# Patient Record
Sex: MALE | Race: WHITE | NOT HISPANIC OR LATINO | Employment: OTHER | URBAN - METROPOLITAN AREA
[De-identification: names, ages, dates, MRNs, and addresses within clinical notes are randomized per-mention and may not be internally consistent; named-entity substitution may affect disease eponyms.]

---

## 2017-01-16 DIAGNOSIS — R00.2 PALPITATIONS: Primary | ICD-10-CM

## 2017-01-18 RX ORDER — DILTIAZEM HYDROCHLORIDE 120 MG/1
120 CAPSULE, COATED, EXTENDED RELEASE ORAL DAILY
Qty: 90 CAPSULE | Refills: 0 | Status: SHIPPED | OUTPATIENT
Start: 2017-01-18 | End: 2017-02-24

## 2017-02-15 ENCOUNTER — DOCUMENTATION ONLY (OUTPATIENT)
Dept: LAB | Facility: CLINIC | Age: 77
End: 2017-02-15

## 2017-02-15 DIAGNOSIS — I10 HYPERTENSION GOAL BP (BLOOD PRESSURE) < 140/90: Primary | ICD-10-CM

## 2017-02-15 DIAGNOSIS — E78.5 HYPERLIPIDEMIA LDL GOAL <130: ICD-10-CM

## 2017-02-15 NOTE — PROGRESS NOTES
This patient has a lab only appointment on 2/20/2017 but does not have future orders. Please review, associate diagnosis and sign pending lab orders for the upcoming appointment.  He has an appointment with Dr. Doty on 2/24/2017.    Thank you,    [facility]

## 2017-02-20 DIAGNOSIS — E78.5 HYPERLIPIDEMIA LDL GOAL <130: ICD-10-CM

## 2017-02-20 DIAGNOSIS — I10 HYPERTENSION GOAL BP (BLOOD PRESSURE) < 140/90: ICD-10-CM

## 2017-02-20 LAB
ALBUMIN SERPL-MCNC: 3.7 G/DL (ref 3.4–5)
ALP SERPL-CCNC: 85 U/L (ref 40–150)
ALT SERPL W P-5'-P-CCNC: 32 U/L (ref 0–70)
ANION GAP SERPL CALCULATED.3IONS-SCNC: 8 MMOL/L (ref 3–14)
AST SERPL W P-5'-P-CCNC: 22 U/L (ref 0–45)
BILIRUB SERPL-MCNC: 0.6 MG/DL (ref 0.2–1.3)
BUN SERPL-MCNC: 10 MG/DL (ref 7–30)
CALCIUM SERPL-MCNC: 9.4 MG/DL (ref 8.5–10.1)
CHLORIDE SERPL-SCNC: 103 MMOL/L (ref 94–109)
CHOLEST SERPL-MCNC: 164 MG/DL
CO2 SERPL-SCNC: 27 MMOL/L (ref 20–32)
CREAT SERPL-MCNC: 0.73 MG/DL (ref 0.66–1.25)
ERYTHROCYTE [DISTWIDTH] IN BLOOD BY AUTOMATED COUNT: 14.3 % (ref 10–15)
GFR SERPL CREATININE-BSD FRML MDRD: NORMAL ML/MIN/1.7M2
GLUCOSE SERPL-MCNC: 98 MG/DL (ref 70–99)
HCT VFR BLD AUTO: 42.6 % (ref 40–53)
HDLC SERPL-MCNC: 66 MG/DL
HGB BLD-MCNC: 13.7 G/DL (ref 13.3–17.7)
LDLC SERPL CALC-MCNC: 82 MG/DL
MCH RBC QN AUTO: 30 PG (ref 26.5–33)
MCHC RBC AUTO-ENTMCNC: 32.2 G/DL (ref 31.5–36.5)
MCV RBC AUTO: 93 FL (ref 78–100)
NONHDLC SERPL-MCNC: 98 MG/DL
PLATELET # BLD AUTO: 193 10E9/L (ref 150–450)
POTASSIUM SERPL-SCNC: 3.9 MMOL/L (ref 3.4–5.3)
PROT SERPL-MCNC: 7.6 G/DL (ref 6.8–8.8)
RBC # BLD AUTO: 4.57 10E12/L (ref 4.4–5.9)
SODIUM SERPL-SCNC: 138 MMOL/L (ref 133–144)
TRIGL SERPL-MCNC: 81 MG/DL
WBC # BLD AUTO: 8.9 10E9/L (ref 4–11)

## 2017-02-20 PROCEDURE — 36415 COLL VENOUS BLD VENIPUNCTURE: CPT | Performed by: FAMILY MEDICINE

## 2017-02-20 PROCEDURE — 80053 COMPREHEN METABOLIC PANEL: CPT | Performed by: FAMILY MEDICINE

## 2017-02-20 PROCEDURE — 80061 LIPID PANEL: CPT | Performed by: FAMILY MEDICINE

## 2017-02-20 PROCEDURE — 85027 COMPLETE CBC AUTOMATED: CPT | Performed by: FAMILY MEDICINE

## 2017-02-24 ENCOUNTER — OFFICE VISIT (OUTPATIENT)
Dept: FAMILY MEDICINE | Facility: CLINIC | Age: 77
End: 2017-02-24
Payer: COMMERCIAL

## 2017-02-24 VITALS
SYSTOLIC BLOOD PRESSURE: 138 MMHG | HEIGHT: 69 IN | TEMPERATURE: 97.1 F | BODY MASS INDEX: 32.29 KG/M2 | HEART RATE: 74 BPM | OXYGEN SATURATION: 95 % | DIASTOLIC BLOOD PRESSURE: 67 MMHG | WEIGHT: 218 LBS

## 2017-02-24 DIAGNOSIS — R00.2 PALPITATIONS: ICD-10-CM

## 2017-02-24 DIAGNOSIS — E78.5 HYPERLIPIDEMIA LDL GOAL <130: Primary | ICD-10-CM

## 2017-02-24 DIAGNOSIS — J45.40 MODERATE PERSISTENT ASTHMA, UNCOMPLICATED: ICD-10-CM

## 2017-02-24 DIAGNOSIS — Z12.11 COLON CANCER SCREENING: ICD-10-CM

## 2017-02-24 PROCEDURE — 99214 OFFICE O/P EST MOD 30 MIN: CPT | Performed by: FAMILY MEDICINE

## 2017-02-24 RX ORDER — BUDESONIDE AND FORMOTEROL FUMARATE DIHYDRATE 160; 4.5 UG/1; UG/1
2 AEROSOL RESPIRATORY (INHALATION) 2 TIMES DAILY
Qty: 3 INHALER | Refills: 3 | Status: SHIPPED | OUTPATIENT
Start: 2017-02-24 | End: 2018-03-20

## 2017-02-24 RX ORDER — DILTIAZEM HYDROCHLORIDE 120 MG/1
120 CAPSULE, COATED, EXTENDED RELEASE ORAL DAILY
Qty: 90 CAPSULE | Refills: 3 | Status: SHIPPED | OUTPATIENT
Start: 2017-02-24 | End: 2018-03-20

## 2017-02-24 RX ORDER — ATORVASTATIN CALCIUM 20 MG/1
10 TABLET, FILM COATED ORAL DAILY
Qty: 45 TABLET | Refills: 3 | Status: SHIPPED | OUTPATIENT
Start: 2017-02-24 | End: 2018-03-07

## 2017-02-24 NOTE — PROGRESS NOTES
"SUBJECTIVE:  Guillermo Hogue, a 76 year old male scheduled an appointment to discuss the following issues:  Follow-up htn, high cholesterol and asthma.   Due for repeat colonoscopy in late spring.   Asthma stable. Weight loss - diet improving. Joined [employer] - silver sneakers.   Outside reading regularly. No chest pain. No abdominal pain. No nausea, vomiting or diarrhea or bloody stools. Occasionally gerd in past believed from sinuses -improving. HEPA filter.   Emotionally ok. No dating. Grandkid and great grandkids doing worse.    Past Medical History   Diagnosis Date     Allergic rhinitis age 22     Asthma 2009     Nasal polyposis 2009     Reactive airway disease 2009       Past Surgical History   Procedure Laterality Date     Colonoscopy       Tonsillectomy  age 22     Sinus surgery  2009     Colonoscopy with co2 insufflation N/A 5/14/2015     Procedure: COLONOSCOPY WITH CO2 INSUFFLATION;  Surgeon: Jose R Richmond MD;  Location:  OR       Family History   Problem Relation Age of Onset     Breast Cancer Mother      Arthritis Father      C.A.D. Father      CEREBROVASCULAR DISEASE Father      Respiratory Father      TB history     Rheumatoid Arthritis Father      CEREBROVASCULAR DISEASE Maternal Grandmother      Hypertension Maternal Grandmother      CANCER Maternal Grandfather      C.A.D. Brother      Prostate Cancer Brother      C.A.D. Brother        Social History   Substance Use Topics     Smoking status: Never Smoker     Smokeless tobacco: Never Used     Alcohol use No       ROS:  \  OBJECTIVE:  /67  Pulse 74  Temp 97.1  F (36.2  C) (Oral)  Ht 5' 9\" (1.753 m)  Wt 218 lb (98.9 kg)  SpO2 95%  BMI 32.19 kg/m2  EXAM:  GENERAL APPEARANCE: healthy, alert and no distress  NECK: no adenopathy, no asymmetry, masses, or scars and thyroid normal to palpation  RESP: lungs clear to auscultation - no rales, rhonchi or wheezes  CV: regular rates and rhythm, normal S1 S2, no S3 or S4 and no murmur, click or rub " -  ABDOMEN:  soft, nontender, no HSM or masses and bowel sounds normal  MS: extremities normal- no gross deformities noted, no evidence of inflammation in joints, FROM in all extremities.  PSYCH: mentation appears normal and affect normal/bright    ASSESSMENT / PLAN:  (E78.5) Hyperlipidemia LDL goal <130  (primary encounter diagnosis)  Comment: stable  Plan: atorvastatin (LIPITOR) 20 MG tablet        Continue diet/exercise. Chest pain or shortness of breath to er.     (R00.2) Palpitations  Comment: stable  Plan: diltiazem (CARTIA XT) 120 MG 24 hr capsule        To er if prolonged or LIGHTHEADED/ shortness of breath / chest pain. Reveiwed risks and side effects of medication      (J45.40) Moderate persistent asthma, uncomplicated  Comment: stable  Plan: budesonide-formoterol (SYMBICORT) 160-4.5         MCG/ACT Inhaler            (Z12.11) Colon cancer screening  Plan: GASTROENTEROLOGY ADULT REF PROCEDURE ONLY        Due in may - hopefully last needed with age.  Discussed psa - patient deferred.     Edi Doty

## 2017-02-24 NOTE — MR AVS SNAPSHOT
After Visit Summary   2/24/2017    Guillermo Hogue    MRN: [id number]           Patient Information     Date Of Birth          1940        Visit Information        Provider Department      2/24/2017 11:20 AM Edi Doty MD [facility]        Today's Diagnoses     Hyperlipidemia LDL goal <130    -  1    Palpitations        Moderate persistent asthma, uncomplicated        Colon cancer screening           Follow-ups after your visit        Additional Services     GASTROENTEROLOGY ADULT REF PROCEDURE ONLY       Last Lab Result: Creatinine       Date                     Value                 02/20/2017               0.73 mg/dL            10/13/2009               215              ----------  Body mass index is 32.19 kg/(m^2).     Needed:  No  Language:  English    Patient will be contacted to schedule procedure.     Please be aware that coverage of these services is subject to the terms and limitations of your health insurance plan.  Call member services at your health plan with any benefit or coverage questions.  Any procedures must be performed at a [place] facility OR coordinated by your clinic's referral office.    Please bring the following with you to your appointment:    (1) Any X-Rays, CTs or MRIs which have been performed.  Contact the facility where they were done to arrange for  prior to your scheduled appointment.    (2) List of current medications   (3) This referral request   (4) Any documents/labs given to you for this referral                  Your next 10 appointments already scheduled     Mar 20, 2017 10:00 AM CDT   Return Visit with Luis Cruz MD   [facility] ([facility] [place])    [address]  [place] [id number] [phone]              Who to contact     If you have questions or need follow up information about today's clinic visit or your schedule please contact [facility] directly at  "[phone].  Normal or non-critical lab and imaging results will be communicated to you by MyChart, letter or phone within 4 business days after the clinic has received the results. If you do not hear from us within 7 days, please contact the clinic through Sayduckhart or phone. If you have a critical or abnormal lab result, we will notify you by phone as soon as possible.  Submit refill requests through Phoodeez or call your pharmacy and they will forward the refill request to us. Please allow 3 business days for your refill to be completed.          Additional Information About Your Visit        SayduckharGraphite Software Information     Phoodeez lets you send messages to your doctor, view your test results, renew your prescriptions, schedule appointments and more. To sign up, go to [email or web address] . Click on \"Log in\" on the left side of the screen, which will take you to the Welcome page. Then click on \"Sign up Now\" on the right side of the page.     You will be asked to enter the access code listed below, as well as some personal information. Please follow the directions to create your username and password.     Your access code is: [email or web address]  Expires: 2017 11:58 AM     Your access code will  in 90 days. If you need help or a new code, please call your [place] clinic or [phone].        Care EveryWhere ID     This is your Care EveryWhere ID. This could be used by other organizations to access your [place] medical records  [id number]        Your Vitals Were     Pulse Temperature Height Pulse Oximetry BMI (Body Mass Index)       74 97.1  F (36.2  C) (Oral) 5' 9\" (1.753 m) 95% 32.19 kg/m2        Blood Pressure from Last 3 Encounters:   17 138/67   16 161/83   16 139/70    Weight from Last 3 Encounters:   17 218 lb (98.9 kg)   16 223 lb 3.2 oz (101.2 kg)   16 230 lb 3.2 oz (104.4 kg)              We Performed the Following     Asthma Action Plan (AAP)     GASTROENTEROLOGY " ADULT REF PROCEDURE ONLY          Today's Medication Changes          These changes are accurate as of: 2/24/17 11:58 AM.  If you have any questions, ask your nurse or doctor.               These medicines have changed or have updated prescriptions.        Dose/Directions    diltiazem 120 MG 24 hr capsule   Commonly known as:  CARTIA XT   This may have changed:  additional instructions   Used for:  Palpitations   Changed by:  Edi Doty MD        Dose:  120 mg   Take 1 capsule (120 mg) by mouth daily For blood pressure/lowers pulse. Pharmacy ok to hold prescription until due   Quantity:  90 capsule   Refills:  3            Where to get your medicines      These medications were sent to [facility]/pharmacy #[id number] - [place], [place] - [address],  [address]  [address], , [facility] [address]     Phone:  [phone]     atorvastatin 20 MG tablet    budesonide-formoterol 160-4.5 MCG/ACT Inhaler    diltiazem 120 MG 24 hr capsule                Primary Care Provider Office Phone # Fax #    Edi Doty [phone]       [facility] [address] [address] [facility] [address]        Thank you!     Thank you for choosing [facility]  for your care. Our goal is always to provide you with excellent care. Hearing back from our patients is one way we can continue to improve our services. Please take a few minutes to complete the written survey that you may receive in the mail after your visit with us. Thank you!             Your Updated Medication List - Protect others around you: Learn how to safely use, store and throw away your medicines at www.disposemymeds.org.          This list is accurate as of: 2/24/17 11:58 AM.  Always use your most recent med list.                   Brand Name Dispense Instructions for use    ARTHRITIS PAIN RELIEF PO      Take 1-2 tablets by mouth every 8 hours       atorvastatin 20 MG tablet    LIPITOR    45  tablet    Take 0.5 tablets (10 mg) by mouth daily For cholesterol Pharmacy ok to hold prescription until due       budesonide-formoterol 160-4.5 MCG/ACT Inhaler    SYMBICORT    3 Inhaler    Inhale 2 puffs into the lungs 2 times daily Pharmacy ok to hold prescription until due       COMPOUND - PHARMACY TO MIX COMPOUNDED MEDICATION    CMPD RX    2000 mL    20 mLs by Nasal Instillation route 2 times daily Itraconazole nasal solution 0.1mg/mL solution for nasal irrigation       diltiazem 120 MG 24 hr capsule    CARTIA XT    90 capsule    Take 1 capsule (120 mg) by mouth daily For blood pressure/lowers pulse. Pharmacy ok to hold prescription until due       diphenhydrAMINE-acetaminophen  MG tablet    TYLENOL PM     Take 1 tablet by mouth At Bedtime Once a week       IBUPROFEN PO      Take 200 mg by mouth Once a week alt with tylenol

## 2017-02-25 ASSESSMENT — ASTHMA QUESTIONNAIRES: ACT_TOTALSCORE: 25

## 2017-03-18 ENCOUNTER — TRANSFERRED RECORDS (OUTPATIENT)
Dept: HEALTH INFORMATION MANAGEMENT | Facility: CLINIC | Age: 77
End: 2017-03-18

## 2017-03-23 DIAGNOSIS — J32.4 CHRONIC PANSINUSITIS: ICD-10-CM

## 2017-03-23 NOTE — TELEPHONE ENCOUNTER
PT is requesting the following...    Gentamicin 0.016%/Dexameth 0.012% Irr  #2000ml  Last filled 11/23/16    Itraconazole 0.01% Nasal irrigation  #2000ml   Last filled 11/23/16      Thank you

## 2017-03-27 ENCOUNTER — TELEPHONE (OUTPATIENT)
Dept: FAMILY MEDICINE | Facility: CLINIC | Age: 77
End: 2017-03-27

## 2017-03-27 ENCOUNTER — TELEPHONE (OUTPATIENT)
Dept: OTOLARYNGOLOGY | Facility: CLINIC | Age: 77
End: 2017-03-27

## 2017-03-27 DIAGNOSIS — J31.0 CHRONIC RHINITIS: Primary | ICD-10-CM

## 2017-03-27 NOTE — TELEPHONE ENCOUNTER
Gokul the MA spoke with me about the lack of the second compounded medication.  I had only sent in Itraconazole, he also needs the Gent Dex.  I have just sent this in.

## 2017-03-27 NOTE — TELEPHONE ENCOUNTER
Pt has colonoscopy scheduled with Dr. Abisai Duarte at [facility] on 5-10-17.    Pt states he just picked up prep and it is different then what he has had in past.  He was given large galloon bottle and he would prefer to have two small bottles instead.  Advised that surgeons determine own prep's and if he wants something different the surgeon's nurse would need okay change with surgeon.  Message left at [facility] for nurse to contact pt about change.  Amanda Gold RN

## 2017-03-27 NOTE — TELEPHONE ENCOUNTER
This message was sent to the wrong team. It should be with Dr. Doty's team.    Regards,    Lata Bryan

## 2017-03-27 NOTE — TELEPHONE ENCOUNTER
Reason for call: Please call back regarding colonoscopy prep/Patient is questioning solution.    Phone Number Pt can be reached at: Home number on file [phone] (home)  Best Time: anytime  Can we leave a detailed message on this number? YES

## 2017-03-27 NOTE — TELEPHONE ENCOUNTER
Reason for Call:  Other prescription    Detailed comments: Per patient prior authorization is required for Gentamicin 0.016%/Dexameth 0.012% & Traconozole    Phone Number Patient can be reached at: Home number on file [phone] (home)    Best Time: anytime    Can we leave a detailed message on this number? YES    Call taken on 3/27/2017 at 10:44 AM by Astrid Ordonez

## 2017-03-27 NOTE — TELEPHONE ENCOUNTER
Daughter (memo) calling. There is come confusion about the compounding of this medication and with the pharmacy. Please call memo @  [phone].

## 2017-03-29 ENCOUNTER — TELEPHONE (OUTPATIENT)
Dept: FAMILY MEDICINE | Facility: CLINIC | Age: 77
End: 2017-03-29

## 2017-04-04 ENCOUNTER — OFFICE VISIT (OUTPATIENT)
Dept: OTOLARYNGOLOGY | Facility: CLINIC | Age: 77
End: 2017-04-04
Payer: COMMERCIAL

## 2017-04-04 VITALS — HEIGHT: 69 IN | BODY MASS INDEX: 31.84 KG/M2 | WEIGHT: 215 LBS | RESPIRATION RATE: 12 BRPM

## 2017-04-04 DIAGNOSIS — J32.4 CHRONIC PANSINUSITIS: Primary | ICD-10-CM

## 2017-04-04 DIAGNOSIS — J33.9 NASAL POLYPOSIS: ICD-10-CM

## 2017-04-04 DIAGNOSIS — J30.89 ALLERGIC RHINITIS DUE TO OTHER ALLERGIC TRIGGER, UNSPECIFIED RHINITIS SEASONALITY: ICD-10-CM

## 2017-04-04 PROCEDURE — 99214 OFFICE O/P EST MOD 30 MIN: CPT | Performed by: OTOLARYNGOLOGY

## 2017-04-04 NOTE — NURSING NOTE
"Chief Complaint   Patient presents with     RECHECK     sinus follow up       Initial Resp 12  Ht 1.753 m (5' 9\")  Wt 97.5 kg (215 lb)  BMI 31.75 kg/m2 Estimated body mass index is 31.75 kg/(m^2) as calculated from the following:    Height as of this encounter: 1.753 m (5' 9\").    Weight as of this encounter: 97.5 kg (215 lb).  Medication Reconciliation: complete     Gokul Thompson CMA      "

## 2017-04-04 NOTE — MR AVS SNAPSHOT
"              After Visit Summary   4/4/2017    Guillermo Hogue    MRN: [id number]           Patient Information     Date Of Birth          1940        Visit Information        Provider Department      4/4/2017 10:30 AM Alonso Valadez MD [facility]        Today's Diagnoses     Chronic pansinusitis    -  1    Allergic rhinitis due to other allergic trigger, unspecified rhinitis seasonality        Nasal polyposis           Follow-ups after your visit        Your next 10 appointments already scheduled     May 10, 2017   Procedure with Abisai Duarte,    [facility] (--)    [address] [facility] [address] [phone]              Who to contact     If you have questions or need follow up information about today's clinic visit or your schedule please contact [facility] directly at [phone].  Normal or non-critical lab and imaging results will be communicated to you by MyChart, letter or phone within 4 business days after the clinic has received the results. If you do not hear from us within 7 days, please contact the clinic through MyChart or phone. If you have a critical or abnormal lab result, we will notify you by phone as soon as possible.  Submit refill requests through DocuSpeak or call your pharmacy and they will forward the refill request to us. Please allow 3 business days for your refill to be completed.          Additional Information About Your Visit        MyChart Information     DocuSpeak lets you send messages to your doctor, view your test results, renew your prescriptions, schedule appointments and more. To sign up, go to [email or web address] . Click on \"Log in\" on the left side of the screen, which will take you to the Welcome page. Then click on \"Sign up Now\" on the right side of the page.     You will be asked to enter the access code listed below, as well as some personal information. Please follow the " "directions to create your username and password.     Your access code is: [email or web address]  Expires: 2017 12:58 PM     Your access code will  in 90 days. If you need help or a new code, please call your [place] clinic or [phone].        Care EveryWhere ID     This is your Care EveryWhere ID. This could be used by other organizations to access your [place] medical records  [id number]        Your Vitals Were     Respirations Height BMI (Body Mass Index)             12 1.753 m (5' 9\") 31.75 kg/m2          Blood Pressure from Last 3 Encounters:   17 138/67   16 161/83   16 139/70    Weight from Last 3 Encounters:   17 97.5 kg (215 lb)   17 98.9 kg (218 lb)   16 101.2 kg (223 lb 3.2 oz)              Today, you had the following     No orders found for display       Primary Care Provider Office Phone # Fax #    Edi Doty [phone]       [facility] [address] [facility] [address]        Thank you!     Thank you for choosing [facility]  for your care. Our goal is always to provide you with excellent care. Hearing back from our patients is one way we can continue to improve our services. Please take a few minutes to complete the written survey that you may receive in the mail after your visit with us. Thank you!             Your Updated Medication List - Protect others around you: Learn how to safely use, store and throw away your medicines at www.disposemymeds.org.          This list is accurate as of: 17 11:12 AM.  Always use your most recent med list.                   Brand Name Dispense Instructions for use    ARTHRITIS PAIN RELIEF PO      Take 1-2 tablets by mouth every 8 hours       atorvastatin 20 MG tablet    LIPITOR    45 tablet    Take 0.5 tablets (10 mg) by mouth daily For cholesterol Pharmacy ok to hold prescription until due       budesonide-formoterol 160-4.5 MCG/ACT Inhaler    SYMBICORT    " 3 Inhaler    Inhale 2 puffs into the lungs 2 times daily Pharmacy ok to hold prescription until due       * COMPOUND - PHARMACY TO MIX COMPOUNDED MEDICATION    CMPD RX    2000 mL    20 mLs by Nasal Instillation route 2 times daily Itraconazole nasal solution 0.1mg/mL solution for nasal irrigation       * COMPOUND - PHARMACY TO MIX COMPOUNDED MEDICATION    CMPD RX    2000 mL    20 mLs by Nasal Instillation route 2 times daily       diltiazem 120 MG 24 hr capsule    CARTIA XT    90 capsule    Take 1 capsule (120 mg) by mouth daily For blood pressure/lowers pulse. Pharmacy ok to hold prescription until due       diphenhydrAMINE-acetaminophen  MG tablet    TYLENOL PM     Take 1 tablet by mouth At Bedtime Once a week       IBUPROFEN PO      Take 200 mg by mouth Once a week alt with tylenol       * Notice:  This list has 2 medication(s) that are the same as other medications prescribed for you. Read the directions carefully, and ask your doctor or other care provider to review them with you.

## 2017-04-04 NOTE — PROGRESS NOTES
"History of Present Illness - Guillermo Hogue is a 76 year old male status post functional endoscopic sinus surgery on 2/12/2009.  At the visit on 11/11/2013 he told me that since the functional endoscopic sinus surgery his nose has been \"excellent.\"   However, we have continued to manage a chronic post nasal drainage.    To review, he was on BPM as an antihistamine, but because it was no longer available, I placed him on low dose atarax (hydroxyzine) to dry up his allergic rhinitis related post nasal drainage.  He has not had follow up since the last visit.  He told me that \"cornered the market on the BPM in [place]\" and he has just run out of it.  I tried him on atarax but he tells me that it is not helping nearly as much.  He is also taking the BID mucinex, but he thinks that is making only a minimal difference.  He had been taking sleeping pills to get him to sleep.    So with those failures of therapy, I tried a new approach, and used Gent Itraconazole Dex nasal irrigations with the thought that perhaps this was inflammatory post nasal drainage.  On 12/12/12, and the rinses worked dramatically well for him.  He did have complaints of sleep cycle issues, which I then addressed with decreasing the the Dex component.  But then at the follow up on 2/6/2013 there was a return of the drainage and congestion, so I resumed the full strength dex component for two months.  At the visit on 8/5/2014, I encouraged him to try and decrease the frequency of the medicated rinses, and continue saline irrigation.  And at the most recent visit on 10/5/2015, things were going great.        Past Medical History -   Patient Active Problem List   Diagnosis     Reactive airway disease     Nasal polyposis     AR (allergic rhinitis)     HYPERLIPIDEMIA LDL GOAL <130     Advanced directives, counseling/discussion     Moderate persistent asthma     BPH (benign prostatic hyperplasia)     Palpitations     Hypertension goal BP (blood " pressure) < 140/90     Fatty liver     Cataracts, bilateral       Current Medications -   Current Outpatient Prescriptions:      COMPOUND (CMPD RX) - PHARMACY TO MIX COMPOUNDED MEDICATION, 20 mLs by Nasal Instillation route 2 times daily, Disp: 2000 mL, Rfl: 06     COMPOUND (CMPD RX) - PHARMACY TO MIX COMPOUNDED MEDICATION, 20 mLs by Nasal Instillation route 2 times daily Itraconazole nasal solution 0.1mg/mL solution for nasal irrigation, Disp: 2000 mL, Rfl: 12     diphenhydrAMINE-acetaminophen (TYLENOL PM)  MG tablet, Take 1 tablet by mouth At Bedtime Once a week, Disp: , Rfl:      IBUPROFEN PO, Take 200 mg by mouth Once a week alt with tylenol, Disp: , Rfl:      diltiazem (CARTIA XT) 120 MG 24 hr capsule, Take 1 capsule (120 mg) by mouth daily For blood pressure/lowers pulse. Pharmacy ok to hold prescription until due, Disp: 90 capsule, Rfl: 3     atorvastatin (LIPITOR) 20 MG tablet, Take 0.5 tablets (10 mg) by mouth daily For cholesterol Pharmacy ok to hold prescription until due, Disp: 45 tablet, Rfl: 3     budesonide-formoterol (SYMBICORT) 160-4.5 MCG/ACT Inhaler, Inhale 2 puffs into the lungs 2 times daily Pharmacy ok to hold prescription until due, Disp: 3 Inhaler, Rfl: 3     Acetaminophen (ARTHRITIS PAIN RELIEF PO), Take 1-2 tablets by mouth every 8 hours, Disp: , Rfl:     Allergies -   Allergies   Allergen Reactions     Hytrin [Terazosin Hcl]      Leg swelling and vertigo     Simvastatin Other (See Comments)     Body aches     Cats      Codeine Nausea     Dogs      Dust Mites      [place] Trees      Seasonal Allergies        Social History -   Social History     Social History     Marital status:      Spouse name: Sanam -  2013     Number of children: 2     Years of education: 14     Occupational History     [employer] Management Retired          Social History Main Topics     Smoking status: Never Smoker     Smokeless tobacco: Never Used     Alcohol use No     Drug use: No     Sexual  "activity: No     Other Topics Concern     Parent/Sibling W/ Cabg, Mi Or Angioplasty Before 65f 55m? Yes     2 Brothers and father      Service No     Blood Transfusions No     Caffeine Concern No     Occupational Exposure Yes     he was in [employer]     Hobby Hazards No     Sleep Concern No     Stress Concern No     Weight Concern Yes     Special Diet No     Back Care No     Exercise No     Bike Helmet No     Seat Belt Yes     Self-Exams No     Social History Narrative       Family History -   Family History   Problem Relation Age of Onset     Breast Cancer Mother      Arthritis Father      C.A.D. Father      CEREBROVASCULAR DISEASE Father      Respiratory Father      TB history     Rheumatoid Arthritis Father      CEREBROVASCULAR DISEASE Maternal Grandmother      Hypertension Maternal Grandmother      CANCER Maternal Grandfather      C.A.D. Brother      Prostate Cancer Brother      C.A.D. Brother        Review of Systems - As per HPI and PMHx, otherwise 10+ system review of the head and neck, and general constitution is negative.    Physical Exam  Resp 12  Ht 1.753 m (5' 9\")    General - The patient is well nourished and well developed, and appears to have good nutritional status.  Alert and oriented to person and place, answers questions and cooperates with examination appropriately.   Head and Face - Normocephalic and atraumatic, with no gross asymmetry noted of the contour of the facial features.  The facial nerve is intact, with strong symmetric movements.  Voice and Breathing - The patient was breathing comfortably without the use of accessory muscles. There was no wheezing, stridor, or stertor.  The patients voice was clear and strong, and had appropriate pitch and quality.  Ears - The tympanic membranes are normal in appearance, bony landmarks are intact.  No retraction, perforation, or masses.  Normal mobility on valsalva maneuver, with no reports of dizziness on insuflation.  No fluid or purulence " was seen in the external canal or the middle ear. No evidence of infection of the middle ear or external canal, cerumen was normal in appearance.  Eyes - Extraocular movements intact, and the pupils were reactive to light.  Sclera were not icteric or injected, conjunctiva were pink and moist.  Mouth - Examination of the oral cavity showed pink, healthy oral mucosa. No lesions or ulcerations noted.  The tongue was mobile and midline, and the dentition were in good condition.    Throat - The walls of the oropharynx were smooth, pink, moist, symmetric, and had no lesions or ulcerations.  The tonsillar pillars and soft palate were symmetric.  The uvula was midline on elevation.    Neck - Normal midline excursion of the laryngotracheal complex during swallowing.  Full range of motion on passive movement.  Palpation of the occipital, submental, submandibular, internal jugular chain, and supraclavicular nodes did not demonstrate any abnormal lymph nodes or masses.  The carotid pulse was palpable bilaterally.  Palpation of the thyroid was soft and smooth, with no nodules or goiter appreciated.  The trachea was mobile and midline.  Nose - External contour is symmetric, no gross deflection or scars.  Nasal mucosa is pink and moist with no abnormal mucus.  The septum was midline and non-obstructive, turbinates of normal size and position.  No polyps, masses, or purulence noted on examination.      A/P - Guillermo Hogue is a 76 year old male  (J32.4) Chronic pansinusitis  (primary encounter diagnosis)  (J30.89) Allergic rhinitis due to other allergic trigger, unspecified rhinitis seasonality  (J33.9) Nasal polyposis    The Gent Dex, and Itraconazole are still helping him dramatically.  He mentioned that he did have a minor delay in getting the last mix, and it did somewhat worsen his arthritis.    I have renewed his prescriptions, and he should continue to have yearly follow up with me.

## 2017-04-13 DIAGNOSIS — R00.2 PALPITATIONS: ICD-10-CM

## 2017-04-13 RX ORDER — DILTIAZEM HYDROCHLORIDE 120 MG/1
CAPSULE, EXTENDED RELEASE ORAL
Qty: 90 CAPSULE | Refills: 0 | OUTPATIENT
Start: 2017-04-13

## 2017-05-10 ENCOUNTER — SURGERY (OUTPATIENT)
Age: 77
End: 2017-05-10

## 2017-05-10 ENCOUNTER — HOSPITAL ENCOUNTER (OUTPATIENT)
Facility: AMBULATORY SURGERY CENTER | Age: 77
Discharge: HOME | End: 2017-05-10
Attending: SURGERY | Admitting: SURGERY
Payer: COMMERCIAL

## 2017-05-10 VITALS
TEMPERATURE: 98.6 F | RESPIRATION RATE: 16 BRPM | OXYGEN SATURATION: 95 % | DIASTOLIC BLOOD PRESSURE: 69 MMHG | SYSTOLIC BLOOD PRESSURE: 126 MMHG

## 2017-05-10 PROCEDURE — 45380 COLONOSCOPY AND BIOPSY: CPT | Mod: PT

## 2017-05-10 PROCEDURE — 45380 COLONOSCOPY AND BIOPSY: CPT | Mod: PT | Performed by: SURGERY

## 2017-05-10 PROCEDURE — G8907 PT DOC NO EVENTS ON DISCHARG: HCPCS

## 2017-05-10 PROCEDURE — G8918 PT W/O PREOP ORDER IV AB PRO: HCPCS

## 2017-05-10 PROCEDURE — 88305 TISSUE EXAM BY PATHOLOGIST: CPT | Mod: 59 | Performed by: PATHOLOGY

## 2017-05-10 RX ORDER — ONDANSETRON 2 MG/ML
4 INJECTION INTRAMUSCULAR; INTRAVENOUS
Status: DISCONTINUED | OUTPATIENT
Start: 2017-05-10 | End: 2017-05-11 | Stop reason: HOSPADM

## 2017-05-10 RX ORDER — LIDOCAINE 40 MG/G
CREAM TOPICAL
Status: DISCONTINUED | OUTPATIENT
Start: 2017-05-10 | End: 2017-05-11 | Stop reason: HOSPADM

## 2017-05-10 RX ORDER — FENTANYL CITRATE 50 UG/ML
INJECTION, SOLUTION INTRAMUSCULAR; INTRAVENOUS PRN
Status: DISCONTINUED | OUTPATIENT
Start: 2017-05-10 | End: 2017-05-10 | Stop reason: HOSPADM

## 2017-05-10 RX ADMIN — FENTANYL CITRATE 100 MCG: 50 INJECTION, SOLUTION INTRAMUSCULAR; INTRAVENOUS at 10:45

## 2017-05-10 RX ADMIN — FENTANYL CITRATE 50 MCG: 50 INJECTION, SOLUTION INTRAMUSCULAR; INTRAVENOUS at 10:52

## 2017-05-10 RX ADMIN — FENTANYL CITRATE 50 MCG: 50 INJECTION, SOLUTION INTRAMUSCULAR; INTRAVENOUS at 11:10

## 2017-05-15 LAB — COPATH REPORT: NORMAL

## 2017-06-05 LAB — COLONOSCOPY: NORMAL

## 2018-02-06 ENCOUNTER — TRANSFERRED RECORDS (OUTPATIENT)
Dept: HEALTH INFORMATION MANAGEMENT | Facility: CLINIC | Age: 78
End: 2018-02-06

## 2018-03-07 DIAGNOSIS — E78.5 HYPERLIPIDEMIA LDL GOAL <130: ICD-10-CM

## 2018-03-07 RX ORDER — ATORVASTATIN CALCIUM 20 MG/1
TABLET, FILM COATED ORAL
Qty: 15 TABLET | Refills: 0 | Status: SHIPPED | OUTPATIENT
Start: 2018-03-07 | End: 2018-03-20

## 2018-03-07 NOTE — TELEPHONE ENCOUNTER
30-day supply only as patient is overdue for appointment     TC, patient due for:  OV with PCP for asthma, med refills, fasting lipids,     Health Maintenance Due   Topic Date Due     ASTHMA CONTROL TEST Q6 MOS  08/24/2017     ASTHMA ACTION PLAN Q1 YR  02/24/2018     FALL RISK ASSESSMENT  02/24/2018     Yeimi Bob RN

## 2018-03-07 NOTE — LETTER
March 7, 2018    Guillermo Hogue  [address]  [address]  [place] [address]    Dear Guillermo,       We recently received a refill request for atorvastatin (LIPITOR) 20 MG tablet.  We have refilled this for a one time 30 day supply only because you are due for a:    Cholesterol and asthma office visit and fasting lab appointment      Please schedule this lab appointment 4-5 days prior to the office visit.     Please call at your earliest convenience so that there will not be a delay with your future refills.          Thank you,   Your [facility] Team/  [phone]

## 2018-03-10 ENCOUNTER — TELEPHONE (OUTPATIENT)
Dept: FAMILY MEDICINE | Facility: CLINIC | Age: 78
End: 2018-03-10

## 2018-03-10 DIAGNOSIS — I10 HYPERTENSION GOAL BP (BLOOD PRESSURE) < 140/90: Primary | ICD-10-CM

## 2018-03-10 DIAGNOSIS — E78.5 HYPERLIPIDEMIA LDL GOAL <130: ICD-10-CM

## 2018-03-10 DIAGNOSIS — R00.2 PALPITATIONS: ICD-10-CM

## 2018-03-10 NOTE — TELEPHONE ENCOUNTER
Patient has a physical on 3/20 with Dr. Doty. He is scheduled for labs on 3/16 but does not have any standing orders. Please put orders in as needed for the previsit labs. He will be fasting.    Amanda MEJIA  Central Scheduler

## 2018-03-16 DIAGNOSIS — R00.2 PALPITATIONS: ICD-10-CM

## 2018-03-16 DIAGNOSIS — I10 HYPERTENSION GOAL BP (BLOOD PRESSURE) < 140/90: ICD-10-CM

## 2018-03-16 DIAGNOSIS — E78.5 HYPERLIPIDEMIA LDL GOAL <130: ICD-10-CM

## 2018-03-16 LAB
ALBUMIN SERPL-MCNC: 3.8 G/DL (ref 3.4–5)
ALP SERPL-CCNC: 82 U/L (ref 40–150)
ALT SERPL W P-5'-P-CCNC: 26 U/L (ref 0–70)
ANION GAP SERPL CALCULATED.3IONS-SCNC: 7 MMOL/L (ref 3–14)
AST SERPL W P-5'-P-CCNC: 20 U/L (ref 0–45)
BILIRUB SERPL-MCNC: 0.7 MG/DL (ref 0.2–1.3)
BUN SERPL-MCNC: 13 MG/DL (ref 7–30)
CALCIUM SERPL-MCNC: 9.3 MG/DL (ref 8.5–10.1)
CHLORIDE SERPL-SCNC: 103 MMOL/L (ref 94–109)
CHOLEST SERPL-MCNC: 153 MG/DL
CO2 SERPL-SCNC: 28 MMOL/L (ref 20–32)
CREAT SERPL-MCNC: 0.83 MG/DL (ref 0.66–1.25)
ERYTHROCYTE [DISTWIDTH] IN BLOOD BY AUTOMATED COUNT: 14.2 % (ref 10–15)
GFR SERPL CREATININE-BSD FRML MDRD: 90 ML/MIN/1.7M2
GLUCOSE SERPL-MCNC: 100 MG/DL (ref 70–99)
HCT VFR BLD AUTO: 44.1 % (ref 40–53)
HDLC SERPL-MCNC: 70 MG/DL
HGB BLD-MCNC: 14.3 G/DL (ref 13.3–17.7)
LDLC SERPL CALC-MCNC: 67 MG/DL
MCH RBC QN AUTO: 29.9 PG (ref 26.5–33)
MCHC RBC AUTO-ENTMCNC: 32.4 G/DL (ref 31.5–36.5)
MCV RBC AUTO: 92 FL (ref 78–100)
NONHDLC SERPL-MCNC: 83 MG/DL
PLATELET # BLD AUTO: 180 10E9/L (ref 150–450)
POTASSIUM SERPL-SCNC: 4 MMOL/L (ref 3.4–5.3)
PROT SERPL-MCNC: 7.9 G/DL (ref 6.8–8.8)
RBC # BLD AUTO: 4.79 10E12/L (ref 4.4–5.9)
SODIUM SERPL-SCNC: 138 MMOL/L (ref 133–144)
TRIGL SERPL-MCNC: 81 MG/DL
WBC # BLD AUTO: 8.6 10E9/L (ref 4–11)

## 2018-03-16 PROCEDURE — 80053 COMPREHEN METABOLIC PANEL: CPT | Performed by: FAMILY MEDICINE

## 2018-03-16 PROCEDURE — 85027 COMPLETE CBC AUTOMATED: CPT | Performed by: FAMILY MEDICINE

## 2018-03-16 PROCEDURE — 36415 COLL VENOUS BLD VENIPUNCTURE: CPT | Performed by: FAMILY MEDICINE

## 2018-03-16 PROCEDURE — 80061 LIPID PANEL: CPT | Performed by: FAMILY MEDICINE

## 2018-03-20 ENCOUNTER — OFFICE VISIT (OUTPATIENT)
Dept: FAMILY MEDICINE | Facility: CLINIC | Age: 78
End: 2018-03-20
Payer: COMMERCIAL

## 2018-03-20 VITALS
HEART RATE: 71 BPM | RESPIRATION RATE: 20 BRPM | TEMPERATURE: 97 F | BODY MASS INDEX: 32.14 KG/M2 | HEIGHT: 69 IN | SYSTOLIC BLOOD PRESSURE: 139 MMHG | DIASTOLIC BLOOD PRESSURE: 73 MMHG | WEIGHT: 217 LBS | OXYGEN SATURATION: 95 %

## 2018-03-20 DIAGNOSIS — R00.2 PALPITATIONS: ICD-10-CM

## 2018-03-20 DIAGNOSIS — I10 HYPERTENSION GOAL BP (BLOOD PRESSURE) < 140/90: Primary | ICD-10-CM

## 2018-03-20 DIAGNOSIS — J45.40 MODERATE PERSISTENT ASTHMA, UNCOMPLICATED: ICD-10-CM

## 2018-03-20 DIAGNOSIS — E78.5 HYPERLIPIDEMIA LDL GOAL <130: ICD-10-CM

## 2018-03-20 PROCEDURE — 99214 OFFICE O/P EST MOD 30 MIN: CPT | Performed by: FAMILY MEDICINE

## 2018-03-20 RX ORDER — DILTIAZEM HYDROCHLORIDE 120 MG/1
120 CAPSULE, COATED, EXTENDED RELEASE ORAL DAILY
Qty: 90 CAPSULE | Refills: 3 | Status: SHIPPED | OUTPATIENT
Start: 2018-03-20 | End: 2023-01-23

## 2018-03-20 RX ORDER — BUDESONIDE AND FORMOTEROL FUMARATE DIHYDRATE 160; 4.5 UG/1; UG/1
2 AEROSOL RESPIRATORY (INHALATION) 2 TIMES DAILY
Qty: 3 INHALER | Refills: 3 | Status: SHIPPED | OUTPATIENT
Start: 2018-03-20 | End: 2018-06-27

## 2018-03-20 RX ORDER — ATORVASTATIN CALCIUM 20 MG/1
TABLET, FILM COATED ORAL
Qty: 45 TABLET | Refills: 1 | Status: SHIPPED | OUTPATIENT
Start: 2018-03-20 | End: 2018-09-30

## 2018-03-20 ASSESSMENT — PAIN SCALES - GENERAL: PAINLEVEL: MILD PAIN (2)

## 2018-03-20 NOTE — MR AVS SNAPSHOT
After Visit Summary   3/20/2018    Guillermo Hogue    MRN: [id number]           Patient Information     Date Of Birth          1940        Visit Information        Provider Department      3/20/2018 10:10 AM Edi Doty MD [facility]        Today's Diagnoses     Hypertension goal BP (blood pressure) < 140/90    -  1    Hyperlipidemia LDL goal <130        Palpitations        Moderate persistent asthma, uncomplicated           Follow-ups after your visit        Additional Services     CARDIOLOGY EVAL ADULT REFERRAL       Preferred location:  OTHER PROVIDERS:[email or web address] [phone] Palpitations follow-up.      Please be aware that coverage of these services is subject to the terms and limitations of your health insurance plan.  Call member services at your health plan with any benefit or coverage questions.      Please bring the following to your appointment:  Any x-rays, CTs or MRIs which have been performed. Contact the facility where they were done to arrange for  prior to your scheduled appointment.    List of current medications  This referral request   Any documents/labs given to you for this referral                  Who to contact     If you have questions or need follow up information about today's clinic visit or your schedule please contact [facility] directly at [phone].  Normal or non-critical lab and imaging results will be communicated to you by MyChart, letter or phone within 4 business days after the clinic has received the results. If you do not hear from us within 7 days, please contact the clinic through MyChart or phone. If you have a critical or abnormal lab result, we will notify you by phone as soon as possible.  Submit refill requests through [employer] or call your pharmacy and they will forward the refill request to us. Please allow 3 business days for your refill to be completed.          Additional  "Information About Your Visit        Care EveryWhere ID     This is your Care EveryWhere ID. This could be used by other organizations to access your [facility] medical records  [id number]        Your Vitals Were     Pulse Temperature Respirations Height Pulse Oximetry BMI (Body Mass Index)    71 97  F (36.1  C) (Oral) 20 5' 9\" (1.753 m) 95% 32.05 kg/m2       Blood Pressure from Last 3 Encounters:   03/20/18 139/73   05/10/17 126/69   02/24/17 138/67    Weight from Last 3 Encounters:   03/20/18 217 lb (98.4 kg)   04/04/17 215 lb (97.5 kg)   02/24/17 218 lb (98.9 kg)              We Performed the Following     CARDIOLOGY EVAL ADULT REFERRAL          Today's Medication Changes          These changes are accurate as of 3/20/18 10:35 AM.  If you have any questions, ask your nurse or doctor.               These medicines have changed or have updated prescriptions.        Dose/Directions    atorvastatin 20 MG tablet   Commonly known as:  LIPITOR   This may have changed:  See the new instructions.   Used for:  Hyperlipidemia LDL goal <130   Changed by:  Edi Doty MD        TAKE ONE HALF TABLET BY MOUTH DAILY FOR CHOLESTEROL.   Quantity:  45 tablet   Refills:  1            Where to get your medicines      These medications were sent to [facility]/pharmacy #[id number] - [place], [place] - Count includes the [facility],  AT [address], [facility] [address]     Phone:  [phone]     atorvastatin 20 MG tablet    budesonide-formoterol 160-4.5 MCG/ACT Inhaler    diltiazem 120 MG 24 hr capsule                Primary Care Provider Office Phone # Fax #    Edi Doty [phone] [address] [address] [facility] [address]        Equal Access to Services     VIRI AMANDA AH: Amadou mendes Sovish, wakarenda luqethel, qaybta kaalmada sun, samia chen. So [facility] [phone].    ATENCIÓN: Si habla español, tiene a murcia disposición servicios " vanda de asistencia lingüística. Fabrice shankar [phone].    We comply with applicable federal civil rights laws and [place] laws. We do not discriminate on the basis of race, color, national origin, age, disability, sex, sexual orientation, or gender identity.            Thank you!     Thank you for choosing [facility]  for your care. Our goal is always to provide you with excellent care. Hearing back from our patients is one way we can continue to improve our services. Please take a few minutes to complete the written survey that you may receive in the mail after your visit with us. Thank you!             Your Updated Medication List - Protect others around you: Learn how to safely use, store and throw away your medicines at www.disposemymeds.org.          This list is accurate as of 3/20/18 10:35 AM.  Always use your most recent med list.                   Brand Name Dispense Instructions for use Diagnosis    ARTHRITIS PAIN RELIEF PO      Take 1-2 tablets by mouth every 8 hours        atorvastatin 20 MG tablet    LIPITOR    45 tablet    TAKE ONE HALF TABLET BY MOUTH DAILY FOR CHOLESTEROL.    Hyperlipidemia LDL goal <130       budesonide-formoterol 160-4.5 MCG/ACT Inhaler    SYMBICORT    3 Inhaler    Inhale 2 puffs into the lungs 2 times daily Pharmacy ok to hold prescription until due    Moderate persistent asthma, uncomplicated       * COMPOUNDED NON-CONTROLLED SUBSTANCE - PHARMACY TO MIX COMPOUNDED MEDICATION    CMPD RX    2000 mL    20 mLs by Nasal Instillation route 2 times daily Itraconazole nasal solution 0.1mg/mL solution for nasal irrigation    Chronic pansinusitis       * COMPOUNDED NON-CONTROLLED SUBSTANCE - PHARMACY TO MIX COMPOUNDED MEDICATION    CMPD RX    2000 mL    20 mLs by Nasal Instillation route 2 times daily    Chronic rhinitis       diltiazem 120 MG 24 hr capsule    CARTIA XT    90 capsule    Take 1 capsule (120 mg) by mouth daily For blood pressure/lowers pulse. Pharmacy ok  to hold prescription until due    Palpitations       diphenhydrAMINE-acetaminophen  MG tablet    TYLENOL PM     Take 1 tablet by mouth At Bedtime Once a week        IBUPROFEN PO      Take 200 mg by mouth Once a week alt with tylenol        * Notice:  This list has 2 medication(s) that are the same as other medications prescribed for you. Read the directions carefully, and ask your doctor or other care provider to review them with you.

## 2018-03-20 NOTE — PROGRESS NOTES
"SUBJECTIVE:  Guillermo Hogue, a 77 year old male scheduled an appointment to discuss the following issues:     Hypertension goal BP (blood pressure) < 140/90  Moderate persistent asthma without complication  Hyperlipidemia LDL goal <130  Follow-up htn, high cholesterol and asthma.  Breathing worse in spring/winter. No winter vacation.   No chest pain. Walking doing ok. No nausea, vomiting or diarrhea. No black or blood stools. No urine changes or hematuria.   Seen cardiology 2 years - wanted to see again.   Medical, social, surgical, and family histories reviewed.    ROS:  All other ROS negative    OBJECTIVE:  /73  Pulse 71  Temp 97  F (36.1  C) (Oral)  Resp 20  Ht 5' 9\" (1.753 m)  Wt 217 lb (98.4 kg)  SpO2 95%  BMI 32.05 kg/m2  EXAM:  GENERAL APPEARANCE: healthy, alert and no distress  EYES: EOMI,  PERRL  HENT: ear canals and TM's normal and nose and mouth without ulcers or lesions  NECK: no adenopathy, no asymmetry, masses, or scars and thyroid normal to palpation  RESP: lungs clear to auscultation - no rales, rhonchi or wheezes  CV: regular rates and rhythm, normal S1 S2, no S3 or S4 and no murmur, click or rub -  ABDOMEN:  soft, nontender, no HSM or masses and bowel sounds normal  MS: extremities normal- no gross deformities noted, no evidence of inflammation in joints, FROM in all extremities.  NEURO: Normal strength and tone, sensory exam grossly normal, mentation intact and speech normal  PSYCH: mentation appears normal and affect normal/bright    ASSESSMENT / PLAN:  (I10) Hypertension goal BP (blood pressure) < 140/90  (primary encounter diagnosis)  Comment: stable  Plan: self-monitor and exercise. Return to clinic if worse/limit sodium    (E78.5) Hyperlipidemia LDL goal <130  Comment: excellent  Plan: atorvastatin (LIPITOR) 20 MG tablet        Continue exercise    (R00.2) Palpitations  Comment: intermittent  Plan: diltiazem (CARTIA XT) 120 MG 24 hr capsule,         CARDIOLOGY EVAL ADULT " REFERRAL        Patient would like to go back to cardiology [facility]. Prolonged or chest pain/ LIGHTHEADED to er.     (J45.40) Moderate persistent asthma, uncomplicated  Comment: stable  Plan: budesonide-formoterol (SYMBICORT) 160-4.5         MCG/ACT Inhaler        Back to specialist if worse. Acutely worsening shortness of breath to er.     Edi Doty md

## 2018-03-20 NOTE — NURSING NOTE
"Chief Complaint   Patient presents with     Hypertension     Lipids       Initial /73  Pulse 71  Temp 97  F (36.1  C) (Oral)  Resp 20  Ht 5' 9\" (1.753 m)  Wt 217 lb (98.4 kg)  SpO2 95%  BMI 32.05 kg/m2 Estimated body mass index is 32.05 kg/(m^2) as calculated from the following:    Height as of this encounter: 5' 9\" (1.753 m).    Weight as of this encounter: 217 lb (98.4 kg).    Amanda Mead, CMA    "

## 2018-05-15 ENCOUNTER — TRANSFERRED RECORDS (OUTPATIENT)
Dept: HEALTH INFORMATION MANAGEMENT | Facility: CLINIC | Age: 78
End: 2018-05-15

## 2018-06-09 DIAGNOSIS — J45.40 MODERATE PERSISTENT ASTHMA, UNCOMPLICATED: ICD-10-CM

## 2018-06-11 RX ORDER — BUDESONIDE AND FORMOTEROL FUMARATE DIHYDRATE 160; 4.5 UG/1; UG/1
AEROSOL RESPIRATORY (INHALATION)
Qty: 30.6 INHALER | Refills: 1 | Status: SHIPPED | OUTPATIENT
Start: 2018-06-11 | End: 2019-01-07

## 2018-06-11 NOTE — TELEPHONE ENCOUNTER
symbicort refill request  Dr. Edi Doty had filled on 3/20/18  #3 with 3 refills.  Resent.  Holli John RN

## 2018-06-12 ENCOUNTER — TRANSFERRED RECORDS (OUTPATIENT)
Dept: HEALTH INFORMATION MANAGEMENT | Facility: CLINIC | Age: 78
End: 2018-06-12

## 2018-06-19 ENCOUNTER — THERAPY VISIT (OUTPATIENT)
Dept: PHYSICAL THERAPY | Facility: CLINIC | Age: 78
End: 2018-06-19
Payer: COMMERCIAL

## 2018-06-19 DIAGNOSIS — R29.818 NEUROLOGICAL SIGNS: ICD-10-CM

## 2018-06-19 DIAGNOSIS — H81.12 BENIGN PAROXYSMAL POSITIONAL VERTIGO, LEFT: ICD-10-CM

## 2018-06-19 PROCEDURE — G8981 BODY POS CURRENT STATUS: HCPCS | Mod: GP | Performed by: PHYSICAL THERAPIST

## 2018-06-19 PROCEDURE — 95992 CANALITH REPOSITIONING PROC: CPT | Mod: GP | Performed by: PHYSICAL THERAPIST

## 2018-06-19 PROCEDURE — G8982 BODY POS GOAL STATUS: HCPCS | Mod: GP | Performed by: PHYSICAL THERAPIST

## 2018-06-19 PROCEDURE — 97161 PT EVAL LOW COMPLEX 20 MIN: CPT | Mod: GP | Performed by: PHYSICAL THERAPIST

## 2018-06-19 NOTE — MR AVS SNAPSHOT
"              After Visit Summary   6/19/2018    Guillermo Hogue    MRN: [id number]           Patient Information     Date Of Birth          1940        Visit Information        Provider Department      6/19/2018 9:20 AM Chinedu Muro PT [facility] [place] [facility]        Today's Diagnoses     Benign paroxysmal positional vertigo, left        Neurological signs           Follow-ups after your visit        Your next 10 appointments already scheduled     Jun 26, 2018  1:50 PM CDT   [facility] with Chinedu Muro PT   [facility] [place] [facility] ([facility] [place])    [address]  [address]  [place] [address] [phone]              Who to contact     If you have questions or need follow up information about today's clinic visit or your schedule please contact [facility] [place] directly at [phone].  Normal or non-critical lab and imaging results will be communicated to you by [employer], letter or phone within 4 business days after the clinic has received the results. If you do not hear from us within 7 days, please contact the clinic through [employer] or phone. If you have a critical or abnormal lab result, we will notify you by phone as soon as possible.  Submit refill requests through Ex[employer] or call your pharmacy and they will forward the refill request to us. Please allow 3 business days for your refill to be completed.          Additional Information About Your Visit        MyChart Information     Ex[employer]aster lets you send messages to your doctor, view your test results, renew your prescriptions, schedule appointments and more. To sign up, go to [email or web address] . Click on \"Log in\" on the left side of the screen, which will take you to the Welcome page. Then click on \"Sign up Now\" on the right side of the page.     You will be asked to enter the access code listed below, as well as some personal information. Please follow " the directions to create your username and password.     Your access code is: [email or web address]  Expires: 2018 12:58 PM     Your access code will  in 90 days. If you need help or a new code, please call your [place] clinic or [phone].        Care EveryWhere ID     This is your Care EveryWhere ID. This could be used by other organizations to access your [place] medical records  [id number]         Blood Pressure from Last 3 Encounters:   18 139/73   05/10/17 126/69   17 138/67    Weight from Last 3 Encounters:   18 98.4 kg (217 lb)   17 97.5 kg (215 lb)   17 98.9 kg (218 lb)              We Performed the Following     CANALITH REPOSITIONING, PER DAY     HC PT [place], LOW COMPLEXITY        Primary Care Provider Office Phone # Fax #    Edi Glenn Doty [phone] [address] [facility] [address]        Equal Access to Services     [facility]: Hadii aad ku hadasho Soomaali, waaxda luqadaha, qaybta kaalmada adeegyada, waxay idiin hayfrankien benigno reza . So [facility] [phone].    ATENCIÓN: Si habla español, tiene a murcia disposición servicios gratuitos de asistencia lingüística. Llame al [phone].    We comply with applicable federal civil rights laws and [place] laws. We do not discriminate on the basis of race, color, national origin, age, disability, sex, sexual orientation, or gender identity.            Thank you!     Thank you for choosing [facility]  for your care. Our goal is always to provide you with excellent care. Hearing back from our patients is one way we can continue to improve our services. Please take a few minutes to complete the written survey that you may receive in the mail after your visit with us. Thank you!             Your Updated Medication List - Protect others around you: Learn how to safely use, store and throw away your medicines at www.disposemymeds.org.          This list is  accurate as of 6/19/18 12:58 PM.  Always use your most recent med list.                   Brand Name Dispense Instructions for use Diagnosis    ARTHRITIS PAIN RELIEF PO      Take 1-2 tablets by mouth every 8 hours        atorvastatin 20 MG tablet    LIPITOR    45 tablet    TAKE ONE HALF TABLET BY MOUTH DAILY FOR CHOLESTEROL.    Hyperlipidemia LDL goal <130       * budesonide-formoterol 160-4.5 MCG/ACT Inhaler    SYMBICORT    3 Inhaler    Inhale 2 puffs into the lungs 2 times daily Pharmacy ok to hold prescription until due    Moderate persistent asthma, uncomplicated       * SYMBICORT 160-4.5 MCG/ACT Inhaler   Generic drug:  budesonide-formoterol     30.6 Inhaler    INHALE 2 PUFFS INTO THE LUNGS 2 TIMES DAILY PHARMACY OK TO HOLD PRESCRIPTION UNTIL DUE    Moderate persistent asthma, uncomplicated       * COMPOUNDED NON-CONTROLLED SUBSTANCE - PHARMACY TO MIX COMPOUNDED MEDICATION    CMPD RX    2000 mL    20 mLs by Nasal Instillation route 2 times daily Itraconazole nasal solution 0.1mg/mL solution for nasal irrigation    Chronic pansinusitis       * COMPOUNDED NON-CONTROLLED SUBSTANCE - PHARMACY TO MIX COMPOUNDED MEDICATION    CMPD RX    2000 mL    20 mLs by Nasal Instillation route 2 times daily    Chronic rhinitis       diltiazem 120 MG 24 hr capsule    CARTIA XT    90 capsule    Take 1 capsule (120 mg) by mouth daily For blood pressure/lowers pulse. Pharmacy ok to hold prescription until due    Palpitations       diphenhydrAMINE-acetaminophen  MG tablet    TYLENOL PM     Take 1 tablet by mouth At Bedtime Once a week        IBUPROFEN PO      Take 200 mg by mouth Once a week alt with tylenol        * Notice:  This list has 4 medication(s) that are the same as other medications prescribed for you. Read the directions carefully, and ask your doctor or other care provider to review them with you.

## 2018-06-19 NOTE — PROGRESS NOTES
[facility] Initial Evaluation  Subjective:  HPI                    Objective:  System    Physical Exam    General     ROS  S:  Guillermo is a 78 year old patient complaining of vertigo.  Denies HAs, tinnitus, changes in vision or nausea  Onset of symptoms: Started around 6/1/18 for unknown reasons.  No recent head trauma, ear or sinus infections, change in meds      Is this a recurrent problem: Yes, was seen by me in 2012 for the same issue  Progression since onset: mild improvement  Frequency of episodes/time of day: worse in the AM  Duration of episodes: less than a minute  Exacerbating factors: rolling to the L  Relieving factors: rolling to the R  Previous treatments:  PT- very helpful  Special tests/diagnostics performed: no new testing  Significant medical hx: asthma, overweight  Meds: none reported on HH but later states he takes meds for asthma  Occupation: retired   Work requirements: N/A  General health reported by patient: fair  Red Flags: dizziness      O:  Cervical ROM screen: major loss of cervical extension, other motions are functional  Oculomotor/gaze stability screen: Smooth pursuits, saccades and gaze stab WNL  Vertebral artery test: negative  Hallpike-[place] maneuver:  R: negative  L: negative  Horizontal canal test:  R: negative  L: positive  Balance testing:  Not tested      Assessment/Plan:    Patient is a 78 year old male with dizziness complaints.    Patient has the following significant findings with corresponding treatment plan.                Diagnosis 1:  BPPV  Dizziness - CRMs and home program    Therapy Evaluation Codes:   1) History comprised of:   Personal factors that impact the plan of care:      Age, Overall behavior pattern and Past/current experiences.    Comorbidity factors that impact the plan of care are:      Dizziness.     Medications impacting care: None.  2) Examination of Body Systems comprised of:   Body structures and functions that impact the plan of  care:      vestibular.   Activity limitations that impact the plan of care are:      rolling in bed.  3) Clinical presentation characteristics are:   Stable/Uncomplicated.  4) Decision-Making    Low complexity using standardized patient assessment instrument and/or measureable assessment of functional outcome.  Cumulative Therapy Evaluation is: Low complexity.    Previous and current functional limitations:  (See Goal Flow Sheet for this information)    Short term and Long term goals: (See Goal Flow Sheet for this information)     Communication ability:  Patient appears to be able to clearly communicate and understand verbal and written communication and follow directions correctly.  Treatment Explanation - The following has been discussed with the patient:   RX ordered/plan of care  Anticipated outcomes  Possible risks and side effects  This patient would benefit from PT intervention to resume normal activities.   Rehab potential is good.    Frequency:  1 X week, once daily  Duration:  for 3 weeks  Discharge Plan:  Achieve all LTG.  Independent in home treatment program.  Reach maximal therapeutic benefit.    Please refer to the daily flowsheet for treatment today, total treatment time and time spent performing 1:1 timed codes.

## 2018-06-26 ENCOUNTER — THERAPY VISIT (OUTPATIENT)
Dept: PHYSICAL THERAPY | Facility: CLINIC | Age: 78
End: 2018-06-26
Payer: COMMERCIAL

## 2018-06-26 DIAGNOSIS — R29.818 NEUROLOGICAL SIGNS: ICD-10-CM

## 2018-06-26 DIAGNOSIS — H81.12 BENIGN PAROXYSMAL POSITIONAL VERTIGO, LEFT: ICD-10-CM

## 2018-06-26 PROCEDURE — G8983 BODY POS D/C STATUS: HCPCS | Mod: GP | Performed by: PHYSICAL THERAPIST

## 2018-06-26 PROCEDURE — 97530 THERAPEUTIC ACTIVITIES: CPT | Mod: GP | Performed by: PHYSICAL THERAPIST

## 2018-06-26 PROCEDURE — 95992 CANALITH REPOSITIONING PROC: CPT | Mod: GP | Performed by: PHYSICAL THERAPIST

## 2018-06-26 PROCEDURE — G8982 BODY POS GOAL STATUS: HCPCS | Mod: GP | Performed by: PHYSICAL THERAPIST

## 2018-06-26 NOTE — MR AVS SNAPSHOT
"              After Visit Summary   2018    Guillermo Hogue    MRN: [id number]           Patient Information     Date Of Birth          1940        Visit Information        Provider Department      2018 1:50 PM Chinedu Muro PT [facility] [place]        Today's Diagnoses     Benign paroxysmal positional vertigo, left        Neurological signs           Follow-ups after your visit        Who to contact     If you have questions or need follow up information about today's clinic visit or your schedule please contact [facility] [place] directly at [phone].  Normal or non-critical lab and imaging results will be communicated to you by [employer], letter or phone within 4 business days after the clinic has received the results. If you do not hear from us within 7 days, please contact the clinic through [employer] or phone. If you have a critical or abnormal lab result, we will notify you by phone as soon as possible.  Submit refill requests through [employer] or call your pharmacy and they will forward the refill request to us. Please allow 3 business days for your refill to be completed.          Additional Information About Your Visit        MyChart Information     [employer] lets you send messages to your doctor, view your test results, renew your prescriptions, schedule appointments and more. To sign up, go to [email or web address] . Click on \"Log in\" on the left side of the screen, which will take you to the Welcome page. Then click on \"Sign up Now\" on the right side of the page.     You will be asked to enter the access code listed below, as well as some personal information. Please follow the directions to create your username and password.     Your access code is: [email or web address]  Expires: 2018 12:58 PM     Your access code will  in 90 days. If you need help or a new code, please call your [place] clinic or [phone].        Care " EveryWhere ID     This is your Care EveryWhere ID. This could be used by other organizations to access your [place] medical records  [id number]         Blood Pressure from Last 3 Encounters:   03/20/18 139/73   05/10/17 126/69   02/24/17 138/67    Weight from Last 3 Encounters:   03/20/18 98.4 kg (217 lb)   04/04/17 97.5 kg (215 lb)   02/24/17 98.9 kg (218 lb)              We Performed the Following     CANALITH REPOSITIONING, PER DAY     THERAPEUTIC ACTIVITIES        Primary Care Provider Office Phone # Fax #    Edi Glenn Doty [phone] [address] [facility] [address]        Equal Access to Services     VIRI AMANDA : Hadii aad ku hadasho Sovish, waaxda luqadaha, qaybta kaalmada adeegyada, samia chen. So [facility] [phone].    ATENCIÓN: Si habla español, tiene a murcia disposición servicios gratuitos de asistencia lingüística. [facility] [phone].    We comply with applicable federal civil rights laws and [place] laws. We do not discriminate on the basis of race, color, national origin, age, disability, sex, sexual orientation, or gender identity.            Thank you!     Thank you for choosing [facility]   for your care. Our goal is always to provide you with excellent care. Hearing back from our patients is one way we can continue to improve our services. Please take a few minutes to complete the written survey that you may receive in the mail after your visit with us. Thank you!             Your Updated Medication List - Protect others around you: Learn how to safely use, store and throw away your medicines at www.disposemymeds.org.          This list is accurate as of 6/26/18  9:16 PM.  Always use your most recent med list.                   Brand Name Dispense Instructions for use Diagnosis    ARTHRITIS PAIN RELIEF PO      Take 1-2 tablets by mouth every 8 hours        atorvastatin 20 MG tablet    LIPITOR    45  tablet    TAKE ONE HALF TABLET BY MOUTH DAILY FOR CHOLESTEROL.    Hyperlipidemia LDL goal <130       * budesonide-formoterol 160-4.5 MCG/ACT Inhaler    SYMBICORT    3 Inhaler    Inhale 2 puffs into the lungs 2 times daily Pharmacy ok to hold prescription until due    Moderate persistent asthma, uncomplicated       * SYMBICORT 160-4.5 MCG/ACT Inhaler   Generic drug:  budesonide-formoterol     30.6 Inhaler    INHALE 2 PUFFS INTO THE LUNGS 2 TIMES DAILY PHARMACY OK TO HOLD PRESCRIPTION UNTIL DUE    Moderate persistent asthma, uncomplicated       * COMPOUNDED NON-CONTROLLED SUBSTANCE - PHARMACY TO MIX COMPOUNDED MEDICATION    CMPD RX    2000 mL    20 mLs by Nasal Instillation route 2 times daily Itraconazole nasal solution 0.1mg/mL solution for nasal irrigation    Chronic pansinusitis       * COMPOUNDED NON-CONTROLLED SUBSTANCE - PHARMACY TO MIX COMPOUNDED MEDICATION    CMPD RX    2000 mL    20 mLs by Nasal Instillation route 2 times daily    Chronic rhinitis       diltiazem 120 MG 24 hr capsule    CARTIA XT    90 capsule    Take 1 capsule (120 mg) by mouth daily For blood pressure/lowers pulse. Pharmacy ok to hold prescription until due    Palpitations       diphenhydrAMINE-acetaminophen  MG tablet    TYLENOL PM     Take 1 tablet by mouth At Bedtime Once a week        IBUPROFEN PO      Take 200 mg by mouth Once a week alt with tylenol        * Notice:  This list has 4 medication(s) that are the same as other medications prescribed for you. Read the directions carefully, and ask your doctor or other care provider to review them with you.

## 2018-06-27 ENCOUNTER — OFFICE VISIT (OUTPATIENT)
Dept: FAMILY MEDICINE | Facility: CLINIC | Age: 78
End: 2018-06-27
Payer: COMMERCIAL

## 2018-06-27 VITALS
SYSTOLIC BLOOD PRESSURE: 132 MMHG | TEMPERATURE: 97.3 F | BODY MASS INDEX: 32.05 KG/M2 | DIASTOLIC BLOOD PRESSURE: 65 MMHG | HEART RATE: 72 BPM | WEIGHT: 217 LBS | RESPIRATION RATE: 18 BRPM | OXYGEN SATURATION: 97 %

## 2018-06-27 DIAGNOSIS — J01.91 ACUTE RECURRENT SINUSITIS, UNSPECIFIED LOCATION: ICD-10-CM

## 2018-06-27 DIAGNOSIS — R42 VERTIGO: Primary | ICD-10-CM

## 2018-06-27 PROCEDURE — 99213 OFFICE O/P EST LOW 20 MIN: CPT | Performed by: FAMILY MEDICINE

## 2018-06-27 RX ORDER — PREDNISONE 10 MG/1
TABLET ORAL
Qty: 15 TABLET | Refills: 0 | Status: SHIPPED | OUTPATIENT
Start: 2018-06-27 | End: 2019-01-03

## 2018-06-27 RX ORDER — CEFUROXIME AXETIL 500 MG/1
500 TABLET ORAL 2 TIMES DAILY
Qty: 20 TABLET | Refills: 0 | Status: SHIPPED | OUTPATIENT
Start: 2018-06-27 | End: 2019-01-03

## 2018-06-27 ASSESSMENT — PAIN SCALES - GENERAL: PAINLEVEL: NO PAIN (0)

## 2018-06-27 NOTE — MR AVS SNAPSHOT
"              After Visit Summary   2018    Guillermo Hogue    MRN: [id number]           Patient Information     Date Of Birth          1940        Visit Information        Provider Department      2018 10:00 AM Anthony Patton MD [facility]        Today's Diagnoses     Acute recurrent sinusitis, unspecified location    -  1    Benign paroxysmal positional vertigo of left ear          Care Instructions      Take prescribed medication as directed.    See ENT Doctor if not improving.          Follow-ups after your visit        Who to contact     If you have questions or need follow up information about today's clinic visit or your schedule please contact [facility] directly at [phone].  Normal or non-critical lab and imaging results will be communicated to you by MyChart, letter or phone within 4 business days after the clinic has received the results. If you do not hear from us within 7 days, please contact the clinic through MyChart or phone. If you have a critical or abnormal lab result, we will notify you by phone as soon as possible.  Submit refill requests through [employer] or call your pharmacy and they will forward the refill request to us. Please allow 3 business days for your refill to be completed.          Additional Information About Your Visit        MyChart Information     [employer] lets you send messages to your doctor, view your test results, renew your prescriptions, schedule appointments and more. To sign up, go to [email or web address] . Click on \"Log in\" on the left side of the screen, which will take you to the Welcome page. Then click on \"Sign up Now\" on the right side of the page.     You will be asked to enter the access code listed below, as well as some personal information. Please follow the directions to create your username and password.     Your access code is: [email or web address]  Expires: 2018 12:58 PM     Your access code will  in 90 " days. If you need help or a new code, please call your [place] clinic or [phone].        Care EveryWhere ID     This is your Care EveryWhere ID. This could be used by other organizations to access your [place] medical records  [id number]        Your Vitals Were     Pulse Temperature Respirations Pulse Oximetry BMI (Body Mass Index)       72 97.3  F (36.3  C) (Oral) 18 97% 32.05 kg/m2        Blood Pressure from Last 3 Encounters:   06/27/18 132/65   03/20/18 139/73   05/10/17 126/69    Weight from Last 3 Encounters:   06/27/18 217 lb (98.4 kg)   03/20/18 217 lb (98.4 kg)   04/04/17 215 lb (97.5 kg)              Today, you had the following     No orders found for display         Today's Medication Changes          These changes are accurate as of 6/27/18 10:33 AM.  If you have any questions, ask your nurse or doctor.               Start taking these medicines.        Dose/Directions    cefuroxime 500 MG tablet   Commonly known as:  CEFTIN   Used for:  Acute recurrent sinusitis, unspecified location   Started by:  Anthony Patton MD        Dose:  500 mg   Take 1 tablet (500 mg) by mouth 2 times daily   Quantity:  20 tablet   Refills:  0       predniSONE 10 MG tablet   Commonly known as:  DELTASONE   Used for:  Acute recurrent sinusitis, unspecified location   Started by:  Anthony Patton MD        2 daily for 5 days then 1 daily for 5 days.   Quantity:  15 tablet   Refills:  0            Where to get your medicines      These medications were sent to [facility]/pharmacy #[id number] - [address] [address]     Phone:  [phone]     cefuroxime 500 MG tablet         Call your pharmacy to confirm that your medication is ready for pickup. It may take up to 24 hours for them to receive the prescription. If the prescription is not ready within 3 business days, please contact your clinic or your provider.     We will let  you know when these medications are ready. If you don't hear back within 3 business days, please contact us.     predniSONE 10 MG tablet                Primary Care Provider Office Phone # Fax #    Edi Doty [phone] [address] [facility] [address]        Equal Access to Services     REJI AMANDA : Hadii aad ku hadasho Soomaali, waaxda luqadaha, qaybta kaalmada adeegyada, waxay idiin hayaan adesindy stovallrubinaangus chen. So [facility] [phone].    ATENCIÓN: Si habla español, tiene a murcia disposición servicios gratuitos de asistencia lingüística. [facility] [phone].    We comply with applicable federal civil rights laws and [place] laws. We do not discriminate on the basis of race, color, national origin, age, disability, sex, sexual orientation, or gender identity.            Thank you!     Thank you for choosing [facility]  for your care. Our goal is always to provide you with excellent care. Hearing back from our patients is one way we can continue to improve our services. Please take a few minutes to complete the written survey that you may receive in the mail after your visit with us. Thank you!             Your Updated Medication List - Protect others around you: Learn how to safely use, store and throw away your medicines at www.disposemymeds.org.          This list is accurate as of 6/27/18 10:33 AM.  Always use your most recent med list.                   Brand Name Dispense Instructions for use Diagnosis    ARTHRITIS PAIN RELIEF PO      Take 1-2 tablets by mouth every 8 hours        atorvastatin 20 MG tablet    LIPITOR    45 tablet    TAKE ONE HALF TABLET BY MOUTH DAILY FOR CHOLESTEROL.    Hyperlipidemia LDL goal <130       cefuroxime 500 MG tablet    CEFTIN    20 tablet    Take 1 tablet (500 mg) by mouth 2 times daily    Acute recurrent sinusitis, unspecified location       COMPOUNDED NON-CONTROLLED SUBSTANCE - PHARMACY TO MIX COMPOUNDED MEDICATION    CMPD  RX    2000 mL    20 mLs by Nasal Instillation route 2 times daily Itraconazole nasal solution 0.1mg/mL solution for nasal irrigation    Chronic pansinusitis       diltiazem 120 MG 24 hr capsule    CARTIA XT    90 capsule    Take 1 capsule (120 mg) by mouth daily For blood pressure/lowers pulse. Pharmacy ok to hold prescription until due    Palpitations       diphenhydrAMINE-acetaminophen  MG tablet    TYLENOL PM     Take 1 tablet by mouth At Bedtime Once a week        IBUPROFEN PO      Take 200 mg by mouth Once a week alt with tylenol        predniSONE 10 MG tablet    DELTASONE    15 tablet    2 daily for 5 days then 1 daily for 5 days.    Acute recurrent sinusitis, unspecified location       SYMBICORT 160-4.5 MCG/ACT Inhaler   Generic drug:  budesonide-formoterol     30.6 Inhaler    INHALE 2 PUFFS INTO THE LUNGS 2 TIMES DAILY PHARMACY OK TO HOLD PRESCRIPTION UNTIL DUE    Moderate persistent asthma, uncomplicated

## 2018-06-27 NOTE — PROGRESS NOTES
Subjective:  HPI                    Objective:  System    Physical Exam    General     ROS    Assessment/Plan:    DISCHARGE REPORT    Progress reporting period is from 6/19/18 to 6/26/18.       SUBJECTIVE  Subjective: No changes to report- still feels dizzy when he lays on L side and when he first stands up in the morning.  States it is not debilitating, he just knows what to do to manage it    Current Pain level: 0/10.     Initial Pain level: 0/10.   Changes in function:  None  Adverse reaction to treatment or activity: None    OBJECTIVE  Objective: Same testing as last week: negative Kg-Hallpike to R and L, negative R log roll test and L positive for c/o dizziness but no nystagmus     ASSESSMENT/PLAN  Updated problem list and treatment plan: Diagnosis 1:  BPPV    STG/LTGs have been met or progress has been made towards goals:  None  Assessment of Progress: The patient's condition is unchanged.  Self Management Plans:  Patient has been instructed in a home treatment program.  Guillermo continues to require the following intervention to meet STG and LTG's:  PT intervention is no longer required to meet STG/LTG.    Recommendations:  It is still not clear if patient has a true case of BPPV.  Did not respond to first round of CRMs.  Patient felt he was able to self manage and was not interested in returning to PT.  I recommended that if he got any worse he should contact PT and we could refer him to [facility]    Please refer to the daily flowsheet for treatment today, total treatment time and time spent performing 1:1 timed codes.

## 2018-06-27 NOTE — PROGRESS NOTES
CHIEF COMPLAINT    Vertigo      HISTORY    Patient c/o vertigo when he awakens in AM and turns to L side. He saw therapist he knew from before who tried maneuvers w/o benefit. Recommended further evaluation.    He had similar symptoms back in 2012.  Neurology consult was obtained.  He did therapy with maneuvers.  MRI of brain unremarkable for serious pathology at that time.    He does have a history of allergies and sinus problems.  He has had previous sinus surgery.  He is wondering if maybe sinus problems are resuming and could be contributing.    Patient Active Problem List   Diagnosis     Reactive airway disease     Nasal polyposis     AR (allergic rhinitis)     HYPERLIPIDEMIA LDL GOAL <130     Advanced directives, counseling/discussion     Moderate persistent asthma     BPH (benign prostatic hyperplasia)     Palpitations     Hypertension goal BP (blood pressure) < 140/90     Fatty liver     Cataracts, bilateral     Moderate persistent asthma without complication     Moderate persistent asthma, uncomplicated     Current Outpatient Prescriptions   Medication Sig Dispense Refill     Acetaminophen (ARTHRITIS PAIN RELIEF PO) Take 1-2 tablets by mouth every 8 hours       atorvastatin (LIPITOR) 20 MG tablet TAKE ONE HALF TABLET BY MOUTH DAILY FOR CHOLESTEROL. 45 tablet 1     cefuroxime (CEFTIN) 500 MG tablet Take 1 tablet (500 mg) by mouth 2 times daily 20 tablet 0     COMPOUND (CMPD RX) - PHARMACY TO MIX COMPOUNDED MEDICATION 20 mLs by Nasal Instillation route 2 times daily Itraconazole nasal solution 0.1mg/mL solution for nasal irrigation 2000 mL 12     diltiazem (CARTIA XT) 120 MG 24 hr capsule Take 1 capsule (120 mg) by mouth daily For blood pressure/lowers pulse. Pharmacy ok to hold prescription until due 90 capsule 3     diphenhydrAMINE-acetaminophen (TYLENOL PM)  MG tablet Take 1 tablet by mouth At Bedtime Once a week       IBUPROFEN PO Take 200 mg by mouth Once a week alt with tylenol       predniSONE  (DELTASONE) 10 MG tablet 2 daily for 5 days then 1 daily for 5 days. 15 tablet 0     SYMBICORT 160-4.5 MCG/ACT Inhaler INHALE 2 PUFFS INTO THE LUNGS 2 TIMES DAILY PHARMACY OK TO HOLD PRESCRIPTION UNTIL DUE 30.6 Inhaler 1     [DISCONTINUED] budesonide-formoterol (SYMBICORT) 160-4.5 MCG/ACT Inhaler Inhale 2 puffs into the lungs 2 times daily Pharmacy ok to hold prescription until due 3 Inhaler 3       REVIEW OF SYSTEMS    No fever.  No headache.  No visual or speech problems.  No numbness or weakness.  No chest pain or S OB.      Past Medical History:   Diagnosis Date     Allergic rhinitis age 22     Asthma 2009     Nasal polyposis 2009     Reactive airway disease 2009       EXAM  /65  Pulse 72  Temp 97.3  F (36.3  C) (Oral)  Resp 18  Wt 217 lb (98.4 kg)  SpO2 97%  BMI 32.05 kg/m2    PE RRL.  EOMs are intact.  No nystagmus is seen.  Face is symmetrical and speech clear.  Tympanic membranes and ear canals appear normal.  Neck is without adenopathy or mass.  Motor and gait are normal.      (R42) Vertigo  (primary encounter diagnosis)  Comment:   This sounds like positional vertigo but his symptoms are very episodic.  There is concerned that some of this may be related to a sinus congestion so this will be treated first.  He will consider ENT follow-up if his symptoms do not improve.  One could also consider neurologic consultation.  He is advised to have follow-up if symptoms persist.  Plan:     (J01.91) Acute recurrent sinusitis, unspecified location  Comment:   Plan: cefuroxime (CEFTIN) 500 MG tablet, predniSONE         (DELTASONE) 10 MG tablet

## 2018-06-27 NOTE — NURSING NOTE
"Chief Complaint   Patient presents with     Dizziness     light headedness x 3 weeks        Initial /65  Pulse 72  Temp 97.3  F (36.3  C) (Oral)  Resp 18  Wt 217 lb (98.4 kg)  SpO2 97%  BMI 32.05 kg/m2 Estimated body mass index is 32.05 kg/(m^2) as calculated from the following:    Height as of 3/20/18: 5' 9\" (1.753 m).    Weight as of this encounter: 217 lb (98.4 kg).  Medication Reconciliation: complete  Madison Nuñez M.A.    "

## 2018-07-10 ENCOUNTER — OFFICE VISIT (OUTPATIENT)
Dept: OTOLARYNGOLOGY | Facility: CLINIC | Age: 78
End: 2018-07-10
Payer: COMMERCIAL

## 2018-07-10 VITALS
HEART RATE: 74 BPM | BODY MASS INDEX: 31.84 KG/M2 | DIASTOLIC BLOOD PRESSURE: 62 MMHG | RESPIRATION RATE: 12 BRPM | WEIGHT: 215 LBS | SYSTOLIC BLOOD PRESSURE: 144 MMHG | OXYGEN SATURATION: 96 % | HEIGHT: 69 IN

## 2018-07-10 DIAGNOSIS — J32.4 CHRONIC PANSINUSITIS: ICD-10-CM

## 2018-07-10 DIAGNOSIS — H81.10 BENIGN PAROXYSMAL POSITIONAL VERTIGO, UNSPECIFIED LATERALITY: Primary | ICD-10-CM

## 2018-07-10 PROCEDURE — 99214 OFFICE O/P EST MOD 30 MIN: CPT | Performed by: OTOLARYNGOLOGY

## 2018-07-10 NOTE — PATIENT INSTRUCTIONS
Scheduling Information  To schedule your CT/MRI scan, please contact [facility] at [phone] OR [facility] at [phone]    To schedule your Surgery, please contact our Specialty Schedulers at [phone]      ENT Clinic Locations Clinic Hours Telephone Number     [facility]  [address] [place] [address]   Monday:           1:00pm -- 5:00pm    Friday:              8:00am - 12:00pm   To schedule/reschedule an appointment with   Dr. Valadez,   please contact our   Specialty Scheduling Department at:     [phone]       [facility]  [address] [place] [address] Tuesday:          8:00am -- 2:00pm         Urgent Care Locations Clinic Hours Telephone Numbers     [facility]  [address] [place] [address]     Monday-Friday:     11:00am - 9:00pm    Saturday-Sunday:  9:00am - 5:00pm   [phone]     [facility]  [address] [place] [address]     Monday-Friday:      5:00pm - 9:00pm     Saturday-Sunday:  9:00am - 5:00pm   [phone]

## 2018-07-10 NOTE — PROGRESS NOTES
"History of Present Illness - Guillermo Hogue is a 78 year old male status post functional endoscopic sinus surgery on 2/12/2009.  At the visit on 11/11/2013 he told me that since the functional endoscopic sinus surgery his nose has been \"excellent.\"   However, we have continued to manage a chronic post nasal drainage.    To review, he was on BPM as an antihistamine, but because it was no longer available, I placed him on low dose atarax (hydroxyzine) to dry up his allergic rhinitis related post nasal drainage.  He has not had follow up since the last visit.  He told me that \"cornered the market on the BPM in [place]\" and he has just run out of it.  I tried him on atarax but he tells me that it is not helping nearly as much.  He is also taking the BID mucinex, but he thinks that is making only a minimal difference.  He had been taking sleeping pills to get him to sleep.    So with those failures of therapy, I tried a new approach, and used Gent Itraconazole Dex nasal irrigations with the thought that perhaps this was inflammatory post nasal drainage.  On 12/12/12, and the rinses worked dramatically well for him.  He did have complaints of sleep cycle issues, which I then addressed with decreasing the the Dex component.  But then at the follow up on 2/6/2013 there was a return of the drainage and congestion, so I resumed the full strength dex component for two months.  At the visit on 8/5/2014, I encouraged him to try and decrease the frequency of the medicated rinses, and continue saline irrigation.  And at the most recent visit on 10/5/2015, things were going great, and I have not seen him since 4/4/2017.    He tells me that he is here for his annual visit, but also he has a new issue.  He tells me taht intermittently over the years he has had issues with occasional dizziness, and PT for benign paroxysmal positional vertigo has always helped in the past.  But this time it has not seemed help.    Past Medical " History -   Patient Active Problem List   Diagnosis     Reactive airway disease     Nasal polyposis     AR (allergic rhinitis)     HYPERLIPIDEMIA LDL GOAL <130     Advanced directives, counseling/discussion     Moderate persistent asthma     BPH (benign prostatic hyperplasia)     Palpitations     Hypertension goal BP (blood pressure) < 140/90     Fatty liver     Cataracts, bilateral     Moderate persistent asthma without complication     Moderate persistent asthma, uncomplicated       Current Medications -   Current Outpatient Prescriptions:      Acetaminophen (ARTHRITIS PAIN RELIEF PO), Take 1-2 tablets by mouth every 8 hours, Disp: , Rfl:      atorvastatin (LIPITOR) 20 MG tablet, TAKE ONE HALF TABLET BY MOUTH DAILY FOR CHOLESTEROL., Disp: 45 tablet, Rfl: 1     cefuroxime (CEFTIN) 500 MG tablet, Take 1 tablet (500 mg) by mouth 2 times daily, Disp: 20 tablet, Rfl: 0     COMPOUND (CMPD RX) - PHARMACY TO MIX COMPOUNDED MEDICATION, 20 mLs by Nasal Instillation route 2 times daily Itraconazole nasal solution 0.1mg/mL solution for nasal irrigation, Disp: 2000 mL, Rfl: 12     diltiazem (CARTIA XT) 120 MG 24 hr capsule, Take 1 capsule (120 mg) by mouth daily For blood pressure/lowers pulse. Pharmacy ok to hold prescription until due, Disp: 90 capsule, Rfl: 3     diphenhydrAMINE-acetaminophen (TYLENOL PM)  MG tablet, Take 1 tablet by mouth At Bedtime Once a week, Disp: , Rfl:      IBUPROFEN PO, Take 200 mg by mouth Once a week alt with tylenol, Disp: , Rfl:      predniSONE (DELTASONE) 10 MG tablet, 2 daily for 5 days then 1 daily for 5 days., Disp: 15 tablet, Rfl: 0     SYMBICORT 160-4.5 MCG/ACT Inhaler, INHALE 2 PUFFS INTO THE LUNGS 2 TIMES DAILY PHARMACY OK TO HOLD PRESCRIPTION UNTIL DUE, Disp: 30.6 Inhaler, Rfl: 1    Allergies -   Allergies   Allergen Reactions     Hytrin [Terazosin Hcl]      Leg swelling and vertigo     Simvastatin Other (See Comments)     Body aches     Cats      Codeine Nausea     Dogs      Dust  "Mites      [place]      Seasonal Allergies        Social History -   Social History     Social History     Marital status:      Spouse name: Sanam palafox      Number of children: 2     Years of education: 14     Occupational History     Middle Management Retired          Social History Main Topics     Smoking status: Never Smoker     Smokeless tobacco: Never Used     Alcohol use No     Drug use: No     Sexual activity: No     Other Topics Concern     Parent/Sibling W/ Cabg, Mi Or Angioplasty Before 65f 55m? Yes     2 Brothers and father      Service No     Blood Transfusions No     Caffeine Concern No     Occupational Exposure Yes     he was in [employer]     Hobby Hazards No     Sleep Concern No     Stress Concern No     Weight Concern Yes     Special Diet No     Back Care No     Exercise No     Bike Helmet No     Seat Belt Yes     Self-Exams No     Social History Narrative       Family History -   Family History   Problem Relation Age of Onset     Breast Cancer Mother      Arthritis Father      C.A.D. Father      Cerebrovascular Disease Father      Respiratory Father      TB history     Rheumatoid Arthritis Father      Cerebrovascular Disease Maternal Grandmother      Hypertension Maternal Grandmother      Cancer Maternal Grandfather      C.A.D. Brother      Prostate Cancer Brother      C.A.D. Brother        Review of Systems - As per HPI and PMHx, otherwise 10+ system review of the head and neck, and general constitution is negative.    Physical Exam  /62  Pulse 74  Resp 12  Ht 1.753 m (5' 9\")  Wt 97.5 kg (215 lb)  SpO2 96%  BMI 31.75 kg/m2    General - The patient is well nourished and well developed, and appears to have good nutritional status.  Alert and oriented to person and place, answers questions and cooperates with examination appropriately.   Head and Face - Normocephalic and atraumatic, with no gross asymmetry noted of the contour of the facial features.  The " facial nerve is intact, with strong symmetric movements.  Voice and Breathing - The patient was breathing comfortably without the use of accessory muscles. There was no wheezing, stridor, or stertor.  The patients voice was clear and strong, and had appropriate pitch and quality.  Ears - The tympanic membranes are normal in appearance, bony landmarks are intact.  No retraction, perforation, or masses.  No fluid or purulence was seen in the external canal or the middle ear. No evidence of infection of the middle ear or external canal, cerumen was normal in appearance.  Eyes - Extraocular movements intact, and the pupils were reactive to light.  Sclera were not icteric or injected, conjunctiva were pink and moist.  Mouth - Examination of the oral cavity showed pink, healthy oral mucosa. No lesions or ulcerations noted.  The tongue was mobile and midline, and the dentition were in good condition.    Throat - The walls of the oropharynx were smooth, pink, moist, symmetric, and had no lesions or ulcerations.  The tonsillar pillars and soft palate were symmetric.  The uvula was midline on elevation.    Neck - Normal midline excursion of the laryngotracheal complex during swallowing.  Full range of motion on passive movement.  Palpation of the occipital, submental, submandibular, internal jugular chain, and supraclavicular nodes did not demonstrate any abnormal lymph nodes or masses.  The carotid pulse was palpable bilaterally.  Palpation of the thyroid was soft and smooth, with no nodules or goiter appreciated.  The trachea was mobile and midline.  Nose - External contour is symmetric, no gross deflection or scars.  Nasal mucosa is pink and moist with no abnormal mucus.  The septum was midline and non-obstructive, turbinates of normal size and position.  No polyps, masses, or purulence noted on examination.      A/P - Guillermo Hogue is a 78 year old male  (H81.10) Benign paroxysmal positional vertigo, unspecified  laterality  (primary encounter diagnosis)  Based on the trigger of his momentary spinning vertigo changing from unidirectional, to bidrectional rolling in bed, my suspicion is that this is not posterior canal benign paroxysmal positional vertigo, but is actually horizontal canal benign paroxysmal positional vertigo.  I will refer to [facility] for more advanced [place] maneuvers.    (J32.4) Chronic pansinusitis  The two irrigations, itraconazole and separate Gent/Dex continue to maintian perfect control of his nasal and sinus health.  Please continue, and consider decreasing from daily use to QOD use.

## 2018-07-10 NOTE — MR AVS SNAPSHOT
After Visit Summary   7/10/2018    Guillermo Hogue    MRN: [id number]           Patient Information     Date Of Birth          1940        Visit Information        Provider Department      7/10/2018 4:45 PM Alonso Valadez MD [facility]        Today's Diagnoses     Benign paroxysmal positional vertigo, unspecified laterality    -  1    Chronic pansinusitis          Care Instructions    Scheduling Information  To schedule your CT/MRI scan, please contact [facility] at [phone] OR [facility] at [phone]    To schedule your Surgery, please contact our Specialty Schedulers at [phone]      ENT Clinic Locations Clinic Hours Telephone Number     [facility]  [address]  [place] [address]   Monday:           1:00pm -- 5:00pm    Friday:              8:00am - 12:00pm   To schedule/reschedule an appointment with   Dr. Valadez,   please contact our   Specialty Scheduling Department at:     [phone]       [facility]  [address] [place] [address] Tuesday:          8:00am -- 2:00pm         Urgent Care Locations Clinic Hours Telephone Numbers     [facility]  [address] [place] [address]     Monday-Friday:     11:00am - 9:00pm    Saturday-Sunday:  9:00am - 5:00pm   [phone]     [facility]  [address] [place] [address]     Monday-Friday:      5:00pm - 9:00pm     Saturday-Sunday:  9:00am - 5:00pm   [phone]                 Follow-ups after your visit        Additional Services     AUDIOLOGY BALANCE REFERRAL       Your provider has referred you to: [facility] [phone] [email or web address]    Audiology Balance Referral (Comprehensive) includes Videonystagmography (VNG), Rotational Chair testing, and Computerized Dynamic Posturography.  You may also select individual tests from the list  "below.    Procedure(s): Requesting canalith repositioning maneuvers for likely horizontal canal benign paroxysmal positional vertigo.                  Who to contact     If you have questions or need follow up information about today's clinic visit or your schedule please contact [facility] directly at [phone].  Normal or non-critical lab and imaging results will be communicated to you by MyChart, letter or phone within 4 business days after the clinic has received the results. If you do not hear from us within 7 days, please contact the clinic through MyChart or phone. If you have a critical or abnormal lab result, we will notify you by phone as soon as possible.  Submit refill requests through [employer] or call your pharmacy and they will forward the refill request to us. Please allow 3 business days for your refill to be completed.          Additional Information About Your Visit        MyChart Information     [employer] lets you send messages to your doctor, view your test results, renew your prescriptions, schedule appointments and more. To sign up, go to [email or web address] . Click on \"Log in\" on the left side of the screen, which will take you to the Welcome page. Then click on \"Sign up Now\" on the right side of the page.     You will be asked to enter the access code listed below, as well as some personal information. Please follow the directions to create your username and password.     Your access code is: [email or web address]  Expires: 2018 12:58 PM     Your access code will  in 90 days. If you need help or a new code, please call your [facility] clinic or [phone].        Care EveryWhere ID     This is your Care EveryWhere ID. This could be used by other organizations to access your [facility] medical records  [id number]        Your Vitals Were     Pulse Respirations Height Pulse Oximetry BMI (Body Mass Index)       74 12 1.753 m (5' 9\") 96% 31.75 kg/m2        Blood Pressure " from Last 3 Encounters:   07/10/18 144/62   06/27/18 132/65   03/20/18 139/73    Weight from Last 3 Encounters:   07/10/18 97.5 kg (215 lb)   06/27/18 98.4 kg (217 lb)   03/20/18 98.4 kg (217 lb)              We Performed the Following     AUDIOLOGY BALANCE REFERRAL          Today's Medication Changes          These changes are accurate as of 7/10/18  5:12 PM.  If you have any questions, ask your nurse or doctor.               Start taking these medicines.        Dose/Directions    COMPOUNDED NON-CONTROLLED SUBSTANCE - PHARMACY TO MIX COMPOUNDED MEDICATION   Commonly known as:  CMPD RX   Used for:  Chronic pansinusitis, Benign paroxysmal positional vertigo, unspecified laterality   Started by:  Alonso Valadez MD        Gentamicin/Dexamethasone 160/120mg/Liter saline   Quantity:  2000 mL   Refills:  11            Where to get your medicines      These [facility] [place] [address] [facility]  [address] [facility] [address]     Phone:  [phone]     COMPOUNDED NON-CONTROLLED SUBSTANCE - PHARMACY TO MIX COMPOUNDED MEDICATION    COMPOUNDED NON-CONTROLLED SUBSTANCE - PHARMACY TO MIX COMPOUNDED MEDICATION                Primary Care Provider Office Phone # Fax #    Edi Glenn Doty [phone] [address] [address] [facility] [address]        Equal Access to Services     VIRI AMANDA AH: Hadii aad ku hadasho Soomaali, waaxda luqadaha, qaybta kaalmada adeegyada, waxay idiin hayaan benigno chen. So [facility] [phone].    ATENCIÓN: Si habla español, tiene a murcia disposición servicios gratuitos de asistencia lingüística. Llame al [phone].    We comply with applicable federal civil rights laws and [place] laws. We do not discriminate on the basis of race, color, national origin, age, disability, sex, sexual orientation, or gender identity.            Thank you!     Thank you for choosing [facility]  for  your care. Our goal is always to provide you with excellent care. Hearing back from our patients is one way we can continue to improve our services. Please take a few minutes to complete the written survey that you may receive in the mail after your visit with us. Thank you!             Your Updated Medication List - Protect others around you: Learn how to safely use, store and throw away your medicines at www.disposemymeds.org.          This list is accurate as of 7/10/18  5:12 PM.  Always use your most recent med list.                   Brand Name Dispense Instructions for use Diagnosis    ARTHRITIS PAIN RELIEF PO      Take 1-2 tablets by mouth every 8 hours        atorvastatin 20 MG tablet    LIPITOR    45 tablet    TAKE ONE HALF TABLET BY MOUTH DAILY FOR CHOLESTEROL.    Hyperlipidemia LDL goal <130       cefuroxime 500 MG tablet    CEFTIN    20 tablet    Take 1 tablet (500 mg) by mouth 2 times daily    Acute recurrent sinusitis, unspecified location       COMPOUNDED NON-CONTROLLED SUBSTANCE - PHARMACY TO MIX COMPOUNDED MEDICATION    CMPD RX    2000 mL    20 mLs by Nasal Instillation route 2 times daily Itraconazole nasal solution 0.1mg/mL solution for nasal irrigation    Chronic pansinusitis       COMPOUNDED NON-CONTROLLED SUBSTANCE - PHARMACY TO MIX COMPOUNDED MEDICATION    CMPD RX    2000 mL    Gentamicin/Dexamethasone 160/120mg/Liter saline    Chronic pansinusitis, Benign paroxysmal positional vertigo, unspecified laterality       diltiazem 120 MG 24 hr capsule    CARTIA XT    90 capsule    Take 1 capsule (120 mg) by mouth daily For blood pressure/lowers pulse. Pharmacy ok to hold prescription until due    Palpitations       diphenhydrAMINE-acetaminophen  MG tablet    TYLENOL PM     Take 1 tablet by mouth At Bedtime Once a week        IBUPROFEN PO      Take 200 mg by mouth Once a week alt with tylenol        predniSONE 10 MG tablet    DELTASONE    15 tablet    2 daily for 5 days then 1 daily for 5 days.     Acute recurrent sinusitis, unspecified location       SYMBICORT 160-4.5 MCG/ACT Inhaler   Generic drug:  budesonide-formoterol     30.6 Inhaler    INHALE 2 PUFFS INTO THE LUNGS 2 TIMES DAILY PHARMACY OK TO HOLD PRESCRIPTION UNTIL DUE    Moderate persistent asthma, uncomplicated

## 2018-07-11 ENCOUNTER — TELEPHONE (OUTPATIENT)
Dept: OTOLARYNGOLOGY | Facility: CLINIC | Age: 78
End: 2018-07-11

## 2018-07-11 DIAGNOSIS — J32.4 CHRONIC PANSINUSITIS: ICD-10-CM

## 2018-07-11 DIAGNOSIS — H81.10 BENIGN PAROXYSMAL POSITIONAL VERTIGO, UNSPECIFIED LATERALITY: Primary | ICD-10-CM

## 2018-07-11 DIAGNOSIS — H81.10 BENIGN PAROXYSMAL POSITIONAL VERTIGO, UNSPECIFIED LATERALITY: ICD-10-CM

## 2018-07-11 NOTE — TELEPHONE ENCOUNTER
Reason for Call:  Medication or medication refill:    Do you use a [facility]? yes  Name of the pharmacy and phone number for the current request:   Compounding pharmacy    Name of the medication requested: Nasal irrigation gentamicin dexamethazone directions are needed.    Other request: na    Can we leave a detailed message on this number? [phone]    Phone number patient can be reached at: Other phone number:  na     Best Time: any    Call taken on 7/11/2018 at 11:20 AM by Rosie Jenkins

## 2018-07-20 ENCOUNTER — HOSPITAL ENCOUNTER (OUTPATIENT)
Dept: PHYSICAL THERAPY | Facility: CLINIC | Age: 78
Setting detail: THERAPIES SERIES
End: 2018-07-20
Attending: OTOLARYNGOLOGY
Payer: COMMERCIAL

## 2018-07-20 PROCEDURE — G8978 MOBILITY CURRENT STATUS: HCPCS | Mod: GP,CJ | Performed by: PHYSICAL THERAPIST

## 2018-07-20 PROCEDURE — 97161 PT EVAL LOW COMPLEX 20 MIN: CPT | Mod: GP | Performed by: PHYSICAL THERAPIST

## 2018-07-20 PROCEDURE — G8979 MOBILITY GOAL STATUS: HCPCS | Mod: GP,CI | Performed by: PHYSICAL THERAPIST

## 2018-07-20 PROCEDURE — 40000840 ZZHC STATISTIC PT VESTIBULAR VISIT: Performed by: PHYSICAL THERAPIST

## 2018-07-20 PROCEDURE — 97112 NEUROMUSCULAR REEDUCATION: CPT | Mod: GP,59 | Performed by: PHYSICAL THERAPIST

## 2018-07-20 PROCEDURE — 95992 CANALITH REPOSITIONING PROC: CPT | Mod: GP | Performed by: PHYSICAL THERAPIST

## 2018-07-20 NOTE — PROGRESS NOTES
" 07/20/18 0700   Quick Adds   Quick Adds Vestibular Eval   Type of Visit Initial OP PT Evaluation   General Information   Start of Care Date 07/20/18   Referring Physician Dr. Alonso Valadez    Orders Evaluate and Treat as Indicated   Order Date 07/11/18   Medical Diagnosis BPPV, unspecified laterality    Onset of illness/injury or Date of Surgery 06/08/18   Surgical/Medical history reviewed Yes   Pertinent history of current problem (include personal factors and/or comorbidities that impact the POC) Guillermo reports that he usually sleeps on his R side but when turns to L, reports that the room does not spin but feels \"motion\". Has been going on for 6 weeks-- has been treated for BPPV a few years ago. [facility]-- does have hx of sinus and allergies. When he first stands up, he does feel a bit unsteady. He saw PT at [facility] for this issue a few weeks ago and it did not clear up. He reports that PT has helped in the past with a similar issue so wanting further testing. Hx of hypertension, hyperlipidemia and reactive airway diease and asthma.    Pertinent Visual History  no changes in vision    Prior level of function comment reports moving around independently- lives alone so does house work and errands    Patient role/Employment history Retired   Living environment Apartment/condo   Home/Community Accessibility Comments no stairs- lives alone    Assistive Devices Comments does not use    Patient/Family Goals Statement improve his dizziness    General Information Comments arrives today with daughter    Fall Risk Screen   Fall screen completed by PT   Have you fallen 2 or more times in the past year? No   Have you fallen and had an injury in the past year? No   Timed Up and Go score (seconds) did not assess today    Is patient a fall risk? No   System Outcome Measures   Outcome Measures BPPV   Dizziness Handicap Inventory (score out of 100) A decrease in score by 17.18 or greater " indicates a clinically significant change in symptoms. 12   Pain   Pain comments no pain    Vitals Signs   Heart Rate 87   SpO2 96   Blood Pressure 138/71   Cognitive Status Examination   Orientation orientation to person, place and time   Level of Consciousness alert   Follows Commands and Answers Questions 100% of the time   Personal Safety and Judgment intact   Memory intact   Posture   Posture Comments foward shouler posture and slight foward head    Strength   Strength Comments WFLs overall as observed through functional mobility    Bed Mobility   Bed Mobility Comments independent- does move slowly d/t possible dizziness occuring    Transfer Skills   Transfer Comments sit<>stand with UEs on chair for support    Gait   Gait Comments ambulates with decreased step length and arm swing; overall is steady    Gait Special Tests   Gait Special Tests 25 FOOT TIMED WALK   Gait Special Tests 25 Foot Timed Walk   Seconds 8.00    Steps 14 Steps   Balance   Balance Comments overall steady balance with functional mobility- deferred balance tests to next session if indicated    Sensory Examination   Sensory Perception Comments denies issues    Cervicogenic Screen   Neck ROM decreased neck ROM- 50% extension, with rotation and SB moves through at least 75% of ROM    Vertebral Artery Test Normal   Oculomotor Exam   Smooth Pursuit Normal   Saccades Normal   Infrared Goggle Exam or Frenzel Lense Exam   Vestibular Suppressant in Last 24 Hours? No   Exam completed with Infrared Goggles   Spontaneous Nystagmus Negative   Gaze Evoked Nystagmus Horizontal R;Horizontal L   Gaze Evoked Nystagmus comments with end gaze x 5 beats toward the direction he was looking    Head Shake Horizontal Nystagmus Negative   Positional Testing comments sensitive to position changes in general    Kg-Hallpike (right) Negative   [place]-Hallpike (Left) Downbeating L torsional   Kg-Hallpike (left) comments a few noted with delayed onset but then resolved;  reports dizziness/imbalance after 10 secs that last for 30 secs    [facility] Supine Roll Test (Right) Negative   [facility] Supine Roll Test (Left) Negative   [facility] Supine Roll Test (Left) Comments  no nystagmus but reports imbalance/dizziness   BPPV Canal(s) L Anterior   BPPV Type Canalithasis   Planned Therapy Interventions   Planned Therapy Interventions neuromuscular re-education  (canalith repositioning manuever )   Clinical Impression   Criteria for Skilled Therapeutic Interventions Met yes, treatment indicated   PT Diagnosis BPPV, dizziness    Influenced by the following impairments nystagmus with positional testing, dizziness symptoms with testing, sensitivity to movement in and out of bed    Functional limitations due to impairments at increased fall risk, decreased ease of functional mobility    Clinical Presentation Stable/Uncomplicated   Clinical Presentation Rationale stable medcially, clinical presentation and judgement, impairments, co morbidities    Clinical Decision Making (Complexity) Low complexity   Therapy Frequency (3-4 more visits until problem resolves )   Predicted Duration of Therapy Intervention (days/wks) up to 90 days   Risk & Benefits of therapy have been explained Yes   Patient, Family & other staff in agreement with plan of care Yes   Clinical Impression Comments Patient presents with dizziness with positional changes-- had a few torsional down beats in L marino hallpike position indicating AC canalthiasis, however, no the typical presentation of sytmpom description. Did well with epley maneuver and reported decrease in symptoms on second repetition and improved ease of mobility upon leaving clinic. Will benefit from further PT to address these issues and look into possible other vestibular issues with more testing as indicated.    GOALS   PT Eval Goals 1;2   Goal 1   Goal Identifier rolling in bed   Goal Description Guillermo will report a 90% improvement in dizziness symptoms with rolling over in bed  in order to improve his safety with getting up to use restroom at night.    Target Date 10/17/18   Goal 2   Goal Identifier DHI    Goal Description Guillermo will report a 4 or less on DHI in order to show improvement in dizziness sypmtoms to decrease risk of falls and improve ease of mobility.    Target Date 10/17/18   Total Evaluation Time   Total Evaluation Time (Minutes) 30

## 2018-08-07 NOTE — PROGRESS NOTES
Outpatient Physical Therapy Progress Note     Patient: Guillermo Hogue  : 1940    Beginning/End Dates of Reporting Period:  18 to 2018    Referring Provider: Dr. Alonso Valadez    Therapy Diagnosis: BBPV, dizziness     Client Self Report:  Please see evaluation    Outcome Measures (most recent score):  Dizziness Handicap Inventory (score out of 100) A decrease in score by 17.18 or greater indicates a clinically significant change in symptoms.: 12         Goals:  Goal Identifier rolling in bed   Goal Description Guillermo will report a 90% improvement in dizziness symptoms with rolling over in bed in order to improve his safety with getting up to use restroom at night.    Target Date 10/17/18   Date Met   18   Progress: MET     Goal Identifier DHI    Goal Description Guillermo will report a 4 or less on DHI in order to show improvement in dizziness sypmtoms to decrease risk of falls and improve ease of mobility.    Target Date 10/17/18   Date Met      Progress: Was not able to re-test as patient did not return for second appt.     Progress Toward Goals:   Patient called therapist a few days later after evaluation and reported no dizziness symptoms- BPPV was cleared with 1 treatment session on day of evaluation and met his position changing goals.    Plan:  Discharge from therapy.    Discharge:    Reason for Discharge: Patient has met all goals.    Equipment Issued: none    Discharge Plan: Patient to continue home program.

## 2018-08-07 NOTE — ADDENDUM NOTE
Encounter addended by: Mary Vann PT on: 8/7/2018 11:56 AM<BR>     Actions taken: Sign clinical note, Episode resolved

## 2018-09-30 DIAGNOSIS — E78.5 HYPERLIPIDEMIA LDL GOAL <130: ICD-10-CM

## 2018-10-01 RX ORDER — ATORVASTATIN CALCIUM 20 MG/1
TABLET, FILM COATED ORAL
Qty: 45 TABLET | Refills: 1 | Status: SHIPPED | OUTPATIENT
Start: 2018-10-01 | End: 2019-01-07

## 2019-01-02 ENCOUNTER — DOCUMENTATION ONLY (OUTPATIENT)
Dept: LAB | Facility: CLINIC | Age: 79
End: 2019-01-02

## 2019-01-02 DIAGNOSIS — I10 HYPERTENSION GOAL BP (BLOOD PRESSURE) < 140/90: ICD-10-CM

## 2019-01-02 DIAGNOSIS — E78.5 HYPERLIPIDEMIA LDL GOAL <130: Primary | ICD-10-CM

## 2019-01-02 NOTE — PROGRESS NOTES
.Per lab scrubbing protocol patientt is not due for any lab test at this time. Please review chart, if nothing is needed please have someone on your team contact the patient and cancel the appointment.  Thank you,  Valerie

## 2019-01-03 DIAGNOSIS — E78.5 HYPERLIPIDEMIA LDL GOAL <130: ICD-10-CM

## 2019-01-03 DIAGNOSIS — I10 HYPERTENSION GOAL BP (BLOOD PRESSURE) < 140/90: ICD-10-CM

## 2019-01-03 LAB
ALBUMIN SERPL-MCNC: 3.6 G/DL (ref 3.4–5)
ALP SERPL-CCNC: 69 U/L (ref 40–150)
ALT SERPL W P-5'-P-CCNC: 29 U/L (ref 0–70)
ANION GAP SERPL CALCULATED.3IONS-SCNC: 6 MMOL/L (ref 3–14)
AST SERPL W P-5'-P-CCNC: 18 U/L (ref 0–45)
BILIRUB SERPL-MCNC: 0.6 MG/DL (ref 0.2–1.3)
BUN SERPL-MCNC: 13 MG/DL (ref 7–30)
CALCIUM SERPL-MCNC: 9 MG/DL (ref 8.5–10.1)
CHLORIDE SERPL-SCNC: 105 MMOL/L (ref 94–109)
CHOLEST SERPL-MCNC: 161 MG/DL
CO2 SERPL-SCNC: 27 MMOL/L (ref 20–32)
CREAT SERPL-MCNC: 0.85 MG/DL (ref 0.66–1.25)
GFR SERPL CREATININE-BSD FRML MDRD: 84 ML/MIN/{1.73_M2}
GLUCOSE SERPL-MCNC: 103 MG/DL (ref 70–99)
HDLC SERPL-MCNC: 63 MG/DL
LDLC SERPL CALC-MCNC: 83 MG/DL
NONHDLC SERPL-MCNC: 98 MG/DL
POTASSIUM SERPL-SCNC: 4.2 MMOL/L (ref 3.4–5.3)
PROT SERPL-MCNC: 7.4 G/DL (ref 6.8–8.8)
SODIUM SERPL-SCNC: 138 MMOL/L (ref 133–144)
TRIGL SERPL-MCNC: 75 MG/DL

## 2019-01-03 PROCEDURE — 80053 COMPREHEN METABOLIC PANEL: CPT | Performed by: FAMILY MEDICINE

## 2019-01-03 PROCEDURE — 80061 LIPID PANEL: CPT | Performed by: FAMILY MEDICINE

## 2019-01-03 PROCEDURE — 36415 COLL VENOUS BLD VENIPUNCTURE: CPT | Performed by: FAMILY MEDICINE

## 2019-01-07 ENCOUNTER — OFFICE VISIT (OUTPATIENT)
Dept: FAMILY MEDICINE | Facility: CLINIC | Age: 79
End: 2019-01-07
Payer: COMMERCIAL

## 2019-01-07 ENCOUNTER — ANCILLARY PROCEDURE (OUTPATIENT)
Dept: GENERAL RADIOLOGY | Facility: CLINIC | Age: 79
End: 2019-01-07
Payer: COMMERCIAL

## 2019-01-07 VITALS
RESPIRATION RATE: 20 BRPM | HEART RATE: 67 BPM | BODY MASS INDEX: 32.44 KG/M2 | DIASTOLIC BLOOD PRESSURE: 67 MMHG | OXYGEN SATURATION: 95 % | HEIGHT: 69 IN | SYSTOLIC BLOOD PRESSURE: 132 MMHG | WEIGHT: 219 LBS | TEMPERATURE: 97.1 F

## 2019-01-07 DIAGNOSIS — M25.552 HIP PAIN, LEFT: ICD-10-CM

## 2019-01-07 DIAGNOSIS — J45.40 MODERATE PERSISTENT ASTHMA, UNCOMPLICATED: ICD-10-CM

## 2019-01-07 DIAGNOSIS — Z00.00 MEDICARE ANNUAL WELLNESS VISIT, SUBSEQUENT: Primary | ICD-10-CM

## 2019-01-07 DIAGNOSIS — E78.5 HYPERLIPIDEMIA LDL GOAL <130: ICD-10-CM

## 2019-01-07 DIAGNOSIS — R00.2 PALPITATIONS: ICD-10-CM

## 2019-01-07 PROCEDURE — 73502 X-RAY EXAM HIP UNI 2-3 VIEWS: CPT

## 2019-01-07 PROCEDURE — 99213 OFFICE O/P EST LOW 20 MIN: CPT | Mod: 25 | Performed by: FAMILY MEDICINE

## 2019-01-07 PROCEDURE — 99397 PER PM REEVAL EST PAT 65+ YR: CPT | Performed by: FAMILY MEDICINE

## 2019-01-07 RX ORDER — BUDESONIDE AND FORMOTEROL FUMARATE DIHYDRATE 160; 4.5 UG/1; UG/1
AEROSOL RESPIRATORY (INHALATION)
Qty: 30.6 INHALER | Refills: 1 | Status: SHIPPED | OUTPATIENT
Start: 2019-01-07 | End: 2019-12-05

## 2019-01-07 RX ORDER — ATORVASTATIN CALCIUM 20 MG/1
TABLET, FILM COATED ORAL
Qty: 45 TABLET | Refills: 3 | Status: SHIPPED | OUTPATIENT
Start: 2019-01-07 | End: 2020-03-02

## 2019-01-07 ASSESSMENT — ENCOUNTER SYMPTOMS
FREQUENCY: 1
SORE THROAT: 0
CHILLS: 0
ABDOMINAL PAIN: 0
HEMATURIA: 0
MYALGIAS: 1
EYE PAIN: 0
FEVER: 0
WEAKNESS: 0
DYSURIA: 0
PALPITATIONS: 0
JOINT SWELLING: 0
NAUSEA: 0
COUGH: 0
HEADACHES: 0
DIZZINESS: 0
CONSTIPATION: 0
HEARTBURN: 0
DIARRHEA: 0
SHORTNESS OF BREATH: 1
ARTHRALGIAS: 1
NERVOUS/ANXIOUS: 0
PARESTHESIAS: 0
HEMATOCHEZIA: 0

## 2019-01-07 ASSESSMENT — MIFFLIN-ST. JEOR: SCORE: 1708.76

## 2019-01-07 ASSESSMENT — ACTIVITIES OF DAILY LIVING (ADL): CURRENT_FUNCTION: NO ASSISTANCE NEEDED

## 2019-01-07 NOTE — PROGRESS NOTES
"SUBJECTIVE:   Guillermo Hogue is a 78 year old male who presents for Preventive Visit.    Follow-up htn, high cholesterol and asthma.  Breathing worse in spring/winter.  Breathing stable. Limited exercise. Walking a fair amount. No chest pain. Urine issues - mild nocturia.   No nausea, vomiting or diarrhea or bloody stool/black stools. No hematuria.   Emotionally stressful with friend lost. No SUICIAL IDEATION OR HOMOCIDAL IDEATION OR CEASAR.  Not interested in therapist/medicdations. Tylenol pm helpful.   Pain in left thigh x2 months. No injury. No history herniated disc. Worse with standing up hit/miss. No weakness. No similar issues but history cortisone shot right knee/shoulder in past. Seen p.t. Helpful for knee.   No rashes noted. Tylenol arthritis ok.     Are you in the first 12 months of your Medicare coverage?  No    Annual Wellness Visit     In general, how would you rate your overall health?  Good    Frequency of exercise:  None    Do you usually eat at least 4 servings of fruit and vegetables a day, include whole grains    & fiber and avoid regularly eating high fat or \"junk\" foods?  Yes    Taking medications regularly:  Yes    Medication side effects:  None, No muscle aches and No significant flushing    Ability to successfully perform activities of daily living:  No assistance needed    Home Safety:  No safety concerns identified    Hearing Impairment:  No hearing concerns    In the past 6 months, have you been bothered by leaking of urine?  No    In general, how would you rate your overall mental or emotional health?  Good    PHQ-2 Total Score: 1    Additional concerns today:  No    Do you feel safe in your environment? Yes    Do you have a Health Care Directive? Yes: Patient states has Advance Directive and will bring in a copy to clinic.      Fall risk       Cognitive Screening   1) Repeat 3 items (Leader, Season, Table)    2) Clock draw: NORMAL  3) 3 item recall: Recalls 2 objects   Results: NORMAL " clock, 1-2 items recalled: COGNITIVE IMPAIRMENT LESS LIKELY    Mini-CogTM Copyright ARPIT Balbuena. Licensed by the author for use in [facility]; reprinted with permission (mecca@[facility]). All rights reserved.      Do you have sleep apnea, excessive snoring or daytime drowsiness?: no    Reviewed and updated as needed this visit by clinical staff  Tobacco  Allergies  Meds         Reviewed and updated as needed this visit by Provider        Social History     Tobacco Use     Smoking status: Never Smoker     Smokeless tobacco: Never Used   Substance Use Topics     Alcohol use: No       Alcohol Use 1/7/2019   If you drink alcohol do you typically have greater than 3 drinks per day OR greater than 7 drinks per week? Not Applicable           PROBLEMS TO ADD ON...    Current providers sharing in care for this patient include:   Patient Care Team:  Edi Doty MD as PCP - General  Edi Doty MD as PCP - Assigned PCP    The following health maintenance items are reviewed in Epic and correct as of today:  Health Maintenance   Topic Date Due     ASTHMA CONTROL TEST Q6 MOS  08/24/2017     ASTHMA ACTION PLAN Q1 YR  02/24/2018     FALL RISK ASSESSMENT  03/20/2019     PHQ-2 Q1 YR  06/27/2019     ADVANCE DIRECTIVE PLANNING Q5 YRS  02/24/2022     DTAP/TDAP/TD IMMUNIZATION (2 - Td) 08/14/2022     LIPID SCREEN Q5 YR MALE (SYSTEM ASSIGNED)  01/03/2024     INFLUENZA VACCINE  Completed     ZOSTER IMMUNIZATION  Completed     IPV IMMUNIZATION  Aged Out     MENINGITIS IMMUNIZATION  Aged Out     Labs reviewed in EPIC      Review of Systems   Constitutional: Negative for chills and fever.   HENT: Negative for congestion, ear pain and sore throat.    Eyes: Negative for pain and visual disturbance.   Respiratory: Positive for shortness of breath. Negative for cough.    Cardiovascular: Negative for chest pain, palpitations and peripheral edema.   Gastrointestinal: Negative for abdominal pain, constipation, diarrhea,  "heartburn, hematochezia and nausea.   Genitourinary: Positive for frequency and urgency. Negative for discharge, dysuria, genital sores, hematuria and impotence.   Musculoskeletal: Positive for arthralgias and myalgias. Negative for joint swelling.   Skin: Negative for rash.   Neurological: Negative for dizziness, weakness, headaches and paresthesias.   Psychiatric/Behavioral: Negative for mood changes. The patient is not nervous/anxious.          OBJECTIVE:   /67   Pulse 67   Temp 97.1  F (36.2  C) (Oral)   Resp 20   Ht 1.753 m (5' 9\")   Wt 99.3 kg (219 lb)   SpO2 95%   BMI 32.34 kg/m   Estimated body mass index is 32.34 kg/m  as calculated from the following:    Height as of this encounter: 1.753 m (5' 9\").    Weight as of this encounter: 99.3 kg (219 lb).  Physical Exam  GENERAL: healthy, alert and no distress  EYES: Eyes grossly normal to inspection, PERRL and conjunctivae and sclerae normal  HENT: ear canals and TM's normal, nose and mouth without ulcers or lesions  NECK: no adenopathy, no asymmetry, masses, or scars and thyroid normal to palpation  RESP: lungs clear to auscultation - no rales, rhonchi or wheezes  BREAST: normal without masses, tenderness or nipple discharge and no palpable axillary masses or adenopathy  CV: regular rate and rhythm, normal S1 S2, no S3 or S4, no murmur, click or rub, no peripheral edema and peripheral pulses strong  ABDOMEN: soft, nontender, no hepatosplenomegaly, no masses and bowel sounds normal   (male): patient deferred /rectal exams. No concerns.   MS: no gross musculoskeletal defects noted, no edema  MS: some pain with rotation left hip  SKIN: no suspicious lesions or rashes  NEURO: Normal strength and tone, mentation intact and speech normal  BACK: no CVA tenderness, no paralumbar tenderness  PSYCH: mentation appears normal, affect normal/bright      ASSESSMENT / PLAN:     ASSESSMENT / PLAN:  (Z00.00) Medicare annual wellness visit, subsequent  (primary " "encounter diagnosis)  Comment: generallly healthy and normal exam  Plan: Reviewed self mole/testicle check handout.  Call/email with questions/concerns.     (X91.870) Hip pain, left  Comment: strain vs buritis vs disc  Plan: XR Hip Left 2-3 Views        Follow-up p.t. If xray ok. Await rad report. Vs ortho if worse. Expected course and warning signs reviewed. tylenol    (E78.5) Hyperlipidemia LDL goal <130  Comment: stable  Plan: atorvastatin (LIPITOR) 20 MG tablet        Reveiwed risks and side effects of medication      (J45.40) Moderate persistent asthma, uncomplicated  Comment: stable  Plan: budesonide-formoterol (SYMBICORT) 160-4.5         MCG/ACT Inhaler        Reveiwed risks and side effects of medication      (R00.2) Palpitations  Comment: stable  Plan: continue med.       End of Life Planning:  Patient currently has an advanced directive: Yes.  Practitioner is supportive of decision.    COUNSELING:  Reviewed preventive health counseling, as reflected in patient instructions       Regular exercise       Healthy diet/nutrition       Vision screening       Hearing screening       Dental care    BP Readings from Last 1 Encounters:   01/07/19 132/67     Estimated body mass index is 32.34 kg/m  as calculated from the following:    Height as of this encounter: 1.753 m (5' 9\").    Weight as of this encounter: 99.3 kg (219 lb).           reports that  has never smoked. he has never used smokeless tobacco.      Appropriate preventive services were discussed with this patient, including applicable screening as appropriate for cardiovascular disease, diabetes, osteopenia/osteoporosis, and glaucoma.  As appropriate for age/gender, discussed screening for colorectal cancer, prostate cancer, breast cancer, and cervical cancer. Checklist reviewing preventive services available has been given to the patient.    Reviewed patients plan of care and provided an AVS. The Intermediate Care Plan ( asthma action plan, low back pain " action plan, and migraine action plan) for Guillermo meets the Care Plan requirement. This Care Plan has been established and reviewed with the Patient.    Counseling Resources:  ATP IV Guidelines  Pooled Cohorts Equation Calculator  Breast Cancer Risk Calculator  FRAX Risk Assessment  ICSI Preventive Guidelines  Dietary Guidelines for Americans, 2010  USDA's MyPlate  ASA Prophylaxis  Lung CA Screening    Edi Doty MD  [facility]

## 2019-01-07 NOTE — NURSING NOTE
"Chief Complaint   Patient presents with     Physical       Initial /67   Pulse 67   Temp 97.1  F (36.2  C) (Oral)   Resp 20   Ht 1.753 m (5' 9\")   Wt 99.3 kg (219 lb)   SpO2 95%   BMI 32.34 kg/m   Estimated body mass index is 32.34 kg/m  as calculated from the following:    Height as of this encounter: 1.753 m (5' 9\").    Weight as of this encounter: 99.3 kg (219 lb).    Amanda Mead CMA    "

## 2019-01-09 ENCOUNTER — THERAPY VISIT (OUTPATIENT)
Dept: PHYSICAL THERAPY | Facility: CLINIC | Age: 79
End: 2019-01-09
Payer: COMMERCIAL

## 2019-01-09 DIAGNOSIS — M25.552 HIP PAIN, LEFT: ICD-10-CM

## 2019-01-09 PROCEDURE — 97161 PT EVAL LOW COMPLEX 20 MIN: CPT | Mod: GP | Performed by: PHYSICAL THERAPIST

## 2019-01-09 PROCEDURE — G8981 BODY POS CURRENT STATUS: HCPCS | Mod: GP | Performed by: PHYSICAL THERAPIST

## 2019-01-09 PROCEDURE — 97110 THERAPEUTIC EXERCISES: CPT | Mod: GP | Performed by: PHYSICAL THERAPIST

## 2019-01-09 PROCEDURE — G8982 BODY POS GOAL STATUS: HCPCS | Mod: GP | Performed by: PHYSICAL THERAPIST

## 2019-01-09 ASSESSMENT — ACTIVITIES OF DAILY LIVING (ADL)
LIGHT_TO_MODERATE_WORK: SLIGHT DIFFICULTY
WALKING_DOWN_STEEP_HILLS: MODERATE DIFFICULTY
PUTTING_ON_SOCKS_AND_SHOES: EXTREME DIFFICULTY
GETTING_INTO_AND_OUT_OF_A_BATHTUB: SLIGHT DIFFICULTY
STEPPING_UP_AND_DOWN_CURBS: NO DIFFICULTY AT ALL
HOS_ADL_COUNT: 17
WALKING_UP_STEEP_HILLS: MODERATE DIFFICULTY
HOW_WOULD_YOU_RATE_YOUR_CURRENT_LEVEL_OF_FUNCTION_DURING_YOUR_USUAL_ACTIVITIES_OF_DAILY_LIVING_FROM_0_TO_100_WITH_100_BEING_YOUR_LEVEL_OF_FUNCTION_PRIOR_TO_YOUR_HIP_PROBLEM_AND_0_BEING_THE_INABILITY_TO_PERFORM_ANY_OF_YOUR_USUAL_DAILY_ACTIVITIES?: 70
HOS_ADL_SCORE(%): 69.12
HOS_ADL_HIGHEST_POTENTIAL_SCORE: 68
WALKING_APPROXIMATELY_10_MINUTES: SLIGHT DIFFICULTY
GOING_DOWN_1_FLIGHT_OF_STAIRS: SLIGHT DIFFICULTY
HEAVY_WORK: MODERATE DIFFICULTY
RECREATIONAL_ACTIVITIES: SLIGHT DIFFICULTY
ROLLING_OVER_IN_BED: SLIGHT DIFFICULTY
STANDING_FOR_15_MINUTES: SLIGHT DIFFICULTY
WALKING_15_MINUTES_OR_GREATER: MODERATE DIFFICULTY
GOING_UP_1_FLIGHT_OF_STAIRS: SLIGHT DIFFICULTY
SITTING_FOR_15_MINUTES: SLIGHT DIFFICULTY
GETTING_INTO_AND_OUT_OF_AN_AVERAGE_CAR: SLIGHT DIFFICULTY
WALKING_INITIALLY: NO DIFFICULTY AT ALL
DEEP_SQUATTING: MODERATE DIFFICULTY
HOS_ADL_ITEM_SCORE_TOTAL: 47
TWISTING/PIVOTING_ON_INVOLVED_LEG: MODERATE DIFFICULTY

## 2019-01-09 ASSESSMENT — ASTHMA QUESTIONNAIRES: ACT_TOTALSCORE: 23

## 2019-01-09 NOTE — PROGRESS NOTES
"[facility] Initial Evaluation  Subjective:  The history is provided by the patient. No  was used.   Guillermo Hogue is a 78 year old male with a left hip condition.  Condition occurred with:  Insidious onset.  Condition occurred: for unknown reasons.  This is a new condition  Pt states pain came on about three months ago without specific incident.  Feels like isn't as bad as it used to be and has been better since taking wallet out of back pocket L.  Referred to PT 01/07/2019.    Patient reports pain:  Anterior and groin.    Pain is described as sharp and is intermittent and reported as 3/10.   Worse during: no particular pattern re: time of day.  Exacerbated by: sit to stand, any sideways mov't, getting in / out of car. Relieved by: Tylenol arthritis.  Since onset symptoms are gradually improving.  Special testing: xray as in chart 01/07/2019.  Previous treatment: N/A.    General health as reported by patient is excellent.  Pertinent medical history includes:  Asthma.  Medical allergies: yes (see chart and includes latex).  Other surgeries include:  Other (sinus).  Current medications:  Cardiac medication.  Current occupation is retired.        Barriers include:  None as reported by the patient.    Red flags:  None as reported by the patient.    Pt states his goal is to \"get rid of it.\"                    Objective:  System                                           Hip Evaluation  Hip PROM:    Flexion: Left: 90 deg positive   Right: > 90 deg negative  Extension: Left: 20 deg negative   Right: 30 deg negative  Abduction: Left: 30 deg negative    Right: 40 deg negative    Internal Rotation: Left: 0 deg negative    Right: 20 deg  External Rotation: Left: 15 deg    Right: 30 deg              Hip Strength:    Flexion:   Left: 4/5   -  Pain:  Right: 5-/5   -  Pain:                    Extension:  Left: 4/5  -  Pain:Right: 4+/5    -  Pain:    Abduction:  Left: 4/5    -   Pain:Right: " 4+/5   -   Pain:    Internal Rotation:  Left: 4/5   -   Pain:Right: 4/5   -  Pain:  External Rotation:  Left: 4/5   -  Pain:  Right: 4/5   -  Pain:  Knee Flexion:  Left: 4+/5   -  Pain:Right: 5-/5   -  Pain:  Knee Extension:  Left: 4/5   +  Pain:Right: 4+/5    -  Pain:          Hip Palpation:  Normal                      [place] Lumbar Evaluation    Posture:  Sitting: fair  Standing: fair  Lordosis: Reduced  Lateral Shift: no  Correction of Posture: no effect    Movement Loss:  Flexion (Flex): mod  Extension (EXT): mod  Side Glide R (SG R): min  Side Glide L (SG L): min  Test Movements:  FIS: During: no effect  After: no effect  Pretest Movements: L anterior hip and groin  Repeat FIS: During: no effect  After: no effect    EIS: During: no effect  After: no effect    Repeat EIS: During: no effect  After: no effect        SGIS R: During: no effect  After: no effect      SGIS L: During: no effect  After: no effect        Conclusion: other                                     No change in symptoms after sets of repeated sitting hip flexion and standing hip extension.    ROS    Assessment/Plan:    Patient is a 78 year old male with left side hip complaints.    Patient has the following significant findings with corresponding treatment plan.                Diagnosis 1:  L hip pain with underlying degeneration  Pain -  hot/cold therapy  Decreased ROM/flexibility - manual therapy and therapeutic exercise  Decreased strength - therapeutic exercise and therapeutic activities  Decreased function - therapeutic activities    Therapy Evaluation Codes:   1) History comprised of:   Personal factors that impact the plan of care:      Age and Time since onset of symptoms.    Comorbidity factors that impact the plan of care are:      None.     Medications impacting care: None.  2) Examination of Body Systems comprised of:   Body structures and functions that impact the plan of care:      Hip.   Activity limitations that impact the  plan of care are:      Driving, Sitting and Standing.  3) Clinical presentation characteristics are:   Stable/Uncomplicated.  4) Decision-Making    Moderate complexity using standardized patient assessment instrument and/or measureable assessment of functional outcome.  Cumulative Therapy Evaluation is: Low complexity.    Previous and current functional limitations:  (See Goal Flow Sheet for this information)    Short term and Long term goals: (See Goal Flow Sheet for this information)     Communication ability:  Patient appears to be able to clearly communicate and understand verbal and written communication and follow directions correctly.  Treatment Explanation - The following has been discussed with the patient:   RX ordered/plan of care  Anticipated outcomes  Possible risks and side effects  This patient would benefit from PT intervention to resume normal activities.   Rehab potential is good.    Frequency:  1 X week, once daily  Duration:  for 4 weeks  Discharge Plan:  Achieve all LTG.  Independent in home treatment program.  Reach maximal therapeutic benefit.    Please refer to the daily flowsheet for treatment today, total treatment time and time spent performing 1:1 timed codes.

## 2019-01-16 ENCOUNTER — THERAPY VISIT (OUTPATIENT)
Dept: PHYSICAL THERAPY | Facility: CLINIC | Age: 79
End: 2019-01-16
Payer: COMMERCIAL

## 2019-01-16 DIAGNOSIS — M25.552 HIP PAIN, LEFT: ICD-10-CM

## 2019-01-16 PROCEDURE — 97110 THERAPEUTIC EXERCISES: CPT | Mod: GP | Performed by: PHYSICAL THERAPIST

## 2019-01-16 PROCEDURE — 97530 THERAPEUTIC ACTIVITIES: CPT | Mod: GP | Performed by: PHYSICAL THERAPIST

## 2019-01-16 NOTE — PROGRESS NOTES
"Subjective:  HPI                    Objective:  System    Physical Exam    General     ROS    Assessment/Plan:    SUBJECTIVE  Subjective: Pt reports he is \"comfortable\" with HEP but not necessarily feeling great symptomatic change.  Seems to do well with anything \"forward\" but is challenging for anything out to the side.   Current Pain level: No change   Changes in function:  Yes (See Goal flowsheet attached for changes in current functional level)     Adverse reaction to treatment or activity:  None    OBJECTIVE  Objective: Asymmetric WB on sit to stand greater to R.  L hip PROM and MMT as noted 01/09.     ASSESSMENT  Guillermo continues to require intervention to meet STG and LTG's: PT  Patient is progressing as expected.  Response to therapy has shown an improvement in function with conscious attention to technique sit to stand  Progress made towards STG/LTG?  Yes (See Goal flowsheet attached for updates on achievement of STG and LTG)    PLAN  Current treatment program is being advanced to more complex exercises.    PTA/ATC plan:  N/A    Please refer to the daily flowsheet for treatment today, total treatment time and time spent performing 1:1 timed codes.          "

## 2019-01-31 ENCOUNTER — OFFICE VISIT (OUTPATIENT)
Dept: FAMILY MEDICINE | Facility: CLINIC | Age: 79
End: 2019-01-31
Payer: COMMERCIAL

## 2019-01-31 VITALS
WEIGHT: 219 LBS | TEMPERATURE: 97.5 F | DIASTOLIC BLOOD PRESSURE: 69 MMHG | SYSTOLIC BLOOD PRESSURE: 127 MMHG | BODY MASS INDEX: 32.34 KG/M2 | OXYGEN SATURATION: 95 % | HEART RATE: 90 BPM

## 2019-01-31 DIAGNOSIS — J45.41 MODERATE PERSISTENT ASTHMA WITH ACUTE EXACERBATION: Primary | ICD-10-CM

## 2019-01-31 DIAGNOSIS — J01.90 ACUTE SINUSITIS, RECURRENCE NOT SPECIFIED, UNSPECIFIED LOCATION: ICD-10-CM

## 2019-01-31 PROCEDURE — 99214 OFFICE O/P EST MOD 30 MIN: CPT | Performed by: FAMILY MEDICINE

## 2019-01-31 RX ORDER — AMOXICILLIN 875 MG
875 TABLET ORAL 2 TIMES DAILY
Qty: 20 TABLET | Refills: 0 | Status: SHIPPED | OUTPATIENT
Start: 2019-01-31 | End: 2019-02-28

## 2019-01-31 RX ORDER — ALBUTEROL SULFATE 90 UG/1
2 AEROSOL, METERED RESPIRATORY (INHALATION) EVERY 4 HOURS PRN
Qty: 1 INHALER | Refills: 0 | Status: SHIPPED | OUTPATIENT
Start: 2019-01-31 | End: 2019-06-23

## 2019-01-31 RX ORDER — PREDNISONE 20 MG/1
TABLET ORAL
Qty: 20 TABLET | Refills: 0 | Status: SHIPPED | OUTPATIENT
Start: 2019-01-31 | End: 2019-02-28

## 2019-01-31 NOTE — PROGRESS NOTES
SUBJECTIVE:   Guillermo Hogue is a 78 year old male who presents to clinic today for the following health issues:    Accompanied by daughter    RESPIRATORY SYMPTOMS      Duration: 5 days    Description  rhinorrhea, cough, shortness of breath and headache    Severity: moderate    Accompanying signs and symptoms: None    History (predisposing factors):  Asthma- diagnosed 10 years ago or so    Precipitating or alleviating factors: None    Therapies tried and outcome:  rest and fluids    5 days ago had a runny nose went on for 2 days  But then 3 days ago the coughing started  Yesterday started having more productive cough  Not sure if wheezing - he has no albuterol   SMOKER: No  Fever No  Sinus congestion/sinus pain just some congestion  Chest pain or exertional shortness of breath: NO  Exposure to pertussis or pertussis like symptoms: NO  Orthopnea, worsening edema, pnd: NO  Rash: NO  Tried OTC medications without relief  No hemoptysis   No calf pain or tenderness. No signs or symptoms of DVT.   Worsening symptoms hence patient came in to be seen     Problem list and histories reviewed & adjusted, as indicated.  Additional history: as documented    Problem list, Medication list, Allergies, and Medical/Social/Surgical histories reviewed in EPIC and updated as appropriate.    ROS:  Constitutional, HEENT, cardiovascular, pulmonary, gi and gu systems are negative, except as otherwise noted.    OBJECTIVE:                                                    /69   Pulse 90   Temp 97.5  F (36.4  C)   Wt 99.3 kg (219 lb)   SpO2 95%   BMI 32.34 kg/m    Body mass index is 32.34 kg/m .  GENERAL: healthy, alert and no distress  EYES: Pink palpebral conjunctiva, anicteric sclera  ENT: midline nasal septum normal ear exam. Congested turbinates  NECK: no adenopathy, no asymmetry, masses, or scars and thyroid normal to palpation  RESP: symmetrical chest expansion, no retractions, increased expiratory phase wheezes heard at  bilateral lungs   CV: regular rate and rhythm, normal S1 S2, no S3 or S4, no murmur, click or rub, no peripheral edema and peripheral pulses strong  SKIN: no readily visible rashes noted  MS: no gross musculoskeletal defects noted    Diagnostic Test Results:  none      ASSESSMENT/PLAN:                                                        ICD-10-CM    1. Moderate persistent asthma with acute exacerbation J45.41 predniSONE (DELTASONE) 20 MG tablet     albuterol (PROAIR HFA/PROVENTIL HFA/VENTOLIN HFA) 108 (90 Base) MCG/ACT inhaler     Spacer/Aero-Holding Chambers (SPACER/AERO-HOLD CHAMBER MASK) RAMAN   2. Acute sinusitis, recurrence not specified, unspecified location J01.90 amoxicillin (AMOXIL) 875 MG tablet     Prescribed with above  Tolerated these meds before   Follow up with primary care provider recommended in 3 days  Advised that prolonged cough > 3 weeks recommend chest xray, offered today, declined.   Adverse reactions of medications discussed.  Over the counter medications discussed.   Aware to come back in if with worsening symptoms or if no relief despite treatment plan  Patient voiced understanding and had no further questions.   Alarm signs or symptoms discussed, if present recommend go to ER       MD Nicole Porras MD  [facility]

## 2019-02-28 ENCOUNTER — OFFICE VISIT (OUTPATIENT)
Dept: FAMILY MEDICINE | Facility: CLINIC | Age: 79
End: 2019-02-28
Payer: COMMERCIAL

## 2019-02-28 ENCOUNTER — TELEPHONE (OUTPATIENT)
Dept: FAMILY MEDICINE | Facility: CLINIC | Age: 79
End: 2019-02-28

## 2019-02-28 ENCOUNTER — ANCILLARY PROCEDURE (OUTPATIENT)
Dept: GENERAL RADIOLOGY | Facility: CLINIC | Age: 79
End: 2019-02-28
Attending: FAMILY MEDICINE
Payer: COMMERCIAL

## 2019-02-28 VITALS
BODY MASS INDEX: 32.88 KG/M2 | HEART RATE: 73 BPM | DIASTOLIC BLOOD PRESSURE: 76 MMHG | TEMPERATURE: 97.8 F | WEIGHT: 222 LBS | SYSTOLIC BLOOD PRESSURE: 124 MMHG | HEIGHT: 69 IN | OXYGEN SATURATION: 94 %

## 2019-02-28 DIAGNOSIS — R05.9 COUGH: ICD-10-CM

## 2019-02-28 DIAGNOSIS — J45.41 MODERATE PERSISTENT ASTHMA WITH ACUTE EXACERBATION: Primary | ICD-10-CM

## 2019-02-28 DIAGNOSIS — M16.12 PRIMARY OSTEOARTHRITIS OF LEFT HIP: ICD-10-CM

## 2019-02-28 PROCEDURE — 71046 X-RAY EXAM CHEST 2 VIEWS: CPT

## 2019-02-28 PROCEDURE — 99214 OFFICE O/P EST MOD 30 MIN: CPT | Performed by: FAMILY MEDICINE

## 2019-02-28 RX ORDER — PREDNISONE 20 MG/1
TABLET ORAL
Qty: 20 TABLET | Refills: 0 | Status: SHIPPED | OUTPATIENT
Start: 2019-02-28 | End: 2019-05-17

## 2019-02-28 RX ORDER — AMOXICILLIN 875 MG
875 TABLET ORAL 2 TIMES DAILY
Qty: 20 TABLET | Refills: 0 | Status: SHIPPED | OUTPATIENT
Start: 2019-02-28 | End: 2019-05-17

## 2019-02-28 ASSESSMENT — MIFFLIN-ST. JEOR: SCORE: 1722.37

## 2019-02-28 NOTE — TELEPHONE ENCOUNTER
Patient was expecting scripts for Amoxicillin and Prednisone. There have been no scripts written in Epic. Patient is currently waiting in pharmacy.      Katlyn Villarreal    Pharmacy Technician  [facility]

## 2019-02-28 NOTE — TELEPHONE ENCOUNTER
RN contacted Dr. Stevenson by phone requesting orders for pt as below.  Per Dr. Stevenson, if orders were not sent to pharmacy, then they are pending in the office visit  and can be signed.  RN located the pending orders within pt's office visit entry, and verified the orders pended by provider, and signed orders per VO per Dr. Stevenson.    Attempted to reach pt. There was no answer. No message left.  RN contacted pharmacy staff, Tricia. Tricia states pt is still waiting at pharmacy, and confirmed receipt of orders.    Michelle Hernandez RN

## 2019-02-28 NOTE — PROGRESS NOTES
"  SUBJECTIVE:   Guillermo Hogue is a 78 year old male who presents to clinic today for the following health issues:        ENT Symptoms             Symptoms: cc Present Absent Comment   Fever/Chills   x    Fatigue  x     Muscle Aches   x    Eye Irritation   x    Sneezing  x     Nasal Noe/Drg  x     Sinus Pressure/Pain   x    Loss of smell   x    Dental pain   x    Sore Throat   x    Swollen Glands   x    Ear Pain/Fullness   x    Cough  x     Wheeze  x     Chest Pain  x     Shortness of breath  x     Rash   x    Other   x      Symptom duration:  2 days    Symptom severity:     Treatments tried:  inhalers    Contacts:  no       Had some URI symptoms  He was started on amoxicillin and prednisone  Was on it about 3 weeks ago.   Was fine until the last 2 days when above symptoms   Now noticing intermittent sharp pain in right side of chest.   Is using his inhaler  Chest pain only occurs at night   Sob is the same as always    Pt would like his left hip xray reviewed. Having a lot of left groin pain  Xray reveals severe arthritis of hip. Would recommend he follow-up with ortho.  Pt is agreeable        Problem list and histories reviewed & adjusted, as indicated.  Additional history: as documented    Labs reviewed in EPIC    Reviewed and updated as needed this visit by clinical staff  Tobacco  Allergies  Meds  Med Hx  Surg Hx  Fam Hx  Soc Hx      Reviewed and updated as needed this visit by Provider         ROS:  Constitutional, HEENT, cardiovascular, pulmonary, gi and gu systems are negative, except as otherwise noted.    OBJECTIVE:     /76   Pulse 73   Temp 97.8  F (36.6  C) (Oral)   Ht 1.753 m (5' 9\")   Wt 100.7 kg (222 lb)   SpO2 94%   BMI 32.78 kg/m    Body mass index is 32.78 kg/m .  GENERAL: healthy, alert and no distress  EYES: Eyes grossly normal to inspection, PERRL and conjunctivae and sclerae normal  HENT: ear canals and TM's normal, nose and mouth without ulcers or lesions  NECK: no " adenopathy, no asymmetry, masses, or scars and thyroid normal to palpation  RESP: rhonchi bibasilar and expiratory wheezes throughout  CV: regular rate and rhythm, normal S1 S2, no S3 or S4, no murmur, click or rub, no peripheral edema and peripheral pulses strong  MS: no gross musculoskeletal defects noted, no edema    Diagnostic Test Results:  CXR - negative    ASSESSMENT/PLAN:     1. Moderate persistent asthma with acute exacerbation  Asthma exacerbation again. Pt to do prednisone taper and amoxicillin. As weaning down prednisone should increase symbicort to bid.  Follow-up if not completely better  - XR Chest 2 Views; Future  - amoxicillin (AMOXIL) 875 MG tablet; Take 1 tablet (875 mg) by mouth 2 times daily  Dispense: 20 tablet; Refill: 0  - predniSONE (DELTASONE) 20 MG tablet; Take 60 mg by mouth daily for 3 days, THEN 40 mg daily for 3 days, THEN 20 mg daily for 3 days, THEN 10 mg daily for 3 days.  Dispense: 20 tablet; Refill: 0    2. Primary osteoarthritis of left hip  Refer to ortho  - ORTHOPEDICS ADULT REFERRAL        Suyapa Solis MD  [facility]

## 2019-03-07 ENCOUNTER — OFFICE VISIT (OUTPATIENT)
Dept: ORTHOPEDICS | Facility: CLINIC | Age: 79
End: 2019-03-07
Payer: COMMERCIAL

## 2019-03-07 ENCOUNTER — TELEPHONE (OUTPATIENT)
Dept: ORTHOPEDICS | Facility: CLINIC | Age: 79
End: 2019-03-07

## 2019-03-07 ENCOUNTER — ANCILLARY PROCEDURE (OUTPATIENT)
Dept: GENERAL RADIOLOGY | Facility: CLINIC | Age: 79
End: 2019-03-07
Attending: ORTHOPAEDIC SURGERY
Payer: COMMERCIAL

## 2019-03-07 VITALS
OXYGEN SATURATION: 93 % | HEART RATE: 72 BPM | SYSTOLIC BLOOD PRESSURE: 144 MMHG | DIASTOLIC BLOOD PRESSURE: 66 MMHG | RESPIRATION RATE: 12 BRPM | TEMPERATURE: 99 F

## 2019-03-07 DIAGNOSIS — M16.12 PRIMARY OSTEOARTHRITIS OF LEFT HIP: ICD-10-CM

## 2019-03-07 DIAGNOSIS — M16.12 PRIMARY OSTEOARTHRITIS OF LEFT HIP: Primary | ICD-10-CM

## 2019-03-07 PROCEDURE — 72170 X-RAY EXAM OF PELVIS: CPT

## 2019-03-07 PROCEDURE — 99203 OFFICE O/P NEW LOW 30 MIN: CPT | Performed by: ORTHOPAEDIC SURGERY

## 2019-03-07 ASSESSMENT — PAIN SCALES - GENERAL: PAINLEVEL: MODERATE PAIN (4)

## 2019-03-07 NOTE — LETTER
3/7/2019         RE: Guillermo Hogue  [address]  [place] [address]        Dear Colleague,    Thank you for referring your patient, Guillermo Hogue, to the [facility]. Please see a copy of my visit note below.    HISTORY OF PRESENT ILLNESS    Guillermo Hogue is a 78 year old male who is seen in consultation at the request of Dr. Doty for evaluation of left hip pain that has been present for approximately 3-4 months. He has had ongoing thigh and groin pain. He has been taking prednisone for respiratory congestion. This has been beneficial regarding his left hip pain. Today he has thigh and groin pain. Pain with getting in and out of a car. No pain at night. Pain with prolonged sitting and first getting up. He makes an effort to walk around [employer], but has groin pain with prolonged walking. Has low back pain.     No history of DVT.     Pain occurrence: imtermittent  Night pain: yes  Walking ability: circumference of [employer]  Pain location: groin,   Pain radiates to: NA    Lower extremity sensory symptoms: none  Lower extremity motor symptoms: none   Previous treatment: prednisone    Orthopaedics history: History of bilateral knee pains in 2015.      PMH: See patient history on [facility].    This document serves as a record of the services and decisions personally performed and made by AMARA Parisi MD. It was created on his behalf by Yaima Newton, a trained medical scribe. The creation of this document is based the provider's statements to the medical scribe.    Vera Newton 11:21 AM 3/7/2019      REVIEW OF SYSTEMS:  CONSTITUTIONAL:NEGATIVE for fever, chills, change in weight  INTEGUMENTARY/SKIN: NEGATIVE for worrisome rashes, moles or lesions  MUSCULOSKELETAL:See HPI above  NEURO: NEGATIVE for weakness, dizziness or paresthesias     PHYSICAL EXAM:    GENERAL APPEARANCE: elderly, alert and no distress   SKIN: no suspicious lesions or rashes  NEURO: Normal strength and  tone, sensory exam grossly normal, mentation intact, speech normal and oriented times   PSYCH:  mentation appears normal and affect normal/bright  VASCULAR: normal pulses, and cap refill in lower extremity's.    Gait: normal  Palpation: Tender:  Left greater trochanter and inguinal area.   Strength:  Mild hip flexor weakness, mild abductor weakness*  Hip range of motion: Internal rotation: 10*, External rotation: 15*, Abduction 25*, Flexion 105*, no flexion contracture  Leg lengths: equal at medial malleolus     Lumbar range of motion: flexion to touch mid shin, extension limitations with pain in back, side bend: limited with lateral tilt to right reproduces left groin pain  Trendelenburg's sign: negative   Toe stand: Able  Heel stand: Able  Sensation:normal  Motor: all normal  Pathologic reflexes: None    X-Rays: of the left hip from 1/7/2019  IMPRESSION: Severe degenerative changes in the left hip including near  complete loss of the joint space, marginal osteophytes, and  subchondral sclerosis and cystic change. No evidence of fracture. Flattening of the superior femoral head and osteophytes of the femoral head.                                       X-rays of the left femur from 5/11/2015 show moderate narrowing of the hip joint space. There has been progression in the last 4 years.     Assessment/Plan:    ICD-10-CM    1. Primary osteoarthritis of left hip M16.12 XR Pelvis 1/2 Views     [facility]-Operative Worksheet         Total Hip Arthroplasty:   We talked about the patient's condition and diagnosis. Because of severe arthritis, and failure of conservative measures, I have suggested left hip total hip arthroplasty.  I've talked in depth about the procedure and the risks, which include, but are not limited to blood loss requiring transfusion, infection, neurovascular injury, DVT, PE, pain, (both perioperative and persistent post-recovery pain), dislocation, leg length discrepancy, intra-operative fracture, potential  anesthetic and perioperative medical complications, and the possibility of needing additional procedures. We also talked about recovery time, which differs from patient to patient.  We've encouraged attendance at the arthroplasty class at the hospital.  Medical clearance will need to be obtained.    Corticosteroid injection was discussed as an option as well, but is not likely to give long term relief.  The overall risk for thromboembolic events in this patient is small due to no history of dvt/pe, and 325 mg aspirin will be used for postoperative prophylaxis.    Other special considerations - patient will go to rehab center post discharge       Return to clinic as needed     The information in this document, created by a scribe for me, accurately reflects the services I personally performed and the decisions made by me. I have reviewed and approved this document for accuracy.        AMARA Parisi MD  Dept. Orthopedic Surgery  [facility]        Again, thank you for allowing me to participate in the care of your patient.        Sincerely,        Jeffrey Parisi MD

## 2019-03-07 NOTE — PROGRESS NOTES
HISTORY OF PRESENT ILLNESS    Guillermo Hogue is a 78 year old male who is seen in consultation at the request of Dr. Doty for evaluation of left hip pain that has been present for approximately 3-4 months. He has had ongoing thigh and groin pain. He has been taking prednisone for respiratory congestion. This has been beneficial regarding his left hip pain. Today he has thigh and groin pain. Pain with getting in and out of a car. No pain at night. Pain with prolonged sitting and first getting up. He makes an effort to walk around [employer], but has groin pain with prolonged walking. Has low back pain.     No history of DVT.     Pain occurrence: imtermittent  Night pain: yes  Walking ability: circumference of [employer]  Pain location: groin,   Pain radiates to: NA    Lower extremity sensory symptoms: none  Lower extremity motor symptoms: none   Previous treatment: prednisone    Orthopaedics history: History of bilateral knee pains in 2015.      PMH: See patient history on [facility].    This document serves as a record of the services and decisions personally performed and made by AMARA Parisi MD. It was created on his behalf by Yaima Newton, a trained medical scribe. The creation of this document is based the provider's statements to the medical scribe.    Scribe Yaima Newton 11:21 AM 3/7/2019      REVIEW OF SYSTEMS:  CONSTITUTIONAL:NEGATIVE for fever, chills, change in weight  INTEGUMENTARY/SKIN: NEGATIVE for worrisome rashes, moles or lesions  MUSCULOSKELETAL:See HPI above  NEURO: NEGATIVE for weakness, dizziness or paresthesias     PHYSICAL EXAM:    GENERAL APPEARANCE: elderly, alert and no distress   SKIN: no suspicious lesions or rashes  NEURO: Normal strength and tone, sensory exam grossly normal, mentation intact, speech normal and oriented times   PSYCH:  mentation appears normal and affect normal/bright  VASCULAR: normal pulses, and cap refill in lower extremity's.    Gait: normal  Palpation: Tender:   Left greater trochanter and inguinal area.   Strength:  Mild hip flexor weakness, mild abductor weakness*  Hip range of motion: Internal rotation: 10*, External rotation: 15*, Abduction 25*, Flexion 105*, no flexion contracture  Leg lengths: equal at medial malleolus     Lumbar range of motion: flexion to touch mid shin, extension limitations with pain in back, side bend: limited with lateral tilt to right reproduces left groin pain  Trendelenburg's sign: negative   Toe stand: Able  Heel stand: Able  Sensation:normal  Motor: all normal  Pathologic reflexes: None    X-Rays: of the left hip from 1/7/2019  IMPRESSION: Severe degenerative changes in the left hip including near  complete loss of the joint space, marginal osteophytes, and  subchondral sclerosis and cystic change. No evidence of fracture. Flattening of the superior femoral head and osteophytes of the femoral head.                                       X-rays of the left femur from 5/11/2015 show moderate narrowing of the hip joint space. There has been progression in the last 4 years.     Assessment/Plan:    ICD-10-CM    1. Primary osteoarthritis of left hip M16.12 XR Pelvis 1/2 Views     [facility]-Operative Worksheet         Total Hip Arthroplasty:   We talked about the patient's condition and diagnosis. Because of severe arthritis, and failure of conservative measures, I have suggested left hip total hip arthroplasty.  I've talked in depth about the procedure and the risks, which include, but are not limited to blood loss requiring transfusion, infection, neurovascular injury, DVT, PE, pain, (both perioperative and persistent post-recovery pain), dislocation, leg length discrepancy, intra-operative fracture, potential anesthetic and perioperative medical complications, and the possibility of needing additional procedures. We also talked about recovery time, which differs from patient to patient.  We've encouraged attendance at the arthroplasty class at the  hospital.  Medical clearance will need to be obtained.    Corticosteroid injection was discussed as an option as well, but is not likely to give long term relief.  The overall risk for thromboembolic events in this patient is small due to no history of dvt/pe, and 325 mg aspirin will be used for postoperative prophylaxis.    Other special considerations - patient will go to rehab center post discharge       Return to clinic as needed     The information in this document, created by a scribe for me, accurately reflects the services I personally performed and the decisions made by me. I have reviewed and approved this document for accuracy.        AMARA Parisi MD  Dept. Orthopedic Surgery  [facility]

## 2019-03-07 NOTE — PATIENT INSTRUCTIONS
Non-surgical treatment for osteoarthritis includes:    * rest.  For acute flares. (Periodic).    * Activity modification - avoid impact activities or activities that aggravate symptoms.   Keeping joints moving may be the most important aspect of joint health.    * Tylenol as needed for pain, consider Tylenol arthritis or similar.  (no more than 3000mg of acetaminophen in a 24 hour period)    * NSAIDS (non-steroidal anti-inflammatory medications; e.g. Aleve, advil, motrin, ibuprofen, etc) - regular use for inflammation ( twice daily or three times daily), with food, as long as there are no contra-indications to using these medications. (if you have stomach ulcers, reflux, or kidney dysfunction, you should talk to your primary care provider to discuss whether these medications are right for your).  Please discuss other concerns with your primary care doctor if needed.    *Glucosamine-Chondroitin (1500 mg per day. Available over the counter)  has not been proven to help arthritis, yet some patients claim that it does help them with their arthritis pain.    * ice, 15-20 minutes at a time several times a day or as needed if there is swelling, or after an acute injury.  * Strengthening of muscles  * Physical Therapy for strengthening, stretching and range of motion exercises of legs    * Weight loss: Body mass index is 35.49 kg/(m^2). A healthy BMI is below 25.  Weight loss benefits, not only the current pain symptoms, but also overall health. Recommend a good diet plan that works for the patient, with the assistance of a dietician or primary care doctor as needed. Also, a good, low-impact exercise program for at least 20 minutes per day, 3 times per week, such as exercise bike, elliptical , or pool.    *DIET:  Although there is no set diet that will eliminate/cure arthritis, there are some foods that may help inflammation, which is the cause of the pain in arthritis, such as concentrated cherry juice, Tumeric, and  Fish Oil.    Check out the Arthritis Foundation website for further diet suggestions to potentially reduce inflammation and pain:    http://www.arthritis.org/living-with-arthritis/arthritis-diet/best-foods-for-arthritis/    * Exercise: low impact such as stationary bike, elliptical, pool.    * Injections: cortisone   * over the counter supplements such as glucosamine-chondroitin sulfate may help with joint pain.  * topical ointments may help as well with pain    *Accupuncture may be helpful to control the pain.    *Surgical options should be discussed with Dr. Parisi.  - We discussed to wait 3 months before surgery at this time.   - Consider taking hip replacement course for more information before potential surgery  - Will likely go to rehab center following surgery.  - Feel free to come back to clinic if you have any more questions.

## 2019-03-12 ENCOUNTER — OFFICE VISIT (OUTPATIENT)
Dept: OTOLARYNGOLOGY | Facility: CLINIC | Age: 79
End: 2019-03-12
Payer: COMMERCIAL

## 2019-03-12 VITALS
BODY MASS INDEX: 32.58 KG/M2 | HEIGHT: 69 IN | RESPIRATION RATE: 16 BRPM | SYSTOLIC BLOOD PRESSURE: 139 MMHG | OXYGEN SATURATION: 97 % | DIASTOLIC BLOOD PRESSURE: 73 MMHG | WEIGHT: 220 LBS | HEART RATE: 72 BPM

## 2019-03-12 DIAGNOSIS — R07.89 CHEST WALL PAIN: ICD-10-CM

## 2019-03-12 DIAGNOSIS — J30.89 ALLERGIC RHINITIS DUE TO OTHER ALLERGIC TRIGGER, UNSPECIFIED SEASONALITY: ICD-10-CM

## 2019-03-12 DIAGNOSIS — J33.9 NASAL POLYPOSIS: Primary | ICD-10-CM

## 2019-03-12 PROCEDURE — 31231 NASAL ENDOSCOPY DX: CPT | Performed by: OTOLARYNGOLOGY

## 2019-03-12 PROCEDURE — 99214 OFFICE O/P EST MOD 30 MIN: CPT | Mod: 25 | Performed by: OTOLARYNGOLOGY

## 2019-03-12 ASSESSMENT — MIFFLIN-ST. JEOR: SCORE: 1713.29

## 2019-03-12 NOTE — PATIENT INSTRUCTIONS
Scheduling Information  To schedule your CT/MRI scan, please contact [facility] at [phone] OR [facility] at [phone]    To schedule your Surgery, please contact our Specialty Schedulers at [phone]      ENT Clinic Locations Clinic Hours Telephone Number     Daniel Medina  [address] [place] [address]   Monday:           1:00pm -- 5:00pm    Friday:              8:00am - 12:00pm   To schedule/reschedule an appointment with   Dr. Valadez,   please contact our   Specialty Scheduling Department at:     [phone]       Daniel Bowels  [address] [place] [address] Tuesday:          8:00am -- 2:00pm         Urgent Care Locations Clinic Hours Telephone Numbers     Daniel Bowles  [address] [place] [address]     Monday-Friday:     11:00am - 9:00pm    Saturday-Sunday:  9:00am - 5:00pm   [phone]     [facility]  [address] [place] [address]     Monday-Friday:      5:00pm - 9:00pm     Saturday-Sunday:  9:00am - 5:00pm   [phone]

## 2019-03-12 NOTE — LETTER
"    3/12/2019         RE: Guillermo Hogue  [id number] [facility] [address]  [place] [address]        Dear Colleague,    Thank you for referring your patient, Guillermo Hogue, to the [facility]. Please see a copy of my visit note below.    History of Present Illness - Guillermo Hogue is a 78 year old male status post functional endoscopic sinus surgery on 2/12/2009, last seen on 7/10/2018.  At the visit on 11/11/2013 he told me that since the functional endoscopic sinus surgery his nose has been \"excellent.\"   However, we have continued to manage a chronic post nasal drainage.    To review, he was on BPM as an antihistamine, but because it was no longer available, I placed him on low dose atarax (hydroxyzine) to dry up his allergic rhinitis related post nasal drainage.  He has not had follow up since the last visit.  He told me that \"cornered the market on the BPM in [place]\" and he has just run out of it.  I tried him on atarax but he tells me that it is not helping nearly as much.  He is also taking the BID mucinex, but he thinks that is making only a minimal difference.  He had been taking sleeping pills to get him to sleep.    So with those failures of therapy, I tried a new approach, and used Gent Itraconazole Dex nasal irrigations with the thought that perhaps this was inflammatory post nasal drainage.  On 12/12/12, and the rinses worked dramatically well for him.  He did have complaints of sleep cycle issues, which I then addressed with decreasing the the Dex component.  But then at the follow up on 2/6/2013 there was a return of the drainage and congestion, so I resumed the full strength dex component for two months.  At the visit on 8/5/2014, I encouraged him to try and decrease the frequency of the medicated rinses, and continue saline irrigation.  And at the most recent visit on 10/5/2015, things were going great, and I have not seen him since 4/4/2017.    He tells me that he is here for " his annual visit, but at the 2018 visit he also had a new issue.  He tells me taht intermittently over the years he has had issues with occasional dizziness, and PT for benign paroxysmal positional vertigo has always helped in the past.  But this time it has not seemed help.  I felt that this bidirectional rotationally triggered momentary vertigo was posterior canal benign paroxysmal positional vertigo, and referred him for Semont maneuvers.  That problem has improved.    The main reason he has come to me earlier than usual is that lately he has been having some pain in the RIGHT upper chest when he lays down.  He was seen by his PCP Dr. Doty, and there was some noise in the chest, and was treated with Amoxicillin and predniosne, and a second round.  He wanted to rule out sinus post nasal drainage as the cause of this pain in the chest. There has been no productive cough or dyspnea otherwise.      Past Medical History -   Patient Active Problem List   Diagnosis     Reactive airway disease     Nasal polyposis     AR (allergic rhinitis)     HYPERLIPIDEMIA LDL GOAL <130     Advanced directives, counseling/discussion     Moderate persistent asthma     BPH (benign prostatic hyperplasia)     Palpitations     Hypertension goal BP (blood pressure) < 140/90     Fatty liver     Cataracts, bilateral     Moderate persistent asthma without complication     Moderate persistent asthma, uncomplicated     Hip pain, left       Current Medications -   Current Outpatient Medications:      Acetaminophen (ARTHRITIS PAIN RELIEF PO), Take 1-2 tablets by mouth every 8 hours, Disp: , Rfl:      albuterol (PROAIR HFA/PROVENTIL HFA/VENTOLIN HFA) 108 (90 Base) MCG/ACT inhaler, Inhale 2 puffs into the lungs every 4 hours as needed for shortness of breath / dyspnea or wheezing Use with spacer, Disp: 1 Inhaler, Rfl: 0     amoxicillin (AMOXIL) 875 MG tablet, Take 1 tablet (875 mg) by mouth 2 times daily, Disp: 20 tablet, Rfl: 0     atorvastatin  (LIPITOR) 20 MG tablet, TAKE 1/2 TABLET BY MOUTH DAILY FOR CHOLESTEROL., Disp: 45 tablet, Rfl: 3     budesonide-formoterol (SYMBICORT) 160-4.5 MCG/ACT Inhaler, INHALE 2 PUFFS INTO THE LUNGS 2 TIMES DAILY PHARMACY OK TO HOLD PRESCRIPTION UNTIL DUE, Disp: 30.6 Inhaler, Rfl: 1     COMPOUNDED NON-CONTROLLED SUBSTANCE (CMPD RX) - PHARMACY TO MIX COMPOUNDED MEDICATION, 20 mLs by Nasal Instillation route 2 times daily Itraconazole nasal solution 0.1mg/mL solution for nasal irrigation, Disp: 2000 mL, Rfl: 12     COMPOUNDED NON-CONTROLLED SUBSTANCE (CMPD RX) - PHARMACY TO MIX COMPOUNDED MEDICATION, Gentamicin/Dexamethasone 160/120mg/Liter saline, Disp: 2000 mL, Rfl: 11     diltiazem (CARTIA XT) 120 MG 24 hr capsule, Take 1 capsule (120 mg) by mouth daily For blood pressure/lowers pulse. Pharmacy ok to hold prescription until due, Disp: 90 capsule, Rfl: 3     diphenhydrAMINE-acetaminophen (TYLENOL PM)  MG tablet, Take 1 tablet by mouth At Bedtime Once a week, Disp: , Rfl:      IBUPROFEN PO, Take 200 mg by mouth Once a week alt with tylenol, Disp: , Rfl:      predniSONE (DELTASONE) 20 MG tablet, Take 60 mg by mouth daily for 3 days, THEN 40 mg daily for 3 days, THEN 20 mg daily for 3 days, THEN 10 mg daily for 3 days., Disp: 20 tablet, Rfl: 0     Spacer/Aero-Holding Chambers (SPACER/AERO-HOLD CHAMBER MASK) RAMAN, 1 Adult size spacer to use with MDI inhalers., Disp: 1 each, Rfl: 0    Allergies -   Allergies   Allergen Reactions     Hytrin [Terazosin Hcl]      Leg swelling and vertigo     Simvastatin Other (See Comments)     Body aches     Cats      Codeine Nausea     Dogs      Dust Mites      [place] Trees      Seasonal Allergies        Social History -   Social History     Social History     Marital status:      Spouse name: Sanam -       Number of children: 2     Years of education: 14     Occupational History     [employer] Management Retired          Social History Main Topics     Smoking status: Never  "Smoker     Smokeless tobacco: Never Used     Alcohol use No     Drug use: No     Sexual activity: No     Other Topics Concern     Parent/Sibling W/ Cabg, Mi Or Angioplasty Before 65f 55m? Yes     2 Brothers and father      Service No     Blood Transfusions No     Caffeine Concern No     Occupational Exposure Yes     he was in [employer]     Hobby Hazards No     Sleep Concern No     Stress Concern No     Weight Concern Yes     Special Diet No     Back Care No     Exercise No     Bike Helmet No     Seat Belt Yes     Self-Exams No     Social History Narrative       Family History -   Family History   Problem Relation Age of Onset     Breast Cancer Mother      Arthritis Father      C.A.D. Father      Cerebrovascular Disease Father      Respiratory Father         TB history     Rheumatoid Arthritis Father      Cerebrovascular Disease Maternal Grandmother      Hypertension Maternal Grandmother      Cancer Maternal Grandfather      C.A.D. Brother      Prostate Cancer Brother      C.A.D. Brother        Review of Systems - As per HPI and PMHx, otherwise 10+ system review of the head and neck, and general constitution is negative.    Physical Exam  /73   Pulse 72   Resp 16   Ht 1.753 m (5' 9\")   Wt 99.8 kg (220 lb)   SpO2 97%   BMI 32.49 kg/m       General - The patient is well nourished and well developed, and appears to have good nutritional status.  Alert and oriented to person and place, answers questions and cooperates with examination appropriately.   Head and Face - Normocephalic and atraumatic, with no gross asymmetry noted of the contour of the facial features.  The facial nerve is intact, with strong symmetric movements.  Voice and Breathing - The patient was breathing comfortably without the use of accessory muscles. There was no wheezing, stridor, or stertor.  The patients voice was clear and strong, and had appropriate pitch and quality.  Ears - The tympanic membranes are normal in appearance, " bony landmarks are intact.  No retraction, perforation, or masses.  No fluid or purulence was seen in the external canal or the middle ear. No evidence of infection of the middle ear or external canal, cerumen was normal in appearance.  Eyes - Extraocular movements intact, and the pupils were reactive to light.  Sclera were not icteric or injected, conjunctiva were pink and moist.  Mouth - Examination of the oral cavity showed pink, healthy oral mucosa. No lesions or ulcerations noted.  The tongue was mobile and midline, and the dentition were in good condition.    Throat - The walls of the oropharynx were smooth, pink, moist, symmetric, and had no lesions or ulcerations.  The tonsillar pillars and soft palate were symmetric.  The uvula was midline on elevation.    Neck - Normal midline excursion of the laryngotracheal complex during swallowing.  Full range of motion on passive movement.  Palpation of the occipital, submental, submandibular, internal jugular chain, and supraclavicular nodes did not demonstrate any abnormal lymph nodes or masses.  The carotid pulse was palpable bilaterally.  Palpation of the thyroid was soft and smooth, with no nodules or goiter appreciated.  The trachea was mobile and midline.    To evaluate the nose in the postoperative state I performed rigid nasal endoscopy.  I first sprayed with lidocaine and neosynephrine.  I began with the LEFT side using a 2.7mm 30 degree rigid nasal endoscope, and color photographs were taken for the medical record.    The middle meatus was open, and I was able to pass the scope through.  The LEFT maxillary antrostomy is open, and looking down and into the LEFT maxillary sinus, the mucosa looks healthy, no abnormal secretions.  Going further back, the ethmoid roof is nicely re-mucosalized, and there is no abnormal secretions or polypoid degeneration.    The right side was then examined.  The middle meatus was open and I visualized the RIGHT antrostomy was  open.  Looking down into the RIGHT maxillary sinus, the mucosa was flat and healthy in appearance.  Going further back the ethmoid system looks good.  The mucosa is healthy, and there were polyps or polypoid degenerations.          A/P - Guillermo Hogue is a 78 year old male  (H81.10) Benign paroxysmal positional vertigo, unspecified laterality  (primary encounter diagnosis)  Based on the trigger of his momentary spinning vertigo changing from unidirectional, to bidrectional rolling in bed, my suspicion is that this is not posterior canal benign paroxysmal positional vertigo, but is actually horizontal canal benign paroxysmal positional vertigo.  I will refer to [facility] for more advanced Semont maneuvers.    (J32.4) Chronic pansinusitis  The two irrigations, itraconazole and separate Gent/Dex continue to maintian perfect control of his nasal and sinus health, and based on today's direct endoscopic exam, things look perfect.  Please continue, and consider decreasing from daily use to QOD use.     I don't think the sinuses are related to this chest pain.  But given the persistence, and his overall health, I would favor more advanced imaging.  I have ordered a chest CT with contrast and will call him with results.      Again, thank you for allowing me to participate in the care of your patient.        Sincerely,        Alonso Valadez MD

## 2019-03-12 NOTE — PROGRESS NOTES
"History of Present Illness - Guillermo Hogue is a 78 year old male status post functional endoscopic sinus surgery on 2/12/2009, last seen on 7/10/2018.  At the visit on 11/11/2013 he told me that since the functional endoscopic sinus surgery his nose has been \"excellent.\"   However, we have continued to manage a chronic post nasal drainage.    To review, he was on BPM as an antihistamine, but because it was no longer available, I placed him on low dose atarax (hydroxyzine) to dry up his allergic rhinitis related post nasal drainage.  He has not had follow up since the last visit.  He told me that \"cornered the market on the BPM in [place]\" and he has just run out of it.  I tried him on atarax but he tells me that it is not helping nearly as much.  He is also taking the BID mucinex, but he thinks that is making only a minimal difference.  He had been taking sleeping pills to get him to sleep.    So with those failures of therapy, I tried a new approach, and used Gent Itraconazole Dex nasal irrigations with the thought that perhaps this was inflammatory post nasal drainage.  On 12/12/12, and the rinses worked dramatically well for him.  He did have complaints of sleep cycle issues, which I then addressed with decreasing the the Dex component.  But then at the follow up on 2/6/2013 there was a return of the drainage and congestion, so I resumed the full strength dex component for two months.  At the visit on 8/5/2014, I encouraged him to try and decrease the frequency of the medicated rinses, and continue saline irrigation.  And at the most recent visit on 10/5/2015, things were going great, and I have not seen him since 4/4/2017.    He tells me that he is here for his annual visit, but at the 2018 visit he also had a new issue.  He tells me taht intermittently over the years he has had issues with occasional dizziness, and PT for benign paroxysmal positional vertigo has always helped in the past.  But this time it " has not seemed help.  I felt that this bidirectional rotationally triggered momentary vertigo was posterior canal benign paroxysmal positional vertigo, and referred him for Semont maneuvers.  That problem has improved.    The main reason he has come to me earlier than usual is that lately he has been having some pain in the RIGHT upper chest when he lays down.  He was seen by his PCP Dr. Doty, and there was some noise in the chest, and was treated with Amoxicillin and predniosne, and a second round.  He wanted to rule out sinus post nasal drainage as the cause of this pain in the chest. There has been no productive cough or dyspnea otherwise.      Past Medical History -   Patient Active Problem List   Diagnosis     Reactive airway disease     Nasal polyposis     AR (allergic rhinitis)     HYPERLIPIDEMIA LDL GOAL <130     Advanced directives, counseling/discussion     Moderate persistent asthma     BPH (benign prostatic hyperplasia)     Palpitations     Hypertension goal BP (blood pressure) < 140/90     Fatty liver     Cataracts, bilateral     Moderate persistent asthma without complication     Moderate persistent asthma, uncomplicated     Hip pain, left       Current Medications -   Current Outpatient Medications:      Acetaminophen (ARTHRITIS PAIN RELIEF PO), Take 1-2 tablets by mouth every 8 hours, Disp: , Rfl:      albuterol (PROAIR HFA/PROVENTIL HFA/VENTOLIN HFA) 108 (90 Base) MCG/ACT inhaler, Inhale 2 puffs into the lungs every 4 hours as needed for shortness of breath / dyspnea or wheezing Use with spacer, Disp: 1 Inhaler, Rfl: 0     amoxicillin (AMOXIL) 875 MG tablet, Take 1 tablet (875 mg) by mouth 2 times daily, Disp: 20 tablet, Rfl: 0     atorvastatin (LIPITOR) 20 MG tablet, TAKE 1/2 TABLET BY MOUTH DAILY FOR CHOLESTEROL., Disp: 45 tablet, Rfl: 3     budesonide-formoterol (SYMBICORT) 160-4.5 MCG/ACT Inhaler, INHALE 2 PUFFS INTO THE LUNGS 2 TIMES DAILY PHARMACY OK TO HOLD PRESCRIPTION UNTIL DUE, Disp: 30.6  Inhaler, Rfl: 1     COMPOUNDED NON-CONTROLLED SUBSTANCE (CMPD RX) - PHARMACY TO MIX COMPOUNDED MEDICATION, 20 mLs by Nasal Instillation route 2 times daily Itraconazole nasal solution 0.1mg/mL solution for nasal irrigation, Disp: 2000 mL, Rfl: 12     COMPOUNDED NON-CONTROLLED SUBSTANCE (CMPD RX) - PHARMACY TO MIX COMPOUNDED MEDICATION, Gentamicin/Dexamethasone 160/120mg/Liter saline, Disp: 2000 mL, Rfl: 11     diltiazem (CARTIA XT) 120 MG 24 hr capsule, Take 1 capsule (120 mg) by mouth daily For blood pressure/lowers pulse. Pharmacy ok to hold prescription until due, Disp: 90 capsule, Rfl: 3     diphenhydrAMINE-acetaminophen (TYLENOL PM)  MG tablet, Take 1 tablet by mouth At Bedtime Once a week, Disp: , Rfl:      IBUPROFEN PO, Take 200 mg by mouth Once a week alt with tylenol, Disp: , Rfl:      predniSONE (DELTASONE) 20 MG tablet, Take 60 mg by mouth daily for 3 days, THEN 40 mg daily for 3 days, THEN 20 mg daily for 3 days, THEN 10 mg daily for 3 days., Disp: 20 tablet, Rfl: 0     Spacer/Aero-Holding Chambers (SPACER/AERO-HOLD CHAMBER MASK) RAMAN, 1 Adult size spacer to use with MDI inhalers., Disp: 1 each, Rfl: 0    Allergies -   Allergies   Allergen Reactions     Hytrin [Terazosin Hcl]      Leg swelling and vertigo     Simvastatin Other (See Comments)     Body aches     Cats      Codeine Nausea     Dogs      Dust Mites      [place] Trees      Seasonal Allergies        Social History -   Social History     Social History     Marital status:      Spouse name: Sanam -       Number of children: 2     Years of education: 14     Occupational History     [employer] Management Retired          Social History Main Topics     Smoking status: Never Smoker     Smokeless tobacco: Never Used     Alcohol use No     Drug use: No     Sexual activity: No     Other Topics Concern     Parent/Sibling W/ Cabg, Mi Or Angioplasty Before 65f 55m? Yes     2 Brothers and father      Service No     Blood  "Transfusions No     Caffeine Concern No     Occupational Exposure Yes     he was in [employer]     Hobby Hazards No     Sleep Concern No     Stress Concern No     Weight Concern Yes     Special Diet No     Back Care No     Exercise No     Bike Helmet No     Seat Belt Yes     Self-Exams No     Social History Narrative       Family History -   Family History   Problem Relation Age of Onset     Breast Cancer Mother      Arthritis Father      C.A.D. Father      Cerebrovascular Disease Father      Respiratory Father         TB history     Rheumatoid Arthritis Father      Cerebrovascular Disease Maternal Grandmother      Hypertension Maternal Grandmother      Cancer Maternal Grandfather      C.A.D. Brother      Prostate Cancer Brother      C.A.D. Brother        Review of Systems - As per HPI and PMHx, otherwise 10+ system review of the head and neck, and general constitution is negative.    Physical Exam  /73   Pulse 72   Resp 16   Ht 1.753 m (5' 9\")   Wt 99.8 kg (220 lb)   SpO2 97%   BMI 32.49 kg/m      General - The patient is well nourished and well developed, and appears to have good nutritional status.  Alert and oriented to person and place, answers questions and cooperates with examination appropriately.   Head and Face - Normocephalic and atraumatic, with no gross asymmetry noted of the contour of the facial features.  The facial nerve is intact, with strong symmetric movements.  Voice and Breathing - The patient was breathing comfortably without the use of accessory muscles. There was no wheezing, stridor, or stertor.  The patients voice was clear and strong, and had appropriate pitch and quality.  Ears - The tympanic membranes are normal in appearance, bony landmarks are intact.  No retraction, perforation, or masses.  No fluid or purulence was seen in the external canal or the middle ear. No evidence of infection of the middle ear or external canal, cerumen was normal in appearance.  Eyes - " Extraocular movements intact, and the pupils were reactive to light.  Sclera were not icteric or injected, conjunctiva were pink and moist.  Mouth - Examination of the oral cavity showed pink, healthy oral mucosa. No lesions or ulcerations noted.  The tongue was mobile and midline, and the dentition were in good condition.    Throat - The walls of the oropharynx were smooth, pink, moist, symmetric, and had no lesions or ulcerations.  The tonsillar pillars and soft palate were symmetric.  The uvula was midline on elevation.    Neck - Normal midline excursion of the laryngotracheal complex during swallowing.  Full range of motion on passive movement.  Palpation of the occipital, submental, submandibular, internal jugular chain, and supraclavicular nodes did not demonstrate any abnormal lymph nodes or masses.  The carotid pulse was palpable bilaterally.  Palpation of the thyroid was soft and smooth, with no nodules or goiter appreciated.  The trachea was mobile and midline.    To evaluate the nose in the postoperative state I performed rigid nasal endoscopy.  I first sprayed with lidocaine and neosynephrine.  I began with the LEFT side using a 2.7mm 30 degree rigid nasal endoscope, and color photographs were taken for the medical record.    The middle meatus was open, and I was able to pass the scope through.  The LEFT maxillary antrostomy is open, and looking down and into the LEFT maxillary sinus, the mucosa looks healthy, no abnormal secretions.  Going further back, the ethmoid roof is nicely re-mucosalized, and there is no abnormal secretions or polypoid degeneration.    The right side was then examined.  The middle meatus was open and I visualized the RIGHT antrostomy was open.  Looking down into the RIGHT maxillary sinus, the mucosa was flat and healthy in appearance.  Going further back the ethmoid system looks good.  The mucosa is healthy, and there were polyps or polypoid degenerations.          JEET/GRACY - Guillermo TANNER  Mykel is a 78 year old male  (H81.10) Benign paroxysmal positional vertigo, unspecified laterality  (primary encounter diagnosis)  Based on the trigger of his momentary spinning vertigo changing from unidirectional, to bidrectional rolling in bed, my suspicion is that this is not posterior canal benign paroxysmal positional vertigo, but is actually horizontal canal benign paroxysmal positional vertigo.  I will refer to [facility] for more advanced Semont maneuvers.    (J32.4) Chronic pansinusitis  The two irrigations, itraconazole and separate Gent/Dex continue to maintian perfect control of his nasal and sinus health, and based on today's direct endoscopic exam, things look perfect.  Please continue, and consider decreasing from daily use to QOD use.     I don't think the sinuses are related to this chest pain.  But given the persistence, and his overall health, I would favor more advanced imaging.  I have ordered a chest CT with contrast and will call him with results.

## 2019-03-13 ENCOUNTER — ANCILLARY PROCEDURE (OUTPATIENT)
Dept: CT IMAGING | Facility: CLINIC | Age: 79
End: 2019-03-13
Attending: OTOLARYNGOLOGY
Payer: COMMERCIAL

## 2019-03-13 DIAGNOSIS — R07.89 CHEST WALL PAIN: ICD-10-CM

## 2019-03-13 PROCEDURE — 71260 CT THORAX DX C+: CPT | Mod: TC

## 2019-03-13 RX ORDER — IOPAMIDOL 755 MG/ML
80 INJECTION, SOLUTION INTRAVASCULAR ONCE
Status: COMPLETED | OUTPATIENT
Start: 2019-03-13 | End: 2019-03-13

## 2019-03-13 RX ADMIN — IOPAMIDOL 80 ML: 755 INJECTION, SOLUTION INTRAVASCULAR at 13:25

## 2019-03-14 LAB
CREAT BLD-MCNC: 0.8 MG/DL (ref 0.5–1.2)
GFR SERPL CREATININE-BSD FRML MDRD: 86 ML/MIN/{1.73_M2}
GFRB: 94

## 2019-03-18 PROBLEM — M25.552 HIP PAIN, LEFT: Status: RESOLVED | Noted: 2019-01-09 | Resolved: 2019-03-18

## 2019-03-18 NOTE — PROGRESS NOTES
"Subjective:  HPI                    Objective:  System    Physical Exam    General     ROS    Assessment/Plan:    DISCHARGE REPORT    Progress reporting period is from 01/09/2019 to 03/18/2019.   Patient has not returned to therapy so current subjective and objective findings are unknown.  The subjective and objective information are from the last visit (01/16/2019) with this patient.    SUBJECTIVE  Subjective: Pt reports he is \"comfortable\" with HEP but not necessarily feeling great symptomatic change.  Seems to do well with anything \"forward\" but is challenging for anything out to the side.   Current Pain level: No change   Initial Pain level: 3/10        ;   ,       OBJECTIVE  Objective: Asymmetric WB on sit to stand greater to R.  L hip PROM and MMT as noted 01/09.      ASSESSMENT/PLAN  Updated problem list and treatment plan: Diagnosis 1:  Hip pain -- see plan below  STG/LTGs have been met or progress has been made towards goals:  N/A  Assessment of Progress: Patient has not returned to therapy.  Current status is unknown and discharge G code cannot be reported.  Self Management Plans:  Patient has been instructed in a home treatment program.  Guillermo continues to require the following intervention to meet STG and LTG's: PT intervention is no longer required to meet STG/LTG.    Recommendations:  Pt last seen in PT 01/16/2019.  He has since cancelled without rescheduling and seen Dr Parisi, targeting [facility].  Discharge to HEP.  "

## 2019-05-06 DIAGNOSIS — I10 HYPERTENSION GOAL BP (BLOOD PRESSURE) < 140/90: Primary | ICD-10-CM

## 2019-05-07 RX ORDER — DILTIAZEM HYDROCHLORIDE 120 MG/1
CAPSULE, EXTENDED RELEASE ORAL
Qty: 90 CAPSULE | Refills: 0 | Status: SHIPPED | OUTPATIENT
Start: 2019-05-07 | End: 2019-08-12

## 2019-05-13 ENCOUNTER — TELEPHONE (OUTPATIENT)
Dept: FAMILY MEDICINE | Facility: CLINIC | Age: 79
End: 2019-05-13

## 2019-05-13 ENCOUNTER — TELEPHONE (OUTPATIENT)
Dept: OTOLARYNGOLOGY | Facility: CLINIC | Age: 79
End: 2019-05-13

## 2019-05-13 NOTE — TELEPHONE ENCOUNTER
A prior authorization is needed for the following medication prescribed.  Please complete a prior authorization with the information included below.    Medication:Gent 160mg/Dex 120mg/L Irr    Ingredients                                                                   NDCs                             Gentamicin sul 40mg/ml                                                   [address]-30      Main Active Ingredient  Dexamethasone sod Phos 120 soln                              [address]-64  Sodium chloride 0.9% Soln                                              [address]-61        RX #:[id number]   Reason for Rejection: Product not on Formulary    Pharmacy Insurance plan: [employer]  BIN #:[id number]  ID #:[id number]  PCN #:[id number]  Phone #[phone]    Please backdate for 05/12/2019      Pharmacy NPI:[id number]      Please advise the pharmacy when the prior authorization is approved or denied.     Thank you for your time.    Tresa Callaway    Compounding Pharmacy Technician  [facility]   [address] [place] [address]   Phone: [phone]  Fax: [phone]

## 2019-05-13 NOTE — TELEPHONE ENCOUNTER
Per patient, he is supposed to have a hip replacement with Dr. Parisi in June at [place]. No one has called to help him schedule this surgery and he has questions regarding his surgery. Please call to discuss.

## 2019-05-14 ENCOUNTER — TELEPHONE (OUTPATIENT)
Dept: ORTHOPEDICS | Facility: CLINIC | Age: 79
End: 2019-05-14

## 2019-05-14 DIAGNOSIS — M16.12 PRIMARY OSTEOARTHRITIS OF LEFT HIP: Primary | ICD-10-CM

## 2019-05-14 NOTE — TELEPHONE ENCOUNTER
N/a X 3. P: We note Surgery did leave message with patient on : Conversation: Schedule Surgery   (Newest Message First)     Mary Jo Chaidez           3/7/19 2:34 PM   Note      Patient wants June.          I left a VM instructing patient to call [phone] to set up this procedure at his convenience.. Additionally, a new surgery order was placed.Advised to call if any questions or assistance needed.   Parminder Lopez OPA

## 2019-05-15 NOTE — TELEPHONE ENCOUNTER
Type of surgery: LEFT TOTAL HIP ARTHROPLASTY  CPT 40837  Primary osteoarthritis of left hip [M16.12]  - Primary    Location of surgery: [facility]  Date and time of surgery: 06/17/2019 / 7:30 AM   Surgeon: AMARA LONGO   Pre-Op Appt Date: 06/03/2019  Post-Op Appt Date: 07/03/2019  Packet sent out: Yes  Pre-cert/Authorization completed:  No pre cert needed, per are online list  Date: 03/15/2019    Thank you,   Izabela Prather   [facility] Department  [phone]

## 2019-05-16 NOTE — TELEPHONE ENCOUNTER
PA Initiation    Medication: Gent 160mg/Dex 120mg/L Irr  Insurance Company: [facility] - Phone [phone] Fax [phone]  Pharmacy Filling the Rx: [facility] - [place], [place] - [address]  Filling Pharmacy Phone: [phone]  Filling Pharmacy Fax:    Start Date: 5/16/2019    Central Prior Authorization Team   Phone: [phone]      Manually faxed prior authorization form to [facility] at fax# [phone]

## 2019-05-17 ENCOUNTER — OFFICE VISIT (OUTPATIENT)
Dept: FAMILY MEDICINE | Facility: CLINIC | Age: 79
End: 2019-05-17
Payer: COMMERCIAL

## 2019-05-17 VITALS
DIASTOLIC BLOOD PRESSURE: 70 MMHG | RESPIRATION RATE: 20 BRPM | OXYGEN SATURATION: 94 % | HEIGHT: 69 IN | WEIGHT: 224 LBS | BODY MASS INDEX: 33.18 KG/M2 | SYSTOLIC BLOOD PRESSURE: 138 MMHG | TEMPERATURE: 97.8 F | HEART RATE: 85 BPM

## 2019-05-17 DIAGNOSIS — J45.40 MODERATE PERSISTENT ASTHMA, UNCOMPLICATED: ICD-10-CM

## 2019-05-17 DIAGNOSIS — J30.89 ALLERGIC RHINITIS DUE TO OTHER ALLERGIC TRIGGER, UNSPECIFIED SEASONALITY: Primary | ICD-10-CM

## 2019-05-17 PROCEDURE — 99214 OFFICE O/P EST MOD 30 MIN: CPT | Performed by: FAMILY MEDICINE

## 2019-05-17 PROCEDURE — 99207 C PAF COMPLETED  NO CHARGE: CPT | Performed by: FAMILY MEDICINE

## 2019-05-17 RX ORDER — PREDNISONE 10 MG/1
TABLET ORAL
Qty: 30 TABLET | Refills: 1 | Status: SHIPPED | OUTPATIENT
Start: 2019-05-17 | End: 2019-06-03

## 2019-05-17 RX ORDER — MONTELUKAST SODIUM 10 MG/1
10 TABLET ORAL AT BEDTIME
Qty: 30 TABLET | Refills: 5 | Status: SHIPPED | OUTPATIENT
Start: 2019-05-17 | End: 2019-08-01

## 2019-05-17 ASSESSMENT — MIFFLIN-ST. JEOR: SCORE: 1726.44

## 2019-05-17 NOTE — NURSING NOTE
"Chief Complaint   Patient presents with     Allergies       Initial /70   Pulse 85   Temp 97.8  F (36.6  C) (Oral)   Resp 20   Ht 1.753 m (5' 9\")   Wt 101.6 kg (224 lb)   SpO2 94%   BMI 33.08 kg/m   Estimated body mass index is 33.08 kg/m  as calculated from the following:    Height as of this encounter: 1.753 m (5' 9\").    Weight as of this encounter: 101.6 kg (224 lb).    Amanda Mead CMA    "

## 2019-05-17 NOTE — TELEPHONE ENCOUNTER
Prior Authorization Approval    Authorization Effective Date:  4/16/2019  Authorization Expiration Date:  5/16/2020  Medication: Gent 160mg/Dex 120mg/L Irr  Approved Dose/Quantity: 1000ml  Reference #: [id number]   Insurance Company: [employer] - Phone [phone] Fax [phone]  Which Pharmacy is filling the prescription (Not needed for infusion/clinic administered): [facility] - [place] [address]    Received an approval letter: Routing to pharmacy to have them reprocess.

## 2019-05-17 NOTE — PROGRESS NOTES
"SUBJECTIVE:  Guillermo Hogue, a 79 year old male scheduled an appointment to discuss the following issues:  Allergies. History asthma.   Had prednisone 3 months.   Will be getting THR surgery June 17th.   Taking symbicort. Albuterol more recently past week.   Allergies worse with pollens. Sinus congestion/sneezing. No fevers or chills. Itching/red eyes. No over the counter allergies. Taking nasal compound from ENT - steroid/gentamicin.   No pets/no water damage to house.   Medical, social, surgical, and family histories reviewed.    ROS:  All other ROS negative    OBJECTIVE:  /70   Pulse 85   Temp 97.8  F (36.6  C) (Oral)   Resp 20   Ht 1.753 m (5' 9\")   Wt 101.6 kg (224 lb)   SpO2 94%   BMI 33.08 kg/m    EXAM:  GENERAL APPEARANCE: healthy, alert and no distress  EYES: EOMI,  PERRL  HENT: ear canals and TM's normal and nose clear discharge and mouth without ulcers or lesions  NECK: no adenopathy, no asymmetry, masses, or scars and thyroid normal to palpation  RESP: diffuse exp wheezing. Able to speak in complete sentences.  CV: regular rates and rhythm, normal S1 S2, no S3 or S4 and no murmur, click or rub -  ABDOMEN:  soft, nontender, no HSM or masses and bowel sounds normal  MS: extremities normal- no gross deformities noted, no evidence of inflammation in joints, FROM in all extremities.  PSYCH: mentation appears normal and affect normal/bright    ASSESSMENT / PLAN:  (J30.89) Allergic rhinitis due to other allergic trigger, unspecified seasonality  (primary encounter diagnosis)  Comment: needs help  Plan: montelukast (SINGULAIR) 10 MG tablet        Reveiwed risks and side effects of medication  Add singulair. Over the counter allegra. Follow-up allergist if not improving/worse    (J45.40) Moderate persistent asthma, uncomplicated  Comment: needs help  Plan: montelukast (SINGULAIR) 10 MG tablet,         predniSONE (DELTASONE) 10 MG tablet        Continue MDI's. Prn prednisone - can hold for now. " Reveiwed risks and side effects of medication. Worsening shortness of breath to er. Allergist if needed. Call/email with questions/concerns    Edi Doty MD    .

## 2019-05-21 NOTE — TELEPHONE ENCOUNTER
I have faxed paperwork to [facility].     Thank you,   Izabela Prather   Referral Department  [phone]

## 2019-06-03 ENCOUNTER — OFFICE VISIT (OUTPATIENT)
Dept: FAMILY MEDICINE | Facility: CLINIC | Age: 79
End: 2019-06-03
Payer: COMMERCIAL

## 2019-06-03 VITALS
OXYGEN SATURATION: 96 % | WEIGHT: 220 LBS | RESPIRATION RATE: 20 BRPM | HEIGHT: 69 IN | TEMPERATURE: 97.8 F | SYSTOLIC BLOOD PRESSURE: 131 MMHG | BODY MASS INDEX: 32.58 KG/M2 | HEART RATE: 64 BPM | DIASTOLIC BLOOD PRESSURE: 65 MMHG

## 2019-06-03 DIAGNOSIS — J45.40 MODERATE PERSISTENT ASTHMA WITHOUT COMPLICATION: ICD-10-CM

## 2019-06-03 DIAGNOSIS — Z01.818 PREOP GENERAL PHYSICAL EXAM: Primary | ICD-10-CM

## 2019-06-03 DIAGNOSIS — M25.552 HIP PAIN, LEFT: ICD-10-CM

## 2019-06-03 DIAGNOSIS — I10 HYPERTENSION GOAL BP (BLOOD PRESSURE) < 140/90: ICD-10-CM

## 2019-06-03 LAB
ALBUMIN UR-MCNC: NEGATIVE MG/DL
APPEARANCE UR: CLEAR
BILIRUB UR QL STRIP: NEGATIVE
COLOR UR AUTO: YELLOW
ERYTHROCYTE [DISTWIDTH] IN BLOOD BY AUTOMATED COUNT: 13.7 % (ref 10–15)
GLUCOSE UR STRIP-MCNC: NEGATIVE MG/DL
HCT VFR BLD AUTO: 42.6 % (ref 40–53)
HGB BLD-MCNC: 13.8 G/DL (ref 13.3–17.7)
HGB UR QL STRIP: NEGATIVE
KETONES UR STRIP-MCNC: NEGATIVE MG/DL
LEUKOCYTE ESTERASE UR QL STRIP: NEGATIVE
MCH RBC QN AUTO: 29.7 PG (ref 26.5–33)
MCHC RBC AUTO-ENTMCNC: 32.4 G/DL (ref 31.5–36.5)
MCV RBC AUTO: 92 FL (ref 78–100)
NITRATE UR QL: NEGATIVE
PH UR STRIP: 5.5 PH (ref 5–7)
PLATELET # BLD AUTO: 184 10E9/L (ref 150–450)
RBC # BLD AUTO: 4.64 10E12/L (ref 4.4–5.9)
SOURCE: NORMAL
SP GR UR STRIP: 1.02 (ref 1–1.03)
UROBILINOGEN UR STRIP-ACNC: 0.2 EU/DL (ref 0.2–1)
WBC # BLD AUTO: 9.3 10E9/L (ref 4–11)

## 2019-06-03 PROCEDURE — 36415 COLL VENOUS BLD VENIPUNCTURE: CPT | Performed by: FAMILY MEDICINE

## 2019-06-03 PROCEDURE — 93000 ELECTROCARDIOGRAM COMPLETE: CPT | Performed by: FAMILY MEDICINE

## 2019-06-03 PROCEDURE — 99214 OFFICE O/P EST MOD 30 MIN: CPT | Performed by: FAMILY MEDICINE

## 2019-06-03 PROCEDURE — 85027 COMPLETE CBC AUTOMATED: CPT | Performed by: FAMILY MEDICINE

## 2019-06-03 PROCEDURE — 81003 URINALYSIS AUTO W/O SCOPE: CPT | Performed by: FAMILY MEDICINE

## 2019-06-03 PROCEDURE — 99207 C PAF COMPLETED  NO CHARGE: CPT | Performed by: FAMILY MEDICINE

## 2019-06-03 ASSESSMENT — MIFFLIN-ST. JEOR: SCORE: 1708.29

## 2019-06-03 NOTE — NURSING NOTE
"Chief Complaint   Patient presents with     Pre-Op Exam     act       Initial /65   Pulse 64   Temp 97.8  F (36.6  C) (Oral)   Resp 20   Ht 1.753 m (5' 9\")   Wt 99.8 kg (220 lb)   SpO2 96%   BMI 32.49 kg/m   Estimated body mass index is 32.49 kg/m  as calculated from the following:    Height as of this encounter: 1.753 m (5' 9\").    Weight as of this encounter: 99.8 kg (220 lb).    Amanda Mead, CMA    "

## 2019-06-03 NOTE — LETTER
June 4, 2019    Guillermo Hogue  [address]  [place] [address]        Dear Guillermo,    The results of your recent tests were normal.  Below is a copy of the results.  It was a pleasure to see you at your last appointment.    If you have any questions or concerns, please call myself or my nurse at [phone].    Sincerely,    .Edi Doty MD/tegan      Results for orders placed or performed in visit on 06/03/19   CBC with platelets   Result Value Ref Range    WBC 9.3 4.0 - 11.0 10e9/L    RBC Count 4.64 4.4 - 5.9 10e12/L    Hemoglobin 13.8 13.3 - 17.7 g/dL    Hematocrit 42.6 40.0 - 53.0 %    MCV 92 78 - 100 fl    MCH 29.7 26.5 - 33.0 pg    MCHC 32.4 31.5 - 36.5 g/dL    RDW 13.7 10.0 - 15.0 %    Platelet Count 184 150 - 450 10e9/L   UA reflex to Microscopic and Culture   Result Value Ref Range    Color Urine Yellow     Appearance Urine Clear     Glucose Urine Negative NEG^Negative mg/dL    Bilirubin Urine Negative NEG^Negative    Ketones Urine Negative NEG^Negative mg/dL    Specific Gravity Urine 1.020 1.003 - 1.035    Blood Urine Negative NEG^Negative    pH Urine 5.5 5.0 - 7.0 pH    Protein Albumin Urine Negative NEG^Negative mg/dL    Urobilinogen Urine 0.2 0.2 - 1.0 EU/dL    Nitrite Urine Negative NEG^Negative    Leukocyte Esterase Urine Negative NEG^Negative    Source Midstream Urine

## 2019-06-17 ENCOUNTER — TELEPHONE (OUTPATIENT)
Dept: FAMILY MEDICINE | Facility: CLINIC | Age: 79
End: 2019-06-17

## 2019-06-17 NOTE — TELEPHONE ENCOUNTER
Patient's surgery was cancelled today due to atrial fib. Per anesthesiologist, he needs to see Dr Doty in the next couple of days. Appointment made with Dr Doty for tomorrow at 10:00 AM. Left message for patient to call back.Anny Gaytan MA/TING

## 2019-06-17 NOTE — PROGRESS NOTES
[facility]  [address] [address] [phone]  Dept: [phone]    PRE-OP EVALUATION:  Today's date: 2019    Guillermo Hogue (: 1940) presents for pre-operative evaluation assessment as requested by  ***.  He requires evaluation and anesthesia risk assessment prior to undergoing surgery/procedure for treatment of *** .    {PREOP QUESTIONNAIRE OPTIONS (by MA):123579}    HPI:     HPI related to upcoming procedure: ***      {Ch. Problems:253607}    MEDICAL HISTORY:     Patient Active Problem List    Diagnosis Date Noted     Moderate persistent asthma without complication 2018     Priority: Medium     Moderate persistent asthma, uncomplicated 2018     Priority: Medium     Cataracts, bilateral 2016     Priority: Medium     Fatty liver 10/05/2015     Priority: Medium     Hypertension goal BP (blood pressure) < 140/90 2015     Priority: Medium     Palpitations 2013     Priority: Medium     BPH (benign prostatic hyperplasia) 2011     Priority: Medium     Advanced directives, counseling/discussion 2011     Priority: Medium     Discussed Advance Directive planning with patient; information given to patient to review.           Moderate persistent asthma 2011     Priority: Medium     HYPERLIPIDEMIA LDL GOAL <130 10/31/2010     Priority: Medium     AR (allergic rhinitis) 10/19/2010     Priority: Medium     Reactive airway disease      Priority: Medium     Nasal polyposis      Priority: Medium      Past Medical History:   Diagnosis Date     Allergic rhinitis age 22     Asthma 2009     Chronic osteoarthritis      Nasal polyposis      Osteoarthritis of left hip      Reactive airway disease      Past Surgical History:   Procedure Laterality Date     COLONOSCOPY       COLONOSCOPY N/A 5/10/2017    Procedure: COMBINED COLONOSCOPY, SINGLE OR MULTIPLE BIOPSY/POLYPECTOMY BY BIOPSY;;  Surgeon: Abisai Duarte DO;  Location:  [facility] OR     COLONOSCOPY WITH CO2 INSUFFLATION N/A 5/14/2015    Procedure: COLONOSCOPY WITH CO2 INSUFFLATION;  Surgeon: Jose R Richmond MD;  Location: [facility] OR     COLONOSCOPY WITH CO2 INSUFFLATION N/A 5/10/2017    Procedure: COLONOSCOPY WITH CO2 INSUFFLATION;  COLON SCREEN/ UNDERWOOD;  Surgeon: Abisai Duarte DO;  Location: [facility] OR     SINUS SURGERY  2009     TONSILLECTOMY  age 22     Current Outpatient Medications   Medication Sig Dispense Refill     Acetaminophen (ARTHRITIS PAIN RELIEF PO) Take 1-2 tablets by mouth every 8 hours       albuterol (PROAIR HFA/PROVENTIL HFA/VENTOLIN HFA) 108 (90 Base) MCG/ACT inhaler Inhale 2 puffs into the lungs every 4 hours as needed for shortness of breath / dyspnea or wheezing Use with spacer 1 Inhaler 0     atorvastatin (LIPITOR) 20 MG tablet TAKE 1/2 TABLET BY MOUTH DAILY FOR CHOLESTEROL. 45 tablet 3     budesonide-formoterol (SYMBICORT) 160-4.5 MCG/ACT Inhaler INHALE 2 PUFFS INTO THE LUNGS 2 TIMES DAILY PHARMACY OK TO HOLD PRESCRIPTION UNTIL DUE 30.6 Inhaler 1     COMPOUNDED NON-CONTROLLED SUBSTANCE (CMPD RX) - PHARMACY TO MIX COMPOUNDED MEDICATION 20 mLs by Nasal Instillation route 2 times daily Itraconazole nasal solution 0.1mg/mL solution for nasal irrigation 2000 mL 12     COMPOUNDED NON-CONTROLLED SUBSTANCE (CMPD RX) - PHARMACY TO MIX COMPOUNDED MEDICATION Gentamicin/Dexamethasone 160/120mg/Liter saline 2000 mL 11     DILT- MG 24 hr capsule TAKE 1 CAPSULE BY MOUTH DAILY FOR BLOOD PRESSURE/LOWERS PULSE 90 capsule 0     diltiazem (CARTIA XT) 120 MG 24 hr capsule Take 1 capsule (120 mg) by mouth daily For blood pressure/lowers pulse. Pharmacy ok to hold prescription until due 90 capsule 3     IBUPROFEN PO Take 200 mg by mouth Once a week alt with tylenol       montelukast (SINGULAIR) 10 MG tablet Take 1 tablet (10 mg) by mouth At Bedtime For allergies/asthma 30 tablet 5     Spacer/Aero-Holding Chambers (SPACER/AERO-HOLD CHAMBER MASK) RAMAN 1 Adult size spacer to use with  "MDI inhalers. 1 each 0     OTC products: {OTC ANALGESICS:461432}    Allergies   Allergen Reactions     Hytrin [Terazosin Hcl]      Leg swelling and vertigo     Simvastatin Other (See Comments)     Body aches     Cats      Codeine Nausea     Dogs      Dust Mites      [place] Trees      Seasonal Allergies       Latex Allergy: {YES/NO WITH DEFAULT:721693::\"NO\"}    Social History     Tobacco Use     Smoking status: Never Smoker     Smokeless tobacco: Never Used   Substance Use Topics     Alcohol use: No     History   Drug Use No       REVIEW OF SYSTEMS:   {ROS Preop Choices:366373}    EXAM:   There were no vitals taken for this visit.  {EXAM Preop Choices:042169}    DIAGNOSTICS:   {DIAGNOSTIC FOR PREOP:629176}    Recent Labs   Lab Test 06/03/19  1505 01/03/19  0934 03/16/18  1002   HGB 13.8  --  14.3     --  180   NA  --  138 138   POTASSIUM  --  4.2 4.0   CR  --  0.85 0.83        IMPRESSION:   {PREOP REASONS:540648::\"Reason for surgery/procedure: ***\",\"Diagnosis/reason for consult: ***\"}    The proposed surgical procedure is considered {HIGH=major cardiovascular or procedures requiring prolonged anesthesia >4 hours or large fluid shifts;    INTERMEDIATE=abdominal, most orthopedic and intrathoracic surgery; LOW= endoscopy, cataract and breast surgery:314271} risk.    REVISED CARDIAC RISK INDEX  The patient has the following serious cardiovascular risks for perioperative complications such as (MI, PE, VFib and 3  AV Block):  {PREOP REVISED CARDIAC INDEX (RCI):766930:p:\"No serious cardiac risks\"}  INTERPRETATION: {REVISED CARDIAC RISK INTERPRETATION:629571}    The patient has the following additional risks for perioperative complications:  {Additional perioperative risks:851309:p:\"No identified additional risks\"}      ICD-10-CM    1. Preop general physical exam Z01.818        RECOMMENDATIONS:     {IMPORTANT - Conditions - complete carefully!!:997268}    {IMPORTANT - Medications:654260::\"--Patient is to take all " "scheduled medications on the day of surgery EXCEPT for modifications listed below.\"}    {IMPORTANT - Approval:938470:p:\"APPROVAL GIVEN to proceed with proposed procedure, without further diagnostic evaluation\"}       Signed Electronically by: Edi Doty MD    Copy of this evaluation report is provided to requesting physician.    [facility] Preop Guidelines    Revised Cardiac Risk Index  "

## 2019-06-17 NOTE — TELEPHONE ENCOUNTER
Called and spoke to patient, as there was not a release to speak with the daughter. Patient will be in at 10:00 tomorrow morning to see Dr Doty.Anny Gaytan MA/TING

## 2019-06-18 ENCOUNTER — OFFICE VISIT (OUTPATIENT)
Dept: FAMILY MEDICINE | Facility: CLINIC | Age: 79
End: 2019-06-18
Payer: COMMERCIAL

## 2019-06-18 VITALS
SYSTOLIC BLOOD PRESSURE: 127 MMHG | TEMPERATURE: 98 F | RESPIRATION RATE: 16 BRPM | HEART RATE: 72 BPM | DIASTOLIC BLOOD PRESSURE: 71 MMHG | OXYGEN SATURATION: 91 % | WEIGHT: 222 LBS | BODY MASS INDEX: 32.88 KG/M2 | HEIGHT: 69 IN

## 2019-06-18 DIAGNOSIS — I48.0 PAF (PAROXYSMAL ATRIAL FIBRILLATION) (H): Primary | ICD-10-CM

## 2019-06-18 PROCEDURE — 99214 OFFICE O/P EST MOD 30 MIN: CPT | Performed by: FAMILY MEDICINE

## 2019-06-18 ASSESSMENT — MIFFLIN-ST. JEOR: SCORE: 1717.37

## 2019-06-18 NOTE — PROGRESS NOTES
"SUBJECTIVE:  Guillermo Hogue, a 79 year old male scheduled an appointment to discuss the following issues:  Follow-up a.fib. Was supposed to have hip replacement and cancelled by anesthesiology because a.fib noted.  Patient thinks had a bout of self-limited a.fib 2 years ago.   Patient unaware of symptoms. No chest pain or palpitations or LIGHTHEADED.   Patient was on off cardiazem day of surgery.   No bleeding issues. No black/bloody stools. No abdominal pain. No focal weakness/tingling. No blurry vision. No headache.   Medical, social, surgical, and family histories reviewed.    ROS:  All other ROS.     OBJECTIVE:  /71   Pulse 72   Temp 98  F (36.7  C) (Oral)   Resp 16   Ht 1.753 m (5' 9\")   Wt 100.7 kg (222 lb)   SpO2 91%   BMI 32.78 kg/m    EXAM:  GENERAL APPEARANCE: healthy, alert and no distress  EYES: EOMI,  PERRL  HENT: ear canals and TM's normal and nose and mouth without ulcers or lesions  NECK: no adenopathy, no asymmetry, masses, or scars and thyroid normal to palpation  RESP: lungs clear to auscultation - no rales, rhonchi or wheezes  CV: irregularly irregular rhythm  ABDOMEN:  soft, nontender, no HSM or masses and bowel sounds normal  MS: extremities normal- no gross deformities noted, no evidence of inflammation in joints, FROM in all extremities.  PSYCH: mentation appears normal and affect normal/bright    ASSESSMENT / PLAN:  (I48.0) PAF (paroxysmal atrial fibrillation) (H)  (primary encounter diagnosis)  Comment: likely had episode self-limited 2 years ago. Normal stress test in past and rate ok today but on diltiazem.   Plan: rivaroxaban ANTICOAGULANT (XARELTO) 20 MG TABS         tablet, CARDIOLOGY EVAL ADULT REFERRAL        Needs blood thinner. Reveiwed risks and side effects of medication  Consider generic equilavent. Expected course and warning signs reviewed. Call/email with questions/concerns. To ER if chest pain/ shortness of breath/ LIGHTHEADED.   Will clear for surgery if ok " per cardiology in future.   Edi Doty MD

## 2019-06-18 NOTE — NURSING NOTE
"Chief Complaint   Patient presents with     Atrial Fib     act       Initial /71   Pulse 72   Temp 98  F (36.7  C) (Oral)   Resp 16   Ht 1.753 m (5' 9\")   Wt 100.7 kg (222 lb)   SpO2 91%   BMI 32.78 kg/m   Estimated body mass index is 32.78 kg/m  as calculated from the following:    Height as of this encounter: 1.753 m (5' 9\").    Weight as of this encounter: 100.7 kg (222 lb).    Amanda Mead, CMA    "

## 2019-06-19 ENCOUNTER — ANCILLARY PROCEDURE (OUTPATIENT)
Dept: CARDIOLOGY | Facility: CLINIC | Age: 79
End: 2019-06-19
Attending: INTERNAL MEDICINE
Payer: COMMERCIAL

## 2019-06-19 ENCOUNTER — OFFICE VISIT (OUTPATIENT)
Dept: CARDIOLOGY | Facility: CLINIC | Age: 79
End: 2019-06-19
Payer: COMMERCIAL

## 2019-06-19 VITALS
DIASTOLIC BLOOD PRESSURE: 69 MMHG | HEART RATE: 77 BPM | OXYGEN SATURATION: 93 % | BODY MASS INDEX: 32.93 KG/M2 | SYSTOLIC BLOOD PRESSURE: 126 MMHG | WEIGHT: 223 LBS

## 2019-06-19 DIAGNOSIS — Z01.810 PRE-OPERATIVE CARDIOVASCULAR EXAMINATION: Primary | ICD-10-CM

## 2019-06-19 DIAGNOSIS — Z87.09 HISTORY OF ASTHMA: ICD-10-CM

## 2019-06-19 DIAGNOSIS — I48.0 PAF (PAROXYSMAL ATRIAL FIBRILLATION) (H): ICD-10-CM

## 2019-06-19 PROCEDURE — 99204 OFFICE O/P NEW MOD 45 MIN: CPT | Performed by: INTERNAL MEDICINE

## 2019-06-19 PROCEDURE — 93000 ELECTROCARDIOGRAM COMPLETE: CPT | Performed by: INTERNAL MEDICINE

## 2019-06-19 PROCEDURE — 93306 TTE W/DOPPLER COMPLETE: CPT | Performed by: INTERNAL MEDICINE

## 2019-06-19 ASSESSMENT — ASTHMA QUESTIONNAIRES: ACT_TOTALSCORE: 22

## 2019-06-19 NOTE — LETTER
6/19/2019      RE: Guillermo Hogue  [id number] [address]  [place] [id number]       Dear Colleague,    Thank you for the opportunity to participate in the care of your patient, Guillermo Hogue, at the [facility] AT [facility] at [facility]. Please see a copy of my visit note below.    Referring provider: Edi Doty MD    Chief complaint: pre-operative evaluation    HPI: Mr. Guillermo Hogue is a 79 year old  male with PMH significant for asthma and hyperlipidemia.  The patient was seen by Dr. Doty on 6/3/2019 for preoperative evaluation prior to undergoing left hip replacement and he was cleared for surgery.  The patient's procedure on 6/17  was canceled by anesthesiology due to possible atrial fibrillation noted on the monitor when he was being administered premedication drugs and about to receive spinal block in preparation for surgery.  He was taken to a monitoring room and stayed there for ~1 hour and his rhythm was noted to be normal (patient information).  No EKG documentation is available.  The patient denies feeling palpitations on that day.  He was also noted to have normal vitals during checking in for surgery.  The patient reports chronic dyspnea on exertion due to asthma which is not new to him.  Denies chest pain, palpitations, dizziness, syncope.  He reports lower extremity edema which was exacerbated by his recent prednisone use for wheezing and asthma exacerbation.  The patient has a history of paroxysmal atrial fibrillation in the setting of pericarditis in 2011.  He underwent CT angiogram which showed mild coronary artery disease.  No recurrence of atrial fibrillation since then.  The patient was seen by Dr. Doty after the cancellation of surgery and recommended to start rivaroxaban for anticoagulation however the patient has not started the medication yet.  He denies using aspirin.  He is currently on inhalers,  atorvastatin 20 mg and diltiazem 120 mg.  No history of smoking.  He drinks alcohol very rarely.  No history of hypertension, diabetes or stroke.      Prior cardiac tests include a nuclear stress test in 2014 from [facility] which showed normal LV function with no evidence of ischemia.     I have reviewed his ECG in clinic today which shows sinus rhythm with bigeminal PACs.  Normal MD/QRS/QTc intervals.  No ST-T wave changes.  Normal axis.      Medications, personal, family, and social history reviewed with patient and revised.    PAST MEDICAL HISTORY:  Past Medical History:   Diagnosis Date     Allergic rhinitis age 22     Asthma 2009     Chronic osteoarthritis      Nasal polyposis 2009     Osteoarthritis of left hip      Reactive airway disease 2009       CURRENT MEDICATIONS:  Current Outpatient Medications   Medication Sig Dispense Refill     Acetaminophen (ARTHRITIS PAIN RELIEF PO) Take 1-2 tablets by mouth every 8 hours       albuterol (PROAIR HFA/PROVENTIL HFA/VENTOLIN HFA) 108 (90 Base) MCG/ACT inhaler Inhale 2 puffs into the lungs every 4 hours as needed for shortness of breath / dyspnea or wheezing Use with spacer 1 Inhaler 0     atorvastatin (LIPITOR) 20 MG tablet TAKE 1/2 TABLET BY MOUTH DAILY FOR CHOLESTEROL. 45 tablet 3     budesonide-formoterol (SYMBICORT) 160-4.5 MCG/ACT Inhaler INHALE 2 PUFFS INTO THE LUNGS 2 TIMES DAILY PHARMACY OK TO HOLD PRESCRIPTION UNTIL DUE 30.6 Inhaler 1     COMPOUNDED NON-CONTROLLED SUBSTANCE (CMPD RX) - PHARMACY TO MIX COMPOUNDED MEDICATION 20 mLs by Nasal Instillation route 2 times daily Itraconazole nasal solution 0.1mg/mL solution for nasal irrigation 2000 mL 12     COMPOUNDED NON-CONTROLLED SUBSTANCE (CMPD RX) - PHARMACY TO MIX COMPOUNDED MEDICATION Gentamicin/Dexamethasone 160/120mg/Liter saline 2000 mL 11     DILT- MG 24 hr capsule TAKE 1 CAPSULE BY MOUTH DAILY FOR BLOOD PRESSURE/LOWERS PULSE 90 capsule 0     diltiazem (CARTIA XT) 120 MG 24 hr capsule Take 1  capsule (120 mg) by mouth daily For blood pressure/lowers pulse. Pharmacy ok to hold prescription until due 90 capsule 3     IBUPROFEN PO Take 200 mg by mouth Once a week alt with tylenol       montelukast (SINGULAIR) 10 MG tablet Take 1 tablet (10 mg) by mouth At Bedtime For allergies/asthma 30 tablet 5     rivaroxaban ANTICOAGULANT (XARELTO) 20 MG TABS tablet Take 1 tablet (20 mg) by mouth daily (with dinner) Blood thinner for atrial fib 15 tablet 1     Spacer/Aero-Holding Chambers (SPACER/AERO-HOLD CHAMBER MASK) RAMAN 1 Adult size spacer to use with MDI inhalers. 1 each 0       PAST SURGICAL HISTORY:  Past Surgical History:   Procedure Laterality Date     COLONOSCOPY       COLONOSCOPY N/A 5/10/2017    Procedure: COMBINED COLONOSCOPY, SINGLE OR MULTIPLE BIOPSY/POLYPECTOMY BY BIOPSY;;  Surgeon: Abisai Duarte DO;  Location: [facility] OR     COLONOSCOPY WITH CO2 INSUFFLATION N/A 5/14/2015    Procedure: COLONOSCOPY WITH CO2 INSUFFLATION;  Surgeon: Jose R Richmond MD;  Location: [facility] OR     COLONOSCOPY WITH CO2 INSUFFLATION N/A 5/10/2017    Procedure: COLONOSCOPY WITH CO2 INSUFFLATION;  COLON SCREEN/ UNDERWOOD;  Surgeon: Abisai Duarte DO;  Location: [facility] OR     SINUS SURGERY  2009     TONSILLECTOMY  age 22       ALLERGIES:     Allergies   Allergen Reactions     Hytrin [Terazosin Hcl]      Leg swelling and vertigo     Simvastatin Other (See Comments)     Body aches     Cats      Codeine Nausea     Dogs      Dust Mites      [place] Trees      Seasonal Allergies        FAMILY HISTORY:  Family History   Problem Relation Age of Onset     Breast Cancer Mother      Arthritis Father      C.A.D. Father      Cerebrovascular Disease Father      Respiratory Father         TB history     Rheumatoid Arthritis Father      Cerebrovascular Disease Maternal Grandmother      Hypertension Maternal Grandmother      Cancer Maternal Grandfather      C.A.D. Brother      Prostate Cancer Brother      C.A.D. Brother          SOCIAL HISTORY:  Social  History     Tobacco Use     Smoking status: Never Smoker     Smokeless tobacco: Never Used   Substance Use Topics     Alcohol use: No     Drug use: No       ROS:   Constitutional: No fever, chills, or sweats. Weight stable.   ENT: No visual disturbance, ear ache, epistaxis, sore throat.   Cardiovascular: As per HPI.   Respiratory: Cough +, No hemoptysis.    GI: No nausea, vomiting, hematemesis, melena, or hematochezia.   : No hematuria.   Integument: Negative.   Psychiatric: Negative.   Hematologic:  No easy bruising, no easy bleeding.  Neuro: Negative.   Endocrinology: No significant heat or cold intolerance   Musculoskeletal: No myalgia.    Exam:  /69 (BP Location: Left arm, Patient Position: Chair, Cuff Size: Adult Large)   Pulse 77   Wt 101.2 kg (223 lb)   SpO2 93%   BMI 32.93 kg/m     GENERAL APPEARANCE: alert and no distress  HEENT: no icterus, no central cyanosis  LYMPH/NECK: no adenopathy, no asymmetry, JVP not elevated, no carotid bruits.  RESPIRATORY:Rhonchi over both lungs, no use of accessory muscles, no retractions, respirations are unlabored, normal respiratory rate  CARDIOVASCULAR: regular rhythm, normal S1, S2, no S3 or S4 and no murmur, click or rub, precordium quiet with normal PMI.  GI: soft, non tender  EXTREMITIES: peripheral pulses normal, 1+ bilateral pretibial edema  NEURO: alert, normal speech,and affect  VASC: Radial, dorsalis pedis and posterior tibialis pulses are normal in volumes and symmetric bilaterally.   SKIN: no ecchymoses, no rashes     I have reviewed the labs, outside hospital medical records and personally reviewed the imaging below (transthoracic echocardiogram from today and EKG in clinic) and made my comment in the assessment and plan.    Labs:  CBC RESULTS:   Lab Results   Component Value Date    WBC 9.3 06/03/2019    RBC 4.64 06/03/2019    HGB 13.8 06/03/2019    HCT 42.6 06/03/2019    MCV 92 06/03/2019    MCH 29.7 06/03/2019    MCHC 32.4 06/03/2019    RDW 13.7  06/03/2019     06/03/2019       BMP RESULTS:  Lab Results   Component Value Date     01/03/2019    POTASSIUM 4.2 01/03/2019    CHLORIDE 105 01/03/2019    CO2 27 01/03/2019    ANIONGAP 6 01/03/2019     (H) 01/03/2019    BUN 13 01/03/2019    CR 0.85 01/03/2019    GFRESTIMATED 84 01/03/2019    GFRESTBLACK >90 01/03/2019    BENJA 9.0 01/03/2019        INR RESULTS:  Lab Results   Component Value Date    INR 0.94 12/01/2009       NORMAL pharmacologic stress perfusion imaging study [facility] 3/20/2014   No significant ischemia was suggested by this study.   There was no ECG evidence diagnostic for of ischemia.   LV systolic function was normal without regional wall motion abnormalities.   Compared to prior study of 8/12, there is no significant change in major findings.    CT angiogram 8/1/2011 [facility]  1)  Minor scattered calcific coronary plaque.   2)  No severe obstructive lesions noted.   3)  Normal left ventricular function.   4)  Pericardial effusion noted.    5)  Study reveals presence of calcified nonobstructive plaque.    EKG 5/15/2018: Sinus rhythm with first-degree AV block [facility]    EKG 6/19/2019 personally reviewed in clinic shows sinus rhythm with bigeminal PACs.    Assessment and Plan:   Mr. Guillermo Hogue is a 79 year old  male with PMH significant for asthma and hyperlipidemia.    1.  Paroxysmal atrial fibrillation?:  The patient had a previous episode of atrial fibrillation in the setting of pericarditis in 2011 without any recurrence since then.  Few days ago the patient underwent preparation for left hip replacement and patient's procedure was canceled due to atrial fibrillation ? detected on monitor by anesthesiology.  However there is no documentation on EKG and the patient was told to have normal rhythm when he was brought to monitoring unit.  So far there is no convincing evidence of afib that requires long-term oral anticoagulation initiation (prior episode in  the setting of inflammation due to pericarditis).  Therefore I recommended long-term EKG monitoring for 1 month. He will hold starting anticoagulation in the meantime.  I discussed extensively with the patient and the daughter that since we do not have clear evidence of atrial fibrillation documentation the patient is at risk of bleeding with oral anticoagulation if the rhythm detected on the monitor was not atrial fibrillation (the patient has frequent PACs which can sometimes cause irregular rhythm and be mistaken for atrial fibrillation). Today's EKG shows sinus rhythm with frequent PACs.    2.  Preoperative cardiovascular evaluation: The patient has dyspnea on exertion however he has asthma and his symptoms are not new.  CT angiogram in 2011 did not show obstructive CAD.  2014 nuclear stress test also showed normal myocardial perfusion.  The patient can proceed to planned hip surgery with low risk from cardiac standpoint.    3.  Moderate persistent asthma: On inhalers and sometimes prednisone.    No medication changes today. I will review the event monitor and if there is any evidence of atrial fibrillation we will start oral anticoagulation at that time.    Plan summary:  Continue current medications.  1 month event EKG monitor to detect silent atrial fibrillation (the patient will start wearing monitor after hip surgery.  If the surgery is delayed more than 1 month patient's daughter will call our clinic and EKG monitor will be placed on the patient).  Anticoagulation is held for now until there is clear evidence of atrial ablation on the event monitor.  The patient is cleared to undergo hip surgery from cardiac standpoint.  TTE ordered.    A total of 40 minutes spent face-toface with greater than 50% of the time spent in counseling and coordinating cares of the issues above.     Please donot hesitate to contact me if you have any questions or concerns. Again, thank you for allowing me to participate in the  care of your patient.    Dea PERAZA MD  [facility] Division of Cardiology  Pager [phone]    Progress note 6/19/2019:  I personally reviewed transthoracic echocardiogram obtained today which showed normal LV size and function.  No valve disease noted.   DR.CAN

## 2019-06-19 NOTE — PROGRESS NOTES
Referring provider: Edi Doty MD    Chief complaint: pre-operative evaluation    HPI: Mr. Guillermo Hogue is a 79 year old  male with PMH significant for asthma and hyperlipidemia.  The patient was seen by Dr. Doty on 6/3/2019 for preoperative evaluation prior to undergoing left hip replacement and he was cleared for surgery.  The patient's procedure on 6/17  was canceled by anesthesiology due to possible atrial fibrillation noted on the monitor when he was being administered premedication drugs and about to receive spinal block in preparation for surgery.  He was taken to a monitoring room and stayed there for ~1 hour and his rhythm was noted to be normal (patient information).  No EKG documentation is available.  The patient denies feeling palpitations on that day.  He was also noted to have normal vitals during checking in for surgery.  The patient reports chronic dyspnea on exertion due to asthma which is not new to him.  Denies chest pain, palpitations, dizziness, syncope.  He reports lower extremity edema which was exacerbated by his recent prednisone use for wheezing and asthma exacerbation.  The patient has a history of paroxysmal atrial fibrillation in the setting of pericarditis in 2011.  He underwent CT angiogram which showed mild coronary artery disease.  No recurrence of atrial fibrillation since then.  The patient was seen by Dr. Doty after the cancellation of surgery and recommended to start rivaroxaban for anticoagulation however the patient has not started the medication yet.  He denies using aspirin.  He is currently on inhalers, atorvastatin 20 mg and diltiazem 120 mg.  No history of smoking.  He drinks alcohol very rarely.  No history of hypertension, diabetes or stroke.      Prior cardiac tests include a nuclear stress test in 2014 from [facility] which showed normal LV function with no evidence of ischemia.     I have reviewed his ECG in clinic today which shows sinus rhythm with  bigeminal PACs.  Normal CT/QRS/QTc intervals.  No ST-T wave changes.  Normal axis.      Medications, personal, family, and social history reviewed with patient and revised.    PAST MEDICAL HISTORY:  Past Medical History:   Diagnosis Date     Allergic rhinitis age 22     Asthma 2009     Chronic osteoarthritis      Nasal polyposis 2009     Osteoarthritis of left hip      Reactive airway disease 2009       CURRENT MEDICATIONS:  Current Outpatient Medications   Medication Sig Dispense Refill     Acetaminophen (ARTHRITIS PAIN RELIEF PO) Take 1-2 tablets by mouth every 8 hours       albuterol (PROAIR HFA/PROVENTIL HFA/VENTOLIN HFA) 108 (90 Base) MCG/ACT inhaler Inhale 2 puffs into the lungs every 4 hours as needed for shortness of breath / dyspnea or wheezing Use with spacer 1 Inhaler 0     atorvastatin (LIPITOR) 20 MG tablet TAKE 1/2 TABLET BY MOUTH DAILY FOR CHOLESTEROL. 45 tablet 3     budesonide-formoterol (SYMBICORT) 160-4.5 MCG/ACT Inhaler INHALE 2 PUFFS INTO THE LUNGS 2 TIMES DAILY PHARMACY OK TO HOLD PRESCRIPTION UNTIL DUE 30.6 Inhaler 1     COMPOUNDED NON-CONTROLLED SUBSTANCE (CMPD RX) - PHARMACY TO MIX COMPOUNDED MEDICATION 20 mLs by Nasal Instillation route 2 times daily Itraconazole nasal solution 0.1mg/mL solution for nasal irrigation 2000 mL 12     COMPOUNDED NON-CONTROLLED SUBSTANCE (CMPD RX) - PHARMACY TO MIX COMPOUNDED MEDICATION Gentamicin/Dexamethasone 160/120mg/Liter saline 2000 mL 11     DILT- MG 24 hr capsule TAKE 1 CAPSULE BY MOUTH DAILY FOR BLOOD PRESSURE/LOWERS PULSE 90 capsule 0     diltiazem (CARTIA XT) 120 MG 24 hr capsule Take 1 capsule (120 mg) by mouth daily For blood pressure/lowers pulse. Pharmacy ok to hold prescription until due 90 capsule 3     IBUPROFEN PO Take 200 mg by mouth Once a week alt with tylenol       montelukast (SINGULAIR) 10 MG tablet Take 1 tablet (10 mg) by mouth At Bedtime For allergies/asthma 30 tablet 5     rivaroxaban ANTICOAGULANT (XARELTO) 20 MG TABS tablet  Take 1 tablet (20 mg) by mouth daily (with dinner) Blood thinner for atrial fib 15 tablet 1     Spacer/Aero-Holding Chambers (SPACER/AERO-HOLD CHAMBER MASK) RAMAN 1 Adult size spacer to use with MDI inhalers. 1 each 0       PAST SURGICAL HISTORY:  Past Surgical History:   Procedure Laterality Date     COLONOSCOPY       COLONOSCOPY N/A 5/10/2017    Procedure: COMBINED COLONOSCOPY, SINGLE OR MULTIPLE BIOPSY/POLYPECTOMY BY BIOPSY;;  Surgeon: Abisai Duarte DO;  Location: [facility] OR     COLONOSCOPY WITH CO2 INSUFFLATION N/A 5/14/2015    Procedure: COLONOSCOPY WITH CO2 INSUFFLATION;  Surgeon: Jose R Richmond MD;  Location: [facility] OR     COLONOSCOPY WITH CO2 INSUFFLATION N/A 5/10/2017    Procedure: COLONOSCOPY WITH CO2 INSUFFLATION;  COLON SCREEN/ UNDERWOOD;  Surgeon: Abisai Duarte DO;  Location: [facility] OR     SINUS SURGERY  2009     TONSILLECTOMY  age 22       ALLERGIES:     Allergies   Allergen Reactions     Hytrin [Terazosin Hcl]      Leg swelling and vertigo     Simvastatin Other (See Comments)     Body aches     Cats      Codeine Nausea     Dogs      Dust Mites      [place] Trees      Seasonal Allergies        FAMILY HISTORY:  Family History   Problem Relation Age of Onset     Breast Cancer Mother      Arthritis Father      C.A.D. Father      Cerebrovascular Disease Father      Respiratory Father         TB history     Rheumatoid Arthritis Father      Cerebrovascular Disease Maternal Grandmother      Hypertension Maternal Grandmother      Cancer Maternal Grandfather      C.A.D. Brother      Prostate Cancer Brother      C.A.D. Brother          SOCIAL HISTORY:  Social History     Tobacco Use     Smoking status: Never Smoker     Smokeless tobacco: Never Used   Substance Use Topics     Alcohol use: No     Drug use: No       ROS:   Constitutional: No fever, chills, or sweats. Weight stable.   ENT: No visual disturbance, ear ache, epistaxis, sore throat.   Cardiovascular: As per HPI.   Respiratory: Cough +, No hemoptysis.    GI:  No nausea, vomiting, hematemesis, melena, or hematochezia.   : No hematuria.   Integument: Negative.   Psychiatric: Negative.   Hematologic:  No easy bruising, no easy bleeding.  Neuro: Negative.   Endocrinology: No significant heat or cold intolerance   Musculoskeletal: No myalgia.    Exam:  /69 (BP Location: Left arm, Patient Position: Chair, Cuff Size: Adult Large)   Pulse 77   Wt 101.2 kg (223 lb)   SpO2 93%   BMI 32.93 kg/m    GENERAL APPEARANCE: alert and no distress  HEENT: no icterus, no central cyanosis  LYMPH/NECK: no adenopathy, no asymmetry, JVP not elevated, no carotid bruits.  RESPIRATORY:Rhonchi over both lungs, no use of accessory muscles, no retractions, respirations are unlabored, normal respiratory rate  CARDIOVASCULAR: regular rhythm, normal S1, S2, no S3 or S4 and no murmur, click or rub, precordium quiet with normal PMI.  GI: soft, non tender  EXTREMITIES: peripheral pulses normal, 1+ bilateral pretibial edema  NEURO: alert, normal speech,and affect  VASC: Radial, dorsalis pedis and posterior tibialis pulses are normal in volumes and symmetric bilaterally.   SKIN: no ecchymoses, no rashes     I have reviewed the labs, outside hospital medical records and personally reviewed the imaging below (transthoracic echocardiogram from today and EKG in clinic) and made my comment in the assessment and plan.    Labs:  CBC RESULTS:   Lab Results   Component Value Date    WBC 9.3 06/03/2019    RBC 4.64 06/03/2019    HGB 13.8 06/03/2019    HCT 42.6 06/03/2019    MCV 92 06/03/2019    MCH 29.7 06/03/2019    MCHC 32.4 06/03/2019    RDW 13.7 06/03/2019     06/03/2019       BMP RESULTS:  Lab Results   Component Value Date     01/03/2019    POTASSIUM 4.2 01/03/2019    CHLORIDE 105 01/03/2019    CO2 27 01/03/2019    ANIONGAP 6 01/03/2019     (H) 01/03/2019    BUN 13 01/03/2019    CR 0.85 01/03/2019    GFRESTIMATED 84 01/03/2019    GFRESTBLACK >90 01/03/2019    BENJA 9.0 01/03/2019         INR RESULTS:  Lab Results   Component Value Date    INR 0.94 12/01/2009       NORMAL pharmacologic stress perfusion imaging study [facility] 3/20/2014   No significant ischemia was suggested by this study.   There was no ECG evidence diagnostic for of ischemia.   LV systolic function was normal without regional wall motion abnormalities.   Compared to prior study of 8/12, there is no significant change in major findings.    CT angiogram 8/1/2011 [facility]  1)  Minor scattered calcific coronary plaque.   2)  No severe obstructive lesions noted.   3)  Normal left ventricular function.   4)  Pericardial effusion noted.    5)  Study reveals presence of calcified nonobstructive plaque.    EKG 5/15/2018: [facility]us [facility]    EKG 6/19/2019 personally reviewed in clinic shows sinus rhythm with bigeminal PACs.    Assessment and Plan:   Mr. Guillermo Hogue is a 79 year old  male with PMH significant for asthma and hyperlipidemia.    1.  Paroxysmal atrial fibrillation?:  The patient had a previous episode of atrial fibrillation in the setting of pericarditis in 2011 without any recurrence since then.  Few days ago the patient underwent preparation for left hip replacement and patient's procedure was canceled due to atrial fibrillation ? detected on monitor by anesthesiology.  However there is no documentation on EKG and the patient was told to have normal rhythm when he was brought to monitoring unit.  So far there is no convincing evidence of afib that requires long-term oral anticoagulation initiation (prior episode in the setting of inflammation due to pericarditis).  Therefore I recommended long-term EKG monitoring for 1 month. He will hold starting anticoagulation in the meantime.  I discussed extensively with the patient and the daughter that since we do not have clear evidence of atrial fibrillation documentation the patient is at risk of bleeding with oral anticoagulation  if the rhythm detected on the monitor was not atrial fibrillation (the patient has frequent PACs which can sometimes cause irregular rhythm and be mistaken for atrial fibrillation). Today's EKG shows sinus rhythm with frequent PACs.    2.  Preoperative cardiovascular evaluation: The patient has dyspnea on exertion however he has asthma and his symptoms are not new.  CT angiogram in 2011 did not show obstructive CAD.  2014 nuclear stress test also showed normal myocardial perfusion.  The patient can proceed to planned hip surgery with low risk from cardiac standpoint.    3.  Moderate persistent asthma: On inhalers and sometimes prednisone.    No medication changes today. I will review the event monitor and if there is any evidence of atrial fibrillation we will start oral anticoagulation at that time.    Plan summary:  Continue current medications.  1 month event EKG monitor to detect silent atrial fibrillation (the patient will start wearing monitor after hip surgery.  If the surgery is delayed more than 1 month patient's daughter will call our clinic and EKG monitor will be placed on the patient).  Anticoagulation is held for now until there is clear evidence of atrial ablation on the event monitor.  The patient is cleared to undergo hip surgery from cardiac standpoint.  TTE ordered.    A total of 40 minutes spent face-toface with greater than 50% of the time spent in counseling and coordinating cares of the issues above.     Please donot hesitate to contact me if you have any questions or concerns. Again, thank you for allowing me to participate in the care of your patient.    Dea PERAZA MD  [facility] Division of Cardiology  Pager [phone]    Progress note 6/19/2019:  I personally reviewed transthoracic echocardiogram obtained today which showed normal LV size and function.  No valve disease noted.   DR.CAN

## 2019-06-19 NOTE — NURSING NOTE
"Chief Complaint   Patient presents with     Atrial Fib     NEW Dr. Todd paroxsymal afib. Knee surgery canceled due to afib found. No EKG found. Also contacted [facility] medical records and confirmed that they can not locate the EKG also Hx HTN, HL . Pt reports occasional chest pains at night, SOB happening more often, daily, fatique with exertion.       Initial /69 (BP Location: Left arm, Patient Position: Chair, Cuff Size: Adult Large)   Pulse 77   Wt 101.2 kg (223 lb)   SpO2 93%   BMI 32.93 kg/m   Estimated body mass index is 32.93 kg/m  as calculated from the following:    Height as of 6/18/19: 1.753 m (5' 9\").    Weight as of this encounter: 101.2 kg (223 lb)..  BP completed using cuff size: large    EKG was done.  Heydi Gomez MA    "

## 2019-06-20 NOTE — TELEPHONE ENCOUNTER
No prior auth required per [facility] list.  See below on telephone message.      Sent to  and was received. 06/21/2019

## 2019-06-20 NOTE — TELEPHONE ENCOUNTER
6-17-19 surgery was cancelled due to heart murmur. Patient left  1:45pm stating he has been cleared for surgery. LVM for patient to return call to schedule

## 2019-06-20 NOTE — TELEPHONE ENCOUNTER
Surgery has been rescheduled / cardiac clearance is done    Surgery date 07/22/2019 [facility] [place]  preop is 07/16/2019  Postop 08/08/2019

## 2019-06-21 DIAGNOSIS — J45.41 MODERATE PERSISTENT ASTHMA WITH ACUTE EXACERBATION: ICD-10-CM

## 2019-06-23 RX ORDER — ALBUTEROL SULFATE 90 UG/1
2 AEROSOL, METERED RESPIRATORY (INHALATION) EVERY 4 HOURS PRN
Qty: 1 INHALER | Refills: 1 | Status: SHIPPED | OUTPATIENT
Start: 2019-06-23 | End: 2021-11-30

## 2019-07-10 ENCOUNTER — OFFICE VISIT (OUTPATIENT)
Dept: FAMILY MEDICINE | Facility: CLINIC | Age: 79
End: 2019-07-10
Payer: COMMERCIAL

## 2019-07-10 ENCOUNTER — ANCILLARY PROCEDURE (OUTPATIENT)
Dept: GENERAL RADIOLOGY | Facility: CLINIC | Age: 79
End: 2019-07-10
Attending: FAMILY MEDICINE
Payer: COMMERCIAL

## 2019-07-10 VITALS
OXYGEN SATURATION: 94 % | BODY MASS INDEX: 32.34 KG/M2 | DIASTOLIC BLOOD PRESSURE: 62 MMHG | TEMPERATURE: 98.3 F | WEIGHT: 219 LBS | SYSTOLIC BLOOD PRESSURE: 110 MMHG | HEART RATE: 89 BPM

## 2019-07-10 DIAGNOSIS — J45.41 MODERATE PERSISTENT ASTHMA WITH ACUTE EXACERBATION: Primary | ICD-10-CM

## 2019-07-10 PROCEDURE — 94640 AIRWAY INHALATION TREATMENT: CPT | Performed by: FAMILY MEDICINE

## 2019-07-10 PROCEDURE — 99214 OFFICE O/P EST MOD 30 MIN: CPT | Mod: 25 | Performed by: FAMILY MEDICINE

## 2019-07-10 PROCEDURE — 71046 X-RAY EXAM CHEST 2 VIEWS: CPT

## 2019-07-10 RX ORDER — ALBUTEROL SULFATE 0.83 MG/ML
2.5 SOLUTION RESPIRATORY (INHALATION) ONCE
Status: COMPLETED | OUTPATIENT
Start: 2019-07-10 | End: 2019-07-10

## 2019-07-10 RX ORDER — PREDNISONE 20 MG/1
TABLET ORAL
Qty: 18 TABLET | Refills: 0 | Status: SHIPPED | OUTPATIENT
Start: 2019-07-10 | End: 2019-07-16

## 2019-07-10 RX ADMIN — ALBUTEROL SULFATE 2.5 MG: 0.83 SOLUTION RESPIRATORY (INHALATION) at 10:51

## 2019-07-10 ASSESSMENT — PAIN SCALES - GENERAL: PAINLEVEL: NO PAIN (0)

## 2019-07-10 NOTE — Clinical Note
Gonzalez Tucker and lee Guidry patient was seen for asthma exacerbation and is on prednisone taper. With recent prednisone use not sure he will be medically optimized in time for planned hip replacement procedure. He has an appointment with Dr. Doty for preop that he plans to keep this appointment to follow up with Dr. Davion Love

## 2019-07-10 NOTE — NURSING NOTE
The following nebulizer treatment was given:     MEDICATION: Albuterol Sulfate 2.5 mg  : Minervax  LOT #: [id number]  EXPIRATION DATE:  11/2020  NDC # 27938-325-07     Nebulizer Start Time:  10:59am  Nebulizer Stop Time:  11:11am  See Vital Signs Flowsheet    Given by Abilio SARGENT MA

## 2019-07-10 NOTE — NURSING NOTE
"Chief Complaint   Patient presents with     Cough     cough for 4-5 days       Initial /62   Pulse 89   Temp 98.3  F (36.8  C) (Tympanic)   Wt 99.3 kg (219 lb)   SpO2 94%   BMI 32.34 kg/m   Estimated body mass index is 32.34 kg/m  as calculated from the following:    Height as of 6/18/19: 1.753 m (5' 9\").    Weight as of this encounter: 99.3 kg (219 lb).  Medication Reconciliation: complete    KENNY Oakes MA    "

## 2019-07-10 NOTE — PROGRESS NOTES
Chief complaint: cough and wheezing     This is a recurring situation     Patient would have a cough and worse at night  The past few days has been worse   Last episode was 6 months ago   Known Asthma: Yes  Known COPD: No  SMOKER: No  Fever No  Sinus congestion/sinus pain  has been ahving some postnasal drainage - this is chronic  Chest pain or exertional shortness of breath: NO  Exposure to pertussis or pertussis like symptoms: NO  Orthopnea, worsening edema, pnd: NO  Rash: NO  Tried OTC medications without relief  No hemoptysis   No calf pain or tenderness. No signs or symptoms of DVT.   Worsening symptoms hence patient came in to be seen      Problem list and histories reviewed & adjusted, as indicated.  Additional history: as documented     Problem list, Medication list, Allergies, and Medical/Social/Surgical histories reviewed in EPIC and updated as appropriate.     ROS:  Constitutional, HEENT, cardiovascular, pulmonary, gi and gu systems are negative, except as otherwise noted.     OBJECTIVE:                                                    /62   Pulse 89   Temp 98.3  F (36.8  C) (Tympanic)   Wt 99.3 kg (219 lb)   SpO2 94%   BMI 32.34 kg/m    Body mass index is 32.34 kg/m .  GENERAL: healthy, alert and no distress  EYES: Pink palpebral conjunctiva, anicteric sclera  ENT: midline nasal septum normal ear exam. Normal sinuses  NECK: no adenopathy, no asymmetry, masses, or scars and thyroid normal to palpation  RESP: symmetrical chest expansion, no retractions, increased expiratory phase wheezes heard at bilateral lungs all lung fields   CV: regular rate and rhythm, normal S1 S2, no S3 or S4, no murmur, click or rub, no peripheral edema and peripheral pulses strong  SKIN: no readily visible rashes noted  MS: no gross musculoskeletal defects noted     Diagnostic Test Results:  Results for orders placed or performed in visit on 07/10/19 (from the past 24 hour(s))   XR Chest 2 Views    Narrative    CHEST  TWO VIEWS July 10, 2019 10:59 AM     HISTORY: Moderate persistent asthma with acute exacerbation.    COMPARISON: Chest x-ray 2/28/2019.      Impression    IMPRESSION: Two views of the chest are performed. Lungs are clear.  Mild elevation left hemidiaphragm is stable. The thorax or pleural  effusions. Heart is normal in size. Calcified plaque is noted in the  aortic arch. Degenerative thoracic spine changes are present.    EVELIN DOMINGUEZ MD         ASSESSMENT/PLAN:                                                         ICD-10-CM    1. Moderate persistent asthma with acute exacerbation J45.41 predniSONE (DELTASONE) 20 MG tablet     albuterol (PROVENTIL) neb solution 2.5 mg     XR Chest 2 Views     INHALATION/NEBULIZER TREATMENT, INITIAL     Prescribed with prednisone taper  Initially very tight airways improved with albuterol neb  Patient has hip replacement scheduled 12 days from now - with this recent exacerbation - advised patient to notify his surgeon as he would be on prednisone taper.  He may not be medically optimized in time 12 days from now.  Copied primary care provider and Dr. Parisi with my note.   Patient voiced understanding   Follow up with primary care provider recommended in 3 days  Adverse reactions of medications discussed.  Over the counter medications discussed.   Aware to come back in if with worsening symptoms or if no relief despite treatment plan  Patient voiced understanding and had no further questions.      MD Nicole Porras MD  [facility]

## 2019-07-12 NOTE — PROGRESS NOTES
[facility]  [address] [id number] [phone]  Dept: [phone]    PRE-OP EVALUATION:  Today's date: 2019    Guillermo Hogue (: 1940) presents for pre-operative evaluation assessment as requested by Dr. Parisi.  He requires evaluation and anesthesia risk assessment prior to undergoing surgery/procedure for treatment of Hip replacement  .  See pre-op from 6/3/19. Surgery delayed for bout of a.fib -resolved and seen cardiology and normal echo and recently for prednisone for asthma flare seen in urgent care and on 20mg daily until tomorrow. Breathing overall improving. No fevers. No antibiotic given.  Surgery supposed to happen next Monday.   Breathing worse in hot/humid weather. Taking symbicort.     Fax number for surgical facility: [facility]  Primary Physician: Edi Doty  Type of Anesthesia Anticipated: to be determined    Patient has a Health Care Directive or Living Will:  YES     Preop Questions 2019   Who is doing your surgery? dr pearce   What are you having done? hip replacement   Date of Surgery/Procedure: 2019   Facility or Hospital where procedure/surgery will be performed: [facility]   1.  Do you have a history of Heart attack, stroke, stent, coronary bypass surgery, or other heart surgery? No   2.  Do you ever have any pain or discomfort in your chest? YES - cough recently   3.  Do you have a history of  Heart Failure? No   4.   Are you troubled by shortness of breath when:  walking on a level surface, or up a slight hill, or at night? YES     5.  Do you currently have a cold, bronchitis or other respiratory infection? No   6.  Do you have a cough, shortness of breath, or wheezing? YES    7.  Do you sometimes get pains in the calves of your legs when you walk? No   8. Do you or anyone in your family have previous history of blood clots? No   9.  Do you or does anyone in your family have a serious bleeding problem such as prolonged  bleeding following surgeries or cuts? No   10. Have you ever had problems with anemia or been told to take iron pills? No   11. Have you had any abnormal blood loss such as black, tarry or bloody stools? No   12. Have you ever had a blood transfusion? UNKNOWN    13. Have you or any of your relatives ever had problems with anesthesia? No   14. Do you have sleep apnea, excessive snoring or daytime drowsiness? No   15. Do you have any prosthetic heart valves? No   16. Do you have prosthetic joints? No         HPI:     HPI related to upcoming procedure: hip replacement.      ASTHMA - Patient has a longstanding history of moderate-severe Asthma     HYPERTENSION - Patient has longstanding history of HTN , currently denies any symptoms referable to elevated blood pressure. Specifically denies chest pain, palpitations, dyspnea, orthopnea, PND or peripheral edema. Blood pressure readings have been in normal range. Current medication regimen is as listed below. Patient denies any side effects of medication.       MEDICAL HISTORY:     Patient Active Problem List    Diagnosis Date Noted     Moderate persistent asthma without complication 03/20/2018     Priority: Medium     Moderate persistent asthma, uncomplicated 03/20/2018     Priority: Medium     Cataracts, bilateral 02/01/2016     Priority: Medium     Fatty liver 10/05/2015     Priority: Medium     Hypertension goal BP (blood pressure) < 140/90 09/08/2015     Priority: Medium     Palpitations 03/18/2013     Priority: Medium     BPH (benign prostatic hyperplasia) 08/03/2011     Priority: Medium     Advanced directives, counseling/discussion 04/14/2011     Priority: Medium     Discussed Advance Directive planning with patient; information given to patient to review.           Moderate persistent asthma 04/14/2011     Priority: Medium     HYPERLIPIDEMIA LDL GOAL <130 10/31/2010     Priority: Medium     AR (allergic rhinitis) 10/19/2010     Priority: Medium     Reactive airway  disease      Priority: Medium     Nasal polyposis      Priority: Medium      Past Medical History:   Diagnosis Date     Allergic rhinitis age 22     Asthma 2009     Chronic osteoarthritis      Nasal polyposis 2009     Osteoarthritis of left hip      Reactive airway disease 2009     Past Surgical History:   Procedure Laterality Date     COLONOSCOPY       COLONOSCOPY N/A 5/10/2017    Procedure: COMBINED COLONOSCOPY, SINGLE OR MULTIPLE BIOPSY/POLYPECTOMY BY BIOPSY;;  Surgeon: Abisai Duarte DO;  Location: [facility] OR     COLONOSCOPY WITH CO2 INSUFFLATION N/A 5/14/2015    Procedure: COLONOSCOPY WITH CO2 INSUFFLATION;  Surgeon: Jose R Richmond MD;  Location: [facility] OR     COLONOSCOPY WITH CO2 INSUFFLATION N/A 5/10/2017    Procedure: COLONOSCOPY WITH CO2 INSUFFLATION;  COLON SCREEN/ UNDERWOOD;  Surgeon: Abisai Duarte DO;  Location: [facility] OR     SINUS SURGERY  2009     TONSILLECTOMY  age 22     Current Outpatient Medications   Medication Sig Dispense Refill     Acetaminophen (ARTHRITIS PAIN RELIEF PO) Take 1-2 tablets by mouth every 8 hours       albuterol (PROAIR HFA/PROVENTIL HFA/VENTOLIN HFA) 108 (90 Base) MCG/ACT inhaler Inhale 2 puffs into the lungs every 4 hours as needed for shortness of breath / dyspnea or wheezing Use with spacer 1 Inhaler 1     atorvastatin (LIPITOR) 20 MG tablet TAKE 1/2 TABLET BY MOUTH DAILY FOR CHOLESTEROL. 45 tablet 3     budesonide-formoterol (SYMBICORT) 160-4.5 MCG/ACT Inhaler INHALE 2 PUFFS INTO THE LUNGS 2 TIMES DAILY PHARMACY OK TO HOLD PRESCRIPTION UNTIL DUE 30.6 Inhaler 1     COMPOUNDED NON-CONTROLLED SUBSTANCE (CMPD RX) - PHARMACY TO MIX COMPOUNDED MEDICATION 20 mLs by Nasal Instillation route 2 times daily Itraconazole nasal solution 0.1mg/mL solution for nasal irrigation (Patient not taking: Reported on 7/10/2019) 2000 mL 12     COMPOUNDED NON-CONTROLLED SUBSTANCE (CMPD RX) - PHARMACY TO MIX COMPOUNDED MEDICATION Gentamicin/Dexamethasone 160/120mg/Liter saline (Patient not taking:  Reported on 7/10/2019) 2000 mL 11     DILT- MG 24 hr capsule TAKE 1 CAPSULE BY MOUTH DAILY FOR BLOOD PRESSURE/LOWERS PULSE 90 capsule 0     diltiazem (CARTIA XT) 120 MG 24 hr capsule Take 1 capsule (120 mg) by mouth daily For blood pressure/lowers pulse. Pharmacy ok to hold prescription until due 90 capsule 3     IBUPROFEN PO Take 200 mg by mouth Once a week alt with tylenol       montelukast (SINGULAIR) 10 MG tablet Take 1 tablet (10 mg) by mouth At Bedtime For allergies/asthma 30 tablet 5     predniSONE (DELTASONE) 20 MG tablet 20mg/daily: 3 tablets the first 3 days, then 2 tabs the next 3 days, then 1 tab daily for the last 3 days 18 tablet 0     Spacer/Aero-Holding Chambers (SPACER/AERO-HOLD CHAMBER MASK) RAMAN 1 Adult size spacer to use with MDI inhalers. 1 each 0     OTC products: None, except as noted above    Allergies   Allergen Reactions     Hytrin [Terazosin Hcl]      Leg swelling and vertigo     Simvastatin Other (See Comments)     Body aches     Cats      Codeine Nausea     Dogs      Dust Mites      [place]      Seasonal Allergies       Latex Allergy: NO    Social History     Tobacco Use     Smoking status: Never Smoker     Smokeless tobacco: Never Used   Substance Use Topics     Alcohol use: No     History   Drug Use No       REVIEW OF SYSTEMS:   CONSTITUTIONAL: NEGATIVE for fever, chills, change in weight  INTEGUMENTARY/SKIN: NEGATIVE for worrisome rashes  ENT/MOUTH: NEGATIVE for ear, mouth and throat problems  ENT/MOUTH: post-nasal drip - taking some sudafed intermittent.  RESP:cough/wheezing  CV: NEGATIVE for chest pain, palpitations or peripheral edema  GI: NEGATIVE for nausea, abdominal pain, heartburn, or change in bowel habits, no black or bloody stools  : negative for dysuria, hematuria, decreased urinary stream, erectile dysfunction  MUSCULOSKELETAL: NEGATIVE for significant arthralgias or myalgia  NEURO: NEGATIVE for weakness, dizziness or paresthesias  ENDOCRINE: NEGATIVE for  "temperature intolerance, skin/hair changes  HEME/ALLERGY/IMMUNE: NEGATIVE for bleeding problems  PSYCHIATRIC: NEGATIVE for changes in mood or affect    EXAM:   There were no vitals taken for this visit.   /62   Pulse 69   Temp 97.5  F (36.4  C) (Tympanic)   Ht 1.753 m (5' 9\")   Wt 101.6 kg (224 lb)   SpO2 94%   BMI 33.08 kg/m      GENERAL APPEARANCE: healthy, alert and no distress  EYES: Eyes grossly normal to inspection, PERRL and conjunctivae and sclerae normal  HENT: ear canals and TM's normal and nose clear discharge  and mouth without ulcers or lesions  NECK: no adenopathy, no asymmetry, masses, or scars and thyroid normal to palpation  RESP: lungs clear to auscultation - no rales, rhonchi but mild diffuse wheezes  CV: regular rate and rhythm, normal S1 S2, no S3 or S4 and no murmur, click or rub   ABDOMEN: soft, nontender, no HSM or masses and bowel sounds normal  MS: extremities normal- no gross deformities noted  SKIN: no suspicious lesions or rashes  NEURO: Normal strength and tone, sensory exam grossly normal, mentation intact and speech normal  PSYCH: mentation appears normal and affect normal/bright    DIAGNOSTICS:       Recent Labs   Lab Test 06/03/19  1505 01/03/19  0934 03/16/18  1002   HGB 13.8  --  14.3     --  180   NA  --  138 138   POTASSIUM  --  4.2 4.0   CR  --  0.85 0.83     IMPRESSION:   Reason for surgery/procedure: chronic hip pain// THR  Diagnosis/reason for consult: asthma/htn/palpitations// fitness for surgery  Lungs currently clear and cardiac exam/echo ok.   Patient would like to delay surgery until early fall with heat/humidity. I will clear again but would need repeat cbc/urine.   The proposed surgical procedure is considered INTERMEDIATE risk.    REVISED CARDIAC RISK INDEX  The patient has the following serious cardiovascular risks for perioperative complications such as (MI, PE, VFib and 3  AV Block):  No serious cardiac risks  INTERPRETATION: 1 risks: Class II " (low risk - 0.9% complication rate)    The patient has the following additional risks for perioperative complications:  No identified additional risks      ICD-10-CM    1. Preop general physical exam Z01.818        RECOMMENDATIONS:     --Consult hospital rounder / IM to assist post-op medical management  --patient should be  wear compression hose after surgery    --Patient is to take all scheduled medications on the day of surgery EXCEPT for modifications listed below.    SURGERY on HOLD until early fall with history of asthma worse with heat/humidity.        Signed Electronically by: Edi Doty MD    Copy of this evaluation report is provided to requesting physician.    [facility] Preop Guidelines    Revised Cardiac Risk Index

## 2019-07-16 ENCOUNTER — OFFICE VISIT (OUTPATIENT)
Dept: FAMILY MEDICINE | Facility: CLINIC | Age: 79
End: 2019-07-16
Payer: COMMERCIAL

## 2019-07-16 VITALS
BODY MASS INDEX: 33.18 KG/M2 | SYSTOLIC BLOOD PRESSURE: 130 MMHG | HEART RATE: 69 BPM | WEIGHT: 224 LBS | DIASTOLIC BLOOD PRESSURE: 62 MMHG | HEIGHT: 69 IN | OXYGEN SATURATION: 94 % | TEMPERATURE: 97.5 F

## 2019-07-16 DIAGNOSIS — Z01.818 PREOP GENERAL PHYSICAL EXAM: Primary | ICD-10-CM

## 2019-07-16 DIAGNOSIS — M25.552 HIP PAIN, LEFT: ICD-10-CM

## 2019-07-16 DIAGNOSIS — J45.40 MODERATE PERSISTENT ASTHMA, UNCOMPLICATED: ICD-10-CM

## 2019-07-16 DIAGNOSIS — J45.41 MODERATE PERSISTENT ASTHMA WITH ACUTE EXACERBATION: ICD-10-CM

## 2019-07-16 PROCEDURE — 99214 OFFICE O/P EST MOD 30 MIN: CPT | Performed by: FAMILY MEDICINE

## 2019-07-16 RX ORDER — PSEUDOEPHEDRINE HCL 30 MG
30 TABLET ORAL EVERY 4 HOURS PRN
COMMUNITY
End: 2019-12-17

## 2019-07-16 RX ORDER — PREDNISONE 20 MG/1
TABLET ORAL
Qty: 14 TABLET | Refills: 1 | Status: SHIPPED | OUTPATIENT
Start: 2019-07-16 | End: 2019-09-26

## 2019-07-16 ASSESSMENT — MIFFLIN-ST. JEOR: SCORE: 1726.44

## 2019-07-16 NOTE — Clinical Note
Patient cleared for surgery but patient currently on prednisone and asthma worse with heat and humidity. Patient would like to cancel surgery and reschedule for early fall.

## 2019-07-16 NOTE — NURSING NOTE
"Chief Complaint   Patient presents with     Pre-Op Exam       Initial /62   Pulse 69   Temp 97.5  F (36.4  C) (Tympanic)   Ht 1.753 m (5' 9\")   Wt 101.6 kg (224 lb)   SpO2 94%   BMI 33.08 kg/m   Estimated body mass index is 33.08 kg/m  as calculated from the following:    Height as of this encounter: 1.753 m (5' 9\").    Weight as of this encounter: 101.6 kg (224 lb).    Amanda Mead, ENDY      "

## 2019-07-19 NOTE — TELEPHONE ENCOUNTER
7-22-19 surgery cancelled, patient did not pass pre-op due to asthma.  Surgery rescheduled to 9-23-19 @ [facility] [place]

## 2019-08-01 ENCOUNTER — OFFICE VISIT (OUTPATIENT)
Dept: ALLERGY | Facility: CLINIC | Age: 79
End: 2019-08-01
Attending: FAMILY MEDICINE
Payer: COMMERCIAL

## 2019-08-01 ENCOUNTER — TELEPHONE (OUTPATIENT)
Dept: ALLERGY | Facility: CLINIC | Age: 79
End: 2019-08-01

## 2019-08-01 VITALS
OXYGEN SATURATION: 94 % | WEIGHT: 224 LBS | BODY MASS INDEX: 33.18 KG/M2 | SYSTOLIC BLOOD PRESSURE: 116 MMHG | TEMPERATURE: 97.5 F | DIASTOLIC BLOOD PRESSURE: 62 MMHG | HEIGHT: 69 IN | HEART RATE: 69 BPM

## 2019-08-01 DIAGNOSIS — J30.89 ALLERGIC RHINITIS DUE TO DUST MITE: ICD-10-CM

## 2019-08-01 DIAGNOSIS — J30.81 ALLERGIC RHINITIS DUE TO ANIMAL DANDER: ICD-10-CM

## 2019-08-01 DIAGNOSIS — J30.1 SEASONAL ALLERGIC RHINITIS DUE TO POLLEN: ICD-10-CM

## 2019-08-01 DIAGNOSIS — J32.8 OTHER CHRONIC SINUSITIS: ICD-10-CM

## 2019-08-01 DIAGNOSIS — J45.41 MODERATE PERSISTENT ASTHMA WITH ACUTE EXACERBATION: Primary | ICD-10-CM

## 2019-08-01 PROBLEM — H10.9 RHINOCONJUNCTIVITIS: Status: ACTIVE | Noted: 2019-08-01

## 2019-08-01 PROBLEM — J31.0 RHINOCONJUNCTIVITIS: Status: ACTIVE | Noted: 2019-08-01

## 2019-08-01 LAB
BASOPHILS # BLD AUTO: 0 10E9/L (ref 0–0.2)
BASOPHILS NFR BLD AUTO: 0.2 %
DIFFERENTIAL METHOD BLD: NORMAL
EOSINOPHIL # BLD AUTO: 0.7 10E9/L (ref 0–0.7)
EOSINOPHIL NFR BLD AUTO: 6.6 %
ERYTHROCYTE [DISTWIDTH] IN BLOOD BY AUTOMATED COUNT: 13.8 % (ref 10–15)
FEF 25/75: NORMAL
FEV-1: NORMAL
FEV1/FVC: NORMAL
FVC: NORMAL
HCT VFR BLD AUTO: 41.8 % (ref 40–53)
HGB BLD-MCNC: 13.4 G/DL (ref 13.3–17.7)
LYMPHOCYTES # BLD AUTO: 3.4 10E9/L (ref 0.8–5.3)
LYMPHOCYTES NFR BLD AUTO: 34.6 %
MCH RBC QN AUTO: 29.6 PG (ref 26.5–33)
MCHC RBC AUTO-ENTMCNC: 32.1 G/DL (ref 31.5–36.5)
MCV RBC AUTO: 92 FL (ref 78–100)
MONOCYTES # BLD AUTO: 1.1 10E9/L (ref 0–1.3)
MONOCYTES NFR BLD AUTO: 10.8 %
NEUTROPHILS # BLD AUTO: 4.7 10E9/L (ref 1.6–8.3)
NEUTROPHILS NFR BLD AUTO: 47.8 %
PLATELET # BLD AUTO: 198 10E9/L (ref 150–450)
RBC # BLD AUTO: 4.53 10E12/L (ref 4.4–5.9)
WBC # BLD AUTO: 9.8 10E9/L (ref 4–11)

## 2019-08-01 PROCEDURE — 99204 OFFICE O/P NEW MOD 45 MIN: CPT | Mod: 25 | Performed by: ALLERGY & IMMUNOLOGY

## 2019-08-01 PROCEDURE — 94060 EVALUATION OF WHEEZING: CPT | Performed by: ALLERGY & IMMUNOLOGY

## 2019-08-01 PROCEDURE — 82785 ASSAY OF IGE: CPT | Performed by: ALLERGY & IMMUNOLOGY

## 2019-08-01 PROCEDURE — 85025 COMPLETE CBC W/AUTO DIFF WBC: CPT | Performed by: ALLERGY & IMMUNOLOGY

## 2019-08-01 PROCEDURE — 95004 PERQ TESTS W/ALRGNC XTRCS: CPT | Performed by: ALLERGY & IMMUNOLOGY

## 2019-08-01 PROCEDURE — 36415 COLL VENOUS BLD VENIPUNCTURE: CPT | Performed by: ALLERGY & IMMUNOLOGY

## 2019-08-01 RX ORDER — TRIAMCINOLONE ACETONIDE 55 UG/1
2 SPRAY, METERED NASAL DAILY
Qty: 1 BOTTLE | Refills: 3 | Status: SHIPPED | OUTPATIENT
Start: 2019-08-01 | End: 2019-09-26

## 2019-08-01 RX ORDER — ALBUTEROL SULFATE 0.83 MG/ML
2.5 SOLUTION RESPIRATORY (INHALATION) ONCE
Status: COMPLETED | OUTPATIENT
Start: 2019-08-01 | End: 2019-08-01

## 2019-08-01 RX ADMIN — ALBUTEROL SULFATE 2.5 MG: 0.83 SOLUTION RESPIRATORY (INHALATION) at 11:19

## 2019-08-01 ASSESSMENT — MIFFLIN-ST. JEOR: SCORE: 1726.44

## 2019-08-01 NOTE — LETTER
8/1/2019         RE: Guillermo Hogue  [address]  [address]  [place] [address]        Dear Colleague,    Thank you for referring your patient, Guillermo Hogue, to the [facility]. Please see a copy of my visit note below.    Guillermo Hogue is a 79 year old White male with previous medical history significant for asthma, chronic sinusitis, allergic rhinitis. Guillermo Hogue is being seen today for evaluation of asthma and seasonal allergies. The patient is being seen in consultation at the request of Dr. Soren MD.     Patient has a long-standing history of chest symptoms.  Chest symptoms include shortness of breath, wheezing.  He reports that he was diagnosed with asthma in 2010 at [facility].  In fact I found records from 2010 which document moderate persistent asthma.  He apparently had an elevated fractional excretion of nitric oxide.  Had been on Symbicort.  Had been on chronic prednisone use.  Had been on Singulair.  Previously his main complaint was chronic cough.  Denies chest tightness.  Now with exercise he reports shortness of breath 2-3 times per day.  Symptoms worsen with hot/humid air.  Increased chest symptoms in the spring and summer.  Increased chest symptoms if exposed to cats, dogs, dust and smoke.  Albuterol is used twice daily.  He thinks it is helpful.  He is off Singulair.  He did not find this to be helpful.  He is on Symbicort 160/4.5 mcg 2 puff inhaled twice daily.  He is put on steroids approximately twice per year for chest symptoms.  He thinks oral steroids are beneficial.    Patient has perennial with spring and summer nasal and ocular symptoms.  Increased symptoms around cats and dogs.  Symptoms include rhinorrhea, sneezing, congestion, ocular itching, ocular watering.  History of chronic sinusitis with nasal polyposis.  He denies decreased sense of smell, decreased taste.  He denies sinus pressure.  Mucus from his nose is clear.  Follows with ENT.  He had been  on medicated sinus rinses.  He is no longer using.  He is not using any nasal corticosteroid.  I reviewed prior allergy notes, ENT notes and pulmonary notes.  He is a lifelong non-smoker.  At most recent ENT appointment no polyps were noted.    ENVIRONMENTAL HISTORY: The family [place] in a [facility] [place]. The home is heated with a forced air. They do have central air conditioning. The patient's bedroom is furnished with carpeting in bedroom.  Pets inside the house include 0 animals. There is no history of cockroach or mice infestation. There is/are 0 smokers in the house.  The house does not have a damp basement.     ACT Total Scores 8/1/2019   ACT TOTAL SCORE -   ASTHMA ER VISITS -   ASTHMA HOSPITALIZATIONS -   ACT TOTAL SCORE (Goal Greater than or Equal to 20) 15   In the past 12 months, how many times did you visit the emergency room for your asthma without being admitted to the hospital? 0   In the past 12 months, how many times were you hospitalized overnight because of your asthma? 0     Past Medical History:   Diagnosis Date     Allergic rhinitis age 22     Asthma 2009     Chronic osteoarthritis      Nasal polyposis 2009     Osteoarthritis of left hip      Reactive airway disease 2009     Family History   Problem Relation Age of Onset     Breast Cancer Mother      Arthritis Father      C.A.D. Father      Cerebrovascular Disease Father      Respiratory Father         TB history     Rheumatoid Arthritis Father      Cerebrovascular Disease Maternal Grandmother      Hypertension Maternal Grandmother      Cancer Maternal Grandfather      C.A.D. Brother      Prostate Cancer Brother      C.A.D. Brother      Past Surgical History:   Procedure Laterality Date     COLONOSCOPY       COLONOSCOPY N/A 5/10/2017    Procedure: COMBINED COLONOSCOPY, SINGLE OR MULTIPLE BIOPSY/POLYPECTOMY BY BIOPSY;;  Surgeon: Abisai Duarte DO;  Location: [facility] OR     COLONOSCOPY WITH CO2 INSUFFLATION N/A 5/14/2015     Procedure: COLONOSCOPY WITH CO2 INSUFFLATION;  Surgeon: Jose R Richmond MD;  Location: [facility] OR     COLONOSCOPY WITH CO2 INSUFFLATION N/A 5/10/2017    Procedure: COLONOSCOPY WITH CO2 INSUFFLATION;  COLON SCREEN/ UNDERWOOD;  Surgeon: Abisai Duarte DO;  Location: [facility] OR     SINUS SURGERY  2009     TONSILLECTOMY  age 22       REVIEW OF SYSTEMS:  General: negative for weight gain. negative for weight loss. negative for changes in sleep.   Ears: negative for fullness. negative for hearing loss. negative for dizziness.   Nose: negative for snoring.negative for changes in smell. negative for drainage.   Eyes: positive  for eye watering. positive  for eye itching. negative for vision changes. negative for eye redness.  Throat: negative for hoarseness. negative for sore throat. negative for trouble swallowing.   Lungs: positive  for shortness of breath.positive  for wheezing. negative for sputum production.   Cardiovascular: positive  for chest pain. negative for swelling of ankles. negative for fast or irregular heartbeat.   Gastrointestinal: negative for nausea. positive  for heartburn. negative for acid reflux.   Musculoskeletal: positive  for joint pain. positive  for joint stiffness. negative for joint swelling.   Neurologic: negative for seizures. negative for fainting. negative for weakness.   Psychiatric: negative for changes in mood. negative for anxiety.   Endocrine: negative for cold intolerance. negative for heat intolerance. negative for tremors.   Lymphatic: negative for lower extremity swelling. negative for lymph node swelling.   Hematologic: negative for easy bruising. negative for easy bleeding.  Integumentary: negative for rash. negative for scaling. negative for nail changes.       Current Outpatient Medications:      Acetaminophen (ARTHRITIS PAIN RELIEF PO), Take 1-2 tablets by mouth every 8 hours, Disp: , Rfl:      albuterol (PROAIR HFA/PROVENTIL HFA/VENTOLIN HFA) 108 (90 Base) MCG/ACT inhaler,  Inhale 2 puffs into the lungs every 4 hours as needed for shortness of breath / dyspnea or wheezing Use with spacer, Disp: 1 Inhaler, Rfl: 1     atorvastatin (LIPITOR) 20 MG tablet, TAKE 1/2 TABLET BY MOUTH DAILY FOR CHOLESTEROL., Disp: 45 tablet, Rfl: 3     budesonide-formoterol (SYMBICORT) 160-4.5 MCG/ACT Inhaler, INHALE 2 PUFFS INTO THE LUNGS 2 TIMES DAILY PHARMACY OK TO HOLD PRESCRIPTION UNTIL DUE, Disp: 30.6 Inhaler, Rfl: 1     DILT- MG 24 hr capsule, TAKE 1 CAPSULE BY MOUTH DAILY FOR BLOOD PRESSURE/LOWERS PULSE, Disp: 90 capsule, Rfl: 0     diltiazem (CARTIA XT) 120 MG 24 hr capsule, Take 1 capsule (120 mg) by mouth daily For blood pressure/lowers pulse. Pharmacy ok to hold prescription until due, Disp: 90 capsule, Rfl: 3     IBUPROFEN PO, Take 200 mg by mouth Once a week alt with tylenol, Disp: , Rfl:      Spacer/Aero-Holding Chambers (SPACER/AERO-HOLD CHAMBER MASK) RAMAN, 1 Adult size spacer to use with MDI inhalers., Disp: 1 each, Rfl: 0     tiotropium (SPIRIVA RESPIMAT) 2.5 MCG/ACT inhaler, Inhale 2 puffs into the lungs daily, Disp: 1 Inhaler, Rfl: 3     triamcinolone (NASACORT) 55 MCG/ACT nasal aerosol, Spray 2 sprays into both nostrils daily, Disp: 1 Bottle, Rfl: 3     COMPOUNDED NON-CONTROLLED SUBSTANCE (CMPD RX) - PHARMACY TO MIX COMPOUNDED MEDICATION, 20 mLs by Nasal Instillation route 2 times daily Itraconazole nasal solution 0.1mg/mL solution for nasal irrigation (Patient not taking: Reported on 8/1/2019), Disp: 2000 mL, Rfl: 12     COMPOUNDED NON-CONTROLLED SUBSTANCE (CMPD RX) - PHARMACY TO MIX COMPOUNDED MEDICATION, Gentamicin/Dexamethasone 160/120mg/Liter saline (Patient not taking: Reported on 8/1/2019), Disp: 2000 mL, Rfl: 11     predniSONE (DELTASONE) 20 MG tablet, 2 tablets the first 2 days, then 1 tabs the next 3 days. Take with food in AM for asthma flares (Patient not taking: Reported on 8/1/2019), Disp: 14 tablet, Rfl: 1     pseudoePHEDrine (SUDAFED) 30 MG tablet, Take 30 mg by mouth  every 4 hours as needed for congestion, Disp: , Rfl:   No current facility-administered medications for this visit.   Immunization History   Administered Date(s) Administered     Influenza (H1N1) 12/22/2009     Influenza (High Dose) 3 valent vaccine 10/27/2010, 10/22/2011, 09/30/2014, 09/08/2015, 09/29/2016, 10/04/2017, 09/13/2018     Influenza (IIV3) PF 09/18/2009, 10/13/2012     Influenza Vaccine IM 3yrs+ 4 Valent IIV4 10/17/2013     Mantoux Tuberculin Skin Test 10/09/2009     Pneumo Conj 13-V (2010&after) 09/08/2015     Pneumococcal 23 valent 10/20/2006     TD (ADULT, 7+) 10/27/2010     TDAP Vaccine (Adacel) 08/14/2012     Zoster vaccine recombinant adjuvanted (SHINGRIX) 09/13/2018, 01/03/2019     Zoster vaccine, live 11/12/2014     Allergies   Allergen Reactions     Hytrin [Terazosin Hcl]      Leg swelling and vertigo     Simvastatin Other (See Comments)     Body aches     Cats      Codeine Nausea     Dogs      Dust Mites      [place] Trees      Seasonal Allergies          EXAM:   Constitutional:  Appears well-developed and well-nourished. No distress.   HEENT:   Head: Normocephalic.   Mouth/Throat: No oropharyngeal exudate present.   No cobblestoning of posterior oropharynx.   Nasal tissue pink and normal appearing.  No rhinorrhea noted.    Eyes: Conjunctivae are non-erythematous   No maxillary or frontal sinus tenderness to palpation.   Cardiovascular: Normal rate, regular rhythm and normal heart sounds. Exam reveals no gallop and no friction rub.   No murmur heard.  Respiratory: Effort normal and breath sounds normal. No respiratory distress. No wheezes. No rales.   Musculoskeletal: Normal range of motion.   Neuro: Oriented to person, place, and time.  Skin: Skin is warm and dry. No rash noted.   Psychiatric: Normal mood and affect.     Nursing note and vitals reviewed.      WORKUP:   Spirometry-pre  FVC % pred:58  FEV1 % pred:68  FEV1/FVC % act:85    Restrictive disease.     Spirometry-post  FVC % pred:67  FEV1  % pred:78  FEV1/FVC % act:83    Significant improvement in FEV1 postbronchodilator.      ENVIRONMENTAL PERCUTANEOUS SKIN TESTING: ADULT  [facility] [employer] 8/1/2019   Consent Y   Ordering Physician Quang   Interpreting Physician Quang   Testing Technician Nia TANNER   Location Back   Time start: 11:34 AM   Time End: 11:49 AM   Positive Control: Histatrol*ALK 1 mg/ml 4/24   Negative Control: 50% Glycerin 0   Cat Hair*ALK (10,000 BAU/ml) 7/30   AP Dog Hair/Dander (1:100 w/v) 6/30   Dust Mite p. 30,000 AU/ml 0   Dust Mite f. (30,000 AU/ml) 3/6   Mykel (W/F in millimeters) 0   Pernell Grass (100,000 BAU/mL) 0   Red Cedar (W/F in millimeters) 3/7   Maple/Monona (W/F in millimeters) 0   Hackberry (W/F in millimeters) 0   Dovray (W/F in millimeters) 3/5   Inglewood *ALK (W/F in millimeters) 0   American Elm (W/F in millimeters) 0   Queen Anne's (W/F in millimeters) 0   Black Ruby (W/F in millimeters) 0   Birch Mix (W/F in millimeters) 22/72   G[place]boro (W/F in millimeters) 3/6   [place] (W/F in millimeters) 5/60   Cocklebur (W/F in millimeters) 3/20   McFarlan (W/F in millimeters) 0   White Gio (W/F in millimeters) 3/20   Careless (W/F in millimeters) 0   Nettle (W/F in millimeters) 0   English Plantain (W/F in millimeters) 0   Kochia (W/F in millimeters) 0   Lamb's Quarter (W/F in millimeters) 0   Marshelder (W/F in millimeters) 0   Ragweed Mix* ALK (W/F in millimeters) 5/25   Russian Thistle (W/F in millimeters) 0   Sagebrush/Mugwort (W/F in millimeters) 4/6   Sheep Sorrel (W/F in millimeters) 0   Feather Mix* ALK (W/F in millimeters) 0   Penicillium Mix (1:10 w/v) 0   Curvularia spicifera (1:10 w/v) 0   Epicoccum (1:10 w/v) 0   Aspergillus fumigatus (1:10 w/v): 0   Alternaria tenius (1:10 w/v) 0   H. Cladosporium (1:10 w/v) 0   Phoma herbarum (1:10 w/v) 0            ASSESSMENT/PLAN:  Problem List Items Addressed This Visit        Respiratory    Other chronic sinusitis    Relevant Medications    albuterol (PROVENTIL)  neb solution 2.5 mg (Completed)    triamcinolone (NASACORT) 55 MCG/ACT nasal aerosol    tiotropium (SPIRIVA RESPIMAT) 2.5 MCG/ACT inhaler    Moderate persistent asthma - Primary     History of chronic chest symptoms for multiple years.  Diagnosed with asthma at [facility] in 2010.  On Symbicort 160/4.5 mcg 2 puff inhaled twice daily.  Singulair not helpful.  Using albuterol twice per day.    Spirometry with restrictive lung disease.  Decreased FEV1 at 68% and therefore treated with albuterol neb and spirometry was repeated showing significant improvement in FEV1.     - Continue Symbicort 160/4.5 mcg 2 puff inhaled twice daily.  -Albuterol 2-4 puffs inhaled (use a spacer unless using a Proair Respiclick device) every 4 hours as needed for chest tightness, wheezing, shortness of breath and/or coughing.   -Albuterol 2-4 puffs inhaled (use spacer if not using Proair Respiclick device) 15-20 minutes prior to physical activity.   Please ensure warm up period prior to exercise.   - Spiriva 2.5mcg 2 puffs daily.   - IgE and cbc with diff. Could be biologic candidate.   - Once chest symptoms better controlled could be allergy shot candidate.            Relevant Medications    albuterol (PROVENTIL) neb solution 2.5 mg (Completed)    triamcinolone (NASACORT) 55 MCG/ACT nasal aerosol    tiotropium (SPIRIVA RESPIMAT) 2.5 MCG/ACT inhaler    Other Relevant Orders    Spirometry, Breathing Capacity (Completed)    CBC with platelets differential    IgE    BRONCHODILATION RESPONSE, PRE/POST ADMIN (Completed)    Seasonal allergic rhinitis due to pollen     Perennial with spring and summer nasal and ocular symptoms.  Increased symptoms around cats and dogs.  No recent allergy testing.  Has history of chronic sinusitis with nasal polyposis.  Follows with ENT.  No current therapy.    Skin testing:  Positive for trees, weeds, cat, dog, dust mite.    -Allergen avoidance measures discussed and literature provided.  - Nasacort 2 sprays per  nostril daily.  -Continue follow-up with ENT.  Sinusitis was controlled at most recent visit.         Relevant Medications    albuterol (PROVENTIL) neb solution 2.5 mg (Completed)    triamcinolone (NASACORT) 55 MCG/ACT nasal aerosol    tiotropium (SPIRIVA RESPIMAT) 2.5 MCG/ACT inhaler    Other Relevant Orders    CBC with platelets differential    IgE    ALLERGY SKIN TESTS,ALLERGENS [70218] (Completed)    Allergic rhinitis due to animal dander    Relevant Medications    albuterol (PROVENTIL) neb solution 2.5 mg (Completed)    triamcinolone (NASACORT) 55 MCG/ACT nasal aerosol    tiotropium (SPIRIVA RESPIMAT) 2.5 MCG/ACT inhaler    Other Relevant Orders    ALLERGY SKIN TESTS,ALLERGENS [96352] (Completed)    Allergic rhinitis due to dust mite    Relevant Medications    albuterol (PROVENTIL) neb solution 2.5 mg (Completed)    triamcinolone (NASACORT) 55 MCG/ACT nasal aerosol    tiotropium (SPIRIVA RESPIMAT) 2.5 MCG/ACT inhaler    Other Relevant Orders    ALLERGY SKIN TESTS,ALLERGENS [33471] (Completed)        Return in 4 weeks.  Chart documentation with Dragon Voice recognition Software. Although reviewed after completion, some words and grammatical errors may remain.    Malcolm Del Rio DO FAAAAI  Allergy/Immunology  [place], [place]      Again, thank you for allowing me to participate in the care of your patient.        Sincerely,        Malcolm Del Rio DO

## 2019-08-01 NOTE — PROGRESS NOTES
Guillermo Hogue is a 79 year old White male with previous medical history significant for asthma, chronic sinusitis, allergic rhinitis. Guillermo Hogue is being seen today for evaluation of asthma and seasonal allergies. The patient is being seen in consultation at the request of Dr. Soren MD.     Patient has a long-standing history of chest symptoms.  Chest symptoms include shortness of breath, wheezing.  He reports that he was diagnosed with asthma in 2010 at [facility].  In fact I found records from 2010 which document moderate persistent asthma.  He apparently had an elevated fractional excretion of nitric oxide.  Had been on Symbicort.  Had been on chronic prednisone use.  Had been on Singulair.  Previously his main complaint was chronic cough.  Denies chest tightness.  Now with exercise he reports shortness of breath 2-3 times per day.  Symptoms worsen with hot/humid air.  Increased chest symptoms in the spring and summer.  Increased chest symptoms if exposed to cats, dogs, dust and smoke.  Albuterol is used twice daily.  He thinks it is helpful.  He is off Singulair.  He did not find this to be helpful.  He is on Symbicort 160/4.5 mcg 2 puff inhaled twice daily.  He is put on steroids approximately twice per year for chest symptoms.  He thinks oral steroids are beneficial.    Patient has perennial with spring and summer nasal and ocular symptoms.  Increased symptoms around cats and dogs.  Symptoms include rhinorrhea, sneezing, congestion, ocular itching, ocular watering.  History of chronic sinusitis with nasal polyposis.  He denies decreased sense of smell, decreased taste.  He denies sinus pressure.  Mucus from his nose is clear.  Follows with ENT.  He had been on medicated sinus rinses.  He is no longer using.  He is not using any nasal corticosteroid.  I reviewed prior allergy notes, ENT notes and pulmonary notes.  He is a lifelong non-smoker.  At most recent ENT appointment no polyps were  noted.    ENVIRONMENTAL HISTORY: The family [place] a [facility] [place]. The home is heated with a forced air. They do have central air conditioning. The patient's bedroom is furnished with carpeting in bedroom.  Pets inside the house include 0 animals. There is no history of cockroach or mice infestation. There is/are 0 smokers in the house.  The house does not have a damp basement.     ACT Total Scores 8/1/2019   ACT TOTAL SCORE -   ASTHMA ER VISITS -   ASTHMA HOSPITALIZATIONS -   ACT TOTAL SCORE (Goal Greater than or Equal to 20) 15   In the past 12 months, how many times did you visit the emergency room for your asthma without being admitted to the hospital? 0   In the past 12 months, how many times were you hospitalized overnight because of your asthma? 0     Past Medical History:   Diagnosis Date     Allergic rhinitis age 22     Asthma 2009     Chronic osteoarthritis      Nasal polyposis 2009     Osteoarthritis of left hip      Reactive airway disease 2009     Family History   Problem Relation Age of Onset     Breast Cancer Mother      Arthritis Father      C.A.D. Father      Cerebrovascular Disease Father      Respiratory Father         TB history     Rheumatoid Arthritis Father      Cerebrovascular Disease Maternal Grandmother      Hypertension Maternal Grandmother      Cancer Maternal Grandfather      C.A.D. Brother      Prostate Cancer Brother      C.A.D. Brother      Past Surgical History:   Procedure Laterality Date     COLONOSCOPY       COLONOSCOPY N/A 5/10/2017    Procedure: COMBINED COLONOSCOPY, SINGLE OR MULTIPLE BIOPSY/POLYPECTOMY BY BIOPSY;;  Surgeon: Abisai Duarte DO;  Location: [facility] OR     COLONOSCOPY WITH CO2 INSUFFLATION N/A 5/14/2015    Procedure: COLONOSCOPY WITH CO2 INSUFFLATION;  Surgeon: Jose R Richmond MD;  Location: [facility] OR     COLONOSCOPY WITH CO2 INSUFFLATION N/A 5/10/2017    Procedure: COLONOSCOPY WITH CO2 INSUFFLATION;  COLON SCREEN/ UNDERWOOD;  Surgeon: Gerald  Abisai KERR DO;  Location: [facility] OR     SINUS SURGERY  2009     TONSILLECTOMY  age 22       REVIEW OF SYSTEMS:  General: negative for weight gain. negative for weight loss. negative for changes in sleep.   Ears: negative for fullness. negative for hearing loss. negative for dizziness.   Nose: negative for snoring.negative for changes in smell. negative for drainage.   Eyes: positive  for eye watering. positive  for eye itching. negative for vision changes. negative for eye redness.  Throat: negative for hoarseness. negative for sore throat. negative for trouble swallowing.   Lungs: positive  for shortness of breath.positive  for wheezing. negative for sputum production.   Cardiovascular: positive  for chest pain. negative for swelling of ankles. negative for fast or irregular heartbeat.   Gastrointestinal: negative for nausea. positive  for heartburn. negative for acid reflux.   Musculoskeletal: positive  for joint pain. positive  for joint stiffness. negative for joint swelling.   Neurologic: negative for seizures. negative for fainting. negative for weakness.   Psychiatric: negative for changes in mood. negative for anxiety.   Endocrine: negative for cold intolerance. negative for heat intolerance. negative for tremors.   Lymphatic: negative for lower extremity swelling. negative for lymph node swelling.   Hematologic: negative for easy bruising. negative for easy bleeding.  Integumentary: negative for rash. negative for scaling. negative for nail changes.       Current Outpatient Medications:      Acetaminophen (ARTHRITIS PAIN RELIEF PO), Take 1-2 tablets by mouth every 8 hours, Disp: , Rfl:      albuterol (PROAIR HFA/PROVENTIL HFA/VENTOLIN HFA) 108 (90 Base) MCG/ACT inhaler, Inhale 2 puffs into the lungs every 4 hours as needed for shortness of breath / dyspnea or wheezing Use with spacer, Disp: 1 Inhaler, Rfl: 1     atorvastatin (LIPITOR) 20 MG tablet, TAKE 1/2 TABLET BY MOUTH DAILY FOR CHOLESTEROL., Disp: 45  tablet, Rfl: 3     budesonide-formoterol (SYMBICORT) 160-4.5 MCG/ACT Inhaler, INHALE 2 PUFFS INTO THE LUNGS 2 TIMES DAILY PHARMACY OK TO HOLD PRESCRIPTION UNTIL DUE, Disp: 30.6 Inhaler, Rfl: 1     DILT- MG 24 hr capsule, TAKE 1 CAPSULE BY MOUTH DAILY FOR BLOOD PRESSURE/LOWERS PULSE, Disp: 90 capsule, Rfl: 0     diltiazem (CARTIA XT) 120 MG 24 hr capsule, Take 1 capsule (120 mg) by mouth daily For blood pressure/lowers pulse. Pharmacy ok to hold prescription until due, Disp: 90 capsule, Rfl: 3     IBUPROFEN PO, Take 200 mg by mouth Once a week alt with tylenol, Disp: , Rfl:      Spacer/Aero-Holding Chambers (SPACER/AERO-HOLD CHAMBER MASK) RAMAN, 1 Adult size spacer to use with MDI inhalers., Disp: 1 each, Rfl: 0     tiotropium (SPIRIVA RESPIMAT) 2.5 MCG/ACT inhaler, Inhale 2 puffs into the lungs daily, Disp: 1 Inhaler, Rfl: 3     triamcinolone (NASACORT) 55 MCG/ACT nasal aerosol, Spray 2 sprays into both nostrils daily, Disp: 1 Bottle, Rfl: 3     COMPOUNDED NON-CONTROLLED SUBSTANCE (CMPD RX) - PHARMACY TO MIX COMPOUNDED MEDICATION, 20 mLs by Nasal Instillation route 2 times daily Itraconazole nasal solution 0.1mg/mL solution for nasal irrigation (Patient not taking: Reported on 8/1/2019), Disp: 2000 mL, Rfl: 12     COMPOUNDED NON-CONTROLLED SUBSTANCE (CMPD RX) - PHARMACY TO MIX COMPOUNDED MEDICATION, Gentamicin/Dexamethasone 160/120mg/Liter saline (Patient not taking: Reported on 8/1/2019), Disp: 2000 mL, Rfl: 11     predniSONE (DELTASONE) 20 MG tablet, 2 tablets the first 2 days, then 1 tabs the next 3 days. Take with food in AM for asthma flares (Patient not taking: Reported on 8/1/2019), Disp: 14 tablet, Rfl: 1     pseudoePHEDrine (SUDAFED) 30 MG tablet, Take 30 mg by mouth every 4 hours as needed for congestion, Disp: , Rfl:   No current facility-administered medications for this visit.   Immunization History   Administered Date(s) Administered     Influenza (H1N1) 12/22/2009     Influenza (High Dose) 3  valent vaccine 10/27/2010, 10/22/2011, 09/30/2014, 09/08/2015, 09/29/2016, 10/04/2017, 09/13/2018     Influenza (IIV3) PF 09/18/2009, 10/13/2012     Influenza Vaccine IM 3yrs+ 4 Valent IIV4 10/17/2013     Mantoux Tuberculin Skin Test 10/09/2009     Pneumo Conj 13-V (2010&after) 09/08/2015     Pneumococcal 23 valent 10/20/2006     TD (ADULT, 7+) 10/27/2010     TDAP Vaccine (Adacel) 08/14/2012     Zoster vaccine recombinant adjuvanted (SHINGRIX) 09/13/2018, 01/03/2019     Zoster vaccine, live 11/12/2014     Allergies   Allergen Reactions     Hytrin [Terazosin Hcl]      Leg swelling and vertigo     Simvastatin Other (See Comments)     Body aches     Cats      Codeine Nausea     Dogs      Dust Mites      [place] Trees      Seasonal Allergies          EXAM:   Constitutional:  Appears well-developed and well-nourished. No distress.   HEENT:   Head: Normocephalic.   Mouth/Throat: No oropharyngeal exudate present.   No cobblestoning of posterior oropharynx.   Nasal tissue pink and normal appearing.  No rhinorrhea noted.    Eyes: Conjunctivae are non-erythematous   No maxillary or frontal sinus tenderness to palpation.   Cardiovascular: Normal rate, regular rhythm and normal heart sounds. Exam reveals no gallop and no friction rub.   No murmur heard.  Respiratory: Effort normal and breath sounds normal. No respiratory distress. No wheezes. No rales.   Musculoskeletal: Normal range of motion.   Neuro: Oriented to person, place, and time.  Skin: Skin is warm and dry. No rash noted.   Psychiatric: Normal mood and affect.     Nursing note and vitals reviewed.      WORKUP:   Spirometry-pre  FVC % pred:58  FEV1 % pred:68  FEV1/FVC % act:85    Restrictive disease.     Spirometry-post  FVC % pred:67  FEV1 % pred:78  FEV1/FVC % act:83    Significant improvement in FEV1 postbronchodilator.      ENVIRONMENTAL PERCUTANEOUS SKIN TESTING: ELAINE [facility] [employer] 8/1/2019   Consent Y   Ordering Physician Quang   Interpreting Physician  Quang   Testing Technician Nia TANNER   Location Back   Time start: 11:34 AM   Time End: 11:49 AM   Positive Control: Histatrol*ALK 1 mg/ml 4/24   Negative Control: 50% Glycerin 0   Cat Hair*ALK (10,000 BAU/ml) 7/30   AP Dog Hair/Dander (1:100 w/v) 6/30   Dust Mite p. 30,000 AU/ml 0   Dust Mite f. (30,000 AU/ml) 3/6   Mykel (W/F in millimeters) 0   Pernell Grass (100,000 BAU/mL) 0   Red Cedar (W/F in millimeters) 3/7   Maple/Cotton (W/F in millimeters) 0   Hackberry (W/F in millimeters) 0   Hazard (W/F in millimeters) 3/5   Ritchie *ALK (W/F in millimeters) 0   American Elm (W/F in millimeters) 0   [place] (W/F in millimeters) 0   Black Cranbury (W/F in millimeters) 0   Birch Mix (W/F in millimeters) 22/72   [place] (W/F in millimeters) 3/6   Belmont (W/F in millimeters) 5/60   Cocklebur (W/F in millimeters) 3/20   Westbrook (W/F in millimeters) 0   White Gio (W/F in millimeters) 3/20   Careless (W/F in millimeters) 0   Nettle (W/F in millimeters) 0   English Plantain (W/F in millimeters) 0   Kochia (W/F in millimeters) 0   Lamb's Quarter (W/F in millimeters) 0   Marshelder (W/F in millimeters) 0   Ragweed Mix* ALK (W/F in millimeters) 5/25   Russian Thistle (W/F in millimeters) 0   Sagebrush/Mugwort (W/F in millimeters) 4/6   Sheep Sorrel (W/F in millimeters) 0   Feather Mix* ALK (W/F in millimeters) 0   Penicillium Mix (1:10 w/v) 0   Curvularia spicifera (1:10 w/v) 0   Epicoccum (1:10 w/v) 0   Aspergillus fumigatus (1:10 w/v): 0   Alternaria tenius (1:10 w/v) 0   H. Cladosporium (1:10 w/v) 0   Phoma herbarum (1:10 w/v) 0            ASSESSMENT/PLAN:  Problem List Items Addressed This Visit        Respiratory    Other chronic sinusitis    Relevant Medications    albuterol (PROVENTIL) neb solution 2.5 mg (Completed)    triamcinolone (NASACORT) 55 MCG/ACT nasal aerosol    tiotropium (SPIRIVA RESPIMAT) 2.5 MCG/ACT inhaler    Moderate persistent asthma - Primary     History of chronic chest symptoms for multiple years.   Diagnosed with asthma at [facility] in 2010.  On Symbicort 160/4.5 mcg 2 puff inhaled twice daily.  Singulair not helpful.  Using albuterol twice per day.    Spirometry with restrictive lung disease.  Decreased FEV1 at 68% and therefore treated with albuterol neb and spirometry was repeated showing significant improvement in FEV1.     - Continue Symbicort 160/4.5 mcg 2 puff inhaled twice daily.  -Albuterol 2-4 puffs inhaled (use a spacer unless using a Proair Respiclick device) every 4 hours as needed for chest tightness, wheezing, shortness of breath and/or coughing.   -Albuterol 2-4 puffs inhaled (use spacer if not using Proair Respiclick device) 15-20 minutes prior to physical activity.   Please ensure warm up period prior to exercise.   - Spiriva 2.5mcg 2 puffs daily.   - IgE and cbc with diff. Could be biologic candidate.   - Once chest symptoms better controlled could be allergy shot candidate.            Relevant Medications    albuterol (PROVENTIL) neb solution 2.5 mg (Completed)    triamcinolone (NASACORT) 55 MCG/ACT nasal aerosol    tiotropium (SPIRIVA RESPIMAT) 2.5 MCG/ACT inhaler    Other Relevant Orders    Spirometry, Breathing Capacity (Completed)    CBC with platelets differential    IgE    BRONCHODILATION RESPONSE, PRE/POST ADMIN (Completed)    Seasonal allergic rhinitis due to pollen     Perennial with spring and summer nasal and ocular symptoms.  Increased symptoms around cats and dogs.  No recent allergy testing.  Has history of chronic sinusitis with nasal polyposis.  Follows with ENT.  No current therapy.    Skin testing:  Positive for trees, weeds, cat, dog, dust mite.    -Allergen avoidance measures discussed and literature provided.  - Nasacort 2 sprays per nostril daily.  -Continue follow-up with ENT.  Sinusitis was controlled at most recent visit.         Relevant Medications    albuterol (PROVENTIL) neb solution 2.5 mg (Completed)    triamcinolone (NASACORT) 55 MCG/ACT nasal aerosol     tiotropium (SPIRIVA RESPIMAT) 2.5 MCG/ACT inhaler    Other Relevant Orders    CBC with platelets differential    IgE    ALLERGY SKIN TESTS,ALLERGENS [62726] (Completed)    Allergic rhinitis due to animal dander    Relevant Medications    albuterol (PROVENTIL) neb solution 2.5 mg (Completed)    triamcinolone (NASACORT) 55 MCG/ACT nasal aerosol    tiotropium (SPIRIVA RESPIMAT) 2.5 MCG/ACT inhaler    Other Relevant Orders    ALLERGY SKIN TESTS,ALLERGENS [09613] (Completed)    Allergic rhinitis due to dust mite    Relevant Medications    albuterol (PROVENTIL) neb solution 2.5 mg (Completed)    triamcinolone (NASACORT) 55 MCG/ACT nasal aerosol    tiotropium (SPIRIVA RESPIMAT) 2.5 MCG/ACT inhaler    Other Relevant Orders    ALLERGY SKIN TESTS,ALLERGENS [10210] (Completed)        Return in 4 weeks.  Chart documentation with Dragon Voice recognition Software. Although reviewed after completion, some words and grammatical errors may remain.    Malcolm Del Rio DO FAAAAI  Allergy/Immunology  [place], [place]

## 2019-08-01 NOTE — TELEPHONE ENCOUNTER
Patient failed ACT, postponing message for 4 weeks to have repeat ACT mailed to patient.        Vilma Husain CMA

## 2019-08-01 NOTE — LETTER
My Asthma Action Plan  Name: Guillermo Hogue   YOB: 1940  Date: 8/1/2019   My doctor: Malcolm Del Rio, DO   My clinic: [facility]        My Control Medicine: Budesonide + formoterol (Symbicort) -  160/4.5 mcg 2 puffs inhaled twice daily  Tiotropium (Spiriva) -  Respimat 2.5 mcg 2 puffs daily  My Rescue Medicine:   Albuterol 2-4 puffs inhaled (use a spacer unless using a Proair Respiclick device) every 4 hours as needed for chest tightness, wheezing, shortness of breath and/or coughing.        My Asthma Severity: moderate persistent              GREEN ZONE   Good Control    I feel good    No cough or wheeze    Can work, sleep and play without asthma symptoms       Take your asthma control medicine every day.     1. If exercise triggers your asthma, take your rescue medication    15 minutes before exercise or sports, and    During exercise if you have asthma symptoms  2. Spacer to use with inhaler: If you have a spacer, make sure to use it with your inhaler             YELLOW ZONE Getting Worse  I have ANY of these:    I do not feel good    Cough or wheeze    Chest feels tight    Wake up at night   1. Keep taking your Green Zone medications  2. Start taking your rescue medicine:    every 20 minutes for up to 1 hour. Then every 4 hours for 24-48 hours.  3. If you stay in the Yellow Zone for more than 12-24 hours, contact your doctor.  4. If you do not return to the Green Zone in 12-24 hours or you get worse, start taking your oral steroid medicine if prescribed by your provider.           RED ZONE Medical Alert - Get Help  I have ANY of these:    I feel awful    Medicine is not helping    Breathing getting harder    Trouble walking or talking    Nose opens wide to breathe       1. Take your rescue medicine NOW  2. If your provider has prescribed an oral steroid medicine, start taking it NOW  3. Call your doctor NOW  4. If you are still in the Red Zone after 20 minutes and you have not  reached your doctor:    Take your rescue medicine again and    Call 911 or go to the emergency room right away    See your regular doctor within 2 weeks of an Emergency Room or Urgent Care visit for follow-up treatment.          Annual Reminders:  Meet with Asthma Educator,  Flu Shot in the Fall, consider Pneumonia Vaccination for patients with asthma (aged 19 and older).    Pharmacy:    [facility] - [place], [place] - [address]  [facility] - [place], [place] - [address]  [id number] - [place], [place] - [address] [address]                      Asthma Triggers  How To Control Things That Make Your Asthma Worse    Triggers are things that make your asthma worse.  Look at the list below to help you find your triggers and what you can do about them.  You can help prevent asthma flare-ups by staying away from your triggers.      Trigger                                                          What you can do   Cigarette Smoke  Tobacco smoke can make asthma worse. Do not allow smoking in your home, car or around you.  Be sure no one smokes at a child s day care or school.  If you smoke, ask your health care provider for ways to help you quit.  Ask family members to quit too.  Ask your health care provider for a referral to Quit Plan to help you quit smoking, or call 3-549-708-PLAN.     Colds, Flu, Bronchitis  These are common triggers of asthma. Wash your hands often.  Don t touch your eyes, nose or mouth.  Get a flu shot every year.     Dust Mites  These are tiny bugs that live in cloth or carpet. They are too small to see. Wash sheets and blankets in hot water every week.   Encase pillows and mattress in dust mite proof covers.  Avoid having carpet if you can. If you have carpet, vacuum weekly.   Use a dust mask and HEPA vacuum.   Pollen and Outdoor Mold  Some people are allergic to trees, grass, or weed pollen, or molds. Try to keep your windows  closed.  Limit time out doors when pollen count is high.   Ask you health care provider about taking medicine during allergy season.     Animal Dander  Some people are allergic to skin flakes, urine or saliva from pets with fur or feathers. Keep pets with fur or feathers out of your home.    If you can t keep the pet outdoors, then keep the pet out of your bedroom.  Keep the bedroom door closed.  Keep pets off cloth furniture and away from stuffed toys.     Mice, Rats, and Cockroaches  Some people are allergic to the waste from these pests.   Cover food and garbage.  Clean up spills and food crumbs.  Store grease in the refrigerator.   Keep food out of the bedroom.   Indoor Mold  This can be a trigger if your home has high moisture. Fix leaking faucets, pipes, or other sources of water.   Clean moldy surfaces.  Dehumidify basement if it is damp and smelly.   Smoke, Strong Odors, and Sprays  These can reduce air quality. Stay away from strong odors and sprays, such as perfume, powder, hair spray, paints, smoke incense, paint, cleaning products, candles and new carpet.   Exercise or Sports  Some people with asthma have this trigger. Be active!  Ask your doctor about taking medicine before sports or exercise to prevent symptoms.    Warm up for 5-10 minutes before and after sports or exercise.     Other Triggers of Asthma  Cold air:  Cover your nose and mouth with a scarf.  Sometimes laughing or crying can be a trigger.  Some medicines and food can trigger asthma.

## 2019-08-01 NOTE — NURSING NOTE
Per providers order:      The following nebulizer treatment was given:     MEDICATION: Albuterol Sulfate 2.5 mg  : [employer]  LOT #: [id number]  EXPIRATION DATE:  11/30/2020  NDC # 68845-121-44        Vilma Husain CMA

## 2019-08-01 NOTE — ASSESSMENT & PLAN NOTE
History of chronic chest symptoms for multiple years.  Diagnosed with asthma at [facility] in 2010.  On Symbicort 160/4.5 mcg 2 puff inhaled twice daily.  Singulair not helpful.  Using albuterol twice per day.    Spirometry with restrictive lung disease.  Decreased FEV1 at 68% and therefore treated with albuterol neb and spirometry was repeated showing significant improvement in FEV1.     - Continue Symbicort 160/4.5 mcg 2 puff inhaled twice daily.  -Albuterol 2-4 puffs inhaled (use a spacer unless using a Proair Respiclick device) every 4 hours as needed for chest tightness, wheezing, shortness of breath and/or coughing.   -Albuterol 2-4 puffs inhaled (use spacer if not using Proair Respiclick device) 15-20 minutes prior to physical activity.   Please ensure warm up period prior to exercise.   - Spiriva 2.5mcg 2 puffs daily.   - IgE and cbc with diff. Could be biologic candidate.   - Once chest symptoms better controlled could be allergy shot candidate.     
Perennial with spring and summer nasal and ocular symptoms.  Increased symptoms around cats and dogs.  No recent allergy testing.  Has history of chronic sinusitis with nasal polyposis.  Follows with ENT.  No current therapy.    Skin testing:  Positive for trees, weeds, cat, dog, dust mite.    -Allergen avoidance measures discussed and literature provided.  - Nasacort 2 sprays per nostril daily.  -Continue follow-up with ENT.  Sinusitis was controlled at most recent visit.  
pt with hx kidney stone 35 yrs ago c/o right back/flank pain since yesterday with hematuria at pmd's office this am. no fevers, chills, d/n/v, cp, sob, cough, diarrhea. pt took motrin 600mg pta with good relief. pt declining pain meds.  pmd-  jennifer rodriguez

## 2019-08-01 NOTE — PATIENT INSTRUCTIONS
Allergy Staff Appt Hours Shot Hours Locations    Physician     Malcolm Del Rio DO       Support Staff     YUE Corbin, ENDY  Tuesday:        [place] 7-4:20     Wednesday:        [place]: 7-5     Thursday:                    [place] 7-6:40     Friday:        [place] 7-2:40   [place]        Thursday: 1-5:50        Friday: 7-10:50     [place]        Tuesday: 7- 3:20        Wednesday: 7-4:20     [place] Monday: 7-4:20        Tuesday: 1-6:20         [facility]  [address] [place] [address]  Appt Line: [phone]  Allergy RN:  [phone]    [facility]  [address] [place] [address]  Appt Line: [phone]  Allergy RN:  [phone]       Important Scheduling Information  Aspirin Desensitization: Appt will last 2 clinic days. Please call the Allergy RN line for your clinic to schedule. Discontinue antihistamines 7 days prior to the appointment.     Food Challenges: Appt will last 3-4 hours. Please call the Allergy RN line for your clinic to schedule. Discontinue antihistamines 7 days prior to the appointment.     Penicillin Testing: Appt will last 2-3 hours. Please call the Allergy RN line for your clinic to schedule. Discontinue antihistamines 7 days prior to the appointment.     Skin Testing: Appt will about 40 minutes. Call the appointment line for your clinic to schedule. Discontinue antihistamines 7 days prior to the appointment.     Venom Testing: Appt will last 2-3 hours. Please call the Allergy RN line for your clinic to schedule. Discontinue antihistamines 7 days prior to the appointment.     Thank you for trusting us with your Allergy, Asthma, and Immunology care. Please feel free to contact us with any questions or concerns you may have.      -Start Nasacort 2 sprays per nostril daily.  -Continue Symbicort.  -Blood testing today.  -Continue follow-up with ENT.  -Spiriva 2.5mcg 2 puffs daily.   -Albuterol 2-4 puffs inhaled (use a spacer unless using a  Proair Respiclick device) every 4 hours as needed for chest tightness, wheezing, shortness of breath and/or coughing.   -Return in 4 weeks.     AEROALLERGEN AVOIDANCE INSTRUCTIONS  MOLD  Indoors, mold season is year round. Outdoors, most mold prefer seasons with high humidity. Mold prefers damp, dark, warm places. Here are some tips on how to avoid mold exposure.  1.  Keep humidity inside between 35-50% with air conditioning or dehumidifier. The humidity level can be checked with a meter from a hardware store.   2.  Clean surfaces where mold grows and dry wet areas.  3.  Avoid steam cleaning carpets and discard moldy belongings.  4.  Wear a mask when doing yard work and refrain from walking through uncut fields or playing in leaves.  5.  Minimize use of potted plants and do not keep them indoors.  6.  Consider an allergy cover for the pillow and mattress.  POLLEN  Pollens are the tiny airborne particles given off by trees, weeds, and grasses. They can be the cause of seasonal allergic rhinitis or hay fever symptoms, which include stuffy, itchy, runny nose, redness, swelling and itching of the eyes, and itching of the ears and throat. Here are some tips on how to avoid pollen exposure.  1. .Keep windows closed and use the air conditioner when possible.  2.  Avoid outside exposure in the early morning as pollen counts are highest at that time.  3.  Take a shower and wash hair each night.  4.  Consider wearing a mask when working in the yard and/or garden.  5.  Clean furnace filter monthly with HEPA filters. Consider a HEPA filter vacuum  which will prevent pollen from being reintroduced into the air.   DUST MITES  Dust mites can never be entirely eliminated in the house no matter how clean your house is. Dust mites are attracted to warm, moist areas and feed on dead skin flakes. Here are tips to minimize dust mites in your home.  1.  Encase pillows and mattress/box springs in zippered allergy covers.  2.  Wash  bedding in hot water (at least 130 F) every 7-14 days.  3.  Avoid curtains, carpet, and upholstered furniture if possible.  4.  Use HEPA air filters and a HEPA filter vacuum . Change filters monthly. Vacuum weekly.  5.  Keep bedroom simple, avoiding clutter, so it can quickly be dusted.  6.  Cover heating vents with vent filters.  7.  Keep stuffed toys in a closed container and wash or freeze regularly.  8.  Keep clothing in the closet with the door closed.  PETS  Pets present many problems for people with allergies. Dander from pets is very difficult to remove and also is a food source for dust mites.  1.  If possible, find the pet a new home.  2.  If not possible, keep the pet outdoors. Never allow the pet into the bedroom.  3.  Wash pet weekly in warm water.  4.  Encase mattresses, pillows, and box springs in allergen-proof covers.  5.  Use HEPA air filters and a HEPA filter vacuum . Change filters monthly.

## 2019-08-02 LAB — IGE SERPL-ACNC: 713 KIU/L (ref 0–114)

## 2019-08-02 ASSESSMENT — ASTHMA QUESTIONNAIRES: ACT_TOTALSCORE: 15

## 2019-08-03 NOTE — RESULT ENCOUNTER NOTE
Elevated total IgE and absolute eosinophil count. Could be asthma biologic candidate if no improvement. Thanks.     Dr. Del Rio

## 2019-08-05 ENCOUNTER — TELEPHONE (OUTPATIENT)
Dept: ALLERGY | Facility: CLINIC | Age: 79
End: 2019-08-05

## 2019-08-05 NOTE — TELEPHONE ENCOUNTER
----- Message from Malcolm Del Rio DO sent at 8/3/2019  1:24 PM CDT -----  Elevated total IgE and absolute eosinophil count. Could be asthma biologic candidate if no improvement. Thanks.     Dr. Del Rio

## 2019-08-05 NOTE — TELEPHONE ENCOUNTER
Reviewed results and provider recommendations with patient, he verbalized understanding. Aaliyah Fountain RN on 8/5/2019 at 1:21 PM

## 2019-08-05 NOTE — TELEPHONE ENCOUNTER
Left message for patient to return call. Provided [facility] Allergy RN Line [phone] and hours.   Will also remind patient to follow up 8/29/19 as scheduled where provider can determine if care plan needs to be adjusted/ biologic started.     Aaliyah Fountain RN on 8/5/2019 at 11:39 AM

## 2019-08-13 NOTE — PROGRESS NOTES
[facility]  [address] [facility] [address] [phone]  Dept: [phone]    PRE-OP EVALUATION:  Today's date: 2019    Guillermo Hogue (: 1940) presents for pre-operative evaluation assessment as requested by Dr. Parisi.  He requires evaluation and anesthesia risk assessment prior to undergoing surgery/procedure for treatment of L hip surgery.    Proposed Surgery/ Procedure: total joint replacement of L hip  Date of Surgery/ Procedure: 2019  Time of Surgery/ Procedure: 10:00am  [place]ospital/[place]urgical [place] [facility] [place]  Fax number for surgical facility: [phone]  Primary Physician: Edi Doty  Type of Anesthesia Anticipated: to be determined    Patient has a Health Care Directive or Living Will:  YES     1. NO - Do you have a history of heart attack, stroke, stent, bypass or surgery on an artery in the head, neck, heart or legs?  2. NO - Do you ever have any pain or discomfort in your chest?  3. NO - Do you have a history of  Heart Failure?  4. YES - Are you troubled by shortness of breath when: walking on the level, up a slight hill or at night? With his ashtma per patient. No changes in this in fact his asthma has been better the past 2 weeks.     5. NO - Do you currently have a cold, bronchitis or other respiratory infection?  6. NO - Do you have a cough, shortness of breath or wheezing?  7. NO - Do you sometimes get pains in the calves of your legs when you walk?  8. NO - Do you or anyone in your family have previous history of blood clots?  9. NO - Do you or does anyone in your family have a serious bleeding problem such as prolonged bleeding following surgeries or cuts?  10. NO - Have you ever had problems with anemia or been told to take iron pills?  11. NO - Have you had any abnormal blood loss such as black, tarry or bloody stools, or abnormal vaginal bleeding?  12. NO - Have you ever had a blood transfusion?  13. NO - Have you or any of your relatives ever  had problems with anesthesia?  14. NO - Do you have sleep apnea, excessive snoring or daytime drowsiness?  15. NO - Do you have any prosthetic heart valves?  16. NO - Do you have prosthetic joints?  17. NO - Is there any chance that you may be pregnant?      HPI:     HPI related to upcoming procedure:       ASTHMA - Patient has a longstanding history of moderate-severe Asthma . Patient has been doing well overall noting SOB and continues on medication regimen consisting of ICS and albuterol  without adverse reactions or side effects.  Asthma was doing worse 2 weeks ago but feeling much better now per patient.  Oxygen is 98  Percent.  Gets seasonal allergies that affect this.     Act 22.     HYPERLIPIDEMIA - Patient has a long history of significant Hyperlipidemia requiring medication for treatment with recent good control. Patient reports no problems or side effects with the medication.     HYPERTENSION - Patient has longstanding history of HTN , currently denies any symptoms referable to elevated blood pressure. Specifically denies chest pain, palpitations, dyspnea, orthopnea, PND or peripheral edema. Blood pressure readings have been in normal range. Current medication regimen is as listed below. Patient denies any side effects of medication.     LDL Cholesterol Calculated   Date Value Ref Range Status   01/03/2019 83 <100 mg/dL Final     Comment:     Desirable:       <100 mg/dl         MEDICAL HISTORY:     Patient Active Problem List    Diagnosis Date Noted     Seasonal allergic rhinitis due to pollen 08/01/2019     Priority: Medium     Allergic rhinitis due to animal dander 08/01/2019     Priority: Medium     Allergic rhinitis due to dust mite 08/01/2019     Priority: Medium     Moderate persistent asthma without complication 03/20/2018     Priority: Medium     Moderate persistent asthma, uncomplicated 03/20/2018     Priority: Medium     Cataracts, bilateral 02/01/2016     Priority: Medium     Fatty liver  10/05/2015     Priority: Medium     Hypertension goal BP (blood pressure) < 140/90 09/08/2015     Priority: Medium     Palpitations 03/18/2013     Priority: Medium     BPH (benign prostatic hyperplasia) 08/03/2011     Priority: Medium     Advanced directives, counseling/discussion 04/14/2011     Priority: Medium     Discussed Advance Directive planning with patient; information given to patient to review.           Moderate persistent asthma 04/14/2011     Priority: Medium     HYPERLIPIDEMIA LDL GOAL <130 10/31/2010     Priority: Medium     AR (allergic rhinitis) 10/19/2010     Priority: Medium     Reactive airway disease      Priority: Medium     Other chronic sinusitis      Priority: Medium      Past Medical History:   Diagnosis Date     Allergic rhinitis age 22     Asthma 2009     Chronic osteoarthritis      Nasal polyposis 2009     Osteoarthritis of left hip      Reactive airway disease 2009     Past Surgical History:   Procedure Laterality Date     COLONOSCOPY       COLONOSCOPY N/A 5/10/2017    Procedure: COMBINED COLONOSCOPY, SINGLE OR MULTIPLE BIOPSY/POLYPECTOMY BY BIOPSY;;  Surgeon: Abisai Duarte DO;  Location: [facility] OR     COLONOSCOPY WITH CO2 INSUFFLATION N/A 5/14/2015    Procedure: COLONOSCOPY WITH CO2 INSUFFLATION;  Surgeon: Jose R [place] MD;  Location: [facility] OR     COLONOSCOPY WITH CO2 INSUFFLATION N/A 5/10/2017    Procedure: COLONOSCOPY WITH CO2 INSUFFLATION;  COLON SCREEN/ UNDERWOOD;  Surgeon: Abisai Duarte DO;  Location: [facility] OR     SINUS SURGERY  2009     TONSILLECTOMY  age 22     Current Outpatient Medications   Medication Sig Dispense Refill     Acetaminophen (ARTHRITIS PAIN RELIEF PO) Take 1-2 tablets by mouth every 8 hours       albuterol (PROAIR HFA/PROVENTIL HFA/VENTOLIN HFA) 108 (90 Base) MCG/ACT inhaler Inhale 2 puffs into the lungs every 4 hours as needed for shortness of breath / dyspnea or wheezing Use with spacer 1 Inhaler 1     atorvastatin (LIPITOR) 20 MG tablet TAKE 1/2 TABLET BY  MOUTH DAILY FOR CHOLESTEROL. 45 tablet 3     budesonide-formoterol (SYMBICORT) 160-4.5 MCG/ACT Inhaler INHALE 2 PUFFS INTO THE LUNGS 2 TIMES DAILY PHARMACY OK TO HOLD PRESCRIPTION UNTIL DUE 30.6 Inhaler 1     COMPOUNDED NON-CONTROLLED SUBSTANCE (CMPD RX) - PHARMACY TO MIX COMPOUNDED MEDICATION 20 mLs by Nasal Instillation route 2 times daily Itraconazole nasal solution 0.1mg/mL solution for nasal irrigation (Patient not taking: Reported on 8/1/2019) 2000 mL 12     COMPOUNDED NON-CONTROLLED SUBSTANCE (CMPD RX) - PHARMACY TO MIX COMPOUNDED MEDICATION Gentamicin/Dexamethasone 160/120mg/Liter saline (Patient not taking: Reported on 8/1/2019) 2000 mL 11     DILT- MG 24 hr capsule TAKE 1 CAPSULE BY MOUTH ONCE DAILY FOR BLOOD PRESSURE / LOWER PULSE.  90 capsule 1     diltiazem (CARTIA XT) 120 MG 24 hr capsule Take 1 capsule (120 mg) by mouth daily For blood pressure/lowers pulse. Pharmacy ok to hold prescription until due 90 capsule 3     IBUPROFEN PO Take 200 mg by mouth Once a week alt with tylenol       predniSONE (DELTASONE) 20 MG tablet 2 tablets the first 2 days, then 1 tabs the next 3 days. Take with food in AM for asthma flares (Patient not taking: Reported on 8/1/2019) 14 tablet 1     pseudoePHEDrine (SUDAFED) 30 MG tablet Take 30 mg by mouth every 4 hours as needed for congestion       Spacer/Aero-Holding Chambers (SPACER/AERO-HOLD CHAMBER MASK) RAMAN 1 Adult size spacer to use with MDI inhalers. 1 each 0     tiotropium (SPIRIVA RESPIMAT) 2.5 MCG/ACT inhaler Inhale 2 puffs into the lungs daily 1 Inhaler 3     triamcinolone (NASACORT) 55 MCG/ACT nasal aerosol Spray 2 sprays into both nostrils daily 1 Bottle 3     OTC products: None, except as noted above    Allergies   Allergen Reactions     Hytrin [Terazosin Hcl]      Leg swelling and vertigo     Simvastatin Other (See Comments)     Body aches     Cats      Codeine Nausea     Dogs      Dust Mites      [place] Trees      Seasonal Allergies       Latex Allergy:  NO    Social History     Tobacco Use     Smoking status: Never Smoker     Smokeless tobacco: Never Used   Substance Use Topics     Alcohol use: No     History   Drug Use No       REVIEW OF SYSTEMS:   Constitutional, neuro, ENT, endocrine, pulmonary, cardiac, gastrointestinal, genitourinary, musculoskeletal, integument and psychiatric systems are negative, except as otherwise noted.    EXAM:   /79   Pulse 74   Temp 98  F (36.7  C) (Oral)   Wt 98.9 kg (218 lb)   SpO2 98%   BMI 32.19 kg/m      GENERAL APPEARANCE: healthy, alert and no distress     EYES: EOMI,  PERRL     HENT: ear canals and TM's normal and nose and mouth without ulcers or lesions     NECK: no adenopathy, no asymmetry, masses, or scars and thyroid normal to palpation     RESP: lungs clear to auscultation - no rales, rhonchi or wheezes     CV: regular rates and rhythm, normal S1 S2, no S3 or S4 and no murmur, click or rub         MS: extremities normal- no gross deformities noted, no evidence of inflammation in joints, FROM in all extremities.     SKIN: no suspicious lesions or rashes     NEURO: Normal strength and tone, sensory exam grossly normal, mentation intact and speech normal     PSYCH: mentation appears normal. and affect normal/bright     LYMPHATICS: No cervical adenopathy    DIAGNOSTICS:   EKG: appears normal , NSR, normal axis, normal intervals, no acute ST/T changes c/w ischemia, no LVH by voltage criteria, unchanged from previous tracings      Labs-    Results for orders placed or performed in visit on 09/09/19   Basic metabolic panel   Result Value Ref Range    Sodium 139 133 - 144 mmol/L    Potassium 3.9 3.4 - 5.3 mmol/L    Chloride 105 94 - 109 mmol/L    Carbon Dioxide 27 20 - 32 mmol/L    Anion Gap 7 3 - 14 mmol/L    Glucose 98 70 - 99 mg/dL    Urea Nitrogen 15 7 - 30 mg/dL    Creatinine 0.73 0.66 - 1.25 mg/dL    GFR Estimate 89 >60 mL/min/[1.73_m2]    GFR Estimate If Black >90 >60 mL/min/[1.73_m2]    Calcium 8.8 8.5 - 10.1  mg/dL   CBC with platelets differential   Result Value Ref Range    WBC 8.4 4.0 - 11.0 10e9/L    RBC Count 4.81 4.4 - 5.9 10e12/L    Hemoglobin 14.0 13.3 - 17.7 g/dL    Hematocrit 43.9 40.0 - 53.0 %    MCV 91 78 - 100 fl    MCH 29.1 26.5 - 33.0 pg    MCHC 31.9 31.5 - 36.5 g/dL    RDW 14.2 10.0 - 15.0 %    Platelet Count 192 150 - 450 10e9/L    % Neutrophils 50.7 %    % Lymphocytes 30.1 %    % Monocytes 11.4 %    % Eosinophils 7.4 %    % Basophils 0.4 %    Absolute Neutrophil 4.3 1.6 - 8.3 10e9/L    Absolute Lymphocytes 2.5 0.8 - 5.3 10e9/L    Absolute Monocytes 1.0 0.0 - 1.3 10e9/L    Absolute Eosinophils 0.6 0.0 - 0.7 10e9/L    Absolute Basophils 0.0 0.0 - 0.2 10e9/L    Diff Method Automated Method    Lipid panel reflex to direct LDL Fasting   Result Value Ref Range    Cholesterol 153 <200 mg/dL    Triglycerides 68 <150 mg/dL    HDL Cholesterol 62 >39 mg/dL    LDL Cholesterol Calculated 77 <100 mg/dL    Non HDL Cholesterol 91 <130 mg/dL       Recent Labs   Lab Test 08/01/19  1237 06/03/19  1505 01/03/19  0934 03/16/18  1002   HGB 13.4 13.8  --  14.3    184  --  180   NA  --   --  138 138   POTASSIUM  --   --  4.2 4.0   CR  --   --  0.85 0.83        IMPRESSION:   Reason for surgery/procedure: hip replacement  Diagnosis/reason for consult: pain    The proposed surgical procedure is considered INTERMEDIATE risk.    REVISED CARDIAC RISK INDEX  The patient has the following serious cardiovascular risks for perioperative complications such as (MI, PE, VFib and 3  AV Block):  No serious cardiac risks  INTERPRETATION: 0 risks: Class I (very low risk - 0.4% complication rate)    The patient has the following additional risks for perioperative complications:  No identified additional risks      ICD-10-CM    1. Preop general physical exam Z01.818 Basic metabolic panel     CBC with platelets differential     EKG 12-lead complete w/read - Clinics   2. Primary osteoarthritis of left hip M16.12    3. Hypertension goal BP  (blood pressure) < 140/90 I10    4. Moderate persistent asthma without complication J45.40    5. Paroxysmal atrial fibrillation (H) I48.0    6. Need for prophylactic vaccination and inoculation against influenza Z23 FLU VACCINE, INCREASED ANTIGEN, PRESV FREE, AGE 65+ [72329]     ADMIN INFLUENZA (For MEDICARE Patients ONLY) []   7. Hyperlipidemia LDL goal <130 E78.5 Lipid panel reflex to direct LDL Fasting       RECOMMENDATIONS:     --Consult hospital rounder / IM to assist post-op medical management    --Patient is to take all scheduled medications on the day of surgery EXCEPT for modifications listed below.    APPROVAL GIVEN to proceed with proposed procedure, without further diagnostic evaluation       Signed Electronically by: Izabela Xie PA-C  Agreed with above. Fortunato Can M.D.      Copy of this evaluation report is provided to requesting physician.    [facility] Preop Guidelines    Revised Cardiac Risk Index

## 2019-08-19 NOTE — TELEPHONE ENCOUNTER
I faxed paperwork to [facility].     Thank you,   Izabela Prather   [facility] Department  [phone]

## 2019-08-29 ENCOUNTER — OFFICE VISIT (OUTPATIENT)
Dept: ALLERGY | Facility: CLINIC | Age: 79
End: 2019-08-29
Payer: COMMERCIAL

## 2019-08-29 VITALS
BODY MASS INDEX: 32.88 KG/M2 | HEIGHT: 69 IN | TEMPERATURE: 97.5 F | DIASTOLIC BLOOD PRESSURE: 73 MMHG | WEIGHT: 222 LBS | SYSTOLIC BLOOD PRESSURE: 136 MMHG | HEART RATE: 80 BPM | OXYGEN SATURATION: 94 %

## 2019-08-29 DIAGNOSIS — J45.40 MODERATE PERSISTENT ASTHMA WITHOUT COMPLICATION: ICD-10-CM

## 2019-08-29 DIAGNOSIS — J32.8 OTHER CHRONIC SINUSITIS: ICD-10-CM

## 2019-08-29 DIAGNOSIS — J30.89 ALLERGIC RHINITIS DUE TO DUST MITE: ICD-10-CM

## 2019-08-29 DIAGNOSIS — J30.81 ALLERGIC RHINITIS DUE TO ANIMAL DANDER: ICD-10-CM

## 2019-08-29 DIAGNOSIS — J30.1 SEASONAL ALLERGIC RHINITIS DUE TO POLLEN: ICD-10-CM

## 2019-08-29 PROCEDURE — 99214 OFFICE O/P EST MOD 30 MIN: CPT | Performed by: ALLERGY & IMMUNOLOGY

## 2019-08-29 ASSESSMENT — MIFFLIN-ST. JEOR: SCORE: 1717.37

## 2019-08-29 NOTE — TELEPHONE ENCOUNTER
Pt has clinic follow up today 8/29/19, will complete ACT at visit and or mail out to patient.      Vilma Husain MA

## 2019-08-29 NOTE — ASSESSMENT & PLAN NOTE
History of chronic chest symptoms for multiple years. Started on Spiriva and either that or mold remediation was helpful. He believes side effects from spiriva.  Diagnosed with asthma at [facility] in 2010.  On Symbicort 160/4.5 mcg 2 puff inhaled twice daily.  Singulair not helpful.  Using albuterol once weekly.     Spirometry with restrictive lung disease. Significant improvement post bronchodilator.     Elevated total IgE and absolute eosinophil.     - Continue Symbicort 160/4.5 mcg 2 puff inhaled twice daily.  -Albuterol 2-4 puffs inhaled (use a spacer unless using a Proair Respiclick device) every 4 hours as needed for chest tightness, wheezing, shortness of breath and/or coughing.   -Albuterol 2-4 puffs inhaled (use spacer if not using Proair Respiclick device) 15-20 minutes prior to physical activity.   Please ensure warm up period prior to exercise.   - Stop Spiriva 2.5mcg 2 puffs daily. Not tolerated. I think this was helpful but may have been mold remediation. If symptoms worsen after stopping could resume or consider biologic.

## 2019-08-29 NOTE — LETTER
8/29/2019         RE: Guillermo Hogue  [address] [address]  [address]  [place] [address]        Dear Colleague,    Thank you for referring your patient, Guillermo Hogue, to the [facility]. Please see a copy of my visit note below.    Guillermo Hogue is a 79 year old White male with previous medical history significant for asthma, chronic sinusitis and allergic rhinitis who returns for a follow up visit.     Patient returns for follow-up.  At last visit he was having frequent shortness of breath and wheezing.  Reversibility noted on spirometry.  He continued on Symbicort 160/4.5 mcg 2 puffs inhaled twice daily.  He previously had been on Singulair.  Not beneficial.  Started on Spiriva 2.5 mcg 2 puff inhaled twice daily.  He reports that now he is having chest symptoms approximately once per week.  Significant improvement in chest symptoms.  No interval ER visits, prednisone or hospitalizations.he  is concerned that Spiriva has made him tired.  He additionally found mold in his furnace.  This was extensively remediated.  Blood testing at last visit showed elevated total IgE and absolute eosinophil count.  He is allergic based on testing.  He was started on Nasacort 2 sprays per nostril daily.  He denies any recent nasal congestion, postnasal drainage.  History of chronic sinusitis with nasal polyposis.  He will be following up with ENT in early September.    ACT Total Scores 8/29/2019   ACT TOTAL SCORE -   ASTHMA ER VISITS -   ASTHMA HOSPITALIZATIONS -   ACT TOTAL SCORE (Goal Greater than or Equal to 20) 18   In the past 12 months, how many times did you visit the emergency room for your asthma without being admitted to the hospital? 0   In the past 12 months, how many times were you hospitalized overnight because of your asthma? 0         Past Medical History:   Diagnosis Date     Allergic rhinitis age 22     Asthma 2009     Chronic osteoarthritis      Nasal polyposis 2009     Osteoarthritis of left  hip      Reactive airway disease 2009     Family History   Problem Relation Age of Onset     Breast Cancer Mother      Arthritis Father      C.A.D. Father      Cerebrovascular Disease Father      Respiratory Father         TB history     Rheumatoid Arthritis Father      Cerebrovascular Disease Maternal Grandmother      Hypertension Maternal Grandmother      Cancer Maternal Grandfather      C.A.D. Brother      Prostate Cancer Brother      C.A.D. Brother      Past Surgical History:   Procedure Laterality Date     COLONOSCOPY       COLONOSCOPY N/A 5/10/2017    Procedure: COMBINED COLONOSCOPY, SINGLE OR MULTIPLE BIOPSY/POLYPECTOMY BY BIOPSY;;  Surgeon: Abisai Duarte DO;  Location: [facility] OR     COLONOSCOPY WITH CO2 INSUFFLATION N/A 5/14/2015    Procedure: COLONOSCOPY WITH CO2 INSUFFLATION;  Surgeon: Jose R Richmond MD;  Location: [facility] OR     COLONOSCOPY WITH CO2 INSUFFLATION N/A 5/10/2017    Procedure: COLONOSCOPY WITH CO2 INSUFFLATION;  COLON SCREEN/ UNDERWOOD;  Surgeon: Abisai Duarte DO;  Location: [facility] OR     SINUS SURGERY  2009     TONSILLECTOMY  age 22       REVIEW OF SYSTEMS:  General: negative for weight gain. negative for weight loss. negative for changes in sleep.   Ears: negative for fullness. negative for hearing loss. negative for dizziness.   Nose: negative for snoring.negative for changes in smell. negative for drainage.   Eyes: positive  for eye watering. negative for eye itching. negative for vision changes. negative for eye redness.  Throat: Positive for hoarseness. negative for sore throat. negative for trouble swallowing.   Lungs: positive  for shortness of breath.negative for wheezing. negative for sputum production.   Cardiovascular: negative for chest pain. negative for swelling of ankles. negative for fast or irregular heartbeat.   Gastrointestinal: negative for nausea. negative for heartburn. negative for acid reflux.   Musculoskeletal: negative for joint pain. negative for joint stiffness.  negative for joint swelling.   Neurologic: negative for seizures. negative for fainting. negative for weakness.   Psychiatric: negative for changes in mood. negative for anxiety.   Endocrine: negative for cold intolerance. negative for heat intolerance. negative for tremors.   Lymphatic: negative for lower extremity swelling. negative for lymph node swelling.   Hematologic: negative for easy bruising. negative for easy bleeding.  Integumentary: negative for rash. negative for scaling. negative for nail changes.       Current Outpatient Medications:      Acetaminophen (ARTHRITIS PAIN RELIEF PO), Take 1-2 tablets by mouth every 8 hours, Disp: , Rfl:      albuterol (PROAIR HFA/PROVENTIL HFA/VENTOLIN HFA) 108 (90 Base) MCG/ACT inhaler, Inhale 2 puffs into the lungs every 4 hours as needed for shortness of breath / dyspnea or wheezing Use with spacer, Disp: 1 Inhaler, Rfl: 1     atorvastatin (LIPITOR) 20 MG tablet, TAKE 1/2 TABLET BY MOUTH DAILY FOR CHOLESTEROL., Disp: 45 tablet, Rfl: 3     budesonide-formoterol (SYMBICORT) 160-4.5 MCG/ACT Inhaler, INHALE 2 PUFFS INTO THE LUNGS 2 TIMES DAILY PHARMACY OK TO HOLD PRESCRIPTION UNTIL DUE, Disp: 30.6 Inhaler, Rfl: 1     COMPOUNDED NON-CONTROLLED SUBSTANCE (CMPD RX) - PHARMACY TO MIX COMPOUNDED MEDICATION, 20 mLs by Nasal Instillation route 2 times daily Itraconazole nasal solution 0.1mg/mL solution for nasal irrigation, Disp: 2000 mL, Rfl: 12     COMPOUNDED NON-CONTROLLED SUBSTANCE (CMPD RX) - PHARMACY TO MIX COMPOUNDED MEDICATION, Gentamicin/Dexamethasone 160/120mg/Liter saline, Disp: 2000 mL, Rfl: 11     DILT- MG 24 hr capsule, TAKE 1 CAPSULE BY MOUTH ONCE DAILY FOR BLOOD PRESSURE / LOWER PULSE. , Disp: 90 capsule, Rfl: 1     diltiazem (CARTIA XT) 120 MG 24 hr capsule, Take 1 capsule (120 mg) by mouth daily For blood pressure/lowers pulse. Pharmacy ok to hold prescription until due, Disp: 90 capsule, Rfl: 3     IBUPROFEN PO, Take 200 mg by mouth Once a week alt with  tylenol, Disp: , Rfl:      pseudoePHEDrine (SUDAFED) 30 MG tablet, Take 30 mg by mouth every 4 hours as needed for congestion, Disp: , Rfl:      Spacer/Aero-Holding Chambers (SPACER/AERO-HOLD CHAMBER MASK) RAMAN, 1 Adult size spacer to use with MDI inhalers., Disp: 1 each, Rfl: 0     triamcinolone (NASACORT) 55 MCG/ACT nasal aerosol, Spray 2 sprays into both nostrils daily, Disp: 1 Bottle, Rfl: 3     predniSONE (DELTASONE) 20 MG tablet, 2 tablets the first 2 days, then 1 tabs the next 3 days. Take with food in AM for asthma flares (Patient not taking: Reported on 8/29/2019), Disp: 14 tablet, Rfl: 1  Immunization History   Administered Date(s) Administered     Influenza (H1N1) 12/22/2009     Influenza (High Dose) 3 valent vaccine 10/27/2010, 10/22/2011, 09/30/2014, 09/08/2015, 09/29/2016, 10/04/2017, 09/13/2018     Influenza (IIV3) PF 09/18/2009, 10/13/2012     Influenza Vaccine IM > 6 months Valent IIV4 10/17/2013     Mantoux Tuberculin Skin Test 10/09/2009     Pneumo Conj 13-V (2010&after) 09/08/2015     Pneumococcal 23 valent 10/20/2006     TD (ADULT, 7+) 10/27/2010     TDAP Vaccine (Adacel) 08/14/2012     Zoster vaccine recombinant adjuvanted (SHINGRIX) 09/13/2018, 01/03/2019     Zoster vaccine, live 11/12/2014     Allergies   Allergen Reactions     Hytrin [Terazosin Hcl]      Leg swelling and vertigo     Simvastatin Other (See Comments)     Body aches     Cats      Codeine Nausea     Dogs      Dust Mites      [place] Trees      Seasonal Allergies          EXAM:   Constitutional:  Appears well-developed and well-nourished. No distress.   HEENT:   Head: Normocephalic.   Mouth/Throat:   No cobblestoning of posterior oropharynx.   Nasal tissue pink and normal appearing.  No rhinorrhea noted.    Eyes: Conjunctivae are non-erythematous   No maxillary or frontal sinus tenderness to palpation.   Cardiovascular: Normal rate, regular rhythm and normal heart sounds. Exam reveals no gallop and no friction rub.   No murmur  heard.  Respiratory: Effort normal and breath sounds normal. No respiratory distress. No wheezes. No rales.   Musculoskeletal: Normal range of motion.   Neuro: Oriented to person, place, and time.  Skin: Skin is warm and dry. No rash noted.   Psychiatric: Normal mood and affect.     Nursing note and vitals reviewed.    ASSESSMENT/PLAN:  Problem List Items Addressed This Visit        Respiratory    Other chronic sinusitis    Moderate persistent asthma     History of chronic chest symptoms for multiple years. Started on Spiriva and either that or mold remediation was helpful. He believes side effects from spiriva.  Diagnosed with asthma at [facility] in 2010.  On Symbicort 160/4.5 mcg 2 puff inhaled twice daily.  Singulair not helpful.  Using albuterol once weekly.     Spirometry with restrictive lung disease. Significant improvement post bronchodilator.     Elevated total IgE and absolute eosinophil.     - Continue Symbicort 160/4.5 mcg 2 puff inhaled twice daily.  -Albuterol 2-4 puffs inhaled (use a spacer unless using a Proair Respiclick device) every 4 hours as needed for chest tightness, wheezing, shortness of breath and/or coughing.   -Albuterol 2-4 puffs inhaled (use spacer if not using Proair Respiclick device) 15-20 minutes prior to physical activity.   Please ensure warm up period prior to exercise.   - Stop Spiriva 2.5mcg 2 puffs daily. Not tolerated. I think this was helpful but may have been mold remediation. If symptoms worsen after stopping could resume or consider biologic.            Seasonal allergic rhinitis due to pollen - Primary     Perennial with spring and summer nasal and ocular symptoms.  .  Has history of chronic sinusitis with nasal polyposis.  Follows with ENT.   Nasacort significantly helpful.      Skin testing:  Positive for trees, weeds, cat, dog, dust mite.     - Nasacort 2 sprays per nostril daily.  -Continue follow-up with ENT.         Allergic rhinitis due to animal dander     Allergic rhinitis due to dust mite        Return in 3-4 months.   Chart documentation with Dragon Voice recognition Software. Although reviewed after completion, some words and grammatical errors may remain.    Malcolm Del Rio DO [facility]  Allergy/Immunology  [place], [place]      Again, thank you for allowing me to participate in the care of your patient.        Sincerely,        Malcolm Del Rio, DO

## 2019-08-29 NOTE — ASSESSMENT & PLAN NOTE
Perennial with spring and summer nasal and ocular symptoms.  .  Has history of chronic sinusitis with nasal polyposis.  Follows with ENT.  Nasacort significantly helpful.      Skin testing:  Positive for trees, weeds, cat, dog, dust mite.     - Nasacort 2 sprays per nostril daily.  -Continue follow-up with ENT.

## 2019-08-29 NOTE — PATIENT INSTRUCTIONS
Allergy Staff Appt Hours Shot Hours Locations    Physician     Malcolm Del Rio DO       Support Staff     YUE Corbin, ENDY  Tuesday:        [place] 7-4:20     Wednesday:        [place]: 7-5 Thursday:                    [place] 7-6:40     Friday:  [place]  7-2:40   [place]        Thursday: 1-5:50        Friday: 7-10:50     [place]        Tuesday: 7- 3:20        Wednesday: 7-4:20     [place] Monday: 7-4:20        Tuesday: 1-6:20         [facility]  [address] [place] [address]  Appt Line: [phone]  Allergy RN:  [phone]    [facility]  [address] [place] [address]  Appt Line: [phone]  Allergy RN:  [phone]       Important Scheduling Information  Aspirin Desensitization: Appt will last 2 clinic days. Please call the Allergy RN line for your clinic to schedule. Discontinue antihistamines 7 days prior to the appointment.     Food Challenges: Appt will last 3-4 hours. Please call the Allergy RN line for your clinic to schedule. Discontinue antihistamines 7 days prior to the appointment.     Penicillin Testing: Appt will last 2-3 hours. Please call the Allergy RN line for your clinic to schedule. Discontinue antihistamines 7 days prior to the appointment.     Skin Testing: Appt will about 40 minutes. Call the appointment line for your clinic to schedule. Discontinue antihistamines 7 days prior to the appointment.     Venom Testing: Appt will last 2-3 hours. Please call the Allergy RN line for your clinic to schedule. Discontinue antihistamines 7 days prior to the appointment.     Thank you for trusting us with your Allergy, Asthma, and Immunology care. Please feel free to contact us with any questions or concerns you may have.      - Stop Spiriva.   - Symbicort 160/4.5mcg 2 puffs inhaled twice daily with a spacer.   - Albuterol 2-4 puffs inhaled (use a spacer unless using a Proair Respiclick device) every 4 hours as needed for chest tightness,  wheezing, shortness of breath and/or coughing.   - Nasacort 2 sprays/nostril daily.   - Return in 3 months.

## 2019-08-29 NOTE — PROGRESS NOTES
Guillermo Hogue is a 79 year old White male with previous medical history significant for asthma, chronic sinusitis and allergic rhinitis who returns for a follow up visit.     Patient returns for follow-up.  At last visit he was having frequent shortness of breath and wheezing.  Reversibility noted on spirometry.  He continued on Symbicort 160/4.5 mcg 2 puffs inhaled twice daily.  He previously had been on Singulair.  Not beneficial.  Started on Spiriva 2.5 mcg 2 puff inhaled twice daily.  He reports that now he is having chest symptoms approximately once per week.  Significant improvement in chest symptoms.  No interval ER visits, prednisone or hospitalizations.he  is concerned that Spiriva has made him tired.  He additionally found mold in his furnace.  This was extensively remediated.  Blood testing at last visit showed elevated total IgE and absolute eosinophil count.  He is allergic based on testing.  He was started on Nasacort 2 sprays per nostril daily.  He denies any recent nasal congestion, postnasal drainage.  History of chronic sinusitis with nasal polyposis.  He will be following up with ENT in early September.    ACT Total Scores 8/29/2019   ACT TOTAL SCORE -   ASTHMA ER VISITS -   ASTHMA HOSPITALIZATIONS -   ACT TOTAL SCORE (Goal Greater than or Equal to 20) 18   In the past 12 months, how many times did you visit the emergency room for your asthma without being admitted to the hospital? 0   In the past 12 months, how many times were you hospitalized overnight because of your asthma? 0         Past Medical History:   Diagnosis Date     Allergic rhinitis age 22     Asthma 2009     Chronic osteoarthritis      Nasal polyposis 2009     Osteoarthritis of left hip      Reactive airway disease 2009     Family History   Problem Relation Age of Onset     Breast Cancer Mother      Arthritis Father      C.A.D. Father      Cerebrovascular Disease Father      Respiratory Father         TB history     Rheumatoid  Arthritis Father      Cerebrovascular Disease Maternal Grandmother      Hypertension Maternal Grandmother      Cancer Maternal Grandfather      C.A.D. Brother      Prostate Cancer Brother      C.A.D. Brother      Past Surgical History:   Procedure Laterality Date     COLONOSCOPY       COLONOSCOPY N/A 5/10/2017    Procedure: COMBINED COLONOSCOPY, SINGLE OR MULTIPLE BIOPSY/POLYPECTOMY BY BIOPSY;;  Surgeon: Abisai Duarte DO;  Location: [facility] OR     COLONOSCOPY WITH CO2 INSUFFLATION N/A 5/14/2015    Procedure: COLONOSCOPY WITH CO2 INSUFFLATION;  Surgeon: Jose R Richmond MD;  Location: [facility] OR     COLONOSCOPY WITH CO2 INSUFFLATION N/A 5/10/2017    Procedure: COLONOSCOPY WITH CO2 INSUFFLATION;  COLON SCREEN/ UNDERWOOD;  Surgeon: Abisai Duarte DO;  Location: [facility] OR     SINUS SURGERY  2009     TONSILLECTOMY  age 22       REVIEW OF SYSTEMS:  General: negative for weight gain. negative for weight loss. negative for changes in sleep.   Ears: negative for fullness. negative for hearing loss. negative for dizziness.   Nose: negative for snoring.negative for changes in smell. negative for drainage.   Eyes: positive  for eye watering. negative for eye itching. negative for vision changes. negative for eye redness.  Throat: Positive for hoarseness. negative for sore throat. negative for trouble swallowing.   Lungs: positive  for shortness of breath.negative for wheezing. negative for sputum production.   Cardiovascular: negative for chest pain. negative for swelling of ankles. negative for fast or irregular heartbeat.   Gastrointestinal: negative for nausea. negative for heartburn. negative for acid reflux.   Musculoskeletal: negative for joint pain. negative for joint stiffness. negative for joint swelling.   Neurologic: negative for seizures. negative for fainting. negative for weakness.   Psychiatric: negative for changes in mood. negative for anxiety.   Endocrine: negative for cold intolerance. negative for heat  intolerance. negative for tremors.   Lymphatic: negative for lower extremity swelling. negative for lymph node swelling.   Hematologic: negative for easy bruising. negative for easy bleeding.  Integumentary: negative for rash. negative for scaling. negative for nail changes.       Current Outpatient Medications:      Acetaminophen (ARTHRITIS PAIN RELIEF PO), Take 1-2 tablets by mouth every 8 hours, Disp: , Rfl:      albuterol (PROAIR HFA/PROVENTIL HFA/VENTOLIN HFA) 108 (90 Base) MCG/ACT inhaler, Inhale 2 puffs into the lungs every 4 hours as needed for shortness of breath / dyspnea or wheezing Use with spacer, Disp: 1 Inhaler, Rfl: 1     atorvastatin (LIPITOR) 20 MG tablet, TAKE 1/2 TABLET BY MOUTH DAILY FOR CHOLESTEROL., Disp: 45 tablet, Rfl: 3     budesonide-formoterol (SYMBICORT) 160-4.5 MCG/ACT Inhaler, INHALE 2 PUFFS INTO THE LUNGS 2 TIMES DAILY PHARMACY OK TO HOLD PRESCRIPTION UNTIL DUE, Disp: 30.6 Inhaler, Rfl: 1     COMPOUNDED NON-CONTROLLED SUBSTANCE (CMPD RX) - PHARMACY TO MIX COMPOUNDED MEDICATION, 20 mLs by Nasal Instillation route 2 times daily Itraconazole nasal solution 0.1mg/mL solution for nasal irrigation, Disp: 2000 mL, Rfl: 12     COMPOUNDED NON-CONTROLLED SUBSTANCE (CMPD RX) - PHARMACY TO MIX COMPOUNDED MEDICATION, Gentamicin/Dexamethasone 160/120mg/Liter saline, Disp: 2000 mL, Rfl: 11     DILT- MG 24 hr capsule, TAKE 1 CAPSULE BY MOUTH ONCE DAILY FOR BLOOD PRESSURE / LOWER PULSE. , Disp: 90 capsule, Rfl: 1     diltiazem (CARTIA XT) 120 MG 24 hr capsule, Take 1 capsule (120 mg) by mouth daily For blood pressure/lowers pulse. Pharmacy ok to hold prescription until due, Disp: 90 capsule, Rfl: 3     IBUPROFEN PO, Take 200 mg by mouth Once a week alt with tylenol, Disp: , Rfl:      pseudoePHEDrine (SUDAFED) 30 MG tablet, Take 30 mg by mouth every 4 hours as needed for congestion, Disp: , Rfl:      Spacer/Aero-Holding Chambers (SPACER/AERO-HOLD CHAMBER MASK) RAMAN, 1 Adult size spacer to use  with MDI inhalers., Disp: 1 each, Rfl: 0     triamcinolone (NASACORT) 55 MCG/ACT nasal aerosol, Spray 2 sprays into both nostrils daily, Disp: 1 Bottle, Rfl: 3     predniSONE (DELTASONE) 20 MG tablet, 2 tablets the first 2 days, then 1 tabs the next 3 days. Take with food in AM for asthma flares (Patient not taking: Reported on 8/29/2019), Disp: 14 tablet, Rfl: 1  Immunization History   Administered Date(s) Administered     Influenza (H1N1) 12/22/2009     Influenza (High Dose) 3 valent vaccine 10/27/2010, 10/22/2011, 09/30/2014, 09/08/2015, 09/29/2016, 10/04/2017, 09/13/2018     Influenza (IIV3) PF 09/18/2009, 10/13/2012     Influenza Vaccine IM > 6 months Valent IIV4 10/17/2013     Mantoux Tuberculin Skin Test 10/09/2009     Pneumo Conj 13-V (2010&after) 09/08/2015     Pneumococcal 23 valent 10/20/2006     TD (ADULT, 7+) 10/27/2010     TDAP Vaccine (Adacel) 08/14/2012     Zoster vaccine recombinant adjuvanted (SHINGRIX) 09/13/2018, 01/03/2019     Zoster vaccine, live 11/12/2014     Allergies   Allergen Reactions     Hytrin [Terazosin Hcl]      Leg swelling and vertigo     Simvastatin Other (See Comments)     Body aches     Cats      Codeine Nausea     Dogs      Dust Mites      [place] Trees      Seasonal Allergies          EXAM:   Constitutional:  Appears well-developed and well-nourished. No distress.   HEENT:   Head: Normocephalic.   Mouth/Throat:   No cobblestoning of posterior oropharynx.   Nasal tissue pink and normal appearing.  No rhinorrhea noted.    Eyes: Conjunctivae are non-erythematous   No maxillary or frontal sinus tenderness to palpation.   Cardiovascular: Normal rate, regular rhythm and normal heart sounds. Exam reveals no gallop and no friction rub.   No murmur heard.  Respiratory: Effort normal and breath sounds normal. No respiratory distress. No wheezes. No rales.   Musculoskeletal: Normal range of motion.   Neuro: Oriented to person, place, and time.  Skin: Skin is warm and dry. No rash noted.    Psychiatric: Normal mood and affect.     Nursing note and vitals reviewed.    ASSESSMENT/PLAN:  Problem List Items Addressed This Visit        Respiratory    Other chronic sinusitis    Moderate persistent asthma     History of chronic chest symptoms for multiple years. Started on Spiriva and either that or mold remediation was helpful. He believes side effects from spiriva.  Diagnosed with asthma at [facility] in 2010.  On Symbicort 160/4.5 mcg 2 puff inhaled twice daily.  Singulair not helpful.  Using albuterol once weekly.     Spirometry with restrictive lung disease. Significant improvement post bronchodilator.     Elevated total IgE and absolute eosinophil.     - Continue Symbicort 160/4.5 mcg 2 puff inhaled twice daily.  -Albuterol 2-4 puffs inhaled (use a spacer unless using a Proair Respiclick device) every 4 hours as needed for chest tightness, wheezing, shortness of breath and/or coughing.   -Albuterol 2-4 puffs inhaled (use spacer if not using Proair Respiclick device) 15-20 minutes prior to physical activity.   Please ensure warm up period prior to exercise.   - Stop Spiriva 2.5mcg 2 puffs daily. Not tolerated. I think this was helpful but may have been mold remediation. If symptoms worsen after stopping could resume or consider biologic.            Seasonal allergic rhinitis due to pollen - Primary     Perennial with spring and summer nasal and ocular symptoms.  .  Has history of chronic sinusitis with nasal polyposis.  Follows with ENT.  Nasacort significantly helpful.      Skin testing:  Positive for trees, weeds, cat, dog, dust mite.     - Nasacort 2 sprays per nostril daily.  -Continue follow-up with ENT.         Allergic rhinitis due to animal dander    Allergic rhinitis due to dust mite        Return in 3-4 months.   Chart documentation with Dragon Voice recognition Software. Although reviewed after completion, some words and grammatical errors may remain.    Malcolm Del Rio, DO  FAAAAI  Allergy/Immunology  [facility] and [place], [place]

## 2019-08-30 ASSESSMENT — ASTHMA QUESTIONNAIRES: ACT_TOTALSCORE: 18

## 2019-09-03 ENCOUNTER — OFFICE VISIT (OUTPATIENT)
Dept: OTOLARYNGOLOGY | Facility: CLINIC | Age: 79
End: 2019-09-03
Payer: COMMERCIAL

## 2019-09-03 VITALS
OXYGEN SATURATION: 95 % | BODY MASS INDEX: 32.78 KG/M2 | HEART RATE: 70 BPM | DIASTOLIC BLOOD PRESSURE: 56 MMHG | WEIGHT: 222 LBS | SYSTOLIC BLOOD PRESSURE: 126 MMHG

## 2019-09-03 DIAGNOSIS — J32.4 CHRONIC PANSINUSITIS: ICD-10-CM

## 2019-09-03 PROCEDURE — 99214 OFFICE O/P EST MOD 30 MIN: CPT | Performed by: OTOLARYNGOLOGY

## 2019-09-03 ASSESSMENT — PAIN SCALES - GENERAL: PAINLEVEL: NO PAIN (0)

## 2019-09-03 NOTE — LETTER
"    9/3/2019         RE: Guillermo Hogue  [address]  [address]  [place] [address]        Dear Colleague,    Thank you for referring your patient, Guillermo Hogue, to the [facility]. Please see a copy of my visit note below.    History of Present Illness - Guillermo Hogue is a 79 year old male status post functional endoscopic sinus surgery on 2/12/2009, last seen on 3/12/2019.      At the visit on 11/11/2013 he told me that since the functional endoscopic sinus surgery his nose has been \"excellent.\"   However, we have continued to manage a chronic post nasal drainage.    To review, he was on BPM as an antihistamine, but because it was no longer available, I placed him on low dose atarax (hydroxyzine) to dry up his allergic rhinitis related post nasal drainage.  He told me that \"cornered the market on the BPM in [place]\" and he had run out of it.  I tried him on atarax but he tells me that it is not helping nearly as much.  He is also taking the BID mucinex, but he thinks that is making only a minimal difference.  He had been taking sleeping pills to get him to sleep.    So with those failures of therapy, I tried a new approach, and used Gent Itraconazole Dex nasal irrigations with the thought that perhaps this was inflammatory post nasal drainage.  On 12/12/12, and the rinses worked dramatically well for him.  He did have complaints of sleep cycle issues, which I then addressed with decreasing the the Dex component.  But then at the follow up on 2/6/2013 there was a return of the drainage and congestion, so I resumed the full strength dex component for two months.  At the visit on 8/5/2014, I encouraged him to try and decrease the frequency of the medicated rinses, and continue saline irrigation.  And at the most recent visit on 10/5/2015, things were going great, and I had not seen him since 4/4/2017.    At the 2018 visit he also had a new issue.  He tells me taht intermittently over " the years he has had issues with occasional dizziness, and PT for benign paroxysmal positional vertigo has always helped in the past.  But this time it has not seemed help.  I felt that this bidirectional rotationally triggered momentary vertigo was posterior canal benign paroxysmal positional vertigo, and referred him for Semont maneuvers.  That problem has improved.    The main reason for his March 2019 visit was because he had been having some pain in the RIGHT upper chest when he lays down.  He was seen by his PCP Dr. Doty, and there was some noise in the chest, and was treated with Amoxicillin and predniosne, and a second round.  He wanted to rule out sinus post nasal drainage as the cause of this pain in the chest. There had been no productive cough or dyspnea otherwise. I ordered a chest CT, which was thankfully clear.      Past Medical History -   Patient Active Problem List   Diagnosis     Reactive airway disease     Other chronic sinusitis     AR (allergic rhinitis)     HYPERLIPIDEMIA LDL GOAL <130     Advanced directives, counseling/discussion     Moderate persistent asthma     BPH (benign prostatic hyperplasia)     Palpitations     Hypertension goal BP (blood pressure) < 140/90     Fatty liver     Cataracts, bilateral     Seasonal allergic rhinitis due to pollen     Allergic rhinitis due to animal dander     Allergic rhinitis due to dust mite       Current Medications -   Current Outpatient Medications:      Acetaminophen (ARTHRITIS PAIN RELIEF PO), Take 1-2 tablets by mouth every 8 hours, Disp: , Rfl:      albuterol (PROAIR HFA/PROVENTIL HFA/VENTOLIN HFA) 108 (90 Base) MCG/ACT inhaler, Inhale 2 puffs into the lungs every 4 hours as needed for shortness of breath / dyspnea or wheezing Use with spacer, Disp: 1 Inhaler, Rfl: 1     atorvastatin (LIPITOR) 20 MG tablet, TAKE 1/2 TABLET BY MOUTH DAILY FOR CHOLESTEROL., Disp: 45 tablet, Rfl: 3     budesonide-formoterol (SYMBICORT) 160-4.5 MCG/ACT Inhaler,  INHALE 2 PUFFS INTO THE LUNGS 2 TIMES DAILY PHARMACY OK TO HOLD PRESCRIPTION UNTIL DUE, Disp: 30.6 Inhaler, Rfl: 1     COMPOUNDED NON-CONTROLLED SUBSTANCE (CMPD RX) - PHARMACY TO MIX COMPOUNDED MEDICATION, 20 mLs by Nasal Instillation route 2 times daily Itraconazole nasal solution 0.1mg/mL solution for nasal irrigation, Disp: 2000 mL, Rfl: 12     COMPOUNDED NON-CONTROLLED SUBSTANCE (CMPD RX) - PHARMACY TO MIX COMPOUNDED MEDICATION, Gentamicin/Dexamethasone 160/120mg/Liter saline, Disp: 2000 mL, Rfl: 11     DILT- MG 24 hr capsule, TAKE 1 CAPSULE BY MOUTH ONCE DAILY FOR BLOOD PRESSURE / LOWER PULSE. , Disp: 90 capsule, Rfl: 1     diltiazem (CARTIA XT) 120 MG 24 hr capsule, Take 1 capsule (120 mg) by mouth daily For blood pressure/lowers pulse. Pharmacy ok to hold prescription until due, Disp: 90 capsule, Rfl: 3     IBUPROFEN PO, Take 200 mg by mouth Once a week alt with tylenol, Disp: , Rfl:      predniSONE (DELTASONE) 20 MG tablet, 2 tablets the first 2 days, then 1 tabs the next 3 days. Take with food in AM for asthma flares (Patient not taking: Reported on 2019), Disp: 14 tablet, Rfl: 1     pseudoePHEDrine (SUDAFED) 30 MG tablet, Take 30 mg by mouth every 4 hours as needed for congestion, Disp: , Rfl:      Spacer/Aero-Holding Chambers (SPACER/AERO-HOLD CHAMBER MASK) RAMAN, 1 Adult size spacer to use with MDI inhalers., Disp: 1 each, Rfl: 0     triamcinolone (NASACORT) 55 MCG/ACT nasal aerosol, Spray 2 sprays into both nostrils daily, Disp: 1 Bottle, Rfl: 3    Allergies -   Allergies   Allergen Reactions     Hytrin [Terazosin Hcl]      Leg swelling and vertigo     Simvastatin Other (See Comments)     Body aches     Cats      Codeine Nausea     Dogs      Dust Mites      [place] Trees      Seasonal Allergies        Social History -   Social History     Social History     Marital status:      Spouse name: Sanam -       Number of children: 2     Years of education: 14     Occupational History      Middle Management Retired     2008     Social History Main Topics     Smoking status: Never Smoker     Smokeless tobacco: Never Used     Alcohol use No     Drug use: No     Sexual activity: No     Other Topics Concern     Parent/Sibling W/ Cabg, Mi Or Angioplasty Before 65f 55m? Yes     2 Brothers and father      Service No     Blood Transfusions No     Caffeine Concern No     Occupational Exposure Yes     he was in [employer]     Hobby Hazards No     Sleep Concern No     Stress Concern No     Weight Concern Yes     Special Diet No     Back Care No     Exercise No     Bike Helmet No     Seat Belt Yes     Self-Exams No     Social History Narrative       Family History -   Family History   Problem Relation Age of Onset     Breast Cancer Mother      Arthritis Father      C.A.D. Father      Cerebrovascular Disease Father      Respiratory Father         TB history     Rheumatoid Arthritis Father      Cerebrovascular Disease Maternal Grandmother      Hypertension Maternal Grandmother      Cancer Maternal Grandfather      C.A.D. Brother      Prostate Cancer Brother      C.A.D. Brother        Review of Systems - As per HPI and PMHx, otherwise 10+ system review of the head and neck, and general constitution is negative.    Physical Exam  /56 (BP Location: Right arm, Patient Position: Sitting, Cuff Size: Adult Large)   Pulse 70   Wt 100.7 kg (222 lb)   SpO2 95%   BMI 32.78 kg/m       General - The patient is well nourished and well developed, and appears to have good nutritional status.  Alert and oriented to person and place, answers questions and cooperates with examination appropriately.   Head and Face - Normocephalic and atraumatic, with no gross asymmetry noted of the contour of the facial features.  The facial nerve is intact, with strong symmetric movements.  Voice and Breathing - The patient was breathing comfortably without the use of accessory muscles. There was no wheezing, stridor, or stertor.   The patients voice was clear and strong, and had appropriate pitch and quality.  Ears - The tympanic membranes are normal in appearance, bony landmarks are intact.  No retraction, perforation, or masses.  No fluid or purulence was seen in the external canal or the middle ear. No evidence of infection of the middle ear or external canal, cerumen was normal in appearance.  Eyes - Extraocular movements intact, and the pupils were reactive to light.  Sclera were not icteric or injected, conjunctiva were pink and moist.  Mouth - Examination of the oral cavity showed pink, healthy oral mucosa. No lesions or ulcerations noted.  The tongue was mobile and midline, and the dentition were in good condition.    Throat - The walls of the oropharynx were smooth, pink, moist, symmetric, and had no lesions or ulcerations.  The tonsillar pillars and soft palate were symmetric.  The uvula was midline on elevation.    Neck - Normal midline excursion of the laryngotracheal complex during swallowing.  Full range of motion on passive movement.  Palpation of the occipital, submental, submandibular, internal jugular chain, and supraclavicular nodes did not demonstrate any abnormal lymph nodes or masses.  The carotid pulse was palpable bilaterally.  Palpation of the thyroid was soft and smooth, with no nodules or goiter appreciated.  The trachea was mobile and midline.        A/P - Guillermo Hogue is a 79 year old male  (J32.4) Chronic pansinusitis  The two irrigations have not been used in a month, and jsut the use of OTC nasacort has stopped the post nasal drainage.     Stop the two irrigations, and just go with the nasacort for now then, and let me know if the post nasal drainage comes back.    Continue management with Dr. Del Rio, things are going great with his allergy management at this point.  And I think that probably the biggest overall factor that has probably improved everything was the discovery of mold in his condo being fixed  and removed.      Again, thank you for allowing me to participate in the care of your patient.        Sincerely,        Alonso Valadez MD

## 2019-09-03 NOTE — PROGRESS NOTES
"History of Present Illness - Guillermo Hogue is a 79 year old male status post functional endoscopic sinus surgery on 2/12/2009, last seen on 3/12/2019.      At the visit on 11/11/2013 he told me that since the functional endoscopic sinus surgery his nose has been \"excellent.\"   However, we have continued to manage a chronic post nasal drainage.    To review, he was on BPM as an antihistamine, but because it was no longer available, I placed him on low dose atarax (hydroxyzine) to dry up his allergic rhinitis related post nasal drainage.  He told me that \"cornered the market on the BPM in [place]\" and he had run out of it.  I tried him on atarax but he tells me that it is not helping nearly as much.  He is also taking the BID mucinex, but he thinks that is making only a minimal difference.  He had been taking sleeping pills to get him to sleep.    So with those failures of therapy, I tried a new approach, and used Gent Itraconazole Dex nasal irrigations with the thought that perhaps this was inflammatory post nasal drainage.  On 12/12/12, and the rinses worked dramatically well for him.  He did have complaints of sleep cycle issues, which I then addressed with decreasing the the Dex component.  But then at the follow up on 2/6/2013 there was a return of the drainage and congestion, so I resumed the full strength dex component for two months.  At the visit on 8/5/2014, I encouraged him to try and decrease the frequency of the medicated rinses, and continue saline irrigation.  And at the most recent visit on 10/5/2015, things were going great, and I had not seen him since 4/4/2017.    At the 2018 visit he also had a new issue.  He tells me taht intermittently over the years he has had issues with occasional dizziness, and PT for benign paroxysmal positional vertigo has always helped in the past.  But this time it has not seemed help.  I felt that this bidirectional rotationally triggered momentary vertigo was " posterior canal benign paroxysmal positional vertigo, and referred him for Semont maneuvers.  That problem has improved.    The main reason for his March 2019 visit was because he had been having some pain in the RIGHT upper chest when he lays down.  He was seen by his PCP Dr. Doty, and there was some noise in the chest, and was treated with Amoxicillin and predniosne, and a second round.  He wanted to rule out sinus post nasal drainage as the cause of this pain in the chest. There had been no productive cough or dyspnea otherwise. I ordered a chest CT, which was thankfully clear.      Past Medical History -   Patient Active Problem List   Diagnosis     Reactive airway disease     Other chronic sinusitis     AR (allergic rhinitis)     HYPERLIPIDEMIA LDL GOAL <130     Advanced directives, counseling/discussion     Moderate persistent asthma     BPH (benign prostatic hyperplasia)     Palpitations     Hypertension goal BP (blood pressure) < 140/90     Fatty liver     Cataracts, bilateral     Seasonal allergic rhinitis due to pollen     Allergic rhinitis due to animal dander     Allergic rhinitis due to dust mite       Current Medications -   Current Outpatient Medications:      Acetaminophen (ARTHRITIS PAIN RELIEF PO), Take 1-2 tablets by mouth every 8 hours, Disp: , Rfl:      albuterol (PROAIR HFA/PROVENTIL HFA/VENTOLIN HFA) 108 (90 Base) MCG/ACT inhaler, Inhale 2 puffs into the lungs every 4 hours as needed for shortness of breath / dyspnea or wheezing Use with spacer, Disp: 1 Inhaler, Rfl: 1     atorvastatin (LIPITOR) 20 MG tablet, TAKE 1/2 TABLET BY MOUTH DAILY FOR CHOLESTEROL., Disp: 45 tablet, Rfl: 3     budesonide-formoterol (SYMBICORT) 160-4.5 MCG/ACT Inhaler, INHALE 2 PUFFS INTO THE LUNGS 2 TIMES DAILY PHARMACY OK TO HOLD PRESCRIPTION UNTIL DUE, Disp: 30.6 Inhaler, Rfl: 1     COMPOUNDED NON-CONTROLLED SUBSTANCE (CMPD RX) - PHARMACY TO MIX COMPOUNDED MEDICATION, 20 mLs by Nasal Instillation route 2 times daily  Itraconazole nasal solution 0.1mg/mL solution for nasal irrigation, Disp: 2000 mL, Rfl: 12     COMPOUNDED NON-CONTROLLED SUBSTANCE (CMPD RX) - PHARMACY TO MIX COMPOUNDED MEDICATION, Gentamicin/Dexamethasone 160/120mg/Liter saline, Disp: 2000 mL, Rfl: 11     DILT- MG 24 hr capsule, TAKE 1 CAPSULE BY MOUTH ONCE DAILY FOR BLOOD PRESSURE / LOWER PULSE. , Disp: 90 capsule, Rfl: 1     diltiazem (CARTIA XT) 120 MG 24 hr capsule, Take 1 capsule (120 mg) by mouth daily For blood pressure/lowers pulse. Pharmacy ok to hold prescription until due, Disp: 90 capsule, Rfl: 3     IBUPROFEN PO, Take 200 mg by mouth Once a week alt with tylenol, Disp: , Rfl:      predniSONE (DELTASONE) 20 MG tablet, 2 tablets the first 2 days, then 1 tabs the next 3 days. Take with food in AM for asthma flares (Patient not taking: Reported on 2019), Disp: 14 tablet, Rfl: 1     pseudoePHEDrine (SUDAFED) 30 MG tablet, Take 30 mg by mouth every 4 hours as needed for congestion, Disp: , Rfl:      Spacer/Aero-Holding Chambers (SPACER/AERO-HOLD CHAMBER MASK) RAMAN, 1 Adult size spacer to use with MDI inhalers., Disp: 1 each, Rfl: 0     triamcinolone (NASACORT) 55 MCG/ACT nasal aerosol, Spray 2 sprays into both nostrils daily, Disp: 1 Bottle, Rfl: 3    Allergies -   Allergies   Allergen Reactions     Hytrin [Terazosin Hcl]      Leg swelling and vertigo     Simvastatin Other (See Comments)     Body aches     Cats      Codeine Nausea     Dogs      Dust Mites      [place] Trees      Seasonal Allergies        Social History -   Social History     Social History     Marital status:      Spouse name: Sanam -       Number of children: 2     Years of education: 14     Occupational History     Middle Management Retired          Social History Main Topics     Smoking status: Never Smoker     Smokeless tobacco: Never Used     Alcohol use No     Drug use: No     Sexual activity: No     Other Topics Concern     Parent/Sibling W/ Cabg, Mi Or  Angioplasty Before 65f 55m? Yes     2 Brothers and father      Service No     Blood Transfusions No     Caffeine Concern No     Occupational Exposure Yes     he was in [employer]     Hobby Hazards No     Sleep Concern No     Stress Concern No     Weight Concern Yes     Special Diet No     Back Care No     Exercise No     Bike Helmet No     Seat Belt Yes     Self-Exams No     Social History Narrative       Family History -   Family History   Problem Relation Age of Onset     Breast Cancer Mother      Arthritis Father      C.A.D. Father      Cerebrovascular Disease Father      Respiratory Father         TB history     Rheumatoid Arthritis Father      Cerebrovascular Disease Maternal Grandmother      Hypertension Maternal Grandmother      Cancer Maternal Grandfather      C.A.D. Brother      Prostate Cancer Brother      C.A.D. Brother        Review of Systems - As per HPI and PMHx, otherwise 10+ system review of the head and neck, and general constitution is negative.    Physical Exam  /56 (BP Location: Right arm, Patient Position: Sitting, Cuff Size: Adult Large)   Pulse 70   Wt 100.7 kg (222 lb)   SpO2 95%   BMI 32.78 kg/m      General - The patient is well nourished and well developed, and appears to have good nutritional status.  Alert and oriented to person and place, answers questions and cooperates with examination appropriately.   Head and Face - Normocephalic and atraumatic, with no gross asymmetry noted of the contour of the facial features.  The facial nerve is intact, with strong symmetric movements.  Voice and Breathing - The patient was breathing comfortably without the use of accessory muscles. There was no wheezing, stridor, or stertor.  The patients voice was clear and strong, and had appropriate pitch and quality.  Ears - The tympanic membranes are normal in appearance, bony landmarks are intact.  No retraction, perforation, or masses.  No fluid or purulence was seen in the external  canal or the middle ear. No evidence of infection of the middle ear or external canal, cerumen was normal in appearance.  Eyes - Extraocular movements intact, and the pupils were reactive to light.  Sclera were not icteric or injected, conjunctiva were pink and moist.  Mouth - Examination of the oral cavity showed pink, healthy oral mucosa. No lesions or ulcerations noted.  The tongue was mobile and midline, and the dentition were in good condition.    Throat - The walls of the oropharynx were smooth, pink, moist, symmetric, and had no lesions or ulcerations.  The tonsillar pillars and soft palate were symmetric.  The uvula was midline on elevation.    Neck - Normal midline excursion of the laryngotracheal complex during swallowing.  Full range of motion on passive movement.  Palpation of the occipital, submental, submandibular, internal jugular chain, and supraclavicular nodes did not demonstrate any abnormal lymph nodes or masses.  The carotid pulse was palpable bilaterally.  Palpation of the thyroid was soft and smooth, with no nodules or goiter appreciated.  The trachea was mobile and midline.        A/P - Guillermo Hogue is a 79 year old male  (J32.4) Chronic pansinusitis  The two irrigations have not been used in a month, and jsut the use of OTC nasacort has stopped the post nasal drainage.     Stop the two irrigations, and just go with the nasacort for now then, and let me know if the post nasal drainage comes back.    Continue management with Dr. Del Rio, things are going great with his allergy management at this point.  And I think that probably the biggest overall factor that has probably improved everything was the discovery of mold in his condo being fixed and removed.

## 2019-09-03 NOTE — PATIENT INSTRUCTIONS
Scheduling Information  To schedule your CT/MRI scan, please contact [facility] at [phone] OR [facility] at [phone]    To schedule your Surgery, please contact our Specialty Schedulers at [phone]      ENT Clinic Locations Clinic Hours Telephone Number     [facility]  [address] [place] [address]   Monday:           1:00pm -- 5:00pm    Friday:              8:00am - 12:00pm   To schedule/reschedule an appointment with   Dr. Valadez,   please contact our   Specialty Scheduling Department at:     [phone]       [facility]  [address] [address] Tuesday:          8:00am -- 2:00pm         Urgent Care Locations Clinic Hours Telephone Numbers     [facility]  [address] [place] [address]     Monday-Friday:     11:00am - 9:00pm    Saturday-Sunday:  9:00am - 5:00pm   [phone]     [facility]  [address] [place] [address]     Monday-Friday:      5:00pm - 9:00pm     Saturday-Sunday:  9:00am - 5:00pm   [phone]

## 2019-09-09 ENCOUNTER — OFFICE VISIT (OUTPATIENT)
Dept: FAMILY MEDICINE | Facility: CLINIC | Age: 79
End: 2019-09-09
Payer: COMMERCIAL

## 2019-09-09 VITALS
OXYGEN SATURATION: 98 % | HEART RATE: 74 BPM | SYSTOLIC BLOOD PRESSURE: 137 MMHG | TEMPERATURE: 98 F | DIASTOLIC BLOOD PRESSURE: 79 MMHG | BODY MASS INDEX: 32.19 KG/M2 | WEIGHT: 218 LBS

## 2019-09-09 DIAGNOSIS — I48.0 PAROXYSMAL ATRIAL FIBRILLATION (H): ICD-10-CM

## 2019-09-09 DIAGNOSIS — J45.40 MODERATE PERSISTENT ASTHMA WITHOUT COMPLICATION: ICD-10-CM

## 2019-09-09 DIAGNOSIS — Z01.818 PREOP GENERAL PHYSICAL EXAM: Primary | ICD-10-CM

## 2019-09-09 DIAGNOSIS — I10 HYPERTENSION GOAL BP (BLOOD PRESSURE) < 140/90: ICD-10-CM

## 2019-09-09 DIAGNOSIS — Z23 NEED FOR PROPHYLACTIC VACCINATION AND INOCULATION AGAINST INFLUENZA: ICD-10-CM

## 2019-09-09 DIAGNOSIS — E78.5 HYPERLIPIDEMIA LDL GOAL <130: ICD-10-CM

## 2019-09-09 DIAGNOSIS — M16.12 PRIMARY OSTEOARTHRITIS OF LEFT HIP: ICD-10-CM

## 2019-09-09 LAB
ANION GAP SERPL CALCULATED.3IONS-SCNC: 7 MMOL/L (ref 3–14)
BASOPHILS # BLD AUTO: 0 10E9/L (ref 0–0.2)
BASOPHILS NFR BLD AUTO: 0.4 %
BUN SERPL-MCNC: 15 MG/DL (ref 7–30)
CALCIUM SERPL-MCNC: 8.8 MG/DL (ref 8.5–10.1)
CHLORIDE SERPL-SCNC: 105 MMOL/L (ref 94–109)
CHOLEST SERPL-MCNC: 153 MG/DL
CO2 SERPL-SCNC: 27 MMOL/L (ref 20–32)
CREAT SERPL-MCNC: 0.73 MG/DL (ref 0.66–1.25)
DIFFERENTIAL METHOD BLD: NORMAL
EOSINOPHIL # BLD AUTO: 0.6 10E9/L (ref 0–0.7)
EOSINOPHIL NFR BLD AUTO: 7.4 %
ERYTHROCYTE [DISTWIDTH] IN BLOOD BY AUTOMATED COUNT: 14.2 % (ref 10–15)
GFR SERPL CREATININE-BSD FRML MDRD: 89 ML/MIN/{1.73_M2}
GLUCOSE SERPL-MCNC: 98 MG/DL (ref 70–99)
HCT VFR BLD AUTO: 43.9 % (ref 40–53)
HDLC SERPL-MCNC: 62 MG/DL
HGB BLD-MCNC: 14 G/DL (ref 13.3–17.7)
LDLC SERPL CALC-MCNC: 77 MG/DL
LYMPHOCYTES # BLD AUTO: 2.5 10E9/L (ref 0.8–5.3)
LYMPHOCYTES NFR BLD AUTO: 30.1 %
MCH RBC QN AUTO: 29.1 PG (ref 26.5–33)
MCHC RBC AUTO-ENTMCNC: 31.9 G/DL (ref 31.5–36.5)
MCV RBC AUTO: 91 FL (ref 78–100)
MONOCYTES # BLD AUTO: 1 10E9/L (ref 0–1.3)
MONOCYTES NFR BLD AUTO: 11.4 %
NEUTROPHILS # BLD AUTO: 4.3 10E9/L (ref 1.6–8.3)
NEUTROPHILS NFR BLD AUTO: 50.7 %
NONHDLC SERPL-MCNC: 91 MG/DL
PLATELET # BLD AUTO: 192 10E9/L (ref 150–450)
POTASSIUM SERPL-SCNC: 3.9 MMOL/L (ref 3.4–5.3)
RBC # BLD AUTO: 4.81 10E12/L (ref 4.4–5.9)
SODIUM SERPL-SCNC: 139 MMOL/L (ref 133–144)
TRIGL SERPL-MCNC: 68 MG/DL
WBC # BLD AUTO: 8.4 10E9/L (ref 4–11)

## 2019-09-09 PROCEDURE — 36415 COLL VENOUS BLD VENIPUNCTURE: CPT | Performed by: PHYSICIAN ASSISTANT

## 2019-09-09 PROCEDURE — 99215 OFFICE O/P EST HI 40 MIN: CPT | Mod: 25 | Performed by: PHYSICIAN ASSISTANT

## 2019-09-09 PROCEDURE — 80061 LIPID PANEL: CPT | Performed by: PHYSICIAN ASSISTANT

## 2019-09-09 PROCEDURE — 85025 COMPLETE CBC W/AUTO DIFF WBC: CPT | Performed by: PHYSICIAN ASSISTANT

## 2019-09-09 PROCEDURE — 90662 IIV NO PRSV INCREASED AG IM: CPT | Performed by: PHYSICIAN ASSISTANT

## 2019-09-09 PROCEDURE — 93000 ELECTROCARDIOGRAM COMPLETE: CPT | Performed by: PHYSICIAN ASSISTANT

## 2019-09-09 PROCEDURE — 80048 BASIC METABOLIC PNL TOTAL CA: CPT | Performed by: PHYSICIAN ASSISTANT

## 2019-09-09 PROCEDURE — G0008 ADMIN INFLUENZA VIRUS VAC: HCPCS | Performed by: PHYSICIAN ASSISTANT

## 2019-09-09 NOTE — LETTER
September 10, 2019    Guillermo Hogue  [address]  [address]  [place] [address]        Dear Guillermo,     It was a pleasure to see you at your recent office visit.  Your test results are listed below.  Labs looks great. Cholesterol to goal. Kidney function normal. Glucose normal (blood sugar). White and red blood cell counts normal.         If you have any questions or concerns, please call the clinic at [phone].    Sincerely,  Izabela Xie PA-C    Results for orders placed or performed in visit on 09/09/19   Basic metabolic panel   Result Value Ref Range    Sodium 139 133 - 144 mmol/L    Potassium 3.9 3.4 - 5.3 mmol/L    Chloride 105 94 - 109 mmol/L    Carbon Dioxide 27 20 - 32 mmol/L    Anion Gap 7 3 - 14 mmol/L    Glucose 98 70 - 99 mg/dL    Urea Nitrogen 15 7 - 30 mg/dL    Creatinine 0.73 0.66 - 1.25 mg/dL    GFR Estimate 89 >60 mL/min/[1.73_m2]    GFR Estimate If Black >90 >60 mL/min/[1.73_m2]    Calcium 8.8 8.5 - 10.1 mg/dL   CBC with platelets differential   Result Value Ref Range    WBC 8.4 4.0 - 11.0 10e9/L    RBC Count 4.81 4.4 - 5.9 10e12/L    Hemoglobin 14.0 13.3 - 17.7 g/dL    Hematocrit 43.9 40.0 - 53.0 %    MCV 91 78 - 100 fl    MCH 29.1 26.5 - 33.0 pg    MCHC 31.9 31.5 - 36.5 g/dL    RDW 14.2 10.0 - 15.0 %    Platelet Count 192 150 - 450 10e9/L    % Neutrophils 50.7 %    % Lymphocytes 30.1 %    % Monocytes 11.4 %    % Eosinophils 7.4 %    % Basophils 0.4 %    Absolute Neutrophil 4.3 1.6 - 8.3 10e9/L    Absolute Lymphocytes 2.5 0.8 - 5.3 10e9/L    Absolute Monocytes 1.0 0.0 - 1.3 10e9/L    Absolute Eosinophils 0.6 0.0 - 0.7 10e9/L    Absolute Basophils 0.0 0.0 - 0.2 10e9/L    Diff Method Automated Method    Lipid panel reflex to direct LDL Fasting   Result Value Ref Range    Cholesterol 153 <200 mg/dL    Triglycerides 68 <150 mg/dL    HDL Cholesterol 62 >39 mg/dL    LDL Cholesterol Calculated 77 <100 mg/dL    Non HDL Cholesterol 91 <130 mg/dL

## 2019-09-10 ASSESSMENT — ASTHMA QUESTIONNAIRES: ACT_TOTALSCORE: 22

## 2019-09-10 NOTE — RESULT ENCOUNTER NOTE
Dear Guillermo,      It was a pleasure to see you at your recent office visit.  Your test results are listed below.  Labs looks great. Cholesterol to goal. Kidney function normal. Glucose normal (blood sugar). White and red blood cell counts normal.         If you have any questions or concerns, please call the clinic at [phone].    Sincerely,  Izabela Xie PA-C    
Discussed results with patient in office.  Izabela Xie PA-C    
<<-----Click here for Discharge Medication Review

## 2019-09-11 ENCOUNTER — TELEPHONE (OUTPATIENT)
Dept: FAMILY MEDICINE | Facility: CLINIC | Age: 79
End: 2019-09-11

## 2019-09-11 NOTE — TELEPHONE ENCOUNTER
Pt states he thinks Dr. Doty just checked the wrong box on the form.  Pt states he does not need any modified equipment to drive and does not have any problems driving.  Can you do a letter?  Amanda PHELPSN, RN

## 2019-09-11 NOTE — TELEPHONE ENCOUNTER
Reason for Call:  Other needs letter    Detailed comments: Patient went to [facility] and was told the form from Dr. Doty said he needed modified equipment to drive. All he needs is a form or letter stating that he is able to drive without modified equipment. Please call patient as soon as letter/form is completed. He just saw Izabela Xie for a physical. Thank you    Phone Number Patient can be reached at: Home number on file [phone] (home)    Best Time: ASAP please    Can we leave a detailed message on this number? YES    Call taken on 9/11/2019 at 12:21 PM by Mitzi Zhu

## 2019-09-26 ENCOUNTER — NURSING HOME VISIT (OUTPATIENT)
Dept: GERIATRICS | Facility: CLINIC | Age: 79
End: 2019-09-26
Payer: COMMERCIAL

## 2019-09-26 VITALS
HEART RATE: 92 BPM | OXYGEN SATURATION: 93 % | BODY MASS INDEX: 32.64 KG/M2 | TEMPERATURE: 98.8 F | RESPIRATION RATE: 22 BRPM | SYSTOLIC BLOOD PRESSURE: 138 MMHG | WEIGHT: 221 LBS | DIASTOLIC BLOOD PRESSURE: 79 MMHG

## 2019-09-26 DIAGNOSIS — K59.01 SLOW TRANSIT CONSTIPATION: ICD-10-CM

## 2019-09-26 DIAGNOSIS — Z96.642 STATUS POST TOTAL REPLACEMENT OF LEFT HIP: ICD-10-CM

## 2019-09-26 DIAGNOSIS — M15.0 PRIMARY OSTEOARTHRITIS INVOLVING MULTIPLE JOINTS: ICD-10-CM

## 2019-09-26 DIAGNOSIS — I48.0 PAROXYSMAL ATRIAL FIBRILLATION (H): ICD-10-CM

## 2019-09-26 DIAGNOSIS — E78.5 HYPERLIPIDEMIA LDL GOAL <130: ICD-10-CM

## 2019-09-26 DIAGNOSIS — Z47.89 AFTERCARE FOLLOWING SURGERY OF THE MUSCULOSKELETAL SYSTEM: Primary | ICD-10-CM

## 2019-09-26 DIAGNOSIS — I10 HYPERTENSION GOAL BP (BLOOD PRESSURE) < 140/90: ICD-10-CM

## 2019-09-26 DIAGNOSIS — J45.40 MODERATE PERSISTENT ASTHMA WITHOUT COMPLICATION: ICD-10-CM

## 2019-09-26 PROCEDURE — 99310 SBSQ NF CARE HIGH MDM 45: CPT | Performed by: NURSE PRACTITIONER

## 2019-09-26 RX ORDER — TRAMADOL HYDROCHLORIDE 50 MG/1
50 TABLET ORAL EVERY 6 HOURS PRN
COMMUNITY
End: 2019-12-17

## 2019-09-26 RX ORDER — AMOXICILLIN 250 MG
2 CAPSULE ORAL AT BEDTIME
COMMUNITY
End: 2019-10-16 | Stop reason: ALTCHOICE

## 2019-09-26 RX ORDER — MONTELUKAST SODIUM 10 MG/1
10 TABLET ORAL AT BEDTIME
COMMUNITY
End: 2019-09-26

## 2019-09-26 RX ORDER — AMOXICILLIN 250 MG
1 CAPSULE ORAL 2 TIMES DAILY PRN
COMMUNITY
End: 2019-12-17

## 2019-09-26 RX ORDER — LANOLIN ALCOHOL/MO/W.PET/CERES
3 CREAM (GRAM) TOPICAL
COMMUNITY
End: 2020-02-17

## 2019-09-26 RX ORDER — ACETAMINOPHEN 500 MG
1000 TABLET ORAL 3 TIMES DAILY
COMMUNITY
End: 2024-08-05

## 2019-09-26 NOTE — TELEPHONE ENCOUNTER
Do we have any documentation from Dr. Paredes where he checked this box supposedly on accident? I am trying to figure out what is going on, I did not see patient for this I just saw him for a preop. He had hip surgery I don't know if that affects anything or if the surgeon wrote something for that.     Izabela

## 2019-09-26 NOTE — PROGRESS NOTES
"[place] [facility]  PRIMARY CARE PROVIDER AND CLINIC:  Edi Doty MD, [address] [facility] / [facility] [address]  Chief Complaint   Patient presents with     Hospital F/U     [place] Medical Record Number:  [id number]  Place of Service where encounter took place:  [facility] (S) [[id number]]    Guillermo Hogue  is a 79 year old  (1940), admitted to the above facility from  [facility] . Hospital stay 9/23/19 through 9/25/19..  Admitted to this facility for  rehab, medical management and nursing care.    HPI:    HPI information obtained from: facility chart records, facility staff, patient report and [facility] chart review.   Brief Summary of Hospital Course:   Patient is a 79 year old gentleman with PMH of HTN, reactive airway disease, HLD, left hip osteoarthritis which failed conservative treatment who presented for elected left hip ERINN by Dr Parisi.  Hospitalization was uncomplicated and patient was transferred to TCU for strengthening, nursing cares and medical management.        Updates on Status Since Skilled nursing Admission:   Today patient is met in his TCU room with family present.  He endorses some constipation, feeling \"sore\" and left foot/ankle pain.  He endorses lightheadedness occasionally with position changes. He denies SOB, CP, HA, heartburn, upset stomach.     CODE STATUS/ADVANCE DIRECTIVES DISCUSSION:   CPR/Full code   ALLERGIES: Hytrin [terazosin hcl]; Simvastatin; Cats; Codeine; Dogs; Dust mites; Oak trees; and Seasonal allergies  PAST MEDICAL HISTORY:  has a past medical history of Allergic rhinitis (age 22), Asthma (2009), Chronic osteoarthritis, Nasal polyposis (2009), Osteoarthritis of left hip, and Reactive airway disease (2009).  PAST SURGICAL HISTORY:   has a past surgical history that includes colonoscopy; tonsillectomy (age 22); sinus surgery (2009); Colonoscopy with CO2 insufflation (N/A, 5/14/2015); Colonoscopy with CO2 insufflation (N/A, " 5/10/2017); and Colonoscopy (N/A, 5/10/2017).  FAMILY HISTORY: family history includes Arthritis in his father; Breast Cancer in his mother; C.A.D. in his brother, brother, and father; Cancer in his maternal grandfather; Cerebrovascular Disease in his father and maternal grandmother; Hypertension in his maternal grandmother; Prostate Cancer in his brother; Respiratory in his father; Rheumatoid Arthritis in his father.  SOCIAL HISTORY:   reports that he has never smoked. He has never used smokeless tobacco. He reports that he does not drink alcohol or use drugs.    Post Discharge Medication Reconciliation Status: discharge medications reconciled, continue medications without change    Current Outpatient Medications   Medication Sig Dispense Refill     acetaminophen (TYLENOL) 500 MG tablet Take 1,000 mg by mouth 3 times daily       albuterol (PROAIR HFA/PROVENTIL HFA/VENTOLIN HFA) 108 (90 Base) MCG/ACT inhaler Inhale 2 puffs into the lungs every 4 hours as needed for shortness of breath / dyspnea or wheezing Use with spacer 1 Inhaler 1     aspirin (ASA) 325 MG EC tablet Take 325 mg by mouth 2 times daily       atorvastatin (LIPITOR) 20 MG tablet TAKE 1/2 TABLET BY MOUTH DAILY FOR CHOLESTEROL. 45 tablet 3     budesonide-formoterol (SYMBICORT) 160-4.5 MCG/ACT Inhaler INHALE 2 PUFFS INTO THE LUNGS 2 TIMES DAILY PHARMACY OK TO HOLD PRESCRIPTION UNTIL DUE 30.6 Inhaler 1     diltiazem (CARTIA XT) 120 MG 24 hr capsule Take 1 capsule (120 mg) by mouth daily For blood pressure/lowers pulse. Pharmacy ok to hold prescription until due 90 capsule 3     melatonin 3 MG tablet Take 3 mg by mouth nightly as needed for sleep       pseudoePHEDrine (SUDAFED) 30 MG tablet Take 30 mg by mouth every 4 hours as needed for congestion       senna-docusate (SENOKOT-S/PERICOLACE) 8.6-50 MG tablet Take 1 tablet by mouth 2 times daily       senna-docusate (SENOKOT-S/PERICOLACE) 8.6-50 MG tablet Take 2 tablets by mouth At Bedtime        Spacer/Aero-Holding Chambers (SPACER/AERO-HOLD CHAMBER MASK) RAMAN 1 Adult size spacer to use with MDI inhalers. 1 each 0     tiotropium (SPIRIVA RESPIMAT) 2.5 MCG/ACT inhaler Inhale 2 puffs into the lungs daily       traMADol (ULTRAM) 50 MG tablet Take 50 mg by mouth every 6 hours as needed for severe pain       ROS:  10 point ROS of systems including Constitutional, Eyes, Respiratory, Cardiovascular, Gastroenterology, Genitourinary, Integumentary, Musculoskeletal, Psychiatric were all negative except for pertinent positives noted in my HPI.    Vitals:  /79   Pulse 92   Temp 98.8  F (37.1  C)   Resp 22   Wt 100.2 kg (221 lb)   SpO2 93%   BMI 32.64 kg/m    Exam:  GENERAL APPEARANCE:  Alert, in no distress  RESP:  respiratory effort and palpation of chest normal, auscultation of lungs clear, no respiratory distress  CV:  Palpation and auscultation of heart done , rate and rhythm regular, no murmur, moderate LLE peripheral edema, scant RLE edema  ABDOMEN:  normal bowel sounds, soft, nontender, no hepatosplenomegaly or other masses  M/S:   Gait and station with walker for mobility, Digits and nails at baseline, reduced ROM of left hip with recent ERINN  SKIN:  Inspection and Palpation of skin and subcutaneous tissue with aquacel dressing on left hip, shadow drainage present, foam dressing on lower aspect of incision (old Hemovac) with mild warmth, redness and similar swelling around this area  PSYCH:  insight and judgement, memory intact , affect and mood normal, follows commands readily         Lab/Diagnostic data:  Recent labs in [facility] reviewed by me today.      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   On  mg BID (x 1 month per patient report)  WBAT    Analgesia with (changed) Tylenol 500 mg q4 hours PRN, and (new) Tramadol 50 mg q6 hours PRN.   Physical Therapy Ibuprofen 200 mg q8 hours PRN remains.   Patient reports being \"sore\" and frustrated.   Discussion about scheduling of Tylenol - patient in agreement.     Patient has pain in left foot/ankle - likely from edema.  Leyva's sign neg for DVT. + CMS.   Patient adamantly declined TEDS, reluctantly declined ACE wrap.  Can order PRN in case he changes his mind.     PLAN:  - Change Tylenol to 1000 mg TID   - (new) Tramadol 50 mg q6 hours PRN  - PTA Ibuprofen 200 mg q8 hours PRN - limit use  -  mg BID for anticoagulation  - WBAT  - Physical Therapy/ Occupational Therapy for strengthening, rehab, etc.   - ACE wrap for LLE for edema as likely to reduce pain from swelling  - Close and meticulous monitoring of distal portion of incision as this area was warm, mildly erythematous (swollen but expected) - concerning for early infection.      Moderate persistent asthma without complication  Not f/b Pulmonology  Is f/b Dr Del Rio, Allergy & Immunology, [facility]   Patient remains on albuterol as needed, Symbicort 160-4.5 mcg/act 2 puffs BID, Spiriva 2.5 mcg/act 2 puffs daily, Nasacort daily, montelukast 10 mg q HS.  Patient reports he does not take the montelukast d/t reaction and on-call DC'd Nasacort d/t patient reportedly not taking.     Patient denies SOB  LS clear, on RA    PLAN:  - discontinue Montelukast (per patient report of reaction)  - discontinue Nasacort (per patient report of not using)  - continue PTA Spirivina, Symbicort, Albuterol PRN  - monitor for titration needs    Paroxysmal atrial fibrillation (H)  F/y Cardiology, Dr Dea Todd, [facility]  Per EHR review/Dr Todd's note:  Original ERINN scheduled for " 6/17  was canceled by anesthesiology due to possible atrial fibrillation noted on the monitor when he was being administered premedication drugs and about to receive spinal block in preparation for surgery.  He was taken to a monitoring room and stayed there for ~1 hour and his rhythm was noted to be normal (patient information).  No EKG documentation is available.  The patient denies feeling palpitations on that day.  He was also noted to have normal vitals during checking in for surgery.  has a history of paroxysmal atrial fibrillation in the setting of pericarditis in 2011.    Dr Todd recommended no long-term OA initiation but rather EKG monitoring.   Physical Therapy is on (new)  mg BID for at least the next month (post ERINN)    On diltiazem 120 mg daily  HRs 80-90s mostly    PLAN:  - continue PTA diltiazem  - no OA (other than ASA BID for ERINN) at this time  - Primary Care Provider to f/u with EKG monitoring      Hypertension goal BP (blood pressure) < 140/90  Hyperlipidemia LDL goal <130  On PTA diltiazem 120 mg daily, atorvastatin 10 mg q HS  (noted allergy to simvastatin on [facility] records - not on  Epic)    BPs 120-130s/60-70s  HRs 80-90s mostly  Patient denies CP, HA; endorses occasional lightheadedness with position changes.     PLAN:  - continue PTA diltiazem and statin  - monitor for titration needs  - monitor for need to order orthostatic BPs if lightheadedness continues     Slow Transit Constipation  On (new) Senna S 1 tab BID PRN  Patient reported constipation and on-call added (new) Senna 2 tabs q HS     PLAN:  - (new) Senna S 2 tabs q HS  - (new) Senna S 1 tab BID PRN  - monitor for titration needs        Orders written by provider at facility and transcribed by : Georgette Kumari MA  1.  Senna S 2 tablets at bedtime.  Dx: constipation (hold for loose stools  - continue with PRN order)  2.  Melatonin 3 mg po at bedtime PRN.  Dx: constipation  3.  Discontinue Triamcinolone nasal  spray - patient refuses  4.  Discontinue Montelukast (pt declines).  5.  Change Tylenol to 1000 po TID.  Dx:  Pain  6.  LLE ACE wrap toes to knees PRN on in AM, off in PM.  Dx: edema        Total time spent with patient visit at the skilled nursing facility was 35 min including patient visit, review of past records and coordination of care with nursing. Greater than 50% of total time spent with counseling and coordinating care due to coordinating care with nurse on admission orders, coordinating care with follow up labs and appointments and counseling patient/resident and family for >24 min minutes on: the reason for their hospitalization and what the treatment is, the plan of SNF stay and projected length of stay, current medications (treatments) reconciled from the hospital, recent past lab and imaging results and subsequent treatment plan and discussion regarding therapy and monitoring of incision, HTN goals, asthma management, edema and pain management (and aboev diagnoses). .    Electronically signed by:  NGUYỄN Dai CNP

## 2019-09-26 NOTE — TELEPHONE ENCOUNTER
Please have patient drop off the form then I need to see what Dr. Doty wrote to understand what is going on.     Thank you,   Izabela

## 2019-09-26 NOTE — TELEPHONE ENCOUNTER
Left message on patient's voicemail to drop off a copy of the from for Izabela to review.Anny Gaytan MA/TING

## 2019-09-29 ENCOUNTER — TELEPHONE (OUTPATIENT)
Dept: GERIATRICS | Facility: CLINIC | Age: 79
End: 2019-09-29

## 2019-09-30 ENCOUNTER — NURSING HOME VISIT (OUTPATIENT)
Dept: GERIATRICS | Facility: CLINIC | Age: 79
End: 2019-09-30
Payer: COMMERCIAL

## 2019-09-30 VITALS
WEIGHT: 221 LBS | BODY MASS INDEX: 32.64 KG/M2 | SYSTOLIC BLOOD PRESSURE: 135 MMHG | HEART RATE: 71 BPM | DIASTOLIC BLOOD PRESSURE: 77 MMHG | RESPIRATION RATE: 22 BRPM | OXYGEN SATURATION: 97 % | TEMPERATURE: 98.8 F

## 2019-09-30 DIAGNOSIS — E78.5 HYPERLIPIDEMIA LDL GOAL <130: ICD-10-CM

## 2019-09-30 DIAGNOSIS — J45.40 MODERATE PERSISTENT ASTHMA WITHOUT COMPLICATION: ICD-10-CM

## 2019-09-30 DIAGNOSIS — I10 HYPERTENSION GOAL BP (BLOOD PRESSURE) < 140/90: ICD-10-CM

## 2019-09-30 DIAGNOSIS — M15.0 PRIMARY OSTEOARTHRITIS INVOLVING MULTIPLE JOINTS: ICD-10-CM

## 2019-09-30 DIAGNOSIS — Z47.89 AFTERCARE FOLLOWING SURGERY OF THE MUSCULOSKELETAL SYSTEM: Primary | ICD-10-CM

## 2019-09-30 DIAGNOSIS — Z96.642 STATUS POST TOTAL REPLACEMENT OF LEFT HIP: ICD-10-CM

## 2019-09-30 DIAGNOSIS — K59.01 SLOW TRANSIT CONSTIPATION: ICD-10-CM

## 2019-09-30 DIAGNOSIS — I48.0 PAROXYSMAL ATRIAL FIBRILLATION (H): ICD-10-CM

## 2019-09-30 PROCEDURE — 99309 SBSQ NF CARE MODERATE MDM 30: CPT | Performed by: NURSE PRACTITIONER

## 2019-09-30 RX ORDER — CEPHALEXIN 500 MG/1
500 CAPSULE ORAL 4 TIMES DAILY
COMMUNITY
Start: 2019-09-30 | End: 2019-10-10

## 2019-09-30 RX ORDER — IBUPROFEN 200 MG
200 TABLET ORAL EVERY 8 HOURS PRN
COMMUNITY
End: 2019-12-17

## 2019-09-30 NOTE — TELEPHONE ENCOUNTER
Called by RN that patient's left leg is getting markedly swollen and erythematous and she is concerned about infection/DVT. RN asked for my permission to send the patient to ED for evaluation.   I told RN that it is reasonable to send the patient to ED given these new findings.    BARBARA Abbott MD

## 2019-09-30 NOTE — PROGRESS NOTES
[place] [facility]  PRIMARY CARE PROVIDER AND CLINIC:  Edi Doty MD, [address] [facility] / [facility] [address]  Chief Complaint   Patient presents with     RECHECK     [facility] Medical Record Number:  [id number]  Place of Service where encounter took place:  [facility] (S) [[id number]]    Guillermo Hogue  is a 79 year old  (1940), admitted to the above facility from  [facility] . Hospital stay 9/23/19 through 9/25/19..  Admitted to this facility for  rehab, medical management and nursing care.    HPI:    HPI information obtained from: facility chart records, facility staff, patient report and [facility] chart review.   Brief Summary of Hospital Course:   Patient is a 79 year old gentleman with PMH of HTN, reactive airway disease, HLD, left hip osteoarthritis which failed conservative treatment who presented for elected left hip ERINN by Dr Parisi.  Hospitalization was uncomplicated and patient was transferred to TCU for strengthening, nursing cares and medical management.        Updates on Status Since Skilled nursing Admission:   9/26 NP Visit  - 1.  Senna S 2 tablets at bedtime.  Dx: constipation (hold for loose stools  - continue with PRN order)  2.  Melatonin 3 mg po at bedtime PRN.  Dx: constipation  3.  Discontinue Triamcinolone nasal spray - patient refuses  4.  Discontinue Montelukast (pt declines).  5.  Change Tylenol to 1000 po TID.  Dx:  Pain  6.  LLE ACE wrap toes to knees PRN on in AM, off in PM.  Dx: edema      9/29 sent to ER and started on keflex for cellulitis of incision. Ultrasound of left lower extrem negative for DVT.   9/30 constipation improved. Not using much tramadol. Does not want the spiriva.       CODE STATUS/ADVANCE DIRECTIVES DISCUSSION:   CPR/Full code   ALLERGIES: Hytrin [terazosin hcl]; Simvastatin; Cats; Codeine; Dogs; Dust mites; Oak trees; and Seasonal allergies  PAST MEDICAL HISTORY:  has a past medical history of Allergic rhinitis  (age 22), Asthma (2009), Chronic osteoarthritis, Nasal polyposis (2009), Osteoarthritis of left hip, and Reactive airway disease (2009).  PAST SURGICAL HISTORY:   has a past surgical history that includes colonoscopy; tonsillectomy (age 22); sinus surgery (2009); Colonoscopy with CO2 insufflation (N/A, 5/14/2015); Colonoscopy with CO2 insufflation (N/A, 5/10/2017); and Colonoscopy (N/A, 5/10/2017).  FAMILY HISTORY: family history includes Arthritis in his father; Breast Cancer in his mother; C.A.D. in his brother, brother, and father; Cancer in his maternal grandfather; Cerebrovascular Disease in his father and maternal grandmother; Hypertension in his maternal grandmother; Prostate Cancer in his brother; Respiratory in his father; Rheumatoid Arthritis in his father.  SOCIAL HISTORY:   reports that he has never smoked. He has never used smokeless tobacco. He reports that he does not drink alcohol or use drugs.    Current Outpatient Medications   Medication Sig Dispense Refill     acetaminophen (TYLENOL) 500 MG tablet Take 1,000 mg by mouth 3 times daily       albuterol (PROAIR HFA/PROVENTIL HFA/VENTOLIN HFA) 108 (90 Base) MCG/ACT inhaler Inhale 2 puffs into the lungs every 4 hours as needed for shortness of breath / dyspnea or wheezing Use with spacer 1 Inhaler 1     aspirin (ASA) 325 MG EC tablet Take 325 mg by mouth 2 times daily       atorvastatin (LIPITOR) 20 MG tablet TAKE 1/2 TABLET BY MOUTH DAILY FOR CHOLESTEROL. 45 tablet 3     budesonide-formoterol (SYMBICORT) 160-4.5 MCG/ACT Inhaler INHALE 2 PUFFS INTO THE LUNGS 2 TIMES DAILY PHARMACY OK TO HOLD PRESCRIPTION UNTIL DUE 30.6 Inhaler 1     cephALEXin (KEFLEX) 500 MG capsule Take 500 mg by mouth 4 times daily       diltiazem (CARTIA XT) 120 MG 24 hr capsule Take 1 capsule (120 mg) by mouth daily For blood pressure/lowers pulse. Pharmacy ok to hold prescription until due 90 capsule 3     ibuprofen (ADVIL/MOTRIN) 200 MG tablet Take 200 mg by mouth every 8 hours  as needed for mild pain       melatonin 3 MG tablet Take 3 mg by mouth nightly as needed for sleep       pseudoePHEDrine (SUDAFED) 30 MG tablet Take 30 mg by mouth every 4 hours as needed for congestion       senna-docusate (SENOKOT-S/PERICOLACE) 8.6-50 MG tablet Take 1 tablet by mouth 2 times daily as needed        senna-docusate (SENOKOT-S/PERICOLACE) 8.6-50 MG tablet Take 2 tablets by mouth At Bedtime       traMADol (ULTRAM) 50 MG tablet Take 50 mg by mouth every 6 hours as needed for severe pain       Spacer/Aero-Holding Chambers (SPACER/AERO-HOLD CHAMBER MASK) RAMAN 1 Adult size spacer to use with MDI inhalers. 1 each 0     ROS:  4 point ROS including Respiratory, CV, GI and , other than that noted in the HPI,  is negative    Vitals:  /77   Pulse 71   Temp 98.8  F (37.1  C)   Resp 22   Wt 100.2 kg (221 lb)   SpO2 97%   BMI 32.64 kg/m    Exam:  GENERAL APPEARANCE:  Alert, in no distress  RESP:  respiratory effort and palpation of chest normal, auscultation of lungs clear, no respiratory distress  CV:  Palpation and auscultation of heart done , rate and rhythm regular, no murmur, moderate LLE peripheral edema, scant RLE edema  ABDOMEN:  normal bowel sounds, soft, nontender  M/S:   Gait and station with walker for mobility, Digits and nails at baseline, reduced ROM of left hip with recent ERINN  SKIN:  Inspection and Palpation of skin and subcutaneous tissue at baseline. Bruising noted to left lower extrem.   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   On  mg BID (x 1 month per patient report)  WBAT    Analgesia with (changed) Tylenol 500 mg q4 hours PRN, and (new) Tramadol  "50 mg q6 hours PRN.   Physical Therapy Ibuprofen 200 mg q8 hours PRN remains.   Patient reports being \"sore\" and frustrated.   Discussion about scheduling of Tylenol - patient in agreement.   - continue Physical Therapy / Ocupational Therapy   - continue scheduled tylenol and PRN tramadol.     Moderate persistent asthma without complication  Is f/b Dr Del Rio, Allergy & Immunology, [facility]   Patient remains on albuterol as needed, Symbicort 160-4.5 mcg/act 2 puffs BID, states he gets short of breath with increase in acticity but none noted recently.   - medications discontinued from [facility] as patient states he doesn't take them.     Paroxysmal atrial fibrillation (H)  F/y Cardiology, Dr Dea Todd, [facility]  Per EHR review/Dr Todd's note:  Original ERINN scheduled for 6/17  was canceled by anesthesiology due to possible atrial fibrillation noted on the monitor when he was being administered premedication drugs and about to receive spinal block in preparation for surgery.  He was taken to a monitoring room and stayed there for ~1 hour and his rhythm was noted to be normal (patient information).  No EKG documentation is available.  The patient denies feeling palpitations on that day.  He was also noted to have normal vitals during checking in for surgery.  has a history of paroxysmal atrial fibrillation in the setting of pericarditis in 2011.  - continue PTA diltiazem  - no OA (other than ASA BID for ERINN) at this time  - Primary Care Provider to f/u with EKG monitoring    Hypertension goal BP (blood pressure) < 140/90  Hyperlipidemia LDL goal <130  On PTA diltiazem 120 mg daily, atorvastatin 10 mg q HS    BPs 120-130s/60-70s  HRs 80-90s mostly  Patient denies CP, HA; endorses occasional lightheadedness with position changes.   - no changes     Slow Transit Constipation  Improved with use of scheduled senna. Ok to hold or discontinue if develops loose stool     Electronically signed by:  Linda Scherer NP "       Orders written by provider at facility and transcribed by : Georgette Kumari MA   1. Discontinue spiriva

## 2019-10-01 ENCOUNTER — DOCUMENTATION ONLY (OUTPATIENT)
Dept: OTHER | Facility: CLINIC | Age: 79
End: 2019-10-01

## 2019-10-07 ENCOUNTER — DISCHARGE SUMMARY NURSING HOME (OUTPATIENT)
Dept: GERIATRICS | Facility: CLINIC | Age: 79
End: 2019-10-07
Payer: COMMERCIAL

## 2019-10-07 VITALS
OXYGEN SATURATION: 97 % | TEMPERATURE: 97.9 F | SYSTOLIC BLOOD PRESSURE: 127 MMHG | RESPIRATION RATE: 18 BRPM | BODY MASS INDEX: 32.75 KG/M2 | HEART RATE: 73 BPM | WEIGHT: 221.8 LBS | DIASTOLIC BLOOD PRESSURE: 68 MMHG

## 2019-10-07 DIAGNOSIS — Z47.89 AFTERCARE FOLLOWING SURGERY OF THE MUSCULOSKELETAL SYSTEM: Primary | ICD-10-CM

## 2019-10-07 DIAGNOSIS — K59.01 SLOW TRANSIT CONSTIPATION: ICD-10-CM

## 2019-10-07 DIAGNOSIS — J45.40 MODERATE PERSISTENT ASTHMA WITHOUT COMPLICATION: ICD-10-CM

## 2019-10-07 DIAGNOSIS — E78.5 HYPERLIPIDEMIA LDL GOAL <130: ICD-10-CM

## 2019-10-07 DIAGNOSIS — L03.116 CELLULITIS OF LEFT LOWER EXTREMITY: ICD-10-CM

## 2019-10-07 DIAGNOSIS — I48.0 PAROXYSMAL ATRIAL FIBRILLATION (H): ICD-10-CM

## 2019-10-07 DIAGNOSIS — M15.0 PRIMARY OSTEOARTHRITIS INVOLVING MULTIPLE JOINTS: ICD-10-CM

## 2019-10-07 DIAGNOSIS — I10 HYPERTENSION GOAL BP (BLOOD PRESSURE) < 140/90: ICD-10-CM

## 2019-10-07 PROCEDURE — 99316 NF DSCHRG MGMT 30 MIN+: CPT | Performed by: NURSE PRACTITIONER

## 2019-10-07 RX ORDER — DOXYCYCLINE 100 MG/1
100 CAPSULE ORAL 2 TIMES DAILY
COMMUNITY
Start: 2019-10-07 | End: 2019-10-16

## 2019-10-07 NOTE — PROGRESS NOTES
[place] [facility] DISCHARGE SUMMARY    PATIENT'S NAME: Guillermo Hogue  YOB: 1940  MEDICAL RECORD NUMBER:  [id number]  Place of Service where encounter took place:  [facility] ([facility]) [[id number]]    PRIMARY CARE PROVIDER AND CLINIC RESPONSIBLE AFTER TRANSFER: Edi Doty [id number] [facility] [id number]    [facility] Provider     Transferring providers: Linda Scherer NP, Cesilia Neumann MD  Recent Hospitalization/ED: [facility] . Hospital stay 9/23/19 through 9/25/19..    Date of SNF Admission: 9/25   Date of SNF (anticipated) Discharge: 10/8  Discharged to: previous independent home  Cognitive Scores: BIMS 15/15;   Physical Function: Ambulating 200 ft with wheeled walker  DME: Walker    CODE STATUS/ADVANCE DIRECTIVES DISCUSSION:  Full Code  ALLERGIES: Hytrin [terazosin hcl]; Simvastatin; Cats; Codeine; Dogs; Dust mites; Oak trees; and Seasonal allergies    DISCHARGE DIAGNOSIS/NURSING FACILITY COURSE:   Brief Summary of Hospital Course:   Patient is a 79 year old gentleman with PMH of HTN, reactive airway disease, HLD, left hip osteoarthritis which failed conservative treatment who presented for elected left hip ERINN by Dr Parisi.  Hospitalization was uncomplicated and patient was transferred to TCU for strengthening, nursing cares and medical management.          Updates on Status Since Skilled nursing Admission:   9/26 NP Visit  - 1.  Senna S 2 tablets at bedtime.  Dx: constipation (hold for loose stools  - continue with PRN order)  2.  Melatonin 3 mg po at bedtime PRN.  Dx: constipation  3.  Discontinue Triamcinolone nasal spray - patient refuses  4.  Discontinue Montelukast (pt declines).  5.  Change Tylenol to 1000 po TID.  Dx:  Pain  6.  LLE ACE wrap toes to knees PRN on in AM, off in PM.  Dx: edema      9/29 sent to ER and started on keflex for cellulitis of incision. Ultrasound of left lower extrem negative for DVT.   9/30 constipation improved. Not using much  tramadol. Does not want the spiriva. 1. Discontinue spiriva  10/71. left lower extremitiy reddness and warmth remains. Suspect venous stasis dermatitis. Will treat with abx and recommend he follow up sooner if fever.  1.  Doxycycline 100 mg po BID x 5 days.  Dx: cellulitis  2.  Discontinue Tramadol.  3.  Ok to discharge home with current orders and treatments.  4.  Home PT/OT/RN.    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   On  mg BID (x 1 month per patient report)  WBAT     Analgesia with (changed) Tylenol 500 mg q4 hours PRN, and (new) Tramadol 50 mg q6 hours PRN.   Physical Therapy Ibuprofen 200 mg q8 hours PRN remains.   - continue Physical Therapy / Ocupational Therapy   - continue scheduled tylenol  - discontinue tramadol. Patient not using.      Moderate persistent asthma without complication  Is f/b Dr Del Rio, Allergy & Immunology, [facility]   Patient remains on albuterol as needed, Symbicort 160-4.5 mcg/act 2 puffs BID, states he gets short of breath with increase in acticity but none noted recently.   - medications discontinued from [facility] as patient states he doesn't take them.      Paroxysmal atrial fibrillation (H)  F/y Cardiology, Dr Dea Todd, [facility]  Per EHR review/Dr Todd's note:  Original ERINN scheduled for 6/17  was canceled by anesthesiology due to possible atrial fibrillation noted on the monitor when he was being administered premedication drugs and about to receive spinal block in preparation for surgery.  He was taken to a monitoring room and stayed there for ~1 hour and his rhythm was noted to be normal (patient information).  No EKG documentation is available.  The patient denies feeling palpitations on that day.  He was also noted to have normal vitals  during checking in for surgery.  has a history of paroxysmal atrial fibrillation in the setting of pericarditis in 2011.  - continue PTA diltiazem  - no OA (other than ASA BID for ERINN) at this time  - Primary Care Provider to f/u with EKG monitoring     Hypertension goal BP (blood pressure) < 140/90  Hyperlipidemia LDL goal <130  On PTA diltiazem 120 mg daily, atorvastatin 10 mg q HS     BPs 120-130s/60-70s  HRs 80-90s mostly  Patient denies CP, HA; endorses occasional lightheadedness with position changes.   - no changes      Slow Transit Constipation  Improved with use of scheduled senna. Ok to hold or discontinue if develops loose stool         PAST MEDICAL HISTORY:  has a past medical history of Allergic rhinitis (age 22), Asthma (2009), Chronic osteoarthritis, Nasal polyposis (2009), Osteoarthritis of left hip, and Reactive airway disease (2009).    DISCHARGE MEDICATIONS:  Current Outpatient Medications   Medication Sig Dispense Refill     acetaminophen (TYLENOL) 500 MG tablet Take 1,000 mg by mouth 3 times daily       albuterol (PROAIR HFA/PROVENTIL HFA/VENTOLIN HFA) 108 (90 Base) MCG/ACT inhaler Inhale 2 puffs into the lungs every 4 hours as needed for shortness of breath / dyspnea or wheezing Use with spacer 1 Inhaler 1     aspirin (ASA) 325 MG EC tablet Take 325 mg by mouth 2 times daily       atorvastatin (LIPITOR) 20 MG tablet TAKE 1/2 TABLET BY MOUTH DAILY FOR CHOLESTEROL. 45 tablet 3     budesonide-formoterol (SYMBICORT) 160-4.5 MCG/ACT Inhaler INHALE 2 PUFFS INTO THE LUNGS 2 TIMES DAILY PHARMACY OK TO HOLD PRESCRIPTION UNTIL DUE 30.6 Inhaler 1     diltiazem (CARTIA XT) 120 MG 24 hr capsule Take 1 capsule (120 mg) by mouth daily For blood pressure/lowers pulse. Pharmacy ok to hold prescription until due 90 capsule 3     doxycycline hyclate (VIBRAMYCIN) 100 MG capsule Take 100 mg by mouth 2 times daily       ibuprofen (ADVIL/MOTRIN) 200 MG tablet Take 200 mg by mouth every 8 hours as needed for mild pain        melatonin 3 MG tablet Take 3 mg by mouth nightly as needed for sleep       pseudoePHEDrine (SUDAFED) 30 MG tablet Take 30 mg by mouth every 4 hours as needed for congestion       senna-docusate (SENOKOT-S/PERICOLACE) 8.6-50 MG tablet Take 1 tablet by mouth 2 times daily as needed        senna-docusate (SENOKOT-S/PERICOLACE) 8.6-50 MG tablet Take 2 tablets by mouth At Bedtime       traMADol (ULTRAM) 50 MG tablet Take 50 mg by mouth every 6 hours as needed for severe pain       Spacer/Aero-Holding Chambers (SPACER/AERO-HOLD CHAMBER MASK) RAMAN 1 Adult size spacer to use with MDI inhalers. 1 each 0       MEDICATION CHANGES/RATIONALE:   See aboce     ROS:    4 point ROS including Respiratory, CV, GI and , other than that noted in the HPI,  is negative    Physical Exam:   Vitals: /68   Pulse 73   Temp 97.9  F (36.6  C)   Resp 18   Wt 100.6 kg (221 lb 12.8 oz)   SpO2 97%   BMI 32.75 kg/m    BMI= Body mass index is 32.75 kg/m .  General. Alert in no distress  SKIN: incision closed, healing well. Left lower extrem on medial lower shin with redness. Area outlined in pen yesterday and has receded since then.   EXTREM: left lower extrem with 2+ pitting edema.     DISCHARGE PLAN:  Occupational Therapy, Physical Therapy, Registered Nurse and Home Health Aide  Patient instructed to follow-up with:  PCP in 7 days      Current [place] scheduled appointments:  Future Appointments   Date Time Provider Department Center   10/10/2019 10:30 AM Jeffrey Parisi MD [facility] CLIN   10/16/2019  4:20 PM Becca Hernandez NP [facility] referral needed and placed by this provider: No    Pending labs: none  SNF labs: none  Discharge Treatments:    TOTAL DISCHARGE TIME:   Greater than 30 minutes  Electronically signed by:  Linda Scherer NP      Orders written by provider at facility and transcribed by : Georgette Kumari MA   1.  Doxycycline 100 mg po BID x 5 days.  Dx: cellulitis  2.   Discontinue Tramadol.  3.  Ok to discharge home with current orders and treatments.  4.  Home PT/OT/RN.

## 2019-10-08 ENCOUNTER — TELEPHONE (OUTPATIENT)
Dept: FAMILY MEDICINE | Facility: CLINIC | Age: 79
End: 2019-10-08

## 2019-10-08 NOTE — TELEPHONE ENCOUNTER
Called and spoke to patient and informed him that this can be placed at the time of her appointment with Becca Hernandez.Anny Gaytan MA/TING

## 2019-10-08 NOTE — TELEPHONE ENCOUNTER
Patient would like to schedule an appointment to get a zeo patch. He would like to do it at the same time as his upcoming appointment with Becca Hernandez on 10/16. Please advise.    Thank you

## 2019-10-10 ENCOUNTER — OFFICE VISIT (OUTPATIENT)
Dept: ORTHOPEDICS | Facility: CLINIC | Age: 79
End: 2019-10-10
Payer: COMMERCIAL

## 2019-10-10 ENCOUNTER — TELEPHONE (OUTPATIENT)
Dept: FAMILY MEDICINE | Facility: CLINIC | Age: 79
End: 2019-10-10

## 2019-10-10 ENCOUNTER — TELEPHONE (OUTPATIENT)
Dept: ORTHOPEDICS | Facility: CLINIC | Age: 79
End: 2019-10-10

## 2019-10-10 VITALS
BODY MASS INDEX: 32.88 KG/M2 | HEIGHT: 69 IN | SYSTOLIC BLOOD PRESSURE: 152 MMHG | WEIGHT: 222 LBS | OXYGEN SATURATION: 96 % | DIASTOLIC BLOOD PRESSURE: 67 MMHG | HEART RATE: 89 BPM

## 2019-10-10 DIAGNOSIS — Z76.89 HEALTH CARE HOME: Primary | ICD-10-CM

## 2019-10-10 DIAGNOSIS — L03.116 CELLULITIS OF LEFT LOWER EXTREMITY: ICD-10-CM

## 2019-10-10 DIAGNOSIS — Z96.642 STATUS POST TOTAL REPLACEMENT OF LEFT HIP: Primary | ICD-10-CM

## 2019-10-10 PROCEDURE — 99024 POSTOP FOLLOW-UP VISIT: CPT | Performed by: ORTHOPAEDIC SURGERY

## 2019-10-10 ASSESSMENT — MIFFLIN-ST. JEOR: SCORE: 1717.37

## 2019-10-10 NOTE — LETTER
"    10/10/2019         RE: Guillermo Hogue  [address]  [address]  [place] [address]        Dear Colleague,    Thank you for referring your patient, Guillermo Hogue, to the [facility]. Please see a copy of my visit note below.    SUBJECTIVE:  Guillermo Hogue is here for postoperative follow up status post Left total hip arthroplasty on 9/23/2019. It has been approximately 2.5 weeks since the procedure.  in ER on 9/29/2019, he was prescribed Keflex (TID for 7 days) after being treated for left lower leg swelling. Was then put on a second round of antibiotics (Doxycycline, BID for 5 days) at the rehab center. Still has 2 days left of antibiotics. Today he reports sporadic pain in the medial ankle and medial leg at times. Patient keeps the left leg elevated when he can, but is unsure if this helps. With regards to his total hip arthroplasty, currently his hip is doing fine. He reports the hip is feeling better now than before surgery. Minimal pain. He walks without the walker.       Review of Systems:  Constitutional/General: Negative for fever, chills, change in weight  Integumentary/Skin: Negative for worrisome rashes, moles, or lesions  Neuro: Negative for weakness, dizziness, or paresthesias   Psychiatric: negative for changes in mood or affect    This document serves as a record of the services and decisions personally performed and made by AMARA Parisi MD. It was created on his behalf by Yaima Newton, a trained medical scribe. The creation of this document is based the provider's statements to the medical scribe.    Scribe Yaima Newton 11:24 AM 10/10/2019        OBJECTIVE:  Physical Exam:  BP (!) 152/67 (BP Location: Right arm, Patient Position: Sitting, Cuff Size: Adult Regular)   Pulse 89   Ht 1.753 m (5' 9\")   Wt 100.7 kg (222 lb)   SpO2 96%   BMI 32.78 kg/m     General Appearance: healthy, alert and no distress   Skin: no suspicious lesions or rashes  Neuro: Normal strength and " tone, mentation intact and speech normal  Vascular: see below.   Lymph: no lymphadenopathy   Psych:  mentation appears normal and affect normal/bright  Resp: no increased work of breathing    Left Hip Exam:  Inspection: Wound is healing very well, no evidence of infection.  Swelling: Some fullness without fluctuance around the hip.   Range of motion: No pain with hip rotation.   Strength: Good hip flexion and abduction strength    Left Leg Exam:  Inspection: Light erythema of the left lower leg, very little warmth or tenderness over erythematous area   Good range of motion of left ankle.  Good dorsalis pedis pulse     Left Foot Exam:   Foot is slightly cool  Cap refill is 1-2 seconds.    ASSESSMENT:   2.5 weeks status post left ERINN, doing well.   Encounter Diagnoses   Name Primary?     Status post total replacement of left hip Yes     Cellulitis of left lower extremity         PLAN:   Sutures removed today. I suggested that once he completes his current course of antibiotics, that he should continue monitoring the leg. If he develops fevers, chills, increased redness, pain, or spreading redness, he should contact the clinic immediately. Continue aspirin for another 2 weeks.     Return to clinic in 4 weeks with x-rays of the low AP pelvis and lateral right hip.      AMARA Parisi MD  Dept. Orthopedic Surgery  [facility]      Again, thank you for allowing me to participate in the care of your patient.        Sincerely,        Jeffrey Parisi MD

## 2019-10-10 NOTE — TELEPHONE ENCOUNTER
Dates of shingrex, pnuemonia, and influenza vaccines given to Melonie. Verbal orders for nursing visits given per standing orders.  Advised ot and pt orders will need to come from Dr. Parisi.  Amadna PHELPSN, RN

## 2019-10-10 NOTE — PROGRESS NOTES
"SUBJECTIVE:  Guillermo Hogue is here for postoperative follow up status post Left total hip arthroplasty on 9/23/2019. It has been approximately 2.5 weeks since the procedure.  in ER on 9/29/2019, he was prescribed Keflex (TID for 7 days) after being treated for left lower leg swelling. Was then put on a second round of antibiotics (Doxycycline, BID for 5 days) at the rehab center. Still has 2 days left of antibiotics. Today he reports sporadic pain in the medial ankle and medial leg at times. Patient keeps the left leg elevated when he can, but is unsure if this helps. With regards to his total hip arthroplasty, currently his hip is doing fine. He reports the hip is feeling better now than before surgery. Minimal pain. He walks without the walker.       Review of Systems:  Constitutional/General: Negative for fever, chills, change in weight  Integumentary/Skin: Negative for worrisome rashes, moles, or lesions  Neuro: Negative for weakness, dizziness, or paresthesias   Psychiatric: negative for changes in mood or affect    This document serves as a record of the services and decisions personally performed and made by AMARA Parisi MD. It was created on his behalf by Yaima Newton, a trained medical scribe. The creation of this document is based the provider's statements to the medical scribe.    Scribe Yaima Newton 11:24 AM 10/10/2019        OBJECTIVE:  Physical Exam:  BP (!) 152/67 (BP Location: Right arm, Patient Position: Sitting, Cuff Size: Adult Regular)   Pulse 89   Ht 1.753 m (5' 9\")   Wt 100.7 kg (222 lb)   SpO2 96%   BMI 32.78 kg/m    General Appearance: healthy, alert and no distress   Skin: no suspicious lesions or rashes  Neuro: Normal strength and tone, mentation intact and speech normal  Vascular: see below.   Lymph: no lymphadenopathy   Psych:  mentation appears normal and affect normal/bright  Resp: no increased work of breathing    Left Hip Exam:  Inspection: Wound is healing very well, no " evidence of infection.  Swelling: Some fullness without fluctuance around the hip.   Range of motion: No pain with hip rotation.   Strength: Good hip flexion and abduction strength    Left Leg Exam:  Inspection: Light erythema of the left lower leg, very little warmth or tenderness over erythematous area   Good range of motion of left ankle.  Good dorsalis pedis pulse     Left Foot Exam:   Foot is slightly cool  Cap refill is 1-2 seconds.    ASSESSMENT:   2.5 weeks status post left ERINN, doing well.   Encounter Diagnoses   Name Primary?     Status post total replacement of left hip Yes     Cellulitis of left lower extremity         PLAN:   Sutures removed today. I suggested that once he completes his current course of antibiotics, that he should continue monitoring the leg. If he develops fevers, chills, increased redness, pain, or spreading redness, he should contact the clinic immediately. Continue aspirin for another 2 weeks.     Return to clinic in 4 weeks with x-rays of the low AP pelvis and lateral right hip.      AMARA Parisi MD  Dept. Orthopedic Surgery  [facility]

## 2019-10-10 NOTE — TELEPHONE ENCOUNTER
Reason for call:  Home Healthcare Reason for Call:  Home Health Care        Orders are needed for this patient. verbal    PT: yes to eval and treat    OT: yes to eval and treat    Skilled Nursing: na    Pt Provider: Isak    Phone Number Homecare Nurse can be reached at: [phone]    Can we leave a detailed message on this number? YES    Phone number patient can be reached at: na    Best Time: any    Call taken on 10/10/2019 at 1:09 PM by Rosie Jenkins

## 2019-10-10 NOTE — TELEPHONE ENCOUNTER
Reason for Call:  Home Health Care    Melonie  with [facility]  [facility] called regarding (reason for call): Home care orders    Orders are needed for this patient. Skilled nursing PT and OT    PT: to eval and treat    OT: to eval and treat    Skilled Nursin X week for 1 week, than 2 X week for 1 week than weekly for 6 weeks      Melonie also would like to know when patient has his last flu pneumonia shot and did patient have a shingles shot.     Pt Provider: Soren    Phone Number Homecare Nurse can be reached at: [phone]    Can we leave a detailed message on this number? YES      Best Time:    Call taken on 10/10/2019 at 12:30 PM by Ivis Cooley

## 2019-10-10 NOTE — PATIENT INSTRUCTIONS
Complete antibiotics.     Call the clinic if you notice:  - Fevers  - Chills  - Sensation changes  - Worsening redness  - Redness spreading    Continue aspirin for another 2 weeks.     Follow up in 4 weeks.

## 2019-10-11 ENCOUNTER — PATIENT OUTREACH (OUTPATIENT)
Dept: CARE COORDINATION | Facility: CLINIC | Age: 79
End: 2019-10-11

## 2019-10-11 ASSESSMENT — ACTIVITIES OF DAILY LIVING (ADL): DEPENDENT_IADLS:: INDEPENDENT

## 2019-10-11 NOTE — LETTER
[place] CARE COORDINATION  [address] [place] [place] [address]    October 11, 2019    Guillermo Hogue  [address]  [address]  [place] [address]      Dear Guillermo,    I am a clinic care coordinator who works with Edi Doty MD at [facility].  I wanted to introduce myself and provide you with my contact information so that you can call me with questions or concerns about your health care. Below is a description of clinic care coordination and how I can further assist you.     The clinic care coordinator is a registered nurse and/or  who understand the health care system. The goal of clinic care coordination is to help you manage your health and improve access to the [facility] system in the most efficient manner. The registered nurse can assist you in meeting your health care goals by providing education, coordinating services, and strengthening the communication among your providers. The  can assist you with financial, behavioral, psychosocial, chemical dependency, counseling, and/or psychiatric resources.    Please feel free to contact me at [phone], with any questions or concerns. We at [facility] are focused on providing you with the highest-quality healthcare experience possible and that all starts with you.     Sincerely,     MATTIE HelmsN RN Clinic Care Coordinator   [place], [place], [place]  Phone: [phone]

## 2019-10-11 NOTE — PROGRESS NOTES
Clinic Care Coordination Contact  [facility]/Voicemail    Clinical Data: Care Coordinator Outreach  Outreach attempted x 1.  Left message on patient's voicemail with call back information and requested return call.  Plan: Care Coordinator will send care coordination introduction letter with care coordinator contact information and explanation of care coordination services via mail. Care Coordinator will try to reach patient again in 1-2 business days.    HAKEEM Helms RN Clinic Care Coordinator   [place], [place], [place]  Phone: [phone]

## 2019-10-14 ENCOUNTER — TELEPHONE (OUTPATIENT)
Dept: FAMILY MEDICINE | Facility: CLINIC | Age: 79
End: 2019-10-14

## 2019-10-14 NOTE — TELEPHONE ENCOUNTER
Patient needs a letter written per the DMV's request. The copies of the Application and the request for medical statement are in the TC's cubby for .     Thank you

## 2019-10-14 NOTE — PROGRESS NOTES
Clinic Care Coordination Contact  [facility]/Voicemail    Referral Source: [facility]  Clinical Data: Care Coordinator Outreach  Outreach attempted x 2.  Left message on patient's voicemail with call back information and requested return call.  Plan: Care Coordinator sent care coordination introduction letter on 10/11/19 via mail. Care Coordinator will do no further outreaches at this time.    HAKEEM Helms RN Clinic Care Coordinator   [place], [place], [place]  Phone: [phone]

## 2019-10-14 NOTE — PROGRESS NOTES
Clinic Care Coordination Contact  Clinic Care Coordination Contact  OUTREACH  Referral Information:  Referral Source: [facility]  Primary Diagnosis: Orthopedic(s/p total hip arthroplasty)  Chief Complaint   Patient presents with     Clinic Care Coordination - Initial   Clinic Utilization  Difficulty keeping appointments:: No  Compliance Concerns: No  No-Show Concerns: No  No PCP office visit in Past Year: No  Utilization    Last refreshed: 10/14/2019  2:54 PM:  Hospital Admissions 0           Last refreshed: 10/14/2019  2:54 PM:  ED Visits 0           Last refreshed: 10/14/2019  2:54 PM:  No Show Count (past year) 1              Current as of: 10/14/2019  2:54 PM          Clinical Concerns:  Current Medical Concerns:  [facility] from 9/23-9/25 s/p total hip arthroplasty. Went to [facility] 9/25. discharged 10/8/19 home with [facility] Occupational Therapy, Physical Therapy, Registered Nurse and Home Health Aide  Patient instructed to follow-up with:  PCP in 7 days    Patient has follow up appointment scheduled for 10/16/19.   Patient walked into clinic. CC RN introduced self and role. Patient saw ortho 10/10. Cleared to drive -per patient, forms sent to provider. Please see separate telephone encounter. Patient lives alone. Had [facility] coming to see him, but since he is not home bound this service is not covered. He will call ortho if he wants an order for out patient physical therapy. They reviewed home safety and med set up.  No other concerns. Advised to call clinic with questions or concerns.   Current Behavioral Concerns: none, patient engaged in conversation    Education Provided to patient: RN CC educated about Care Coordination Services, discharge instructions, medications reviewed and follow up    Pain  Pain (GOAL):: Yes  Location of chronic pain:: left hip  Alleviating Factors: Pain Medication, Rest  Aggravating Factors:  Positioning  Health Maintenance Reviewed: Not assessed  Clinical Pathway: None    Medication Management:  Not discussed as home care was out to see patient.      Functional Status:  Dependent ADLs:: Ambulation-walker  Dependent IADLs:: Independent  Bed or wheelchair confined:: No  Mobility Status: Independent w/Device    Living Situation:  Current living arrangement:: I live alone    Diet/Exercise/Sleep:  Food Insecurity: No  Tube Feeding: No  Exercise:: Yes  Significant changes in sleep pattern (GOAL): No    Transportation:  Transportation concerns (GOAL):: No  Transportation means:: Regular car     Psychosocial:  Scientologist or spiritual beliefs that impact treatment:: No  Mental health DX:: No  Mental health management concern (GOAL):: No  Financial/Insurance:   Financial/Insurance concerns (GOAL):: No     Resources and Interventions:  Current Resources:   List of home care services:: Skilled Nursing, Home Health Aid, Physicial Therapy, Occupational Therapy;   Community Resources: None  Supplies used at home:: None  Equipment Currently Used at Home: walker, rolling  Advance Care Plan/Directive  Advanced Care Plans/Directives on file:: No    Referrals Placed: None     Goals: na  Patient/Caregiver understanding: yes   Future Appointments              In 2 days Becca Hernandez NP [facility] CLIN      Plan:   1. Patient will follow treatment plan.  2. Patient will follow up sooner should symptoms worsen, change or patient feels there is a need further care.  3. No further Care Coordination needs identified at this time. Patient may be referred to Care Coordination in the future if additional needs arise.  Pt encouraged to contact Care Coordinator through the clinic if situation changes and assistance is needed. No follow-up planned.    HAKEEM Helms RN Clinic Care Coordinator   [place], [place], [place]  Phone: [phone]

## 2019-10-14 NOTE — TELEPHONE ENCOUNTER
Patient has an appointment with you on 10/16/19. On the disability parking certificate, I believe, it was accidentally marked by Dr Doty that he needs adaptive equipment to drive. Is this something you can address for him when you see him? I did put the paperwork in your office. Please let me know if you would like this sent to a different provider. Thank you.Anny Gaytan MA/TING

## 2019-10-14 NOTE — TELEPHONE ENCOUNTER
Pt would like an addendum to his disability parking certificate.    He does not wand t the box checked that says he nee adaptive equipment to control motor vehicle

## 2019-10-15 NOTE — TELEPHONE ENCOUNTER
I spoke to Guillermo and let him know that Becca will correct his disability parking certificate at his appointment tomorrow 10/16/19.  Olga Barnett,

## 2019-10-16 ENCOUNTER — OFFICE VISIT (OUTPATIENT)
Dept: FAMILY MEDICINE | Facility: CLINIC | Age: 79
End: 2019-10-16
Payer: COMMERCIAL

## 2019-10-16 VITALS
WEIGHT: 221.2 LBS | HEART RATE: 69 BPM | BODY MASS INDEX: 32.67 KG/M2 | DIASTOLIC BLOOD PRESSURE: 60 MMHG | SYSTOLIC BLOOD PRESSURE: 123 MMHG | TEMPERATURE: 97.1 F

## 2019-10-16 DIAGNOSIS — Z96.642 STATUS POST TOTAL REPLACEMENT OF LEFT HIP: Primary | ICD-10-CM

## 2019-10-16 DIAGNOSIS — L03.116 CELLULITIS OF LEFT LOWER EXTREMITY: ICD-10-CM

## 2019-10-16 PROCEDURE — 99495 TRANSJ CARE MGMT MOD F2F 14D: CPT | Performed by: NURSE PRACTITIONER

## 2019-10-16 ASSESSMENT — ENCOUNTER SYMPTOMS
ABDOMINAL PAIN: 0
FATIGUE: 1
ARTHRALGIAS: 1
DIZZINESS: 0
CHILLS: 0
DIFFICULTY URINATING: 0
WEAKNESS: 1
HEADACHES: 0
MYALGIAS: 0
APPETITE CHANGE: 0
COUGH: 0
FEVER: 0
DIARRHEA: 0
PALPITATIONS: 0
CONSTIPATION: 0

## 2019-10-16 NOTE — PROGRESS NOTES
Subjective     Guillermo Hogue is a 79 year old male who presents to clinic today for the following health issues:    HPI       Hospital Follow-up Visit:    Hospital/Nursing Home/IP Rehab Facility:[facility]  Date of Admission: 9/25/2019  Date of Discharge: 10/9/2019  Reason(s) for Admission: rehab hip replacement            Problems taking medications regularly:  None       Medication changes since discharge: None       Problems adhering to non-medication therapy:  None    Summary of hospitalization:  CareEverywhere information obtained and reviewed  Diagnostic Tests/Treatments reviewed.  Follow up needed: Orthopedics  Other Healthcare Providers Involved in Patient s Care:         Specialist appointment - Orthopedics  tomorrow  Update since discharge: improved.      Post Discharge Medication Reconciliation: discharge medications reconciled, continue medications without change.  Plan of care communicated with patient     Coding guidelines for this visit:  Type of Medical   Decision Making Face-to-Face Visit       within 7 Days of discharge Face-to-Face Visit        within 14 days of discharge   Moderate Complexity [id number]   High Complexity [id number]          Guillermo is a 79 y.o male who presents for hospital discharge follow-up.  He was admitted 9/23 for total left hip replacement and then transferred to [facility] 9/25-10/9/19. He was seen in the ER on 9/24 and treated for cellulitis left leg with Keflex x 7 days and then 5 days of Doxycycline at rehab.  He was evaluated by Ortho on 10/10.  Guillermo is now home and reports doing well.  He is using a walker for stability.  Reports feeling well overall.  States he is not taking anything for pain.  Appetite good, no issues with bowel or bladder function.  Keeping legs elevated when sitting.  Denies chest pain, shortness of breath, dizziness, or numbness.  He does have an appt tomorrow to follow-up with Ortho concerning the LLE cellulitis.          Patient Active Problem List   Diagnosis     Reactive airway disease     Other chronic sinusitis     AR (allergic rhinitis)     HYPERLIPIDEMIA LDL GOAL <130     Moderate persistent asthma     BPH (benign prostatic hyperplasia)     Palpitations     Hypertension goal BP (blood pressure) < 140/90     Fatty liver     Cataracts, bilateral     Seasonal allergic rhinitis due to pollen     Allergic rhinitis due to animal dander     Allergic rhinitis due to dust mite     Paroxysmal atrial fibrillation (H)     Past Surgical History:   Procedure Laterality Date     COLONOSCOPY       COLONOSCOPY N/A 5/10/2017    Procedure: COMBINED COLONOSCOPY, SINGLE OR MULTIPLE BIOPSY/POLYPECTOMY BY BIOPSY;;  Surgeon: Abisai Duarte DO;  Location: [facility] OR     COLONOSCOPY WITH CO2 INSUFFLATION N/A 5/14/2015    Procedure: COLONOSCOPY WITH CO2 INSUFFLATION;  Surgeon: Jose R Richmond MD;  Location: [facility] OR     COLONOSCOPY WITH CO2 INSUFFLATION N/A 5/10/2017    Procedure: COLONOSCOPY WITH CO2 INSUFFLATION;  COLON SCREEN/ UNDERWOOD;  Surgeon: Abisai Duarte DO;  Location: [facility] OR     SINUS SURGERY  2009     TONSILLECTOMY  age 22       Social History     Tobacco Use     Smoking status: Never Smoker     Smokeless tobacco: Never Used   Substance Use Topics     Alcohol use: No     Family History   Problem Relation Age of Onset     Breast Cancer Mother      Arthritis Father      C.A.D. Father      Cerebrovascular Disease Father      Respiratory Father         TB history     Rheumatoid Arthritis Father      Cerebrovascular Disease Maternal Grandmother      Hypertension Maternal Grandmother      Cancer Maternal Grandfather      C.A.D. Brother      Prostate Cancer Brother      C.A.D. Brother          Current Outpatient Medications   Medication Sig Dispense Refill     acetaminophen (TYLENOL) 500 MG tablet Take 1,000 mg by mouth 3 times daily       albuterol (PROAIR HFA/PROVENTIL HFA/VENTOLIN HFA) 108 (90 Base) MCG/ACT inhaler Inhale 2 puffs into  the lungs every 4 hours as needed for shortness of breath / dyspnea or wheezing Use with spacer 1 Inhaler 1     aspirin (ASA) 325 MG EC tablet Take 325 mg by mouth 2 times daily       atorvastatin (LIPITOR) 20 MG tablet TAKE 1/2 TABLET BY MOUTH DAILY FOR CHOLESTEROL. 45 tablet 3     budesonide-formoterol (SYMBICORT) 160-4.5 MCG/ACT Inhaler INHALE 2 PUFFS INTO THE LUNGS 2 TIMES DAILY PHARMACY OK TO HOLD PRESCRIPTION UNTIL DUE 30.6 Inhaler 1     diltiazem (CARTIA XT) 120 MG 24 hr capsule Take 1 capsule (120 mg) by mouth daily For blood pressure/lowers pulse. Pharmacy ok to hold prescription until due 90 capsule 3     ibuprofen (ADVIL/MOTRIN) 200 MG tablet Take 200 mg by mouth every 8 hours as needed for mild pain       melatonin 3 MG tablet Take 3 mg by mouth nightly as needed for sleep       pseudoePHEDrine (SUDAFED) 30 MG tablet Take 30 mg by mouth every 4 hours as needed for congestion       Spacer/Aero-Holding Chambers (SPACER/AERO-HOLD CHAMBER MASK) RAMAN 1 Adult size spacer to use with MDI inhalers. 1 each 0     senna-docusate (SENOKOT-S/PERICOLACE) 8.6-50 MG tablet Take 1 tablet by mouth 2 times daily as needed        traMADol (ULTRAM) 50 MG tablet Take 50 mg by mouth every 6 hours as needed for severe pain       Allergies   Allergen Reactions     Hytrin [Terazosin Hcl]      Leg swelling and vertigo     Simvastatin Other (See Comments)     Body aches     Cats      Codeine Nausea     Dogs      Dust Mites      [place] Trees      Seasonal Allergies        Reviewed and updated as needed this visit by Provider         Review of Systems   Constitutional: Positive for fatigue. Negative for appetite change, chills and fever.   Respiratory: Negative for cough.    Cardiovascular: Positive for peripheral edema. Negative for chest pain and palpitations.   Gastrointestinal: Negative for abdominal pain, constipation and diarrhea.   Genitourinary: Negative for difficulty urinating.   Musculoskeletal: Positive for arthralgias.  Negative for myalgias.   Neurological: Positive for weakness. Negative for dizziness and headaches.            Objective    /60   Pulse 69   Temp 97.1  F (36.2  C) (Oral)   Wt 100.3 kg (221 lb 3.2 oz)   BMI 32.67 kg/m    Body mass index is 32.67 kg/m .  Physical Exam  Vitals signs reviewed.   Constitutional:       Appearance: He is well-developed.   HENT:      Head: Normocephalic.   Cardiovascular:      Rate and Rhythm: Normal rate and regular rhythm.   Pulmonary:      Effort: Pulmonary effort is normal.      Breath sounds: Normal breath sounds.   Abdominal:      Palpations: Abdomen is soft.      Tenderness: There is no tenderness.   Musculoskeletal:         General: Tenderness present.      Left hip: He exhibits tenderness.      Comments: Mild tenderness left hip.  Incision c/d/i, JAVIER.     Skin:     General: Skin is warm and dry.      Comments: LLE with mild erythema- no evidence of cellulitis.  LLE 1-2+ edema, RLE trace edema.    Neurological:      Mental Status: He is alert and oriented to person, place, and time.   Psychiatric:         Mood and Affect: Mood normal.         Thought Content: Thought content normal.                  Assessment & Plan     1. Status post total replacement of left hip  - follow-up with Orthopedics tomorrow.    - incision c/d/i- JAVIER.   - continue use of walker for stability    2. Cellulitis of left lower extremity  - resolved- completed 2 rounds of abx.  Encouraged to keep lower extremities elevated when sitting to help with edema.  Notify office if develops redness, pain, or fever.          Return in about 6 months (around 4/16/2020).    Becca Hernandez NP  [facility]

## 2019-10-16 NOTE — LETTER
October 16, 2019      Guillermo Hogue  [address]  [address]  [place] [address]        To Whom It May Concern:    Guillermo Hogue is able in all medical aspects to exercise reasonable and ordinary control over a motor vehicle without adaptive equipment.  This was previously noted in error on the Application for Disability Parking Certificate (Letter ID: [id number]).  Please contact the office if further information is needed.        Sincerely,        Becca Hernandez NP

## 2019-10-17 ENCOUNTER — OFFICE VISIT (OUTPATIENT)
Dept: ORTHOPEDICS | Facility: CLINIC | Age: 79
End: 2019-10-17
Payer: COMMERCIAL

## 2019-10-17 VITALS
BODY MASS INDEX: 32.73 KG/M2 | SYSTOLIC BLOOD PRESSURE: 121 MMHG | WEIGHT: 221 LBS | HEIGHT: 69 IN | HEART RATE: 87 BPM | DIASTOLIC BLOOD PRESSURE: 71 MMHG | OXYGEN SATURATION: 94 %

## 2019-10-17 DIAGNOSIS — Z96.642 STATUS POST TOTAL REPLACEMENT OF LEFT HIP: ICD-10-CM

## 2019-10-17 DIAGNOSIS — L03.116 CELLULITIS OF LEFT LOWER EXTREMITY: Primary | ICD-10-CM

## 2019-10-17 PROCEDURE — 99024 POSTOP FOLLOW-UP VISIT: CPT | Performed by: ORTHOPAEDIC SURGERY

## 2019-10-17 ASSESSMENT — MIFFLIN-ST. JEOR: SCORE: 1712.83

## 2019-10-17 NOTE — LETTER
"    10/17/2019         RE: Guillermo Hogue  [address]  [address]  [place] [address]        Dear Colleague,    Thank you for referring your patient, Guillermo Hogue, to the [facility]. Please see a copy of my visit note below.    SUBJECTIVE:  Guillermo Hogue is here for postoperative follow up status post left total hip arthroplasty, complicated by cellulitis, on 9/23/2019. It has been approximately 3.5 weeks since the procedure. He has been off antibiotics for 5 days. He hasn't noticed any worsening of the pain. A few days ago, he noticed some worsening of the leg erythema.  He reports the cellulitis did not seem to improve with two course of antibiotics. In the five days since he has been off antibiotics, the erythema doesn't seem to have worsened. Presents today with a walker. Reports no drainage. Hip replacement is doing well today. Denies fever and chills.     Review of Systems:  Constitutional/General: Negative for fever, chills, change in weight  Integumentary/Skin: See HPI above  Neuro: Negative for weakness, dizziness, or paresthesias   Psychiatric: negative for changes in mood or affect    This document serves as a record of the services and decisions personally performed and made by AMARA Pariis MD. It was created on his behalf by Nas Chairez, a trained medical scribe. The creation of this document is based the provider's statements to the medical scribe.    Scribe Nas Chairez 11:48 AM 10/17/2019       OBJECTIVE:  Physical Exam:  /71 (BP Location: Left arm, Patient Position: Sitting, Cuff Size: Adult Regular)   Pulse 87   Ht 1.753 m (5' 9\")   Wt 100.2 kg (221 lb)   SpO2 94%   BMI 32.64 kg/m     General Appearance: healthy, alert and no distress   Skin: see below, otherwise no suspicious lesions or rashes  Neuro: Normal strength and tone, mentation intact and speech normal  Vascular: good pulses, and capillary refill   Lymph: no lymphadenopathy   Psych:  " mentation appears normal and affect normal/bright  Resp: no increased work of breathing    Left Leg Exam:  Gait: Walking well with a walker  Inspection: Residual mild redness of the left lower leg, down the ankle and mainly on the medial side. Leg is a little shiny and swollen.   Tenderness: over area of redness.    ASSESSMENT:   3.5 weeks status post left total hip arthroplasty, question of left lower leg cellulitis.    PLAN:   We talked about trying compression stockings to reduce the swelling. He will continue to monitor the erythema of the left leg daily to ensure that it's not progressing. I told him to call if the erythema worsens or if he notices any fever or chills. If so, we will prescribe another course of antibiotics most likely.      Return to clinic: 3 weeks with xrays of the hip    The information in this document, created by a scribe for me, accurately reflects the services I personally performed and the decisions made by me. I have reviewed and approved this document for accuracy.     AMARA Parisi MD  Dept. Orthopedic Surgery  [facility]      Again, thank you for allowing me to participate in the care of your patient.        Sincerely,        Jeffrey Parisi MD

## 2019-10-17 NOTE — PROGRESS NOTES
"SUBJECTIVE:  Guillermo Hogue is here for postoperative follow up status post left total hip arthroplasty, complicated by cellulitis, on 9/23/2019. It has been approximately 3.5 weeks since the procedure. He has been off antibiotics for 5 days. He hasn't noticed any worsening of the pain. A few days ago, he noticed some worsening of the leg erythema.  He reports the cellulitis did not seem to improve with two course of antibiotics. In the five days since he has been off antibiotics, the erythema doesn't seem to have worsened. Presents today with a walker. Reports no drainage. Hip replacement is doing well today. Denies fever and chills.     Review of Systems:  Constitutional/General: Negative for fever, chills, change in weight  Integumentary/Skin: See HPI above  Neuro: Negative for weakness, dizziness, or paresthesias   Psychiatric: negative for changes in mood or affect    This document serves as a record of the services and decisions personally performed and made by AMARA Parisi MD. It was created on his behalf by Nas Chairez, a trained medical scribe. The creation of this document is based the provider's statements to the medical scribe.    Scribe Nas Chairez 11:48 AM 10/17/2019       OBJECTIVE:  Physical Exam:  /71 (BP Location: Left arm, Patient Position: Sitting, Cuff Size: Adult Regular)   Pulse 87   Ht 1.753 m (5' 9\")   Wt 100.2 kg (221 lb)   SpO2 94%   BMI 32.64 kg/m    General Appearance: healthy, alert and no distress   Skin: see below, otherwise no suspicious lesions or rashes  Neuro: Normal strength and tone, mentation intact and speech normal  Vascular: good pulses, and capillary refill   Lymph: no lymphadenopathy   Psych:  mentation appears normal and affect normal/bright  Resp: no increased work of breathing    Left Leg Exam:  Gait: Walking well with a walker  Inspection: Residual mild redness of the left lower leg, down the ankle and mainly on the medial side. Leg is a " little shiny and swollen.   Tenderness: over area of redness.    ASSESSMENT:   3.5 weeks status post left total hip arthroplasty, question of left lower leg cellulitis.    PLAN:   We talked about trying compression stockings to reduce the swelling. He will continue to monitor the erythema of the left leg daily to ensure that it's not progressing. I told him to call if the erythema worsens or if he notices any fever or chills. If so, we will prescribe another course of antibiotics most likely.      Return to clinic: 3 weeks with xrays of the hip    The information in this document, created by a scribe for me, accurately reflects the services I personally performed and the decisions made by me. I have reviewed and approved this document for accuracy.     AMARA Parisi MD  Dept. Orthopedic Surgery  [facility]

## 2019-10-22 ENCOUNTER — TRANSFERRED RECORDS (OUTPATIENT)
Dept: HEALTH INFORMATION MANAGEMENT | Facility: CLINIC | Age: 79
End: 2019-10-22

## 2019-10-22 LAB
CREAT SERPL-MCNC: 0.73 MG/DL (ref 0.72–1.25)
GFR SERPL CREATININE-BSD FRML MDRD: >60 ML/MIN/1.73M2
GLUCOSE SERPL-MCNC: 85 MG/DL (ref 65–100)
POTASSIUM SERPL-SCNC: 4.3 MMOL/L (ref 3.5–5)

## 2019-12-05 ENCOUNTER — OFFICE VISIT (OUTPATIENT)
Dept: ALLERGY | Facility: CLINIC | Age: 79
End: 2019-12-05
Payer: COMMERCIAL

## 2019-12-05 VITALS
BODY MASS INDEX: 32.73 KG/M2 | OXYGEN SATURATION: 94 % | HEIGHT: 69 IN | TEMPERATURE: 97.1 F | WEIGHT: 221 LBS | DIASTOLIC BLOOD PRESSURE: 78 MMHG | SYSTOLIC BLOOD PRESSURE: 121 MMHG | HEART RATE: 78 BPM

## 2019-12-05 DIAGNOSIS — J30.1 SEASONAL ALLERGIC RHINITIS DUE TO POLLEN: ICD-10-CM

## 2019-12-05 DIAGNOSIS — J45.40 MODERATE PERSISTENT ASTHMA, UNCOMPLICATED: ICD-10-CM

## 2019-12-05 PROCEDURE — 99214 OFFICE O/P EST MOD 30 MIN: CPT | Performed by: ALLERGY & IMMUNOLOGY

## 2019-12-05 RX ORDER — BUDESONIDE AND FORMOTEROL FUMARATE DIHYDRATE 160; 4.5 UG/1; UG/1
AEROSOL RESPIRATORY (INHALATION)
Qty: 1 INHALER | Refills: 3 | Status: SHIPPED | OUTPATIENT
Start: 2019-12-05 | End: 2020-05-14

## 2019-12-05 RX ORDER — TRIAMCINOLONE ACETONIDE 55 UG/1
2 SPRAY, METERED NASAL DAILY
COMMUNITY
End: 2023-07-17

## 2019-12-05 ASSESSMENT — MIFFLIN-ST. JEOR: SCORE: 1712.83

## 2019-12-05 NOTE — PROGRESS NOTES
Guillermo Hogue is a 79 year old White male with previous medical history significant for asthma who returns for a follow up visit.     Breath when he lays flat at night that generally last 2 minutes.  The symptoms are new since discontinuing Spiriva.  No chest symptoms at any other time.  On Symbicort 2 puffs inhaled twice daily.  He had previously found Spiriva to be beneficial.  He discontinued as he thought may be contributed to hoarseness.  Allergy testing positive for trees, weeds, cat, dog and dust mites.  On Nasacort 2 sprays per nostril daily.  This has been significantly beneficial.       ACT Total Scores 12/5/2019   ACT TOTAL SCORE -   ASTHMA ER VISITS -   ASTHMA HOSPITALIZATIONS -   ACT TOTAL SCORE (Goal Greater than or Equal to 20) 20   In the past 12 months, how many times did you visit the emergency room for your asthma without being admitted to the hospital? 0   In the past 12 months, how many times were you hospitalized overnight because of your asthma? 0           Past Medical History:   Diagnosis Date     Allergic rhinitis age 22     Asthma 2009     Chronic osteoarthritis      Nasal polyposis 2009     Osteoarthritis of left hip      Reactive airway disease 2009     Family History   Problem Relation Age of Onset     Breast Cancer Mother      Arthritis Father      C.A.D. Father      Cerebrovascular Disease Father      Respiratory Father         TB history     Rheumatoid Arthritis Father      Cerebrovascular Disease Maternal Grandmother      Hypertension Maternal Grandmother      Cancer Maternal Grandfather      C.A.D. Brother      Prostate Cancer Brother      C.A.D. Brother      Past Surgical History:   Procedure Laterality Date     COLONOSCOPY       COLONOSCOPY N/A 5/10/2017    Procedure: COMBINED COLONOSCOPY, SINGLE OR MULTIPLE BIOPSY/POLYPECTOMY BY BIOPSY;;  Surgeon: Abisai Duarte DO;  Location: [facility] OR     COLONOSCOPY WITH CO2 INSUFFLATION N/A 5/14/2015    Procedure: COLONOSCOPY WITH CO2  INSUFFLATION;  Surgeon: Jose R Richmond MD;  Location: [facility] OR     COLONOSCOPY WITH CO2 INSUFFLATION N/A 5/10/2017    Procedure: COLONOSCOPY WITH CO2 INSUFFLATION;  COLON SCREEN/ UNDERWOOD;  Surgeon: Abisai Duarte DO;  Location: [facility] OR     SINUS SURGERY  2009     TONSILLECTOMY  age 22       REVIEW OF SYSTEMS:  General: negative for weight gain. negative for weight loss. negative for changes in sleep.   Ears: negative for fullness. negative for hearing loss. negative for dizziness.   Nose: negative for snoring.negative for changes in smell. positive  for drainage.   Eyes: positive  for eye watering. positive  for eye itching. negative for vision changes. negative for eye redness.  Throat: negative for hoarseness. negative for sore throat. negative for trouble swallowing.   Lungs: positive  for shortness of breath.positive  for wheezing. negative for sputum production.   Cardiovascular: negative for chest pain. negative for swelling of ankles. negative for fast or irregular heartbeat.   Gastrointestinal: negative for nausea. negative for heartburn. negative for acid reflux.   Musculoskeletal: negative for joint pain. negative for joint stiffness. negative for joint swelling.   Neurologic: negative for seizures. negative for fainting. negative for weakness.   Psychiatric: negative for changes in mood. negative for anxiety.   Endocrine: negative for cold intolerance. negative for heat intolerance. negative for tremors.   Lymphatic: negative for lower extremity swelling. negative for lymph node swelling.   Hematologic: negative for easy bruising. negative for easy bleeding.  Integumentary: negative for rash. negative for scaling. negative for nail changes.       Current Outpatient Medications:      acetaminophen (TYLENOL) 500 MG tablet, Take 1,000 mg by mouth 3 times daily, Disp: , Rfl:      albuterol (PROAIR HFA/PROVENTIL HFA/VENTOLIN HFA) 108 (90 Base) MCG/ACT inhaler, Inhale 2 puffs into the lungs every 4 hours as  needed for shortness of breath / dyspnea or wheezing Use with spacer, Disp: 1 Inhaler, Rfl: 1     aspirin (ASA) 325 MG EC tablet, Take 325 mg by mouth 2 times daily, Disp: , Rfl:      atorvastatin (LIPITOR) 20 MG tablet, TAKE 1/2 TABLET BY MOUTH DAILY FOR CHOLESTEROL., Disp: 45 tablet, Rfl: 3     budesonide-formoterol (SYMBICORT) 160-4.5 MCG/ACT Inhaler, INHALE 2 PUFFS INTO THE LUNGS 2 TIMES DAILY PHARMACY OK TO HOLD PRESCRIPTION UNTIL DUE, Disp: 1 Inhaler, Rfl: 3     diltiazem (CARTIA XT) 120 MG 24 hr capsule, Take 1 capsule (120 mg) by mouth daily For blood pressure/lowers pulse. Pharmacy ok to hold prescription until due, Disp: 90 capsule, Rfl: 3     ibuprofen (ADVIL/MOTRIN) 200 MG tablet, Take 200 mg by mouth every 8 hours as needed for mild pain, Disp: , Rfl:      melatonin 3 MG tablet, Take 3 mg by mouth nightly as needed for sleep, Disp: , Rfl:      pseudoePHEDrine (SUDAFED) 30 MG tablet, Take 30 mg by mouth every 4 hours as needed for congestion, Disp: , Rfl:      senna-docusate (SENOKOT-S/PERICOLACE) 8.6-50 MG tablet, Take 1 tablet by mouth 2 times daily as needed , Disp: , Rfl:      Spacer/Aero-Holding Chambers (SPACER/AERO-HOLD CHAMBER MASK) RAMAN, 1 Adult size spacer to use with MDI inhalers., Disp: 1 each, Rfl: 0     tiotropium (SPIRIVA RESPIMAT) 2.5 MCG/ACT inhaler, Inhale 2 puffs into the lungs daily, Disp: 1 Inhaler, Rfl: 3     traMADol (ULTRAM) 50 MG tablet, Take 50 mg by mouth every 6 hours as needed for severe pain, Disp: , Rfl:      triamcinolone (NASACORT) 55 MCG/ACT nasal aerosol, Spray 2 sprays into both nostrils daily, Disp: , Rfl:   Immunization History   Administered Date(s) Administered     Influenza (H1N1) 12/22/2009     Influenza (High Dose) 3 valent vaccine 10/27/2010, 10/22/2011, 09/30/2014, 09/08/2015, 09/29/2016, 10/04/2017, 09/13/2018, 09/09/2019     Influenza (IIV3) PF 09/18/2009, 10/13/2012     Influenza Vaccine IM > 6 months Valent IIV4 10/17/2013     Mantoux Tuberculin Skin Test  10/09/2009     Pneumo Conj 13-V (2010&after) 09/08/2015     Pneumococcal 23 valent 10/20/2006     TD (ADULT, 7+) 10/27/2010     TDAP Vaccine (Adacel) 08/14/2012     Zoster vaccine recombinant adjuvanted (SHINGRIX) 09/13/2018, 01/03/2019     Zoster vaccine, live 11/12/2014     Allergies   Allergen Reactions     Hytrin [Terazosin Hcl]      Leg swelling and vertigo     Simvastatin Other (See Comments)     Body aches     Cats      Codeine Nausea     Dogs      Dust Mites      [place] Trees      Seasonal Allergies          EXAM:   Constitutional:  Appears well-developed and well-nourished. No distress.   HEENT:   Head: Normocephalic.   No cobblestoning of posterior oropharynx.   Nasal tissue pink and normal appearing.  No rhinorrhea noted.    Eyes: Conjunctivae are non-erythematous   No maxillary or frontal sinus tenderness to palpation.   Cardiovascular: Normal rate, regular rhythm and normal heart sounds. Exam reveals no gallop and no friction rub.   No murmur heard.  Respiratory: Effort normal and breath sounds normal. No respiratory distress. No wheezes. No rales.   Musculoskeletal: Normal range of motion.   Neuro: Oriented to person, place, and time.  Skin: Skin is warm and dry. No rash noted.   Psychiatric: Normal mood and affect.     Nursing note and vitals reviewed.    ASSESSMENT/PLAN:  Problem List Items Addressed This Visit        Respiratory    Moderate persistent asthma, uncomplicated     History of chronic chest symptoms for multiple years. Started on Spiriva and either that or mold remediation was helpful. He believes side effects from spiriva including hoarseness. Diagnosed with asthma at [facility] in 2010.  On Symbicort 160/4.5 mcg 2 puff inhaled twice daily.  Singulair not helpful.  Using albuterol once weekly.      Significant improvement post bronchodilator on spirometry in the past.      Elevated total IgE and absolute eosinophil.     - Continue Symbicort 160/4.5 mcg 2 puff inhaled twice  daily.  -Albuterol 2-4 puffs inhaled (use a spacer unless using a Proair Respiclick device) every 4 hours as needed for chest tightness, wheezing, shortness of breath and/or coughing.   -Albuterol 2-4 puffs inhaled (use spacer if not using Proair Respiclick device) 15-20 minutes prior to physical activity.   Please ensure warm up period prior to exercise.   - Resume Spiriva 2.5mcg 2 puffs daily.          Relevant Medications    triamcinolone (NASACORT) 55 MCG/ACT nasal aerosol    budesonide-formoterol (SYMBICORT) 160-4.5 MCG/ACT Inhaler    tiotropium (SPIRIVA RESPIMAT) 2.5 MCG/ACT inhaler    Seasonal allergic rhinitis due to pollen     Perennial with spring and summer nasal and ocular symptoms.  Has history of chronic sinusitis with nasal polyposis.  Follows with ENT.  Nasacort significantly helpful.      Skin testing:  Positive for trees, weeds, cat, dog, dust mite.     - Nasacort 2 sprays per nostril daily.  -Continue follow-up with ENT.         Relevant Medications    triamcinolone (NASACORT) 55 MCG/ACT nasal aerosol    budesonide-formoterol (SYMBICORT) 160-4.5 MCG/ACT Inhaler    tiotropium (SPIRIVA RESPIMAT) 2.5 MCG/ACT inhaler      Return in 3 months    Chart documentation with Dragon Voice recognition Software. Although reviewed after completion, some words and grammatical errors may remain.    Malcolm Del Rio DO FAAAAI  Allergy/Immunology  [place], [place]

## 2019-12-05 NOTE — PATIENT INSTRUCTIONS
Allergy Staff Appt Hours Shot Hours Locations    Physician     Malcolm Del Rio DO       Support Staff     YUE Corbin, ENDY  Tuesday:        [place] 7-4:20     Wednesday:        [place]: 7-5 Thursday:                    [place] 7-6:40     Friday:  [place]  7-2:40   [place]        Thursday: 1-5:50        Friday: 7-10:50     [place]        Tuesday: 7- 3:20        Wednesday: 7-4:20     [place] Monday: 7-4:20        Tuesday: 1-6:20         [facility]  [address] [place] [address]  Appt Line: [phone]  Allergy RN:  [phone]    [facility]  [address] [place] [address]  Appt Line: [phone]  Allergy RN:  [phone]       Important Scheduling Information  Aspirin Desensitization: Appt will last 2 clinic days. Please call the Allergy RN line for your clinic to schedule. Discontinue antihistamines 7 days prior to the appointment.     Food Challenges: Appt will last 3-4 hours. Please call the Allergy RN line for your clinic to schedule. Discontinue antihistamines 7 days prior to the appointment.     Penicillin Testing: Appt will last 2-3 hours. Please call the Allergy RN line for your clinic to schedule. Discontinue antihistamines 7 days prior to the appointment.     Skin Testing: Appt will about 40 minutes. Call the appointment line for your clinic to schedule. Discontinue antihistamines 7 days prior to the appointment.     Venom Testing: Appt will last 2-3 hours. Please call the Allergy RN line for your clinic to schedule. Discontinue antihistamines 7 days prior to the appointment.     Thank you for trusting us with your Allergy, Asthma, and Immunology care. Please feel free to contact us with any questions or concerns you may have.      - Symbicort 160/4.5mcg 2 puffs inhaled twice daily with a spacer.   - Albuterol 2-4 puffs inhaled (use a spacer unless using a Proair Respiclick device) every 4 hours as needed for chest tightness, wheezing,  shortness of breath and/or coughing.   - Resume Spiriva 2.5mcg 2 puffs daily.   - Nasacort 2 sprays/nostril daily.   - Return in 3 months.

## 2019-12-05 NOTE — ASSESSMENT & PLAN NOTE
Perennial with spring and summer nasal and ocular symptoms.  Has history of chronic sinusitis with nasal polyposis.  Follows with ENT.  Nasacort significantly helpful.      Skin testing:  Positive for trees, weeds, cat, dog, dust mite.     - Nasacort 2 sprays per nostril daily.  -Continue follow-up with ENT.

## 2019-12-05 NOTE — LETTER
12/5/2019         RE: Guillermo Hogue  [address]  [address]  [place] [address]        Dear Colleague,    Thank you for referring your patient, Guillermo Hogue, to the [facility]. Please see a copy of my visit note below.    Guillermo Hogue is a 79 year old White male with previous medical history significant for asthma who returns for a follow up visit.     Breath when he lays flat at night that generally last 2 minutes.  The symptoms are new since discontinuing Spiriva.  No chest symptoms at any other time.  On Symbicort 2 puffs inhaled twice daily.  He had previously found Spiriva to be beneficial.  He discontinued as he thought may be contributed to hoarseness.  Allergy testing positive for trees, weeds, cat, dog and dust mites.  On Nasacort 2 sprays per nostril daily.  This has been significantly beneficial.       ACT Total Scores 12/5/2019   ACT TOTAL SCORE -   ASTHMA ER VISITS -   ASTHMA HOSPITALIZATIONS -   ACT TOTAL SCORE (Goal Greater than or Equal to 20) 20   In the past 12 months, how many times did you visit the emergency room for your asthma without being admitted to the hospital? 0   In the past 12 months, how many times were you hospitalized overnight because of your asthma? 0           Past Medical History:   Diagnosis Date     Allergic rhinitis age 22     Asthma 2009     Chronic osteoarthritis      Nasal polyposis 2009     Osteoarthritis of left hip      Reactive airway disease 2009     Family History   Problem Relation Age of Onset     Breast Cancer Mother      Arthritis Father      C.A.D. Father      Cerebrovascular Disease Father      Respiratory Father         TB history     Rheumatoid Arthritis Father      Cerebrovascular Disease Maternal Grandmother      Hypertension Maternal Grandmother      Cancer Maternal Grandfather      C.A.D. Brother      Prostate Cancer Brother      C.A.D. Brother      Past Surgical History:   Procedure Laterality Date     COLONOSCOPY        COLONOSCOPY N/A 5/10/2017    Procedure: COMBINED COLONOSCOPY, SINGLE OR MULTIPLE BIOPSY/POLYPECTOMY BY BIOPSY;;  Surgeon: Abisai Duarte DO;  Location: [facility] OR     COLONOSCOPY WITH CO2 INSUFFLATION N/A 5/14/2015    Procedure: COLONOSCOPY WITH CO2 INSUFFLATION;  Surgeon: Jose R Richomnd MD;  Location: [facility] OR     COLONOSCOPY WITH CO2 INSUFFLATION N/A 5/10/2017    Procedure: COLONOSCOPY WITH CO2 INSUFFLATION;  COLON SCREEN/ UNDERWOOD;  Surgeon: Abisai Duarte DO;  Location: [facility] OR     SINUS SURGERY  2009     TONSILLECTOMY  age 22       REVIEW OF SYSTEMS:  General: negative for weight gain. negative for weight loss. negative for changes in sleep.   Ears: negative for fullness. negative for hearing loss. negative for dizziness.   Nose: negative for snoring.negative for changes in smell. positive  for drainage.   Eyes: positive  for eye watering. positive  for eye itching. negative for vision changes. negative for eye redness.  Throat: negative for hoarseness. negative for sore throat. negative for trouble swallowing.   Lungs: positive  for shortness of breath.positive  for wheezing. negative for sputum production.   Cardiovascular: negative for chest pain. negative for swelling of ankles. negative for fast or irregular heartbeat.   Gastrointestinal: negative for nausea. negative for heartburn. negative for acid reflux.   Musculoskeletal: negative for joint pain. negative for joint stiffness. negative for joint swelling.   Neurologic: negative for seizures. negative for fainting. negative for weakness.   Psychiatric: negative for changes in mood. negative for anxiety.   Endocrine: negative for cold intolerance. negative for heat intolerance. negative for tremors.   Lymphatic: negative for lower extremity swelling. negative for lymph node swelling.   Hematologic: negative for easy bruising. negative for easy bleeding.  Integumentary: negative for rash. negative for scaling. negative for nail changes.       Current  Outpatient Medications:      acetaminophen (TYLENOL) 500 MG tablet, Take 1,000 mg by mouth 3 times daily, Disp: , Rfl:      albuterol (PROAIR HFA/PROVENTIL HFA/VENTOLIN HFA) 108 (90 Base) MCG/ACT inhaler, Inhale 2 puffs into the lungs every 4 hours as needed for shortness of breath / dyspnea or wheezing Use with spacer, Disp: 1 Inhaler, Rfl: 1     aspirin (ASA) 325 MG EC tablet, Take 325 mg by mouth 2 times daily, Disp: , Rfl:      atorvastatin (LIPITOR) 20 MG tablet, TAKE 1/2 TABLET BY MOUTH DAILY FOR CHOLESTEROL., Disp: 45 tablet, Rfl: 3     budesonide-formoterol (SYMBICORT) 160-4.5 MCG/ACT Inhaler, INHALE 2 PUFFS INTO THE LUNGS 2 TIMES DAILY PHARMACY OK TO HOLD PRESCRIPTION UNTIL DUE, Disp: 1 Inhaler, Rfl: 3     diltiazem (CARTIA XT) 120 MG 24 hr capsule, Take 1 capsule (120 mg) by mouth daily For blood pressure/lowers pulse. Pharmacy ok to hold prescription until due, Disp: 90 capsule, Rfl: 3     ibuprofen (ADVIL/MOTRIN) 200 MG tablet, Take 200 mg by mouth every 8 hours as needed for mild pain, Disp: , Rfl:      melatonin 3 MG tablet, Take 3 mg by mouth nightly as needed for sleep, Disp: , Rfl:      pseudoePHEDrine (SUDAFED) 30 MG tablet, Take 30 mg by mouth every 4 hours as needed for congestion, Disp: , Rfl:      senna-docusate (SENOKOT-S/PERICOLACE) 8.6-50 MG tablet, Take 1 tablet by mouth 2 times daily as needed , Disp: , Rfl:      Spacer/Aero-Holding Chambers (SPACER/AERO-HOLD CHAMBER MASK) RAMAN, 1 Adult size spacer to use with MDI inhalers., Disp: 1 each, Rfl: 0     tiotropium (SPIRIVA RESPIMAT) 2.5 MCG/ACT inhaler, Inhale 2 puffs into the lungs daily, Disp: 1 Inhaler, Rfl: 3     traMADol (ULTRAM) 50 MG tablet, Take 50 mg by mouth every 6 hours as needed for severe pain, Disp: , Rfl:      triamcinolone (NASACORT) 55 MCG/ACT nasal aerosol, Spray 2 sprays into both nostrils daily, Disp: , Rfl:   Immunization History   Administered Date(s) Administered     Influenza (H1N1) 12/22/2009     Influenza (High Dose)  3 valent vaccine 10/27/2010, 10/22/2011, 09/30/2014, 09/08/2015, 09/29/2016, 10/04/2017, 09/13/2018, 09/09/2019     Influenza (IIV3) PF 09/18/2009, 10/13/2012     Influenza Vaccine IM > 6 months Valent IIV4 10/17/2013     Mantoux Tuberculin Skin Test 10/09/2009     Pneumo Conj 13-V (2010&after) 09/08/2015     Pneumococcal 23 valent 10/20/2006     TD (ADULT, 7+) 10/27/2010     TDAP Vaccine (Adacel) 08/14/2012     Zoster vaccine recombinant adjuvanted (SHINGRIX) 09/13/2018, 01/03/2019     Zoster vaccine, live 11/12/2014     Allergies   Allergen Reactions     Hytrin [Terazosin Hcl]      Leg swelling and vertigo     Simvastatin Other (See Comments)     Body aches     Cats      Codeine Nausea     Dogs      Dust Mites      [place] Trees      Seasonal Allergies          EXAM:   Constitutional:  Appears well-developed and well-nourished. No distress.   HEENT:   Head: Normocephalic.   No cobblestoning of posterior oropharynx.   Nasal tissue pink and normal appearing.  No rhinorrhea noted.    Eyes: Conjunctivae are non-erythematous   No maxillary or frontal sinus tenderness to palpation.   Cardiovascular: Normal rate, regular rhythm and normal heart sounds. Exam reveals no gallop and no friction rub.   No murmur heard.  Respiratory: Effort normal and breath sounds normal. No respiratory distress. No wheezes. No rales.   Musculoskeletal: Normal range of motion.   Neuro: Oriented to person, place, and time.  Skin: Skin is warm and dry. No rash noted.   Psychiatric: Normal mood and affect.     Nursing note and vitals reviewed.    ASSESSMENT/PLAN:  Problem List Items Addressed This Visit        Respiratory    Moderate persistent asthma, uncomplicated     History of chronic chest symptoms for multiple years. Started on Spiriva and either that or mold remediation was helpful. He believes side effects from spiriva including hoarseness. Diagnosed with asthma at [facility] in 2010.  On Symbicort 160/4.5 mcg 2 puff inhaled twice daily.   Singulair not helpful.  Using albuterol once weekly.      Significant improvement post bronchodilator on spirometry in the past.      Elevated total IgE and absolute eosinophil.     - Continue Symbicort 160/4.5 mcg 2 puff inhaled twice daily.  -Albuterol 2-4 puffs inhaled (use a spacer unless using a Proair Respiclick device) every 4 hours as needed for chest tightness, wheezing, shortness of breath and/or coughing.   -Albuterol 2-4 puffs inhaled (use spacer if not using Proair Respiclick device) 15-20 minutes prior to physical activity.   Please ensure warm up period prior to exercise.   -  Resume Spiriva 2.5mcg 2 puffs daily.          Relevant Medications    triamcinolone (NASACORT) 55 MCG/ACT nasal aerosol    budesonide-formoterol (SYMBICORT) 160-4.5 MCG/ACT Inhaler    tiotropium (SPIRIVA RESPIMAT) 2.5 MCG/ACT inhaler    Seasonal allergic rhinitis due to pollen     Perennial with spring and summer nasal and ocular symptoms.  Has history of chronic sinusitis with nasal polyposis.  Follows with ENT.  Nasacort significantly helpful.      Skin testing:  Positive for trees, weeds, cat, dog, dust mite.     - Nasacort 2 sprays per nostril daily.  -Continue follow-up with ENT.         Relevant Medications    triamcinolone (NASACORT) 55 MCG/ACT nasal aerosol    budesonide-formoterol (SYMBICORT) 160-4.5 MCG/ACT Inhaler    tiotropium (SPIRIVA RESPIMAT) 2.5 MCG/ACT inhaler      Return in 3 months    Chart documentation with Dragon Voice recognition Software. Although reviewed after completion, some words and grammatical errors may remain.    Malcolm Del Rio DO FAAAAI  Allergy/Immunology  [place], [place]      Again, thank you for allowing me to participate in the care of your patient.        Sincerely,        Malcolm Del Rio DO

## 2019-12-05 NOTE — ASSESSMENT & PLAN NOTE
History of chronic chest symptoms for multiple years. Started on Spiriva and either that or mold remediation was helpful. He believes side effects from spiriva including hoarseness. Diagnosed with asthma at [facility] in 2010.  On Symbicort 160/4.5 mcg 2 puff inhaled twice daily.  Singulair not helpful.  Using albuterol once weekly.      Significant improvement post bronchodilator on spirometry in the past.      Elevated total IgE and absolute eosinophil.     - Continue Symbicort 160/4.5 mcg 2 puff inhaled twice daily.  -Albuterol 2-4 puffs inhaled (use a spacer unless using a Proair Respiclick device) every 4 hours as needed for chest tightness, wheezing, shortness of breath and/or coughing.   -Albuterol 2-4 puffs inhaled (use spacer if not using Proair Respiclick device) 15-20 minutes prior to physical activity.   Please ensure warm up period prior to exercise.   - Resume Spiriva 2.5mcg 2 puffs daily.

## 2019-12-06 ASSESSMENT — ASTHMA QUESTIONNAIRES: ACT_TOTALSCORE: 20

## 2019-12-17 ENCOUNTER — OFFICE VISIT (OUTPATIENT)
Dept: FAMILY MEDICINE | Facility: CLINIC | Age: 79
End: 2019-12-17
Payer: COMMERCIAL

## 2019-12-17 VITALS
TEMPERATURE: 97.8 F | SYSTOLIC BLOOD PRESSURE: 126 MMHG | HEART RATE: 70 BPM | BODY MASS INDEX: 32.05 KG/M2 | RESPIRATION RATE: 24 BRPM | WEIGHT: 217 LBS | DIASTOLIC BLOOD PRESSURE: 77 MMHG | OXYGEN SATURATION: 94 %

## 2019-12-17 DIAGNOSIS — J45.41 MODERATE PERSISTENT ASTHMA WITH EXACERBATION: Primary | ICD-10-CM

## 2019-12-17 DIAGNOSIS — J30.2 SEASONAL ALLERGIC RHINITIS, UNSPECIFIED TRIGGER: ICD-10-CM

## 2019-12-17 PROCEDURE — 99214 OFFICE O/P EST MOD 30 MIN: CPT | Performed by: PHYSICIAN ASSISTANT

## 2019-12-17 RX ORDER — PREDNISONE 10 MG/1
TABLET ORAL
Qty: 30 TABLET | Refills: 0 | Status: SHIPPED | OUTPATIENT
Start: 2019-12-17 | End: 2020-02-17

## 2019-12-17 NOTE — PROGRESS NOTES
Subjective     Guillermo Hogue is a 79 year old male who presents to clinic today for the following health issues:    HPI   RESPIRATORY SYMPTOMS      Duration: x 2 weeks    Description  wheezing and cough with some SOB    Severity: moderate    Accompanying signs and symptoms: SOB    History (predisposing factors):  asthma  Pt knows when he gets this he comes in or steroid    No chest pain.  No lower extremity edema.    Saw his allergist Dr. mix on December 5, 201 - see below    Moderate persistent asthma, uncomplicated        History of chronic chest symptoms for multiple years. Started on Spiriva and either that or mold remediation was helpful. He believes side effects from spiriva including hoarseness. Diagnosed with asthma at [facility] in 2010.  On Symbicort 160/4.5 mcg 2 puff inhaled twice daily.  Singulair not helpful.  Using albuterol once weekly.      Significant improvement post bronchodilator on spirometry in the past.      Elevated total IgE and absolute eosinophil          Continue Symbicort 160/4.5 mcg 2 puff inhaled twice daily.  -Albuterol 2-4 puffs inhaled (use a spacer unless using a Proair Respiclick device) every 4 hours as needed for chest tightness, wheezing, shortness of breath and/or coughing.   -Albuterol 2-4 puffs inhaled (use spacer if not using Proair Respiclick device) 15-20 minutes prior to physical activity.   Please ensure warm up period prior to exercise.   - Resume Spiriva 2.5mcg 2 puffs daily.      Allergies   Allergen Reactions     Hytrin [Terazosin Hcl]      Leg swelling and vertigo     Simvastatin Other (See Comments)     Body aches     Cats      Codeine Nausea     Dogs      Dust Mites      [place] Trees      Seasonal Allergies        Past Medical History:   Diagnosis Date     Allergic rhinitis age 22     Asthma 2009     Chronic osteoarthritis      Nasal polyposis 2009     Osteoarthritis of left hip      Reactive airway disease 2009       acetaminophen (TYLENOL) 500 MG tablet,  Take 1,000 mg by mouth 3 times daily  albuterol (PROAIR HFA/PROVENTIL HFA/VENTOLIN HFA) 108 (90 Base) MCG/ACT inhaler, Inhale 2 puffs into the lungs every 4 hours as needed for shortness of breath / dyspnea or wheezing Use with spacer  atorvastatin (LIPITOR) 20 MG tablet, TAKE 1/2 TABLET BY MOUTH DAILY FOR CHOLESTEROL.  budesonide-formoterol (SYMBICORT) 160-4.5 MCG/ACT Inhaler, INHALE 2 PUFFS INTO THE LUNGS 2 TIMES DAILY PHARMACY OK TO HOLD PRESCRIPTION UNTIL DUE  diltiazem (CARTIA XT) 120 MG 24 hr capsule, Take 1 capsule (120 mg) by mouth daily For blood pressure/lowers pulse. Pharmacy ok to hold prescription until due  melatonin 3 MG tablet, Take 3 mg by mouth nightly as needed for sleep  Spacer/Aero-Holding Chambers (SPACER/AERO-HOLD CHAMBER MASK) RAMAN, 1 Adult size spacer to use with MDI inhalers.  tiotropium (SPIRIVA RESPIMAT) 2.5 MCG/ACT inhaler, Inhale 2 puffs into the lungs daily  triamcinolone (NASACORT) 55 MCG/ACT nasal aerosol, Spray 2 sprays into both nostrils daily    No current facility-administered medications on file prior to visit.       Social History     Tobacco Use     Smoking status: Never Smoker     Smokeless tobacco: Never Used   Substance Use Topics     Alcohol use: No       ROS:  CONSTITUTIONAL: Negative for fatigue or fever.  EYES: Negative for eye problems.  ENT: As above.  RESP: As above.  CV: Negative for chest pains.  GI: Negative for vomiting.  MUSCULOSKELETAL:  Negative for significant muscle or joint pains.  NEUROLOGIC: Negative for headaches.  SKIN: Negative for rash.  PSYCH: Normal mentation for age.    OBJECTIVE:  /77   Pulse 70   Temp 97.8  F (36.6  C) (Oral)   Resp 24   Wt 98.4 kg (217 lb)   SpO2 94%   BMI 32.05 kg/m    GENERAL APPEARANCE: Healthy, alert and no distress.  EYES:Conjunctiva/sclera clear.  EARS: No cerumen.   Ear canals w/o erythema.  TM's intact w/o erythema.    NOSE/MOUTH: Nose without ulcers, erythema or lesions.  SINUSES: No maxillary sinus  tenderness.  THROAT: No erythema w/o tonsillar enlargement . No exudates.  NECK: Supple, nontender, no lymphadenopathy.  RESP: Lungs with exp wheezes throughtout  CV: Regular rate and rhythm, normal S1 S2, no murmur noted.  NEURO: Awake, alert    SKIN: No rashes        ASSESSMENT:     ICD-10-CM    1. Moderate persistent asthma with exacerbation J45.41 predniSONE (DELTASONE) 10 MG tablet   2. Seasonal allergic rhinitis, unspecified trigger J30.2              PLAN:Declines neb treatment, declines CXR. Pred taper prescription sent. Recheck 3-5 days if not better  I have discussed clinical findings with patient.  Side effects of medications discussed.  Symptomatic care is discussed.  I have discussed the possibility of  worsening symptoms and to RTC or ER if they occur.  All questions are answered and patient is in agreement with plan.   Patient care instructions are given to at the end of visit.   Lots of rest and fluids.    Marcela Heller PA-C

## 2019-12-18 ENCOUNTER — TELEPHONE (OUTPATIENT)
Dept: ORTHOPEDICS | Facility: CLINIC | Age: 79
End: 2019-12-18

## 2019-12-18 DIAGNOSIS — Z79.2 NEED FOR PROPHYLACTIC ANTIBIOTIC: ICD-10-CM

## 2019-12-18 DIAGNOSIS — Z96.649 STATUS POST TOTAL REPLACEMENT OF HIP, UNSPECIFIED LATERALITY: Primary | ICD-10-CM

## 2019-12-18 RX ORDER — AMOXICILLIN 500 MG/1
2000 TABLET, FILM COATED ORAL ONCE
Qty: 4 TABLET | Refills: 3 | Status: SHIPPED | OUTPATIENT
Start: 2019-12-18 | End: 2020-06-02

## 2019-12-18 NOTE — TELEPHONE ENCOUNTER
I advised dental office this patient does require antibiotic dental prophylaxis in presence of a total joint arthroplasty. P: I asked Kory Perez to advise patient he can call his [facility] pharmacy prior to his rescheduled dental visit.  Amoxicillin e-scribed today. Parminder FRANK

## 2019-12-18 NOTE — TELEPHONE ENCOUNTER
Reason for Call:  Other call back    Detailed comments: [facility] is calling regarding  Patient, he has a dentist appointment scheduled today at 10:00am they are asking if patient is needing antibiotics for this appointment due to patient surgery history. Please call and advise Thank you.     Phone Number Patient can be reached at: Other phone number:  [phone]    Best Time: soon    Can we leave a detailed message on this number? YES    Call taken on 12/18/2019 at 8:39 AM by Jackie Otto

## 2020-01-13 ENCOUNTER — OFFICE VISIT (OUTPATIENT)
Dept: FAMILY MEDICINE | Facility: CLINIC | Age: 80
End: 2020-01-13
Payer: COMMERCIAL

## 2020-01-13 VITALS
BODY MASS INDEX: 32.19 KG/M2 | TEMPERATURE: 97.5 F | WEIGHT: 218 LBS | HEART RATE: 85 BPM | OXYGEN SATURATION: 94 % | DIASTOLIC BLOOD PRESSURE: 70 MMHG | SYSTOLIC BLOOD PRESSURE: 122 MMHG

## 2020-01-13 DIAGNOSIS — J45.41 MODERATE PERSISTENT ASTHMA WITH EXACERBATION: Primary | ICD-10-CM

## 2020-01-13 LAB
FEF 25/75: NORMAL
FEF 25/75: NORMAL
FEV-1: NORMAL
FEV-1: NORMAL
FEV1/FVC: NORMAL
FEV1/FVC: NORMAL
FVC: NORMAL
FVC: NORMAL

## 2020-01-13 PROCEDURE — 94010 BREATHING CAPACITY TEST: CPT | Performed by: FAMILY MEDICINE

## 2020-01-13 PROCEDURE — 99214 OFFICE O/P EST MOD 30 MIN: CPT | Mod: 25 | Performed by: FAMILY MEDICINE

## 2020-01-13 RX ORDER — PREDNISONE 20 MG/1
40 TABLET ORAL DAILY
Qty: 10 TABLET | Refills: 0 | Status: SHIPPED | OUTPATIENT
Start: 2020-01-13 | End: 2020-02-17

## 2020-01-13 RX ORDER — IPRATROPIUM BROMIDE AND ALBUTEROL SULFATE 2.5; .5 MG/3ML; MG/3ML
3 SOLUTION RESPIRATORY (INHALATION) ONCE
Status: COMPLETED | OUTPATIENT
Start: 2020-01-13 | End: 2020-01-13

## 2020-01-13 RX ORDER — MONTELUKAST SODIUM 10 MG/1
10 TABLET ORAL AT BEDTIME
Qty: 30 TABLET | Refills: 1 | Status: SHIPPED | OUTPATIENT
Start: 2020-01-13 | End: 2020-06-02

## 2020-01-13 RX ADMIN — IPRATROPIUM BROMIDE AND ALBUTEROL SULFATE 3 ML: 2.5; .5 SOLUTION RESPIRATORY (INHALATION) at 09:48

## 2020-01-13 NOTE — PROGRESS NOTES
Subjective     Guillermo Hogue is a 79 year old male who presents to clinic today for the following health issues:    HPI   PT here for asthma flare up for the past 2-3 days. Pt has  Been coughing, wheezing, and having SOB          Asthma  Patient presents for evaluation of wheezing, non-productive cough and dyspnea. The patient has been previously diagnosed with asthma. Symptoms currently include wheezing, non-productive cough and dyspnea on exertion and occur daily. Observed precipitants include: cold weather . Current limitations in activity from asthma: yes. Number of days of school or work missed in the last month: not applicable.    Does he do nebulizer treatments? No  Does he use an inhaler? Yes  Does he use a spacer with MDIs? Yes  Does he monitor peak flow rates? No   What is his personal best peak flow rate: not monitored     evaluation and treatment :  Symbicort:  Helpful  Spiriva: Helpful   Albuterol : helpful   Singulair: has not tried     No Hx of smoking   Retiured. 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   Patient Active Problem List   Diagnosis     Reactive airway disease     Other chronic sinusitis     AR (allergic rhinitis)     HYPERLIPIDEMIA LDL GOAL <130     Moderate persistent asthma     BPH (benign prostatic hyperplasia)     Palpitations     Hypertension goal BP (blood pressure) < 140/90     Fatty liver     Cataracts, bilateral     Moderate persistent asthma, uncomplicated     Seasonal allergic rhinitis due to pollen     Allergic rhinitis due to animal dander     Allergic rhinitis due to dust mite     Paroxysmal atrial fibrillation (H)     Past Surgical History:   Procedure Laterality Date     COLONOSCOPY       COLONOSCOPY N/A 5/10/2017    Procedure: COMBINED COLONOSCOPY, SINGLE OR MULTIPLE BIOPSY/POLYPECTOMY BY BIOPSY;;  Surgeon: Abisai Duarte DO;  Location: [facility]     COLONOSCOPY WITH CO2 INSUFFLATION N/A 5/14/2015    Procedure: COLONOSCOPY WITH CO2 INSUFFLATION;  Surgeon: Yi  Jose R Church MD;  Location: [facility]     COLONOSCOPY WITH CO2 INSUFFLATION N/A 5/10/2017    Procedure: COLONOSCOPY WITH CO2 INSUFFLATION;  COLON SCREEN/ UNDERWOOD;  Surgeon: Abisai Duarte DO;  Location: [facility]     SINUS SURGERY  2009     TONSILLECTOMY  age 22       Social History     Tobacco Use     Smoking status: Never Smoker     Smokeless tobacco: Never Used   Substance Use Topics     Alcohol use: No     Family History   Problem Relation Age of Onset     Breast Cancer Mother      Arthritis Father      C.A.D. Father      Cerebrovascular Disease Father      Respiratory Father         TB history     Rheumatoid Arthritis Father      Cerebrovascular Disease Maternal Grandmother      Hypertension Maternal Grandmother      Cancer Maternal Grandfather      C.A.D. Brother      Prostate Cancer Brother      C.A.D. Brother          Current Outpatient Medications   Medication Sig Dispense Refill     acetaminophen (TYLENOL) 500 MG tablet Take 1,000 mg by mouth 3 times daily       albuterol (PROAIR HFA/PROVENTIL HFA/VENTOLIN HFA) 108 (90 Base) MCG/ACT inhaler Inhale 2 puffs into the lungs every 4 hours as needed for shortness of breath / dyspnea or wheezing Use with spacer 1 Inhaler 1     atorvastatin (LIPITOR) 20 MG tablet TAKE 1/2 TABLET BY MOUTH DAILY FOR CHOLESTEROL. 45 tablet 3     budesonide-formoterol (SYMBICORT) 160-4.5 MCG/ACT Inhaler INHALE 2 PUFFS INTO THE LUNGS 2 TIMES DAILY PHARMACY OK TO HOLD PRESCRIPTION UNTIL DUE 1 Inhaler 3     diltiazem (CARTIA XT) 120 MG 24 hr capsule Take 1 capsule (120 mg) by mouth daily For blood pressure/lowers pulse. Pharmacy ok to hold prescription until due 90 capsule 3     melatonin 3 MG tablet Take 3 mg by mouth nightly as needed for sleep       montelukast (SINGULAIR) 10 MG tablet Take 1 tablet (10 mg) by mouth At Bedtime 30 tablet 1     predniSONE (DELTASONE) 20 MG tablet Take 2 tablets (40 mg) by mouth daily for 5 days 10 tablet 0     Spacer/Aero-Holding Chambers (SPACER/AERO-HOLD  CHAMBER MASK) RAMAN 1 Adult size spacer to use with MDI inhalers. 1 each 0     tiotropium (SPIRIVA RESPIMAT) 2.5 MCG/ACT inhaler Inhale 2 puffs into the lungs daily 1 Inhaler 3     triamcinolone (NASACORT) 55 MCG/ACT nasal aerosol Spray 2 sprays into both nostrils daily       predniSONE (DELTASONE) 10 MG tablet 5 tabs PO QD x 2 days then 4 tabs PO QD x 2 days then 3 tabs PO QD x 2 days then 2 tabs PO QD x 2 days then 1 tab PO QD x 2 days (Patient not taking: Reported on 1/13/2020) 30 tablet 0     Allergies   Allergen Reactions     Hytrin [Terazosin Hcl]      Leg swelling and vertigo     Simvastatin Other (See Comments)     Body aches     Cats      Codeine Nausea     Dogs      Dust Mites      [place]      Seasonal Allergies      Recent Labs   Lab Test 10/22/19 09/09/19  1051 01/03/19  0934 03/16/18  1002 02/20/17  1013 03/21/16  1635  07/25/13  0950   LDL  --  77 83 67 82  --    < > 107   HDL  --  62 63 70 66  --    < > 62   TRIG  --  68 75 81 81  --    < > 64   ALT  --   --  29 26 32 85*   < > 61   CR 0.73 0.73 0.85 0.83 0.73 0.89   < > 0.89   GFRESTIMATED >60 89 84 90 >90  Non  GFR Calc   83   < > 84   GFRESTBLACK >60 >90 >90 >90 >90   GFR Calc   >90   GFR Calc     < > >90   POTASSIUM 4.3 3.9 4.2 4.0 3.9 3.9   < > 4.8   TSH  --   --   --   --   --  3.29  --  2.14    < > = values in this interval not displayed.      BP Readings from Last 3 Encounters:   01/13/20 122/70   12/17/19 126/77   12/05/19 121/78    Wt Readings from Last 3 Encounters:   01/13/20 98.9 kg (218 lb)   12/17/19 98.4 kg (217 lb)   12/05/19 100.2 kg (221 lb)                    Reviewed and updated as needed this visit by Provider  Tobacco  Allergies  Meds  Problems  Med Hx  Surg Hx  Fam Hx         Review of Systems   ROS COMP: Constitutional, HEENT, cardiovascular, pulmonary, gi and gu systems are negative, except as otherwise noted.      Objective    /70   Pulse 85   Temp 97.5  F  (36.4  C) (Oral)   Wt 98.9 kg (218 lb)   SpO2 94%   BMI 32.19 kg/m    Body mass index is 32.19 kg/m .  Physical Exam  Vitals signs and nursing note reviewed.   Constitutional:       General: He is not in acute distress.     Appearance: Normal appearance. He is not ill-appearing, toxic-appearing or diaphoretic.   HENT:      Head: Normocephalic and atraumatic.      Nose: Nose normal. No congestion or rhinorrhea.      Mouth/Throat:      Mouth: Mucous membranes are moist.      Pharynx: No oropharyngeal exudate or posterior oropharyngeal erythema.   Eyes:      Pupils: Pupils are equal, round, and reactive to light.   Neck:      Musculoskeletal: Normal range of motion and neck supple. No neck rigidity or muscular tenderness.      Vascular: No carotid bruit.   Cardiovascular:      Rate and Rhythm: Normal rate and regular rhythm.      Pulses: Normal pulses.      Heart sounds: No murmur. No friction rub. No gallop.    Pulmonary:      Effort: Pulmonary effort is normal. No respiratory distress.      Breath sounds: No stridor. Wheezing present. No rhonchi or rales.   Chest:      Chest wall: No tenderness.   Abdominal:      General: Abdomen is flat.   Lymphadenopathy:      Cervical: No cervical adenopathy.   Skin:     Capillary Refill: Capillary refill takes less than 2 seconds.   Neurological:      Mental Status: He is alert.   Psychiatric:         Mood and Affect: Mood normal.         Behavior: Behavior normal.                    Assessment & Plan     1. Moderate persistent asthma with exacerbation  Patient presents for follow-up on asthma.  He reports wheezing cough and shortness of breath for the past couple days.  He was seen by allergy in the past.  He is currently taking Symbicort, Spiriva, albuterol as needed.  Exam showed diffuse wheezing.  Patient was not in acute distress or distress  Vital signs are stable.  Saturation 94% on room air.  Patient had spirometry pre-and posttreatment.  Spirometry showed improvement  of FEV1 after DuoNeb treatment.  DuoNeb given in the clinic.  Patient states he feels about the same.  Exam showed no wheezing after the treatment.  Prescribed prednisone 40 mg for 5 days and started him on Singulair 10 mg nightly  .  Red flag warning signs discussed with patient that would mandate going to emergency room including chest pain, shortness of breath, worsening of his symptoms.  - Spirometry, Breathing Capacity: Normal Order, Clinic Performed  - ipratropium - albuterol 0.5 mg/2.5 mg/3 mL (DUONEB) neb solution 3 mL  - Spirometry, Breathing Capacity  - predniSONE (DELTASONE) 20 MG tablet; Take 2 tablets (40 mg) by mouth daily for 5 days  Dispense: 10 tablet; Refill: 0  - montelukast (SINGULAIR) 10 MG tablet; Take 1 tablet (10 mg) by mouth At Bedtime  Dispense: 30 tablet; Refill: 1         Verbalized        Return in about 3 months (around 4/13/2020).    José Jesus MD  [facility]

## 2020-02-05 ENCOUNTER — DOCUMENTATION ONLY (OUTPATIENT)
Dept: FAMILY MEDICINE | Facility: CLINIC | Age: 80
End: 2020-02-05

## 2020-02-05 DIAGNOSIS — I10 HYPERTENSION GOAL BP (BLOOD PRESSURE) < 140/90: ICD-10-CM

## 2020-02-05 DIAGNOSIS — E78.5 HYPERLIPIDEMIA LDL GOAL <130: Primary | ICD-10-CM

## 2020-02-05 DIAGNOSIS — Z00.00 ROUTINE HISTORY AND PHYSICAL EXAMINATION OF ADULT: ICD-10-CM

## 2020-02-05 NOTE — PROGRESS NOTES
Patient scheduled to see Lab on 2/11/2020 for Pre-visit labs.  Patient does not qualify per Pre-visit protocol.  Please place future orders, or call and advise patient to cancel Lab appointment.

## 2020-02-06 NOTE — PROGRESS NOTES
Labs pended. Please review, sign and close encounter.     appt scheduled for 2/17 for physical    TING Barrios

## 2020-02-11 DIAGNOSIS — Z00.00 ROUTINE HISTORY AND PHYSICAL EXAMINATION OF ADULT: ICD-10-CM

## 2020-02-11 LAB
ERYTHROCYTE [DISTWIDTH] IN BLOOD BY AUTOMATED COUNT: 14.5 % (ref 10–15)
HCT VFR BLD AUTO: 40.8 % (ref 40–53)
HGB BLD-MCNC: 13 G/DL (ref 13.3–17.7)
MCH RBC QN AUTO: 28.8 PG (ref 26.5–33)
MCHC RBC AUTO-ENTMCNC: 31.9 G/DL (ref 31.5–36.5)
MCV RBC AUTO: 90 FL (ref 78–100)
PLATELET # BLD AUTO: 200 10E9/L (ref 150–450)
RBC # BLD AUTO: 4.52 10E12/L (ref 4.4–5.9)
WBC # BLD AUTO: 9.6 10E9/L (ref 4–11)

## 2020-02-11 PROCEDURE — 36415 COLL VENOUS BLD VENIPUNCTURE: CPT | Performed by: FAMILY MEDICINE

## 2020-02-11 PROCEDURE — 85027 COMPLETE CBC AUTOMATED: CPT | Performed by: FAMILY MEDICINE

## 2020-02-17 ENCOUNTER — OFFICE VISIT (OUTPATIENT)
Dept: FAMILY MEDICINE | Facility: CLINIC | Age: 80
End: 2020-02-17
Payer: COMMERCIAL

## 2020-02-17 VITALS
HEART RATE: 75 BPM | HEIGHT: 69 IN | RESPIRATION RATE: 22 BRPM | DIASTOLIC BLOOD PRESSURE: 61 MMHG | BODY MASS INDEX: 32.82 KG/M2 | SYSTOLIC BLOOD PRESSURE: 132 MMHG | TEMPERATURE: 97.5 F | WEIGHT: 221.6 LBS

## 2020-02-17 DIAGNOSIS — J45.40 MODERATE PERSISTENT ASTHMA, UNCOMPLICATED: ICD-10-CM

## 2020-02-17 DIAGNOSIS — Z00.00 ENCOUNTER FOR MEDICARE ANNUAL WELLNESS EXAM: Primary | ICD-10-CM

## 2020-02-17 DIAGNOSIS — J45.41 MODERATE PERSISTENT ASTHMA WITH EXACERBATION: ICD-10-CM

## 2020-02-17 DIAGNOSIS — R00.2 PALPITATIONS: ICD-10-CM

## 2020-02-17 DIAGNOSIS — E78.5 HYPERLIPIDEMIA LDL GOAL <130: ICD-10-CM

## 2020-02-17 PROCEDURE — 99397 PER PM REEVAL EST PAT 65+ YR: CPT | Performed by: FAMILY MEDICINE

## 2020-02-17 RX ORDER — PREDNISONE 20 MG/1
40 TABLET ORAL DAILY
Qty: 20 TABLET | Refills: 1 | Status: SHIPPED | OUTPATIENT
Start: 2020-02-17 | End: 2020-06-02

## 2020-02-17 ASSESSMENT — ENCOUNTER SYMPTOMS
DIARRHEA: 0
HEADACHES: 0
FEVER: 0
CHILLS: 0
WEAKNESS: 0
HEARTBURN: 0
EYE PAIN: 0
SORE THROAT: 0
PARESTHESIAS: 0
HEMATOCHEZIA: 0
NAUSEA: 0
NERVOUS/ANXIOUS: 0
CONSTIPATION: 0
JOINT SWELLING: 1
HEMATURIA: 0
DYSURIA: 0
PALPITATIONS: 0
COUGH: 1
FREQUENCY: 1
MYALGIAS: 0
ARTHRALGIAS: 1
DIZZINESS: 0
SHORTNESS OF BREATH: 1
ABDOMINAL PAIN: 1

## 2020-02-17 ASSESSMENT — ACTIVITIES OF DAILY LIVING (ADL): CURRENT_FUNCTION: NO ASSISTANCE NEEDED

## 2020-02-17 ASSESSMENT — MIFFLIN-ST. JEOR: SCORE: 1715.55

## 2020-02-17 NOTE — PATIENT INSTRUCTIONS
Patient Education   Personalized Prevention Plan  You are due for the preventive services outlined below.  Your care team is available to assist you in scheduling these services.  If you have already completed any of these items, please share that information with your care team to update in your medical record.  Health Maintenance Due   Topic Date Due     Annual Wellness Visit  01/07/2020

## 2020-02-17 NOTE — PROGRESS NOTES
"SUBJECTIVE:   Guillermo Hogue is a 79 year old male who presents for Preventive Visit.  Follow-up htn, high cholesterol and asthma. Had a.fib in summer and hip replacement.   Hip replacement - went well. Mobility and pain ok.   No more palpitations recently. Patient not aware of a.fib.   No chest pain. Exercise - limited.   Breathing worse with cold weather.   No memory concerns. No falls.   Lives by self. Daughter involved with cares - seeing frequency and sister-in-law in same building.   Are you in the first 12 months of your Medicare coverage?  No    Healthy Habits:     In general, how would you rate your overall health?  Fair    Frequency of exercise:  None    Do you usually eat at least 4 servings of fruit and vegetables a day, include whole grains    & fiber and avoid regularly eating high fat or \"junk\" foods?  No    Taking medications regularly:  Yes    Medication side effects:  None    Ability to successfully perform activities of daily living:  No assistance needed    Home Safety:  No safety concerns identified    Hearing Impairment:  No hearing concerns    In the past 6 months, have you been bothered by leaking of urine?  No    In general, how would you rate your overall mental or emotional health?  Good      PHQ-2 Total Score: 0    Do you feel safe in your environment? Yes    Have you ever done Advance Care Planning? (For example, a Health Directive, POLST, or a discussion with a medical provider or your loved ones about your wishes): Yes, advance care planning is on file.      Fall risk  Fallen 2 or more times in the past year?: No  Any fall with injury in the past year?: No    Cognitive Screening   1) Repeat 3 items (Leader, Season, Table)  3  2) Clock draw: NORMAL  3) 3 item recall: Recalls 3 objects  Results: 3 items recalled: COGNITIVE IMPAIRMENT LESS LIKELY    Mini-CogTM Copyright [employer]. Licensed by the author for use in [facility]; reprinted with permission (mecca@.[facility]). All " rights reserved.      Do you have sleep apnea, excessive snoring or daytime drowsiness?: no    Reviewed and updated as needed this visit by clinical staff         Reviewed and updated as needed this visit by Provider        Social History     Tobacco Use     Smoking status: Never Smoker     Smokeless tobacco: Never Used   Substance Use Topics     Alcohol use: No         Alcohol Use 1/7/2019   Prescreen: >3 drinks/day or >7 drinks/week? Not Applicable   Prescreen: >3 drinks/day or >7 drinks/week? -           PROBLEMS TO ADD ON...    Current providers sharing in care for this patient include:   Patient Care Team:  Edi Doty MD as PCP - General  Edi Doty MD as Assigned PCP    The following health maintenance items are reviewed in Epic and correct as of today:  Health Maintenance   Topic Date Due     MEDICARE ANNUAL WELLNESS VISIT  01/07/2020     ASTHMA CONTROL TEST  06/05/2020     ASTHMA ACTION PLAN  08/01/2020     FALL RISK ASSESSMENT  12/17/2020     DTAP/TDAP/TD IMMUNIZATION (3 - Td) 08/14/2022     LIPID  09/09/2024     ADVANCE CARE PLANNING  10/01/2024     PHQ-2  Completed     INFLUENZA VACCINE  Completed     PNEUMOCOCCAL IMMUNIZATION 65+ LOW/MEDIUM RISK  Completed     ZOSTER IMMUNIZATION  Completed     IPV IMMUNIZATION  Aged Out     MENINGITIS IMMUNIZATION  Aged Out     Labs reviewed in EPIC      Review of Systems   Constitutional: Negative for chills and fever.   HENT: Negative for congestion, ear pain, hearing loss and sore throat.    Eyes: Negative for pain and visual disturbance.   Respiratory: Positive for cough and shortness of breath.    Cardiovascular: Positive for peripheral edema. Negative for chest pain and palpitations.   Gastrointestinal: Positive for abdominal pain. Negative for constipation, diarrhea, heartburn, hematochezia and nausea.   Genitourinary: Positive for frequency. Negative for dysuria, genital sores, hematuria and urgency.   Musculoskeletal: Positive for arthralgias  "and joint swelling. Negative for myalgias.   Skin: Negative for rash.   Neurological: Negative for dizziness, weakness, headaches and paresthesias.   Psychiatric/Behavioral: Negative for mood changes. The patient is not nervous/anxious.      All other ROS negative.   colonoscopy - 3 polyps- due in late spring. black/bloody stools. No gerd symptoms.   No diet changes. No hematuria/dysuria. Abdominal pain improving.     OBJECTIVE:   There were no vitals taken for this visit. Estimated body mass index is 32.19 kg/m  as calculated from the following:    Height as of 12/5/19: 1.753 m (5' 9\").    Weight as of 1/13/20: 98.9 kg (218 lb).   /61   Pulse 75   Temp 97.5  F (36.4  C) (Oral)   Resp 22   Ht 1.753 m (5' 9\")   Wt 100.5 kg (221 lb 9.6 oz)   BMI 32.72 kg/m     Physical Exam  GENERAL: healthy, alert and no distress  EYES: Eyes grossly normal to inspection, PERRL and conjunctivae and sclerae normal  HENT: ear canals and TM's normal, nose and mouth without ulcers or lesions  NECK: no adenopathy, no asymmetry, masses, or scars and thyroid normal to palpation  RESP: mild wheezing. Good overall airflow.  BREAST: normal without masses, tenderness or nipple discharge and no palpable axillary masses or adenopathy  CV: regular rate and rhythm, normal S1 S2, no S3 or S4, no murmur, click or rub, no peripheral edema and peripheral pulses strong  ABDOMEN: soft, nontender, without hepatosplenomegaly or masses and bowel sounds normal   (male): patient deferred /rectal exams today. No concerns  MS: no gross musculoskeletal defects noted, no edema  NEURO: Normal strength and tone, mentation intact and speech normal  PSYCH: mentation appears normal, affect normal/bright      ASSESSMENT / PLAN:   ASSESSMENT / PLAN:  (Z00.00) Encounter for Medicare annual wellness exam  (primary encounter diagnosis)  Comment: generally healthy and normal exam/recent labs  Plan: Reviewed self mole/testicle check handout.  No memory/fall " "issues. Good support system with family    (E78.5) Hyperlipidemia LDL goal <130  Comment: stable  Plan: continue statin. Recheck in 6 months  Exerise. Chest pain or shortness of breath to er    (R00.2) Palpitations  Comment: per cardiology stable. No symptoms   Plan: baby asa daily? But borderline low hemoglobin - told by cardiology didn't need at this point. Expected course and warning signs reviewed. Call/email with questions/concerns.       (J45.41) Moderate persistent asthma with exacerbation  Comment: stable  Plan: predniSONE (DELTASONE) 20 MG tablet        Continue MDI and rare prednisone. Back to allergist if worsening.         COUNSELING:  Reviewed preventive health counseling, as reflected in patient instructions       Regular exercise       Healthy diet/nutrition       Vision screening       Hearing screening       Dental care       Bladder control       Fall risk prevention       Osteoporosis Prevention/Bone Health    Estimated body mass index is 32.19 kg/m  as calculated from the following:    Height as of 12/5/19: 1.753 m (5' 9\").    Weight as of 1/13/20: 98.9 kg (218 lb).         reports that he has never smoked. He has never used smokeless tobacco.      Appropriate preventive services were discussed with this patient, including applicable screening as appropriate for cardiovascular disease, diabetes, osteopenia/osteoporosis, and glaucoma.  As appropriate for age/gender, discussed screening for colorectal cancer, prostate cancer, breast cancer, and cervical cancer. Checklist reviewing preventive services available has been given to the patient.    Reviewed patients plan of care and provided an AVS. The Intermediate Care Plan ( asthma action plan, low back pain action plan, and migraine action plan) for Guillermo meets the Care Plan requirement. This Care Plan has been established and reviewed with the Patient.    Counseling Resources:  ATP IV Guidelines  Pooled Cohorts Equation Calculator  Breast Cancer " Risk Calculator  FRAX Risk Assessment  ICSI Preventive Guidelines  Dietary Guidelines for Americans, 2010  USDA's MyPlate  ASA Prophylaxis  Lung CA Screening    Edi Doty MD  [facility]    Identified Health Risks:

## 2020-02-17 NOTE — NURSING NOTE
"Chief Complaint   Patient presents with     Wellness Visit       Initial /61   Pulse 75   Temp 97.5  F (36.4  C) (Oral)   Resp 22   Ht 1.753 m (5' 9\")   Wt 100.5 kg (221 lb 9.6 oz)   BMI 32.72 kg/m   Estimated body mass index is 32.72 kg/m  as calculated from the following:    Height as of this encounter: 1.753 m (5' 9\").    Weight as of this encounter: 100.5 kg (221 lb 9.6 oz).  Medication Reconciliation: complete  Jerrell Estes CMA    "

## 2020-02-18 ASSESSMENT — ASTHMA QUESTIONNAIRES: ACT_TOTALSCORE: 15

## 2020-03-02 DIAGNOSIS — E78.5 HYPERLIPIDEMIA LDL GOAL <130: ICD-10-CM

## 2020-03-02 RX ORDER — ATORVASTATIN CALCIUM 20 MG/1
TABLET, FILM COATED ORAL
Qty: 45 TABLET | Refills: 1 | Status: SHIPPED | OUTPATIENT
Start: 2020-03-02 | End: 2020-08-31

## 2020-03-02 NOTE — TELEPHONE ENCOUNTER
"Requested Prescriptions   Signed Prescriptions Disp Refills    atorvastatin (LIPITOR) 20 MG tablet 45 tablet 1     Sig: TAKE 1/2 TABLET BY MOUTH ONCE DAILY       Statins Protocol Passed - 3/2/2020  9:00 AM        Passed - LDL on file in past 12 months     Recent Labs   Lab Test 09/09/19  1051   LDL 77             Passed - No abnormal creatine kinase in past 12 months     Recent Labs   Lab Test 03/21/16  1635                   Passed - Recent (12 mo) or future (30 days) visit within the authorizing provider's specialty     Patient has had an office visit with the authorizing provider or a provider within the authorizing providers department within the previous 12 mos or has a future within next 30 days. See \"Patient Info\" tab in inbasket, or \"Choose Columns\" in Meds & Orders section of the refill encounter.              Passed - Medication is active on med list        Passed - Patient is age 18 or older          "

## 2020-03-12 ENCOUNTER — OFFICE VISIT (OUTPATIENT)
Dept: ALLERGY | Facility: CLINIC | Age: 80
End: 2020-03-12
Payer: COMMERCIAL

## 2020-03-12 DIAGNOSIS — J32.8 OTHER CHRONIC SINUSITIS: ICD-10-CM

## 2020-03-12 DIAGNOSIS — J45.40 MODERATE PERSISTENT ASTHMA, UNCOMPLICATED: Primary | ICD-10-CM

## 2020-03-12 DIAGNOSIS — J30.1 SEASONAL ALLERGIC RHINITIS DUE TO POLLEN: ICD-10-CM

## 2020-03-12 DIAGNOSIS — J30.81 ALLERGIC RHINITIS DUE TO ANIMAL DANDER: ICD-10-CM

## 2020-03-12 DIAGNOSIS — J30.89 ALLERGIC RHINITIS DUE TO DUST MITE: ICD-10-CM

## 2020-03-12 LAB
FEF 25/75: NORMAL
FEV-1: NORMAL
FEV1/FVC: NORMAL
FVC: NORMAL

## 2020-03-12 PROCEDURE — 94010 BREATHING CAPACITY TEST: CPT | Performed by: ALLERGY & IMMUNOLOGY

## 2020-03-12 PROCEDURE — 99214 OFFICE O/P EST MOD 30 MIN: CPT | Mod: 25 | Performed by: ALLERGY & IMMUNOLOGY

## 2020-03-12 RX ORDER — DUPILUMAB 300 MG/2ML
600 INJECTION, SOLUTION SUBCUTANEOUS ONCE
Qty: 4 ML | Refills: 0 | Status: SHIPPED | OUTPATIENT
Start: 2020-03-12 | End: 2020-03-12

## 2020-03-12 RX ORDER — DUPILUMAB 300 MG/2ML
300 INJECTION, SOLUTION SUBCUTANEOUS
Qty: 2 ML | Refills: 0 | Status: SHIPPED | OUTPATIENT
Start: 2020-03-12 | End: 2020-05-20

## 2020-03-12 NOTE — PROGRESS NOTES
Guillermo Hogue is a 79 year old White male with previous medical history significant for asthma, chronic sinusitis with nasal polyposis, allergic rhinoconjunctivitis who returns for a follow up visit.    Patient had asthma exacerbation in January and February requiring prednisone.  Developed coughing, wheezing shortness of breath.  Albuterol is beneficial.  Prednisone was helpful.  Now he feels fine from a breathing perspective.  Using Symbicort 160/4.5 mcg 2 puff inhaled twice daily.  Additionally on Singulair 10 mg by mouth daily.  Had tried Spiriva.  He did not tolerate as he felt like this caused him to be hoarse.  He reports that he is on prednisone 4-5 times per year for asthma.  Asthma flares with cold air.  He is allergic.  Positive allergy testing for trees, weeds, cat, dog and dust mite.  Elevated total IgE of 713.  Elevated absolute eosinophil count of 0.6.  On Nasacort 2 sprays per nostril daily.  Denies congestion, postnasal drainage, sinus pressure.    ACT Total Scores 3/12/2020   ACT TOTAL SCORE -   ASTHMA ER VISITS -   ASTHMA HOSPITALIZATIONS -   ACT TOTAL SCORE (Goal Greater than or Equal to 20) 19   In the past 12 months, how many times did you visit the emergency room for your asthma without being admitted to the hospital? 0   In the past 12 months, how many times were you hospitalized overnight because of your asthma? 0       Past Medical History:   Diagnosis Date     Allergic rhinitis age 22     Asthma 2009     Chronic osteoarthritis      Nasal polyposis 2009     Osteoarthritis of left hip      Reactive airway disease 2009     Family History   Problem Relation Age of Onset     Breast Cancer Mother      Arthritis Father      C.A.D. Father      Cerebrovascular Disease Father      Respiratory Father         TB history     Rheumatoid Arthritis Father      Cerebrovascular Disease Maternal Grandmother      Hypertension Maternal Grandmother      Cancer Maternal Grandfather      C.A.D. Brother       Prostate Cancer Brother      MISBAH. Brother      Past Surgical History:   Procedure Laterality Date     COLONOSCOPY       COLONOSCOPY N/A 5/10/2017    Procedure: COMBINED COLONOSCOPY, SINGLE OR MULTIPLE BIOPSY/POLYPECTOMY BY BIOPSY;;  Surgeon: Abisai Duarte DO;  Location: [facility]     COLONOSCOPY WITH CO2 INSUFFLATION N/A 5/14/2015    Procedure: COLONOSCOPY WITH CO2 INSUFFLATION;  Surgeon: Jose R Richmond MD;  Location: [facility]     COLONOSCOPY WITH CO2 INSUFFLATION N/A 5/10/2017    Procedure: COLONOSCOPY WITH CO2 INSUFFLATION;  COLON SCREEN/ UNDERWOOD;  Surgeon: Abisai Duarte DO;  Location: [facility]     JOINT REPLACEMENT Left 06/2019    hip     SINUS SURGERY  2009     TONSILLECTOMY  age 22       REVIEW OF SYSTEMS:  General: negative for weight gain. negative for weight loss. negative for changes in sleep.   Ears: negative for fullness. negative for hearing loss. negative for dizziness.   Nose: negative for snoring.negative for changes in smell. positive  for drainage.   Eyes: negative for eye watering. negative for eye itching. negative for vision changes. negative for eye redness.  Throat: negative for hoarseness. negative for sore throat. negative for trouble swallowing.   Lungs: positive  for shortness of breath.positive  for wheezing. negative for sputum production.   Cardiovascular: negative for chest pain. negative for swelling of ankles. negative for fast or irregular heartbeat.   Gastrointestinal: negative for nausea. negative for heartburn. negative for acid reflux.   Musculoskeletal: positive  for joint pain. positive  for joint stiffness. negative for joint swelling.   Neurologic: negative for seizures. negative for fainting. negative for weakness.   Psychiatric: negative for changes in mood. negative for anxiety.   Endocrine: negative for cold intolerance. negative for heat intolerance. negative for tremors.   Lymphatic: negative for lower extremity swelling. negative for lymph node swelling.    Hematologic: negative for easy bruising. negative for easy bleeding.  Integumentary: negative for rash. negative for scaling. negative for nail changes.       Current Outpatient Medications:      acetaminophen (TYLENOL) 500 MG tablet, Take 1,000 mg by mouth 3 times daily, Disp: , Rfl:      albuterol (PROAIR HFA/PROVENTIL HFA/VENTOLIN HFA) 108 (90 Base) MCG/ACT inhaler, Inhale 2 puffs into the lungs every 4 hours as needed for shortness of breath / dyspnea or wheezing Use with spacer, Disp: 1 Inhaler, Rfl: 1     atorvastatin (LIPITOR) 20 MG tablet, TAKE 1/2 TABLET BY MOUTH ONCE DAILY, Disp: 45 tablet, Rfl: 1     budesonide-formoterol (SYMBICORT) 160-4.5 MCG/ACT Inhaler, INHALE 2 PUFFS INTO THE LUNGS 2 TIMES DAILY PHARMACY OK TO HOLD PRESCRIPTION UNTIL DUE, Disp: 1 Inhaler, Rfl: 3     diltiazem (CARTIA XT) 120 MG 24 hr capsule, Take 1 capsule (120 mg) by mouth daily For blood pressure/lowers pulse. Pharmacy ok to hold prescription until due, Disp: 90 capsule, Rfl: 3     Dupilumab (DUPIXENT) 300 MG/2ML syringe, Inject 4 mLs (600 mg) Subcutaneous once for 1 dose, Disp: 4 mL, Rfl: 0     Dupilumab (DUPIXENT) 300 MG/2ML syringe, Inject 2 mLs (300 mg) Subcutaneous every 14 days, Disp: 2 mL, Rfl: 0     montelukast (SINGULAIR) 10 MG tablet, Take 1 tablet (10 mg) by mouth At Bedtime, Disp: 30 tablet, Rfl: 1     predniSONE (DELTASONE) 20 MG tablet, Take 2 tablets (40 mg) by mouth daily Take 2 tablets (40 mg) by mouth daily for 5 days as needed for asthma flares, Disp: 20 tablet, Rfl: 1     Spacer/Aero-Holding Chambers (SPACER/AERO-HOLD CHAMBER MASK) RAMAN, 1 Adult size spacer to use with MDI inhalers., Disp: 1 each, Rfl: 0     triamcinolone (NASACORT) 55 MCG/ACT nasal aerosol, Spray 2 sprays into both nostrils daily, Disp: , Rfl:   Immunization History   Administered Date(s) Administered     Influenza (H1N1) 12/22/2009     Influenza (High Dose) 3 valent vaccine 10/27/2010, 10/22/2011, 09/30/2014, 09/08/2015, 09/29/2016,  10/04/2017, 09/13/2018, 09/09/2019     Influenza (IIV3) PF 10/19/2002, 10/13/2003, 10/16/2004, 10/20/2005, 09/18/2009, 10/13/2012     Influenza Vaccine IM > 6 months Valent IIV4 10/17/2013     Mantoux Tuberculin Skin Test 10/09/2009     Pneumo Conj 13-V (2010&after) 09/08/2015     Pneumococcal 23 valent 10/20/2006     TD (ADULT, 7+) 10/27/2010     TDAP Vaccine (Adacel) 08/14/2012     Zoster vaccine recombinant adjuvanted (SHINGRIX) 09/13/2018, 01/03/2019     Zoster vaccine, live 11/12/2014     Allergies   Allergen Reactions     Hytrin [Terazosin Hcl]      Leg swelling and vertigo     Simvastatin Other (See Comments)     Body aches     Cats      Codeine Nausea     Dogs      Dust Mites      [place] Trees      Seasonal Allergies          EXAM:   Constitutional:  Appears well-developed and well-nourished. No distress.   HEENT:   Head: Normocephalic.   No cobblestoning of posterior oropharynx.   Nasal tissue pink and normal appearing.  No rhinorrhea noted.    Eyes: Conjunctivae are non-erythematous   No maxillary or frontal sinus tenderness to palpation.   Cardiovascular: Normal rate, regular rhythm and normal heart sounds. Exam reveals no gallop and no friction rub.   No murmur heard.  Respiratory: Effort normal and breath sounds normal. No respiratory distress. No wheezes. No rales.   Musculoskeletal: Normal range of motion.   Neuro: Oriented to person, place, and time.  Skin: Skin is warm and dry. No rash noted.   Psychiatric: Normal mood and affect.     Nursing note and vitals reviewed.      WORKUP:   Spirometry  FVC % pred:57  FEV1 % pred:65  FEV1/FVC % act:82    Restrictive.  Low FEV1.    ASSESSMENT/PLAN:  Problem List Items Addressed This Visit        Respiratory    Other chronic sinusitis    Moderate persistent asthma, uncomplicated - Primary     History of chronic chest symptoms for multiple years. Started on Spiriva and either that or mold remediation was helpful. He believes side effects from spiriva including  hoarseness.   Therefore this was discontinued.  Diagnosed with asthma at [facility] in 2010.  On Symbicort 160/4.5 mcg 2 puff inhaled twice daily.  Singulair 10 mg by mouth daily has been used.  2 recent flares requiring prednisone since last visit.     Significant improvement post bronchodilator on spirometry in the past.  Today the patient had restrictive spirometry.  Decreased FEV1.     Elevated total IgE and absolute eosinophil.     - Continue Symbicort 160/4.5 mcg 2 puff inhaled twice daily.  -Albuterol 2-4 puffs inhaled (use a spacer unless using a Proair Respiclick device) every 4 hours as needed for chest tightness, wheezing, shortness of breath and/or coughing.   -Albuterol 2-4 puffs inhaled (use spacer if not using Proair Respiclick device) 15-20 minutes prior to physical activity.   Please ensure warm up period prior to exercise.   - Singulair 10mg by mouth daily at night.   - Dupixent 600 mg loading dose followed by 300 mg every other week.  Discussed risk and benefits of proceeding forward with Dupixent.  He wishes to proceed.  He was instructed how to use the device.         Relevant Medications    Dupilumab (DUPIXENT) 300 MG/2ML syringe    Dupilumab (DUPIXENT) 300 MG/2ML syringe    Other Relevant Orders    Spirometry, Breathing Capacity (Completed)    Seasonal allergic rhinitis due to pollen     Perennial with spring and summer nasal and ocular symptoms.  Has history of chronic sinusitis with nasal polyposis.  Follows with ENT.  Nasacort significantly helpful.      Skin testing:  Positive for trees, weeds, cat, dog, dust mite.     - Nasacort 2 sprays per nostril daily.           Allergic rhinitis due to animal dander    Allergic rhinitis due to dust mite        Return in 3 months.     Chart documentation with Dragon Voice recognition Software. Although reviewed after completion, some words and grammatical errors may remain.    Malcolm Del Rio DO FAAAAI  Allergy/Immunology  [facility]  and [place], [place]

## 2020-03-12 NOTE — PATIENT INSTRUCTIONS
Allergy Staff Appt Hours Shot Hours Locations    Physician     Malcolm Del Rio DO       Support Staff     YUE Corbin, ENDY  Tuesday:        [place] 7-4:20     Wednesday:        [place]: 7-5     Thursday:                    [place] 7-6:40     Friday:  [place]  7-2:40   [place]        Thursday: 1-5:50        Friday: 7-10:50     [place]        Tuesday: 7- 3:20        Wednesday: 7-4:20     [place] Monday: 7-4:20        Tuesday: 1-6:20         [facility]  [address] [place] [address]  Appt Line: [phone]  Allergy RN:  [phone]    [facility]  [address] [place] [address]  Appt Line: [phone]  Allergy RN:  [phone]       Important Scheduling Information  Aspirin Desensitization: Appt will last 2 clinic days. Please call the Allergy RN line for your clinic to schedule. Discontinue antihistamines 7 days prior to the appointment.     Food Challenges: Appt will last 3-4 hours. Please call the Allergy RN line for your clinic to schedule. Discontinue antihistamines 7 days prior to the appointment.     Penicillin Testing: Appt will last 2-3 hours. Please call the Allergy RN line for your clinic to schedule. Discontinue antihistamines 7 days prior to the appointment.     Skin Testing: Appt will about 40 minutes. Call the appointment line for your clinic to schedule. Discontinue antihistamines 7 days prior to the appointment.     Venom Testing: Appt will last 2-3 hours. Please call the Allergy RN line for your clinic to schedule. Discontinue antihistamines 7 days prior to the appointment.     Thank you for trusting us with your Allergy, Asthma, and Immunology care. Please feel free to contact us with any questions or concerns you may have.      - Symbicort 160/4.5mcg 2 puffs inhaled twice daily with a spacer.   - Singulair 10mg by mouth daily at night.   - Nasacort 2 sprays/nostril daily.   - Albuterol 2-4 puffs inhaled (use a spacer unless using a Proair  Respiclick device) every 4 hours as needed for chest tightness, wheezing, shortness of breath and/or coughing.   - Dupixent 600mg loading dose followed by 300 mg every other week.

## 2020-03-12 NOTE — ASSESSMENT & PLAN NOTE
History of chronic chest symptoms for multiple years. Started on Spiriva and either that or mold remediation was helpful. He believes side effects from spiriva including hoarseness.   Therefore this was discontinued.  Diagnosed with asthma at [facility] in 2010.  On Symbicort 160/4.5 mcg 2 puff inhaled twice daily.  Singulair 10 mg by mouth daily has been used.  2 recent flares requiring prednisone since last visit.     Significant improvement post bronchodilator on spirometry in the past.  Today the patient had restrictive spirometry.  Decreased FEV1.     Elevated total IgE and absolute eosinophil.     - Continue Symbicort 160/4.5 mcg 2 puff inhaled twice daily.  -Albuterol 2-4 puffs inhaled (use a spacer unless using a Proair Respiclick device) every 4 hours as needed for chest tightness, wheezing, shortness of breath and/or coughing.   -Albuterol 2-4 puffs inhaled (use spacer if not using Proair Respiclick device) 15-20 minutes prior to physical activity.   Please ensure warm up period prior to exercise.   - Singulair 10mg by mouth daily at night.   - Dupixent 600 mg loading dose followed by 300 mg every other week.  Discussed risk and benefits of proceeding forward with Dupixent.  He wishes to proceed.  He was instructed how to use the device.

## 2020-03-12 NOTE — LETTER
3/12/2020         RE: Guillermo Hogue  [address]  [address]  [place] [address]        Dear Colleague,    Thank you for referring your patient, Guillermo Hogue, to the [facility]. Please see a copy of my visit note below.    Guillermo Hogue is a 79 year old White male with previous medical history significant for asthma, chronic sinusitis with nasal polyposis, allergic rhinoconjunctivitis who returns for a follow up visit.    Patient had asthma exacerbation in January and February requiring prednisone.  Developed coughing, wheezing shortness of breath.  Albuterol is beneficial.  Prednisone was helpful.  Now he feels fine from a breathing perspective.  Using Symbicort 160/4.5 mcg 2 puff inhaled twice daily.  Additionally on Singulair 10 mg by mouth daily.  Had tried Spiriva.  He did not tolerate as he felt like this caused him to be hoarse.  He reports that he is on prednisone 4-5 times per year for asthma.  Asthma flares with cold air.  He is allergic.  Positive allergy testing for trees, weeds, cat, dog and dust mite.  Elevated total IgE of 713.  Elevated absolute eosinophil count of 0.6.  On Nasacort 2 sprays per nostril daily.  Denies congestion, postnasal drainage, sinus pressure.    ACT Total Scores 3/12/2020   ACT TOTAL SCORE -   ASTHMA ER VISITS -   ASTHMA HOSPITALIZATIONS -   ACT TOTAL SCORE (Goal Greater than or Equal to 20) 19   In the past 12 months, how many times did you visit the emergency room for your asthma without being admitted to the hospital? 0   In the past 12 months, how many times were you hospitalized overnight because of your asthma? 0       Past Medical History:   Diagnosis Date     Allergic rhinitis age 22     Asthma 2009     Chronic osteoarthritis      Nasal polyposis 2009     Osteoarthritis of left hip      Reactive airway disease 2009     Family History   Problem Relation Age of Onset     Breast Cancer Mother      Arthritis Father      C.A.D. Father       Cerebrovascular Disease Father      Respiratory Father         TB history     Rheumatoid Arthritis Father      Cerebrovascular Disease Maternal Grandmother      Hypertension Maternal Grandmother      Cancer Maternal Grandfather      C.A.D. Brother      Prostate Cancer Brother      C.A.D. Brother      Past Surgical History:   Procedure Laterality Date     COLONOSCOPY       COLONOSCOPY N/A 5/10/2017    Procedure: COMBINED COLONOSCOPY, SINGLE OR MULTIPLE BIOPSY/POLYPECTOMY BY BIOPSY;;  Surgeon: Abisai Duarte DO;  Location: [facility]     COLONOSCOPY WITH CO2 INSUFFLATION N/A 5/14/2015    Procedure: COLONOSCOPY WITH CO2 INSUFFLATION;  Surgeon: Jose R Richmond MD;  Location: [facility]     COLONOSCOPY WITH CO2 INSUFFLATION N/A 5/10/2017    Procedure: COLONOSCOPY WITH CO2 INSUFFLATION;  COLON SCREEN/ UNDERWOOD;  Surgeon: Abisai Duarte DO;  Location: [facility] OR     JOINT REPLACEMENT Left 06/2019    hip     SINUS SURGERY  2009     TONSILLECTOMY  age 22       REVIEW OF SYSTEMS:  General: negative for weight gain. negative for weight loss. negative for changes in sleep.   Ears: negative for fullness. negative for hearing loss. negative for dizziness.   Nose: negative for snoring.negative for changes in smell. positive  for drainage.   Eyes: negative for eye watering. negative for eye itching. negative for vision changes. negative for eye redness.  Throat: negative for hoarseness. negative for sore throat. negative for trouble swallowing.   Lungs: positive  for shortness of breath.positive  for wheezing. negative for sputum production.   Cardiovascular: negative for chest pain. negative for swelling of ankles. negative for fast or irregular heartbeat.   Gastrointestinal: negative for nausea. negative for heartburn. negative for acid reflux.   Musculoskeletal: positive  for joint pain. positive  for joint stiffness. negative for joint swelling.   Neurologic: negative for seizures. negative for fainting. negative for weakness.    Psychiatric: negative for changes in mood. negative for anxiety.   Endocrine: negative for cold intolerance. negative for heat intolerance. negative for tremors.   Lymphatic: negative for lower extremity swelling. negative for lymph node swelling.   Hematologic: negative for easy bruising. negative for easy bleeding.  Integumentary: negative for rash. negative for scaling. negative for nail changes.       Current Outpatient Medications:      acetaminophen (TYLENOL) 500 MG tablet, Take 1,000 mg by mouth 3 times daily, Disp: , Rfl:      albuterol (PROAIR HFA/PROVENTIL HFA/VENTOLIN HFA) 108 (90 Base) MCG/ACT inhaler, Inhale 2 puffs into the lungs every 4 hours as needed for shortness of breath / dyspnea or wheezing Use with spacer, Disp: 1 Inhaler, Rfl: 1     atorvastatin (LIPITOR) 20 MG tablet, TAKE 1/2 TABLET BY MOUTH ONCE DAILY, Disp: 45 tablet, Rfl: 1     budesonide-formoterol (SYMBICORT) 160-4.5 MCG/ACT Inhaler, INHALE 2 PUFFS INTO THE LUNGS 2 TIMES DAILY PHARMACY OK TO HOLD PRESCRIPTION UNTIL DUE, Disp: 1 Inhaler, Rfl: 3     diltiazem (CARTIA XT) 120 MG 24 hr capsule, Take 1 capsule (120 mg) by mouth daily For blood pressure/lowers pulse. Pharmacy ok to hold prescription until due, Disp: 90 capsule, Rfl: 3     Dupilumab (DUPIXENT) 300 MG/2ML syringe, Inject 4 mLs (600 mg) Subcutaneous once for 1 dose, Disp: 4 mL, Rfl: 0     Dupilumab (DUPIXENT) 300 MG/2ML syringe, Inject 2 mLs (300 mg) Subcutaneous every 14 days, Disp: 2 mL, Rfl: 0     montelukast (SINGULAIR) 10 MG tablet, Take 1 tablet (10 mg) by mouth At Bedtime, Disp: 30 tablet, Rfl: 1     predniSONE (DELTASONE) 20 MG tablet, Take 2 tablets (40 mg) by mouth daily Take 2 tablets (40 mg) by mouth daily for 5 days as needed for asthma flares, Disp: 20 tablet, Rfl: 1     Spacer/Aero-Holding Chambers (SPACER/AERO-HOLD CHAMBER MASK) RAMAN, 1 Adult size spacer to use with MDI inhalers., Disp: 1 each, Rfl: 0     triamcinolone (NASACORT) 55 MCG/ACT nasal aerosol,  Spray 2 sprays into both nostrils daily, Disp: , Rfl:   Immunization History   Administered Date(s) Administered     Influenza (H1N1) 12/22/2009     Influenza (High Dose) 3 valent vaccine 10/27/2010, 10/22/2011, 09/30/2014, 09/08/2015, 09/29/2016, 10/04/2017, 09/13/2018, 09/09/2019     Influenza (IIV3) PF 10/19/2002, 10/13/2003, 10/16/2004, 10/20/2005, 09/18/2009, 10/13/2012     Influenza Vaccine IM > 6 months Valent IIV4 10/17/2013     Mantoux Tuberculin Skin Test 10/09/2009     Pneumo Conj 13-V (2010&after) 09/08/2015     Pneumococcal 23 valent 10/20/2006     TD (ADULT, 7+) 10/27/2010     TDAP Vaccine (Adacel) 08/14/2012     Zoster vaccine recombinant adjuvanted (SHINGRIX) 09/13/2018, 01/03/2019     Zoster vaccine, live 11/12/2014     Allergies   Allergen Reactions     Hytrin [Terazosin Hcl]      Leg swelling and vertigo     Simvastatin Other (See Comments)     Body aches     Cats      Codeine Nausea     Dogs      Dust Mites      [place] Trees      Seasonal Allergies          EXAM:   Constitutional:  Appears well-developed and well-nourished. No distress.   HEENT:   Head: Normocephalic.   No cobblestoning of posterior oropharynx.   Nasal tissue pink and normal appearing.  No rhinorrhea noted.    Eyes: Conjunctivae are non-erythematous   No maxillary or frontal sinus tenderness to palpation.   Cardiovascular: Normal rate, regular rhythm and normal heart sounds. Exam reveals no gallop and no friction rub.   No murmur heard.  Respiratory: Effort normal and breath sounds normal. No respiratory distress. No wheezes. No rales.   Musculoskeletal: Normal range of motion.   Neuro: Oriented to person, place, and time.  Skin: Skin is warm and dry. No rash noted.   Psychiatric: Normal mood and affect.     Nursing note and vitals reviewed.      WORKUP:   Spirometry  FVC % pred:57  FEV1 % pred:65  FEV1/FVC % act:82    Restrictive.  Low FEV1.    ASSESSMENT/PLAN:  Problem List Items Addressed This Visit        Respiratory    Other  chronic sinusitis    Moderate persistent asthma, uncomplicated - Primary     History of chronic chest symptoms for multiple years. Started on Spiriva and either that or mold remediation was helpful. He believes side effects from spiriva including hoarseness.   Therefore this was discontinued.  Diagnosed with asthma at [facility] in 2010.  On Symbicort 160/4.5 mcg 2 puff inhaled twice daily.  Singulair 10 mg by mouth daily has been used.  2 recent flares requiring prednisone since last visit.     Significant improvement post bronchodilator on spirometry in the past.  Today the patient had restrictive spirometry.  Decreased FEV1.     Elevated total IgE and absolute eosinophil.     - Continue Symbicort 160/4.5 mcg 2 puff inhaled twice daily.  -Albuterol 2-4 puffs inhaled (use a spacer unless using a Proair Respiclick device) every 4 hours as needed for chest tightness, wheezing, shortness of breath and/or coughing.   -Albuterol 2-4 puffs inhaled (use spacer if not using Proair Respiclick device) 15-20 minutes prior to physical activity.   Please ensure warm up period prior to exercise.   - Singulair 10mg by mouth daily at night.   - Dupixent 600 mg loading dose followed by 300 mg every other week.  Discussed risk and benefits of proceeding forward with Dupixent.  He wishes to proceed.  He was instructed how to use the device.         Relevant Medications    Dupilumab (DUPIXENT) 300 MG/2ML syringe    Dupilumab (DUPIXENT) 300 MG/2ML syringe    Other Relevant Orders    Spirometry, Breathing Capacity (Completed)    Seasonal allergic rhinitis due to pollen     Perennial with spring and summer nasal and ocular symptoms.  Has history of chronic sinusitis with nasal polyposis.  Follows with ENT.  Nasacort significantly helpful.      Skin testing:  Positive for trees, weeds, cat, dog, dust mite.     - Nasacort 2 sprays per nostril daily.           Allergic rhinitis due to animal dander    Allergic rhinitis due to dust mite         Return in 3 months.     Chart documentation with Dragon Voice recognition Software. Although reviewed after completion, some words and grammatical errors may remain.    Malcolm Del Rio DO FAAAAI  Allergy/Immunology  [facility] and [place]      Again, thank you for allowing me to participate in the care of your patient.        Sincerely,        Malcolm Del Rio, DO

## 2020-03-12 NOTE — LETTER
My Asthma Action Plan    Name: Guillermo Hogue   YOB: 1940  Date: 3/12/2020   My doctor: Malcolm Del Rio, DO   My clinic: [facility]        My Control Medicine: Budesonide + formoterol (Symbicort HFA) -  160/4.5 mcg 2 puffs twice daily.    Singulair 10mg daily  My Rescue Medicine: Albuterol (Proair/Ventolin/Proventil HFA) 2-4 puffs EVERY 4 HOURS as needed. Use a spacer if recommended by your provider.  My Oral Steroid Medicine: none My Asthma Severity:   Moderate Persistent  Know your asthma triggers: upper respiratory infections               GREEN ZONE   Good Control    I feel good    No cough or wheeze    Can work, sleep and play without asthma symptoms       Take your asthma control medicine every day.     1. If exercise triggers your asthma, take your rescue medication    15 minutes before exercise or sports, and    During exercise if you have asthma symptoms  2. Spacer to use with inhaler: If you have a spacer, make sure to use it with your inhaler             YELLOW ZONE Getting Worse  I have ANY of these:    I do not feel good    Cough or wheeze    Chest feels tight    Wake up at night   1. Keep taking your Green Zone medications  2. Start taking your rescue medicine:    every 20 minutes for up to 1 hour. Then every 4 hours for 24-48 hours.  3. If you stay in the Yellow Zone for more than 12-24 hours, contact your doctor.  4. If you do not return to the Green Zone in 12-24 hours or you get worse, start taking your oral steroid medicine if prescribed by your provider.           RED ZONE Medical Alert - Get Help  I have ANY of these:    I feel awful    Medicine is not helping    Breathing getting harder    Trouble walking or talking    Nose opens wide to breathe       1. Take your rescue medicine NOW  2. If your provider has prescribed an oral steroid medicine, start taking it NOW  3. Call your doctor NOW  4. If you are still in the Red Zone after 20 minutes and you have not  reached your doctor:    Take your rescue medicine again and    Call 911 or go to the emergency room right away    See your regular doctor within 2 weeks of an Emergency Room or Urgent Care visit for follow-up treatment.          Annual Reminders:  Meet with Asthma Educator,  Flu Shot in the Fall, consider Pneumonia Vaccination for patients with asthma (aged 19 and older).    Pharmacy:    [facility] - [place], [place] - [address]  [facility] - [place], [place] - [address]  [id number] - [place], [place] - [address] [facility]    Electronically signed by Malcolm Del Rio, DO   Date: 03/12/20                      Asthma Triggers  How To Control Things That Make Your Asthma Worse    Triggers are things that make your asthma worse.  Look at the list below to help you find your triggers and what you can do about them.  You can help prevent asthma flare-ups by staying away from your triggers.      Trigger                                                          What you can do   Cigarette Smoke  Tobacco smoke can make asthma worse. Do not allow smoking in your home, car or around you.  Be sure no one smokes at a child s day care or school.  If you smoke, ask your health care provider for ways to help you quit.  Ask family members to quit too.  Ask your health care provider for a referral to Quit Plan to help you quit smoking, or call 5-652-116-PLAN.     Colds, Flu, Bronchitis  These are common triggers of asthma. Wash your hands often.  Don t touch your eyes, nose or mouth.  Get a flu shot every year.     Dust Mites  These are tiny bugs that live in cloth or carpet. They are too small to see. Wash sheets and blankets in hot water every week.   Encase pillows and mattress in dust mite proof covers.  Avoid having carpet if you can. If you have carpet, vacuum weekly.   Use a dust mask and HEPA vacuum.   Pollen and Outdoor Mold  Some people are allergic to trees,  grass, or weed pollen, or molds. Try to keep your windows closed.  Limit time out doors when pollen count is high.   Ask you health care provider about taking medicine during allergy season.     Animal Dander  Some people are allergic to skin flakes, urine or saliva from pets with fur or feathers. Keep pets with fur or feathers out of your home.    If you can t keep the pet outdoors, then keep the pet out of your bedroom.  Keep the bedroom door closed.  Keep pets off cloth furniture and away from stuffed toys.     Mice, Rats, and Cockroaches   Some people are allergic to the waste from these pests.   Cover food and garbage.  Clean up spills and food crumbs.  Store grease in the refrigerator.   Keep food out of the bedroom.   Indoor Mold  This can be a trigger if your home has high moisture. Fix leaking faucets, pipes, or other sources of water.   Clean moldy surfaces.  Dehumidify basement if it is damp and smelly.   Smoke, Strong Odors, and Sprays  These can reduce air quality. Stay away from strong odors and sprays, such as perfume, powder, hair spray, paints, smoke incense, paint, cleaning products, candles and new carpet.   Exercise or Sports  Some people with asthma have this trigger. Be active!  Ask your doctor about taking medicine before sports or exercise to prevent symptoms.    Warm up for 5-10 minutes before and after sports or exercise.     Other Triggers of Asthma  Cold air:  Cover your nose and mouth with a scarf.  Sometimes laughing or crying can be a trigger.  Some medicines and food can trigger asthma.

## 2020-03-12 NOTE — ASSESSMENT & PLAN NOTE
Perennial with spring and summer nasal and ocular symptoms.  Has history of chronic sinusitis with nasal polyposis.  Follows with ENT.  Nasacort significantly helpful.      Skin testing:  Positive for trees, weeds, cat, dog, dust mite.     - Nasacort 2 sprays per nostril daily.

## 2020-03-13 ENCOUNTER — TELEPHONE (OUTPATIENT)
Dept: ALLERGY | Facility: OTHER | Age: 80
End: 2020-03-13

## 2020-03-13 DIAGNOSIS — R00.2 PALPITATIONS: ICD-10-CM

## 2020-03-13 DIAGNOSIS — I10 HYPERTENSION GOAL BP (BLOOD PRESSURE) < 140/90: ICD-10-CM

## 2020-03-13 ASSESSMENT — ASTHMA QUESTIONNAIRES: ACT_TOTALSCORE: 19

## 2020-03-13 NOTE — TELEPHONE ENCOUNTER
Prior Authorization Approval    Authorization Effective Date: 2/12/2020  Authorization Expiration Date: 9/9/2020  Medication: dupixent   Approved Dose/Quantity: loading and maintenance   Reference #: [id number]   Insurance Company: [employer] - Phone [phone] Fax [phone]  Expected CoPay: $1296.19     CoPay Card Available: No    Foundation Assistance Needed: yes  Which Pharmacy is filling the prescription (Not needed for infusion/clinic administered):    Pharmacy Notified:    Patient Notified:

## 2020-03-16 RX ORDER — DILTIAZEM HYDROCHLORIDE 120 MG/1
CAPSULE, EXTENDED RELEASE ORAL
Qty: 90 CAPSULE | Refills: 0 | OUTPATIENT
Start: 2020-03-16

## 2020-03-16 NOTE — TELEPHONE ENCOUNTER
Patient reports medication is prescribe by Cardiologist.  Denial sent to pharmacy to send to Cardiology.  Needs ASAP.  Lalita Arias RN

## 2020-03-18 NOTE — TELEPHONE ENCOUNTER
Free Drug Application Initiated  Medication-dupixent   [id number]  Additional Information-inge mailed to pt 03/18 (needed provider sig)

## 2020-03-23 NOTE — TELEPHONE ENCOUNTER
Received call from patient. Patient reports that he still has not received the forms that were mailed. Instructed patient to wait another day or 2 and if he does not receive the forms to contact us and we will mail them again. Patient will call RN's direct number [phone] if he does not receive the forms.     Madison Sifuentes RN

## 2020-03-31 NOTE — TELEPHONE ENCOUNTER
Left message for patient to inform that per call to plan today they have received everything they need and are awaiting final review to be complete. Will inform patient as soon as I hear back from the program.

## 2020-04-02 NOTE — TELEPHONE ENCOUNTER
Free Drug Application Approved  Effective Dates 04/02/20 to 12/31/20  Patient notified? yes  Additional Information

## 2020-04-27 ENCOUNTER — TRANSFERRED RECORDS (OUTPATIENT)
Dept: HEALTH INFORMATION MANAGEMENT | Facility: CLINIC | Age: 80
End: 2020-04-27

## 2020-05-13 DIAGNOSIS — J45.40 MODERATE PERSISTENT ASTHMA, UNCOMPLICATED: ICD-10-CM

## 2020-05-13 NOTE — TELEPHONE ENCOUNTER
"Requested Prescriptions   Pending Prescriptions Disp Refills     budesonide-formoterol (SYMBICORT) 160-4.5 MCG/ACT Inhaler 1 Inhaler 3     Sig: INHALE 2 PUFFS INTO THE LUNGS 2 TIMES DAILY PHARMACY OK TO HOLD PRESCRIPTION UNTIL DUE       Inhaled Steroids Protocol Failed - 5/13/2020  2:25 PM        Failed - Asthma control assessment score within normal limits in last 6 months     Please review ACT score.           Passed - Patient is age 12 or older        Passed - Medication is active on med list        Passed - Recent (6 mo) or future (30 days) visit within the authorizing provider's specialty     Patient had office visit in the last 6 months or has a visit in the next 30 days with authorizing provider or within the authorizing provider's specialty.  See \"Patient Info\" tab in inbasket, or \"Choose Columns\" in Meds & Orders section of the refill encounter.           Long-Acting Beta Agonist Inhalers Protocol  Failed - 5/13/2020  2:25 PM        Failed - Asthma control assessment score within normal limits in last 6 months     Please review ACT score.           Failed - Order for Serevent, Striverdi, or Foradil and pt has steroid inhaler        Passed - Patient is age 12 or older        Passed - Medication is active on med list        Passed - Recent (6 mo) or future (30 days) visit within the authorizing provider's specialty     Patient had office visit in the last 6 months or has a visit in the next 30 days with authorizing provider or within the authorizing provider's specialty.  See \"Patient Info\" tab in inbasket, or \"Choose Columns\" in Meds & Orders section of the refill encounter.               Routing refill request to provider for review/approval because:  Last ACT=19 on 3/12/2020      Madison Sifuentes RN        "

## 2020-05-14 RX ORDER — BUDESONIDE AND FORMOTEROL FUMARATE DIHYDRATE 160; 4.5 UG/1; UG/1
AEROSOL RESPIRATORY (INHALATION)
Qty: 1 INHALER | Refills: 3 | Status: SHIPPED | OUTPATIENT
Start: 2020-05-14 | End: 2020-06-02

## 2020-05-20 ENCOUNTER — TELEPHONE (OUTPATIENT)
Dept: ALLERGY | Facility: OTHER | Age: 80
End: 2020-05-20

## 2020-05-20 DIAGNOSIS — J45.40 MODERATE PERSISTENT ASTHMA, UNCOMPLICATED: ICD-10-CM

## 2020-05-20 RX ORDER — DUPILUMAB 300 MG/2ML
300 INJECTION, SOLUTION SUBCUTANEOUS
Qty: 2 ML | Refills: 3 | Status: SHIPPED | OUTPATIENT
Start: 2020-05-20 | End: 2020-06-01

## 2020-05-20 RX ORDER — DUPILUMAB 300 MG/2ML
300 INJECTION, SOLUTION SUBCUTANEOUS
Qty: 2 ML | Refills: 3 | OUTPATIENT
Start: 2020-05-20 | End: 2020-05-20

## 2020-05-20 NOTE — TELEPHONE ENCOUNTER
Patient called me and requested that a refill of his dupixent be sent to [facility].

## 2020-05-20 NOTE — TELEPHONE ENCOUNTER
Pending Prescriptions:                       Disp   Refills    Dupilumab (DUPIXENT) 300 MG/2ML syringe   2 mL   0            Sig: Inject 2 mLs (300 mg) Subcutaneous every 14 days    Routing refill request to provider for review/approval because:  Drug not on the [facility] refill protocol     Enriqueta Spears RN

## 2020-05-21 NOTE — TELEPHONE ENCOUNTER
Patient notified.  He will let us know if he has any other trouble getting his medication.    Enriqueta Spears RN

## 2020-05-21 NOTE — TELEPHONE ENCOUNTER
Patient left voicemail asking for return call regarding Dupixent prescription and needing it sent to pharmacy.  This was sent yesterday per request of  specialty pharmacy [facility].  RN attempted to reach patient twice, but home number was busy.    Enriqueta Spears RN

## 2020-05-22 ENCOUNTER — TELEPHONE (OUTPATIENT)
Dept: FAMILY MEDICINE | Facility: CLINIC | Age: 80
End: 2020-05-22

## 2020-06-01 ENCOUNTER — TELEPHONE (OUTPATIENT)
Dept: ALLERGY | Facility: OTHER | Age: 80
End: 2020-06-01

## 2020-06-01 DIAGNOSIS — J45.40 MODERATE PERSISTENT ASTHMA, UNCOMPLICATED: ICD-10-CM

## 2020-06-01 RX ORDER — DUPILUMAB 300 MG/2ML
300 INJECTION, SOLUTION SUBCUTANEOUS
Qty: 4 ML | Refills: 3 | Status: SHIPPED | OUTPATIENT
Start: 2020-06-01 | End: 2020-09-25

## 2020-06-01 NOTE — TELEPHONE ENCOUNTER
Please correct Rx for Dupixent that was recently sent. Rx only written for 2ml (1 pen) so pharmacy cannot dispense. Qty needs to be 4ml (2 pens). Thanks!!

## 2020-06-02 ENCOUNTER — OFFICE VISIT (OUTPATIENT)
Dept: ALLERGY | Facility: CLINIC | Age: 80
End: 2020-06-02
Payer: COMMERCIAL

## 2020-06-02 VITALS
BODY MASS INDEX: 32.73 KG/M2 | DIASTOLIC BLOOD PRESSURE: 60 MMHG | WEIGHT: 221 LBS | HEIGHT: 69 IN | HEART RATE: 73 BPM | OXYGEN SATURATION: 96 % | SYSTOLIC BLOOD PRESSURE: 122 MMHG

## 2020-06-02 DIAGNOSIS — J30.81 ALLERGIC RHINITIS DUE TO ANIMAL DANDER: ICD-10-CM

## 2020-06-02 DIAGNOSIS — J30.89 ALLERGIC RHINITIS DUE TO DUST MITE: ICD-10-CM

## 2020-06-02 DIAGNOSIS — J30.1 SEASONAL ALLERGIC RHINITIS DUE TO POLLEN: ICD-10-CM

## 2020-06-02 DIAGNOSIS — J45.40 MODERATE PERSISTENT ASTHMA, UNCOMPLICATED: Primary | ICD-10-CM

## 2020-06-02 PROCEDURE — 99214 OFFICE O/P EST MOD 30 MIN: CPT | Performed by: ALLERGY & IMMUNOLOGY

## 2020-06-02 RX ORDER — BUDESONIDE AND FORMOTEROL FUMARATE DIHYDRATE 80; 4.5 UG/1; UG/1
2 AEROSOL RESPIRATORY (INHALATION) 2 TIMES DAILY
Qty: 1 INHALER | Refills: 4 | Status: SHIPPED | OUTPATIENT
Start: 2020-06-02 | End: 2020-12-15

## 2020-06-02 ASSESSMENT — ASTHMA QUESTIONNAIRES
QUESTION_1 LAST FOUR WEEKS HOW MUCH OF THE TIME DID YOUR ASTHMA KEEP YOU FROM GETTING AS MUCH DONE AT WORK, SCHOOL OR AT HOME: SOME OF THE TIME
QUESTION_3 LAST FOUR WEEKS HOW OFTEN DID YOUR ASTHMA SYMPTOMS (WHEEZING, COUGHING, SHORTNESS OF BREATH, CHEST TIGHTNESS OR PAIN) WAKE YOU UP AT NIGHT OR EARLIER THAN USUAL IN THE MORNING: NOT AT ALL
QUESTION_4 LAST FOUR WEEKS HOW OFTEN HAVE YOU USED YOUR RESCUE INHALER OR NEBULIZER MEDICATION (SUCH AS ALBUTEROL): ONCE A WEEK OR LESS
QUESTION_5 LAST FOUR WEEKS HOW WOULD YOU RATE YOUR ASTHMA CONTROL: WELL CONTROLLED
ACT_TOTALSCORE: 20
QUESTION_2 LAST FOUR WEEKS HOW OFTEN HAVE YOU HAD SHORTNESS OF BREATH: ONCE OR TWICE A WEEK

## 2020-06-02 ASSESSMENT — MIFFLIN-ST. JEOR: SCORE: 1707.83

## 2020-06-02 NOTE — PROGRESS NOTES
Guillermo Hogue is a 80 year old White male with previous medical history significant for asthma, allergic rhinitis who returns for a follow up visit.     Patient is on Symbicort 160/4.5 mcg 2 puff inhaled twice daily.  At last visit he was started on Dupixent.  This is been significantly beneficial.  He denies shortness of breath, tightness in chest, wheezing or coughing.  No longer using montelukast.  He has had some intermittent sneezing.  He is on Nasacort 2 sprays per nostril daily.  Not using oral antihistamine.  Denies postnasal drainage, congestion, ocular symptoms.       ACT Total Scores 6/2/2020   ACT TOTAL SCORE -   ASTHMA ER VISITS -   ASTHMA HOSPITALIZATIONS -   ACT TOTAL SCORE (Goal Greater than or Equal to 20) 20   In the past 12 months, how many times did you visit the emergency room for your asthma without being admitted to the hospital? 0   In the past 12 months, how many times were you hospitalized overnight because of your asthma? 0         Past Medical History:   Diagnosis Date     Allergic rhinitis age 22     Asthma 2009     Chronic osteoarthritis      Nasal polyposis 2009     Osteoarthritis of left hip      Reactive airway disease 2009     Family History   Problem Relation Age of Onset     Breast Cancer Mother      Arthritis Father      C.A.D. Father      Cerebrovascular Disease Father      Respiratory Father         TB history     Rheumatoid Arthritis Father      Cerebrovascular Disease Maternal Grandmother      Hypertension Maternal Grandmother      Cancer Maternal Grandfather      C.A.D. Brother      Prostate Cancer Brother      C.A.D. Brother      Past Surgical History:   Procedure Laterality Date     COLONOSCOPY       COLONOSCOPY N/A 5/10/2017    Procedure: COMBINED COLONOSCOPY, SINGLE OR MULTIPLE BIOPSY/POLYPECTOMY BY BIOPSY;;  Surgeon: Abisai Duarte DO;  Location: [facility] OR     COLONOSCOPY WITH CO2 INSUFFLATION N/A 5/14/2015    Procedure: COLONOSCOPY WITH CO2 INSUFFLATION;  Surgeon:  Jose R Richmond MD;  Location: [facility] OR     COLONOSCOPY WITH CO2 INSUFFLATION N/A 5/10/2017    Procedure: COLONOSCOPY WITH CO2 INSUFFLATION;  COLON SCREEN/ UNDERWOOD;  Surgeon: Abisai Duarte DO;  Location: [facility] OR     JOINT REPLACEMENT Left 06/2019    hip     SINUS SURGERY  2009     TONSILLECTOMY  age 22       REVIEW OF SYSTEMS:  General: negative for weight gain. negative for weight loss. negative for changes in sleep.   Ears: negative for fullness. negative for hearing loss. negative for dizziness.   Nose: negative for snoring.negative for changes in smell. negative for drainage.   Eyes: negative for eye watering. negative for eye itching. negative for vision changes. negative for eye redness.  Throat: negative for hoarseness. negative for sore throat. negative for trouble swallowing.   Lungs: negative for shortness of breath.negative for wheezing. negative for sputum production.   Cardiovascular: negative for chest pain. negative for swelling of ankles. negative for fast or irregular heartbeat.   Gastrointestinal: negative for nausea. negative for heartburn. negative for acid reflux.   Musculoskeletal: negative for joint pain. negative for joint stiffness. negative for joint swelling.   Neurologic: negative for seizures. negative for fainting. negative for weakness.   Psychiatric: negative for changes in mood. negative for anxiety.   Endocrine: negative for cold intolerance. negative for heat intolerance. negative for tremors.   Lymphatic: negative for lower extremity swelling. negative for lymph node swelling.   Hematologic: negative for easy bruising. negative for easy bleeding.  Integumentary: negative for rash. negative for scaling. negative for nail changes.       Current Outpatient Medications:      acetaminophen (TYLENOL) 500 MG tablet, Take 1,000 mg by mouth 3 times daily, Disp: , Rfl:      albuterol (PROAIR HFA/PROVENTIL HFA/VENTOLIN HFA) 108 (90 Base) MCG/ACT inhaler, Inhale 2 puffs into the lungs  every 4 hours as needed for shortness of breath / dyspnea or wheezing Use with spacer, Disp: 1 Inhaler, Rfl: 1     atorvastatin (LIPITOR) 20 MG tablet, TAKE 1/2 TABLET BY MOUTH ONCE DAILY, Disp: 45 tablet, Rfl: 1     budesonide-formoterol (SYMBICORT) 80-4.5 MCG/ACT Inhaler, Inhale 2 puffs into the lungs 2 times daily, Disp: 1 Inhaler, Rfl: 4     diltiazem (CARTIA XT) 120 MG 24 hr capsule, Take 1 capsule (120 mg) by mouth daily For blood pressure/lowers pulse. Pharmacy ok to hold prescription until due, Disp: 90 capsule, Rfl: 3     Dupilumab (DUPIXENT) 300 MG/2ML syringe, Inject 2 mLs (300 mg) Subcutaneous every 14 days, Disp: 4 mL, Rfl: 3     Spacer/Aero-Holding Chambers (SPACER/AERO-HOLD CHAMBER MASK) RAMAN, 1 Adult size spacer to use with MDI inhalers., Disp: 1 each, Rfl: 0     triamcinolone (NASACORT) 55 MCG/ACT nasal aerosol, Spray 2 sprays into both nostrils daily, Disp: , Rfl:   Immunization History   Administered Date(s) Administered     Influenza (H1N1) 12/22/2009     Influenza (High Dose) 3 valent vaccine 10/27/2010, 10/22/2011, 09/30/2014, 09/08/2015, 09/29/2016, 10/04/2017, 09/13/2018, 09/09/2019     Influenza (IIV3) PF 10/19/2002, 10/13/2003, 10/16/2004, 10/20/2005, 09/18/2009, 10/13/2012     Influenza Vaccine IM > 6 months Valent IIV4 10/17/2013     Mantoux Tuberculin Skin Test 10/09/2009     Pneumo Conj 13-V (2010&after) 09/08/2015     Pneumococcal 23 valent 10/20/2006     TD (ADULT, 7+) 10/27/2010     TDAP Vaccine (Adacel) 08/14/2012     Zoster vaccine recombinant adjuvanted (SHINGRIX) 09/13/2018, 01/03/2019     Zoster vaccine, live 11/12/2014     Allergies   Allergen Reactions     Hytrin [Terazosin Hcl]      Leg swelling and vertigo     Simvastatin Other (See Comments)     Body aches     Cats      Codeine Nausea     Dogs      Dust Mites      [place] Trees      Seasonal Allergies          EXAM:   Constitutional:  Appears well-developed and well-nourished. No distress.   HEENT:   Head: Normocephalic.    Eyes: Conjunctivae are non-erythematous   Cardiovascular: Normal rate, regular rhythm. Systolic murmur noted. Exam reveals no gallop and no friction rub.   Respiratory: Effort normal and breath sounds normal. No respiratory distress. No wheezes. No rales.   Musculoskeletal: Normal range of motion.   Neuro: Oriented to person, place, and time.  Skin: Skin is warm and dry. No rash noted.   Psychiatric: Normal mood and affect.     Nursing note and vitals reviewed.    ASSESSMENT/PLAN:  Problem List Items Addressed This Visit        Respiratory    Moderate persistent asthma, uncomplicated - Primary     History of chronic chest symptoms for multiple years. Started on Spiriva and either that or mold remediation was helpful. He believes side effects from spiriva including hoarseness.   Therefore this was discontinued.  Diagnosed with asthma at [facility] in 2010.  On Symbicort 160/4.5 mcg 2 puff inhaled twice daily. Now on Dupixent and huge improvement in chest symptoms.      Asthma is well controlled currently.     -Symbicort 80/4.5 mcg 2 puff inhaled twice daily.  -Albuterol 2-4 puffs inhaled (use a spacer unless using a Proair Respiclick device) every 4 hours as needed for chest tightness, wheezing, shortness of breath and/or coughing.   -Albuterol 2-4 puffs inhaled (use spacer if not using Proair Respiclick device) 15-20 minutes prior to physical activity.   Please ensure warm up period prior to exercise.   - Dupixent 300 mg every other week.    -Updated asthma action plan provided.          Relevant Medications    budesonide-formoterol (SYMBICORT) 80-4.5 MCG/ACT Inhaler    Seasonal allergic rhinitis due to pollen     Perennial with spring and summer nasal and ocular symptoms.  Has history of chronic sinusitis with nasal polyposis.  Follows with ENT.  Nasacort significantly helpful. Intermittent sneezing.      Skin testing:  Positive for trees, weeds, cat, dog, dust mite.     - Nasacort 2 sprays per nostril daily.          Relevant Medications    budesonide-formoterol (SYMBICORT) 80-4.5 MCG/ACT Inhaler    Allergic rhinitis due to animal dander    Relevant Medications    budesonide-formoterol (SYMBICORT) 80-4.5 MCG/ACT Inhaler    Allergic rhinitis due to dust mite    Relevant Medications    budesonide-formoterol (SYMBICORT) 80-4.5 MCG/ACT Inhaler        Return in 6 months or sooner if needed.     Chart documentation with Dragon Voice recognition Software. Although reviewed after completion, some words and grammatical errors may remain.    Malcolm Del Rio DO FAAAAI  Allergy/Immunology  [place], [place]

## 2020-06-02 NOTE — PATIENT INSTRUCTIONS
Allergy Staff Appt Hours Shot Hours Locations    Physician     Malcolm Del Rio DO       Support Staff     YUE Corbin, ENDY  Tuesday:        [place] 7-4:20     Wednesday:        [place]: 7-5 Thursday:                    [place] 7-6:40     Friday:  [place]  7-2:40   [place]        Thursday: 1-5:50        Friday: 7-10:50     [place]        Tuesday: 7- 3:20        Wednesday: 7-4:20     [place] Monday: 7-4:20        Tuesday: 1-6:20         [facility]  [address] [place] [address]  Appt Line: [phone]  Allergy RN:  [phone]    [facility]  [address] [place] [address]  Appt Line: [phone]  Allergy RN:  [phone]       Important Scheduling Information  Aspirin Desensitization: Appt will last 2 clinic days. Please call the Allergy RN line for your clinic to schedule. Discontinue antihistamines 7 days prior to the appointment.     Food Challenges: Appt will last 3-4 hours. Please call the Allergy RN line for your clinic to schedule. Discontinue antihistamines 7 days prior to the appointment.     Penicillin Testing: Appt will last 2-3 hours. Please call the Allergy RN line for your clinic to schedule. Discontinue antihistamines 7 days prior to the appointment.     Skin Testing: Appt will about 40 minutes. Call the appointment line for your clinic to schedule. Discontinue antihistamines 7 days prior to the appointment.     Venom Testing: Appt will last 2-3 hours. Please call the Allergy RN line for your clinic to schedule. Discontinue antihistamines 7 days prior to the appointment.     Thank you for trusting us with your Allergy, Asthma, and Immunology care. Please feel free to contact us with any questions or concerns you may have.      - Symbicort 80/4.5mcg 2 puffs inhaled every 4 hours as needed for chest tightness, coughing, wheezing or shortness of breath.   - Albuterol 2-4 puffs inhaled (use a spacer unless using a Proair Respiclick device)  every 4 hours as needed for chest tightness, wheezing, shortness of breath and/or coughing.   - Nasacort 2 sprays/nostril daily.   - Dupixent 300mg every other week.

## 2020-06-02 NOTE — ASSESSMENT & PLAN NOTE
History of chronic chest symptoms for multiple years. Started on Spiriva and either that or mold remediation was helpful. He believes side effects from spiriva including hoarseness.   Therefore this was discontinued.  Diagnosed with asthma at [facility] [facility] in 2010.  On Symbicort 160/4.5 mcg 2 puff inhaled twice daily. Now on Dupixent and huge improvement in chest symptoms.      Asthma is well controlled currently.     -Symbicort 80/4.5 mcg 2 puff inhaled twice daily.  -Albuterol 2-4 puffs inhaled (use a spacer unless using a Proair Respiclick device) every 4 hours as needed for chest tightness, wheezing, shortness of breath and/or coughing.   -Albuterol 2-4 puffs inhaled (use spacer if not using Proair Respiclick device) 15-20 minutes prior to physical activity.   Please ensure warm up period prior to exercise.   - Dupixent 300 mg every other week.    -Updated asthma action plan provided.

## 2020-06-02 NOTE — ASSESSMENT & PLAN NOTE
Perennial with spring and summer nasal and ocular symptoms.  Has history of chronic sinusitis with nasal polyposis.  Follows with ENT.  Nasacort significantly helpful. Intermittent sneezing.      Skin testing:  Positive for trees, weeds, cat, dog, dust mite.     - Nasacort 2 sprays per nostril daily.

## 2020-06-02 NOTE — LETTER
6/2/2020         RE: Guillermo Hogue  [address] [address]  [address]  [place] [address]        Dear Colleague,    Thank you for referring your patient, Guillermo Hogue, to the [facility]. Please see a copy of my visit note below.    Guillermo Hogue is a 80 year old White male with previous medical history significant for asthma, allergic rhinitis who returns for a follow up visit.     Patient is on Symbicort 160/4.5 mcg 2 puff inhaled twice daily.  At last visit he was started on Dupixent.  This is been significantly beneficial.  He denies shortness of breath, tightness in chest, wheezing or coughing.  No longer using montelukast.  He has had some intermittent sneezing.  He is on Nasacort 2 sprays per nostril daily.  Not using oral antihistamine.  Denies postnasal drainage, congestion, ocular symptoms.       ACT Total Scores 6/2/2020   ACT TOTAL SCORE -   ASTHMA ER VISITS -   ASTHMA HOSPITALIZATIONS -   ACT TOTAL SCORE (Goal Greater than or Equal to 20) 20   In the past 12 months, how many times did you visit the emergency room for your asthma without being admitted to the hospital? 0   In the past 12 months, how many times were you hospitalized overnight because of your asthma? 0         Past Medical History:   Diagnosis Date     Allergic rhinitis age 22     Asthma 2009     Chronic osteoarthritis      Nasal polyposis 2009     Osteoarthritis of left hip      Reactive airway disease 2009     Family History   Problem Relation Age of Onset     Breast Cancer Mother      Arthritis Father      C.A.D. Father      Cerebrovascular Disease Father      Respiratory Father         TB history     Rheumatoid Arthritis Father      Cerebrovascular Disease Maternal Grandmother      Hypertension Maternal Grandmother      Cancer Maternal Grandfather      C.A.D. Brother      Prostate Cancer Brother      C.A.D. Brother      Past Surgical History:   Procedure Laterality Date     COLONOSCOPY       COLONOSCOPY N/A 5/10/2017     Procedure: COMBINED COLONOSCOPY, SINGLE OR MULTIPLE BIOPSY/POLYPECTOMY BY BIOPSY;;  Surgeon: Abisai Duarte DO;  Location: [facility] OR     COLONOSCOPY WITH CO2 INSUFFLATION N/A 5/14/2015    Procedure: COLONOSCOPY WITH CO2 INSUFFLATION;  Surgeon: Jose R Richmond MD;  Location: [facility] OR     COLONOSCOPY WITH CO2 INSUFFLATION N/A 5/10/2017    Procedure: COLONOSCOPY WITH CO2 INSUFFLATION;  COLON SCREEN/ UNDERWOOD;  Surgeon: Abisai Duarte DO;  Location: [facility] OR     JOINT REPLACEMENT Left 06/2019    hip     SINUS SURGERY  2009     TONSILLECTOMY  age 22       REVIEW OF SYSTEMS:  General: negative for weight gain. negative for weight loss. negative for changes in sleep.   Ears: negative for fullness. negative for hearing loss. negative for dizziness.   Nose: negative for snoring.negative for changes in smell. negative for drainage.   Eyes: negative for eye watering. negative for eye itching. negative for vision changes. negative for eye redness.  Throat: negative for hoarseness. negative for sore throat. negative for trouble swallowing.   Lungs: negative for shortness of breath.negative for wheezing. negative for sputum production.   Cardiovascular: negative for chest pain. negative for swelling of ankles. negative for fast or irregular heartbeat.   Gastrointestinal: negative for nausea. negative for heartburn. negative for acid reflux.   Musculoskeletal: negative for joint pain. negative for joint stiffness. negative for joint swelling.   Neurologic: negative for seizures. negative for fainting. negative for weakness.   Psychiatric: negative for changes in mood. negative for anxiety.   Endocrine: negative for cold intolerance. negative for heat intolerance. negative for tremors.   Lymphatic: negative for lower extremity swelling. negative for lymph node swelling.   Hematologic: negative for easy bruising. negative for easy bleeding.  Integumentary: negative for rash. negative for scaling. negative for nail changes.        Current Outpatient Medications:      acetaminophen (TYLENOL) 500 MG tablet, Take 1,000 mg by mouth 3 times daily, Disp: , Rfl:      albuterol (PROAIR HFA/PROVENTIL HFA/VENTOLIN HFA) 108 (90 Base) MCG/ACT inhaler, Inhale 2 puffs into the lungs every 4 hours as needed for shortness of breath / dyspnea or wheezing Use with spacer, Disp: 1 Inhaler, Rfl: 1     atorvastatin (LIPITOR) 20 MG tablet, TAKE 1/2 TABLET BY MOUTH ONCE DAILY, Disp: 45 tablet, Rfl: 1     budesonide-formoterol (SYMBICORT) 80-4.5 MCG/ACT Inhaler, Inhale 2 puffs into the lungs 2 times daily, Disp: 1 Inhaler, Rfl: 4     diltiazem (CARTIA XT) 120 MG 24 hr capsule, Take 1 capsule (120 mg) by mouth daily For blood pressure/lowers pulse. Pharmacy ok to hold prescription until due, Disp: 90 capsule, Rfl: 3     Dupilumab (DUPIXENT) 300 MG/2ML syringe, Inject 2 mLs (300 mg) Subcutaneous every 14 days, Disp: 4 mL, Rfl: 3     Spacer/Aero-Holding Chambers (SPACER/AERO-HOLD CHAMBER MASK) RAMAN, 1 Adult size spacer to use with MDI inhalers., Disp: 1 each, Rfl: 0     triamcinolone (NASACORT) 55 MCG/ACT nasal aerosol, Spray 2 sprays into both nostrils daily, Disp: , Rfl:   Immunization History   Administered Date(s) Administered     Influenza (H1N1) 12/22/2009     Influenza (High Dose) 3 valent vaccine 10/27/2010, 10/22/2011, 09/30/2014, 09/08/2015, 09/29/2016, 10/04/2017, 09/13/2018, 09/09/2019     Influenza (IIV3) PF 10/19/2002, 10/13/2003, 10/16/2004, 10/20/2005, 09/18/2009, 10/13/2012     Influenza Vaccine IM > 6 months Valent IIV4 10/17/2013     Mantoux Tuberculin Skin Test 10/09/2009     Pneumo Conj 13-V (2010&after) 09/08/2015     Pneumococcal 23 valent 10/20/2006     TD (ADULT, 7+) 10/27/2010     TDAP Vaccine (Adacel) 08/14/2012     Zoster vaccine recombinant adjuvanted (SHINGRIX) 09/13/2018, 01/03/2019     Zoster vaccine, live 11/12/2014     Allergies   Allergen Reactions     Hytrin [Terazosin Hcl]      Leg swelling and vertigo     Simvastatin Other  (See Comments)     Body aches     Cats      Codeine Nausea     Dogs      Dust Mites      [place] Trees      Seasonal Allergies          EXAM:   Constitutional:  Appears well-developed and well-nourished. No distress.   HEENT:   Head: Normocephalic.   Eyes: Conjunctivae are non-erythematous   Cardiovascular: Normal rate, regular rhythm. Systolic murmur noted. Exam reveals no gallop and no friction rub.   Respiratory: Effort normal and breath sounds normal. No respiratory distress. No wheezes. No rales.   Musculoskeletal: Normal range of motion.   Neuro: Oriented to person, place, and time.  Skin: Skin is warm and dry. No rash noted.   Psychiatric: Normal mood and affect.     Nursing note and vitals reviewed.    ASSESSMENT/PLAN:  Problem List Items Addressed This Visit        Respiratory    Moderate persistent asthma, uncomplicated - Primary     History of chronic chest symptoms for multiple years. Started on Spiriva and either that or mold remediation was helpful. He believes side effects from spiriva including hoarseness.   Therefore this was discontinued.  Diagnosed with asthma at [facility] in 2010.  On Symbicort 160/4.5 mcg 2 puff inhaled twice daily. Now on Dupixent and huge improvement in chest symptoms.      Asthma is well controlled currently.     -Symbicort 80/4.5 mcg 2 puff inhaled twice daily.  -Albuterol 2-4 puffs inhaled (use a spacer unless using a Proair Respiclick device) every 4 hours as needed for chest tightness, wheezing, shortness of breath and/or coughing.   -Albuterol 2-4 puffs inhaled (use spacer if not using Proair Respiclick device) 15-20 minutes prior to physical activity.   Please ensure warm up period prior to exercise.   - Dupixent 300 mg every other week.    -Updated asthma action plan provided.          Relevant Medications    budesonide-formoterol (SYMBICORT) 80-4.5 MCG/ACT Inhaler    Seasonal allergic rhinitis due to pollen     Perennial with spring and summer nasal and ocular symptoms.   Has history of chronic sinusitis with nasal polyposis.  Follows with ENT.  Nasacort significantly helpful. Intermittent sneezing.      Skin testing:  Positive for trees, weeds, cat, dog, dust mite.     - Nasacort 2 sprays per nostril daily.         Relevant Medications    budesonide-formoterol (SYMBICORT) 80-4.5 MCG/ACT Inhaler    Allergic rhinitis due to animal dander    Relevant Medications    budesonide-formoterol (SYMBICORT) 80-4.5 MCG/ACT Inhaler    Allergic rhinitis due to dust mite    Relevant Medications    budesonide-formoterol (SYMBICORT) 80-4.5 MCG/ACT Inhaler        Return in 6 months or sooner if needed.     Chart documentation with Dragon Voice recognition Software. Although reviewed after completion, some words and grammatical errors may remain.    Malcolm Del Rio DO FAAAAI  Allergy/Immunology  [place], [place]      Again, thank you for allowing me to participate in the care of your patient.        Sincerely,        Malcolm Del Rio DO

## 2020-06-02 NOTE — LETTER
My Asthma Action Plan    Name: Guillermo Hogue   YOB: 1940  Date: 6/2/2020   My doctor: Malcolm Del Rio, DO   My clinic: [facility] [place]        My Control Medicine: Budesonide + formoterol (Symbicort HFA) -  80/4.5 mcg 2 puffs twice daily  My Rescue Medicine: Albuterol (Proair/Ventolin/Proventil HFA) 2-4 puffs EVERY 4 HOURS as needed. Use a spacer if recommended by your provider.  My Oral Steroid Medicine: None My Asthma Severity:   Moderate Persistent              GREEN ZONE   Good Control    I feel good    No cough or wheeze    Can work, sleep and play without asthma symptoms       Take your asthma control medicine every day.     1. If exercise triggers your asthma, take your rescue medication    15 minutes before exercise or sports, and    During exercise if you have asthma symptoms  2. Spacer to use with inhaler: If you have a spacer, make sure to use it with your inhaler             YELLOW ZONE Getting Worse  I have ANY of these:    I do not feel good    Cough or wheeze    Chest feels tight    Wake up at night   1. Keep taking your Green Zone medications  2. Start taking your rescue medicine:    every 20 minutes for up to 1 hour. Then every 4 hours for 24-48 hours.  3. If you stay in the Yellow Zone for more than 12-24 hours, contact your doctor.  4. If you do not return to the Green Zone in 12-24 hours or you get worse, start taking your oral steroid medicine if prescribed by your provider.           RED ZONE Medical Alert - Get Help  I have ANY of these:    I feel awful    Medicine is not helping    Breathing getting harder    Trouble walking or talking    Nose opens wide to breathe       1. Take your rescue medicine NOW  2. If your provider has prescribed an oral steroid medicine, start taking it NOW  3. Call your doctor NOW  4. If you are still in the Red Zone after 20 minutes and you have not reached your doctor:    Take your rescue medicine again and    Call 911 or go to the  emergency room right away    See your regular doctor within 2 weeks of an Emergency Room or Urgent Care visit for follow-up treatment.          Annual Reminders:  Meet with Asthma Educator,  Flu Shot in the Fall, consider Pneumonia Vaccination for patients with asthma (aged 19 and older).    Pharmacy:    [facility] - [place], [place] - [address]  [facility] [place], [place] - [address]   [id number] [place] [address] [address] [facility] - [place], [place] - [address]   [facility] - [place], [place] - [address] [facility] [address]    Electronically signed by Malcolm Del Rio, DO   Date: 06/02/20                      Asthma Triggers  How To Control Things That Make Your Asthma Worse    Triggers are things that make your asthma worse.  Look at the list below to help you find your triggers and what you can do about them.  You can help prevent asthma flare-ups by staying away from your triggers.      Trigger                                                          What you can do   Cigarette Smoke  Tobacco smoke can make asthma worse. Do not allow smoking in your home, car or around you.  Be sure no one smokes at a child s day care or school.  If you smoke, ask your health care provider for ways to help you quit.  Ask family members to quit too.  Ask your health care provider for a referral to Quit Plan to help you quit smoking, or call 4-251-667-PLAN.     Colds, Flu, Bronchitis  These are common triggers of asthma. Wash your hands often.  Don t touch your eyes, nose or mouth.  Get a flu shot every year.     Dust Mites  These are tiny bugs that live in cloth or carpet. They are too small to see. Wash sheets and blankets in hot water every week.   Encase pillows and mattress in dust mite proof covers.  Avoid having carpet if you can. If you have carpet, vacuum weekly.   Use a dust mask and HEPA vacuum.   Pollen and  Outdoor Mold  Some people are allergic to trees, grass, or weed pollen, or molds. Try to keep your windows closed.  Limit time out doors when pollen count is high.   Ask you health care provider about taking medicine during allergy season.     Animal Dander  Some people are allergic to skin flakes, urine or saliva from pets with fur or feathers. Keep pets with fur or feathers out of your home.    If you can t keep the pet outdoors, then keep the pet out of your bedroom.  Keep the bedroom door closed.  Keep pets off cloth furniture and away from stuffed toys.     Mice, Rats, and Cockroaches   Some people are allergic to the waste from these pests.   Cover food and garbage.  Clean up spills and food crumbs.  Store grease in the refrigerator.   Keep food out of the bedroom.   Indoor Mold  This can be a trigger if your home has high moisture. Fix leaking faucets, pipes, or other sources of water.   Clean moldy surfaces.  Dehumidify basement if it is damp and smelly.   Smoke, Strong Odors, and Sprays  These can reduce air quality. Stay away from strong odors and sprays, such as perfume, powder, hair spray, paints, smoke incense, paint, cleaning products, candles and new carpet.   Exercise or Sports  Some people with asthma have this trigger. Be active!  Ask your doctor about taking medicine before sports or exercise to prevent symptoms.    Warm up for 5-10 minutes before and after sports or exercise.     Other Triggers of Asthma  Cold air:  Cover your nose and mouth with a scarf.  Sometimes laughing or crying can be a trigger.  Some medicines and food can trigger asthma.

## 2020-06-03 ASSESSMENT — ASTHMA QUESTIONNAIRES: ACT_TOTALSCORE: 20

## 2020-07-31 ENCOUNTER — OFFICE VISIT (OUTPATIENT)
Dept: ALLERGY | Facility: CLINIC | Age: 80
End: 2020-07-31
Payer: COMMERCIAL

## 2020-07-31 VITALS
BODY MASS INDEX: 32.73 KG/M2 | HEIGHT: 69 IN | HEART RATE: 69 BPM | WEIGHT: 221 LBS | OXYGEN SATURATION: 95 % | DIASTOLIC BLOOD PRESSURE: 62 MMHG | SYSTOLIC BLOOD PRESSURE: 120 MMHG

## 2020-07-31 DIAGNOSIS — J30.89 ALLERGIC RHINITIS DUE TO DUST MITE: Primary | ICD-10-CM

## 2020-07-31 DIAGNOSIS — J45.40 MODERATE PERSISTENT ASTHMA, UNCOMPLICATED: ICD-10-CM

## 2020-07-31 DIAGNOSIS — J30.1 SEASONAL ALLERGIC RHINITIS DUE TO POLLEN: ICD-10-CM

## 2020-07-31 DIAGNOSIS — R42 DIZZINESS: ICD-10-CM

## 2020-07-31 DIAGNOSIS — J30.81 ALLERGIC RHINITIS DUE TO ANIMAL DANDER: ICD-10-CM

## 2020-07-31 PROCEDURE — 99213 OFFICE O/P EST LOW 20 MIN: CPT | Performed by: ALLERGY & IMMUNOLOGY

## 2020-07-31 RX ORDER — ASPIRIN 81 MG/1
81 TABLET ORAL DAILY
COMMUNITY
End: 2022-01-27

## 2020-07-31 ASSESSMENT — MIFFLIN-ST. JEOR: SCORE: 1707.83

## 2020-07-31 NOTE — LETTER
7/31/2020         RE: Guillermo Hogue  [address]  [address]  [place] [address]        Dear Colleague,    Thank you for referring your patient, Guillermo Hogue, to the [facility]. Please see a copy of my visit note below.    Guillermo Hogue is a 80 year old White male with previous medical history significant for asthma who returns for a follow up visit.    Guillermo recently went to the ER because of dizziness and weakness in arms. CBC showed slight anemia. He had normal bmp. Normal troponin. EKG with no changes from ekg in 2018. CT of head showed evidence of prior cerebral vascular event but no acute finding. He was supposed to follow up with his primary care but chose to follow up with my instead. No new medication changes. He was discharged from hospital on aspirin 81mg. He has yet to start. Patient thinks he is adequately hydrated. Regarding his asthma it has been well controlled. On Dupixent every other week and made huge difference on asthma. On Symbicort 80/4.5mcg 2 puffs twice daily. He actually just started this dose as he wanted to finish his 160/4.5mcg inhaler. He changed this dose after dizziness episode. Patient with history of atrial fibrillation. Having sneezing and post nasal drip. He just increased nasacort to 2 sprays/nostril daily. He was doing 1 spray/nostril daily.       ACT Total Scores 7/31/2020   ACT TOTAL SCORE -   ASTHMA ER VISITS -   ASTHMA HOSPITALIZATIONS -   ACT TOTAL SCORE (Goal Greater than or Equal to 20) 17   In the past 12 months, how many times did you visit the emergency room for your asthma without being admitted to the hospital? 0   In the past 12 months, how many times were you hospitalized overnight because of your asthma? 0           Past Medical History:   Diagnosis Date     Allergic rhinitis age 22     Asthma 2009     Chronic osteoarthritis      Nasal polyposis 2009     Osteoarthritis of left hip      Reactive airway disease 2009     Family History    Problem Relation Age of Onset     Breast Cancer Mother      Arthritis Father      C.A.D. Father      Cerebrovascular Disease Father      Respiratory Father         TB history     Rheumatoid Arthritis Father      Cerebrovascular Disease Maternal Grandmother      Hypertension Maternal Grandmother      Cancer Maternal Grandfather      C.A.D. Brother      Prostate Cancer Brother      C.A.D. Brother      Past Surgical History:   Procedure Laterality Date     COLONOSCOPY       COLONOSCOPY N/A 5/10/2017    Procedure: COMBINED COLONOSCOPY, SINGLE OR MULTIPLE BIOPSY/POLYPECTOMY BY BIOPSY;;  Surgeon: Abisai Duarte DO;  Location: [facility]     COLONOSCOPY WITH CO2 INSUFFLATION N/A 5/14/2015    Procedure: COLONOSCOPY WITH CO2 INSUFFLATION;  Surgeon: Jose R Richmond MD;  Location: [facility]     COLONOSCOPY WITH CO2 INSUFFLATION N/A 5/10/2017    Procedure: COLONOSCOPY WITH CO2 INSUFFLATION;  COLON SCREEN/ UNDERWOOD;  Surgeon: Abisai Duarte DO;  Location: [facility] OR     JOINT REPLACEMENT Left 06/2019    hip     SINUS SURGERY  2009     TONSILLECTOMY  age 22       REVIEW OF SYSTEMS:  General: negative for weight gain. negative for weight loss. negative for changes in sleep.   Ears: negative for fullness. negative for hearing loss. negative for dizziness.   Nose: negative for snoring.negative for changes in smell. negative for drainage.   Eyes: negative for eye watering. negative for eye itching. negative for vision changes. negative for eye redness.  Throat: negative for hoarseness. negative for sore throat. negative for trouble swallowing.   Lungs: positive  for shortness of breath.negative for wheezing. negative for sputum production.   Cardiovascular: negative for chest pain. negative for swelling of ankles. negative for fast or irregular heartbeat.   Gastrointestinal: negative for nausea. negative for heartburn. negative for acid reflux.   Musculoskeletal: negative for joint pain. negative for joint stiffness. negative for joint  swelling.   Neurologic: negative for seizures. negative for fainting. negative for weakness.   Psychiatric: negative for changes in mood. negative for anxiety.   Endocrine: negative for cold intolerance. negative for heat intolerance. negative for tremors.   Lymphatic: negative for lower extremity swelling. negative for lymph node swelling.   Hematologic: negative for easy bruising. negative for easy bleeding.  Integumentary: negative for rash. negative for scaling. negative for nail changes.       Current Outpatient Medications:      acetaminophen (TYLENOL) 500 MG tablet, Take 1,000 mg by mouth 3 times daily, Disp: , Rfl:      albuterol (PROAIR HFA/PROVENTIL HFA/VENTOLIN HFA) 108 (90 Base) MCG/ACT inhaler, Inhale 2 puffs into the lungs every 4 hours as needed for shortness of breath / dyspnea or wheezing Use with spacer, Disp: 1 Inhaler, Rfl: 1     aspirin 81 MG EC tablet, Take 81 mg by mouth daily, Disp: , Rfl:      atorvastatin (LIPITOR) 20 MG tablet, TAKE 1/2 TABLET BY MOUTH ONCE DAILY, Disp: 45 tablet, Rfl: 1     budesonide-formoterol (SYMBICORT) 80-4.5 MCG/ACT Inhaler, Inhale 2 puffs into the lungs 2 times daily, Disp: 1 Inhaler, Rfl: 4     diltiazem (CARTIA XT) 120 MG 24 hr capsule, Take 1 capsule (120 mg) by mouth daily For blood pressure/lowers pulse. Pharmacy ok to hold prescription until due, Disp: 90 capsule, Rfl: 3     Dupilumab (DUPIXENT) 300 MG/2ML syringe, Inject 2 mLs (300 mg) Subcutaneous every 14 days, Disp: 4 mL, Rfl: 3     Spacer/Aero-Holding Chambers (SPACER/AERO-HOLD CHAMBER MASK) RAMAN, 1 Adult size spacer to use with MDI inhalers., Disp: 1 each, Rfl: 0     triamcinolone (NASACORT) 55 MCG/ACT nasal aerosol, Spray 2 sprays into both nostrils daily, Disp: , Rfl:   Immunization History   Administered Date(s) Administered     Influenza (H1N1) 12/22/2009     Influenza (High Dose) 3 valent vaccine 10/27/2010, 10/22/2011, 09/30/2014, 09/08/2015, 09/29/2016, 10/04/2017, 09/13/2018, 09/09/2019      Influenza (IIV3) PF 10/19/2002, 10/13/2003, 10/16/2004, 10/20/2005, 09/18/2009, 10/13/2012     Influenza Vaccine IM > 6 months Valent IIV4 10/17/2013     Mantoux Tuberculin Skin Test 10/09/2009     Pneumo Conj 13-V (2010&after) 09/08/2015     Pneumococcal 23 valent 10/20/2006     TD (ADULT, 7+) 10/27/2010     TDAP Vaccine (Adacel) 08/14/2012     Zoster vaccine recombinant adjuvanted (SHINGRIX) 09/13/2018, 01/03/2019     Zoster vaccine, live 11/12/2014     Allergies   Allergen Reactions     Hytrin [Terazosin Hcl]      Leg swelling and vertigo     Simvastatin Other (See Comments)     Body aches     Cats      Codeine Nausea     Dogs      Dust Mites      [place] Trees      Seasonal Allergies          EXAM:   Constitutional:  Appears well-developed and well-nourished. No distress.   HEENT:   Head: Normocephalic.   Nasal tissue pink and normal appearing.  No rhinorrhea noted.    Eyes: Conjunctivae are non-erythematous   Cardiovascular: Normal rate, regular rhythm and normal heart sounds. Exam reveals no gallop and no friction rub.   No murmur heard.  Respiratory: Effort normal and breath sounds normal. No respiratory distress. No wheezes. No rales.   Musculoskeletal: Normal range of motion.   Neuro: Oriented to person, place, and time.  Skin: Skin is warm and dry. No rash noted.   Psychiatric: Normal mood and affect.     Nursing note and vitals reviewed.    ASSESSMENT/PLAN:  Problem List Items Addressed This Visit        Respiratory    Moderate persistent asthma, uncomplicated     History of chronic chest symptoms for multiple years. Started on Spiriva and either that or mold remediation was helpful. He believes side effects from spiriva including hoarseness.   Therefore this was discontinued.  Diagnosed with asthma at [facility] in 2010.  On Symbicort 80/4.5 mcg 2 puff inhaled twice daily. Now on Dupixent and huge improvement in chest symptoms.      Asthma is well controlled currently.      -Symbicort 80/4.5 mcg 2 puff  inhaled twice daily.  -Albuterol 2-4 puffs inhaled (use a spacer unless using a Proair Respiclick device) every 4 hours as needed for chest tightness, wheezing, shortness of breath and/or coughing.   -Albuterol 2-4 puffs inhaled (use spacer if not using Proair Respiclick device) 15-20 minutes prior to physical activity.   Please ensure warm up period prior to exercise.   - Dupixent 300 mg every other week.    -Updated asthma action plan provided         Seasonal allergic rhinitis due to pollen     Perennial with spring and summer nasal and ocular symptoms.  Has history of chronic sinusitis with nasal polyposis.  Follows with ENT.  Nasacort significantly helpful. Intermittent sneezing.      Skin testing:  Positive for trees, weeds, cat, dog, dust mite.     - Nasacort 2 sprays per nostril daily.         Allergic rhinitis due to animal dander    Allergic rhinitis due to dust mite - Primary       Other    Dizziness     Recent ER visit for dizziness.  Additionally having weakness of bilateral arms.  CT scan of head found old cerebral infarction.  Otherwise CBC showed slight anemia.  Chemistries were normal.  Troponin was normal.  EKG was unchanged since 2018.  No new medications.    - Encouraged patient to follow-up with his primary care provider.  Was able to schedule him an appointment next week.  Continue to really push water intake throughout the day to ensure hydration.  Agree with starting aspirin 81 mg daily.               Chart documentation with Dragon Voice recognition Software. Although reviewed after completion, some words and grammatical errors may remain.    Malcolm Del Rio DO FAAAAI  Medical Director for Allergy/Immunology at [place], [place]      Again, thank you for allowing me to participate in the care of your patient.        Sincerely,        Malcolm Del Rio DO

## 2020-07-31 NOTE — ASSESSMENT & PLAN NOTE
Recent ER visit for dizziness.  Additionally having weakness of bilateral arms.  CT scan of head found old cerebral infarction.  Otherwise CBC showed slight anemia.  Chemistries were normal.  Troponin was normal.  EKG was unchanged since 2018.  No new medications.    - Encouraged patient to follow-up with his primary care provider.  Was able to schedule him an appointment next week.  Continue to really push water intake throughout the day to ensure hydration.  Agree with starting aspirin 81 mg daily.

## 2020-07-31 NOTE — PROGRESS NOTES
Guillermo Hogue is a 80 year old White male with previous medical history significant for asthma who returns for a follow up visit.    Guillermo recently went to the ER because of dizziness and weakness in arms. CBC showed slight anemia. He had normal bmp. Normal troponin. EKG with no changes from ekg in 2018. CT of head showed evidence of prior cerebral vascular event but no acute finding. He was supposed to follow up with his primary care but chose to follow up with my instead. No new medication changes. He was discharged from hospital on aspirin 81mg. He has yet to start. Patient thinks he is adequately hydrated. Regarding his asthma it has been well controlled. On Dupixent every other week and made huge difference on asthma. On Symbicort 80/4.5mcg 2 puffs twice daily. He actually just started this dose as he wanted to finish his 160/4.5mcg inhaler. He changed this dose after dizziness episode. Patient with history of atrial fibrillation. Having sneezing and post nasal drip. He just increased nasacort to 2 sprays/nostril daily. He was doing 1 spray/nostril daily.       ACT Total Scores 7/31/2020   ACT TOTAL SCORE -   ASTHMA ER VISITS -   ASTHMA HOSPITALIZATIONS -   ACT TOTAL SCORE (Goal Greater than or Equal to 20) 17   In the past 12 months, how many times did you visit the emergency room for your asthma without being admitted to the hospital? 0   In the past 12 months, how many times were you hospitalized overnight because of your asthma? 0           Past Medical History:   Diagnosis Date     Allergic rhinitis age 22     Asthma 2009     Chronic osteoarthritis      Nasal polyposis 2009     Osteoarthritis of left hip      Reactive airway disease 2009     Family History   Problem Relation Age of Onset     Breast Cancer Mother      Arthritis Father      C.A.D. Father      Cerebrovascular Disease Father      Respiratory Father         TB history     Rheumatoid Arthritis Father      Cerebrovascular Disease Maternal  Grandmother      Hypertension Maternal Grandmother      Cancer Maternal Grandfather      C.A.D. Brother      Prostate Cancer Brother      C.A.D. Brother      Past Surgical History:   Procedure Laterality Date     COLONOSCOPY       COLONOSCOPY N/A 5/10/2017    Procedure: COMBINED COLONOSCOPY, SINGLE OR MULTIPLE BIOPSY/POLYPECTOMY BY BIOPSY;;  Surgeon: Abisai Duarte DO;  Location: [facility]     COLONOSCOPY WITH CO2 INSUFFLATION N/A 5/14/2015    Procedure: COLONOSCOPY WITH CO2 INSUFFLATION;  Surgeon: Jose R Richmond MD;  Location: [facility]     COLONOSCOPY WITH CO2 INSUFFLATION N/A 5/10/2017    Procedure: COLONOSCOPY WITH CO2 INSUFFLATION;  COLON SCREEN/ UNDERWOOD;  Surgeon: Abisai Duarte DO;  Location: [facility] OR     JOINT REPLACEMENT Left 06/2019    hip     SINUS SURGERY  2009     TONSILLECTOMY  age 22       REVIEW OF SYSTEMS:  General: negative for weight gain. negative for weight loss. negative for changes in sleep.   Ears: negative for fullness. negative for hearing loss. negative for dizziness.   Nose: negative for snoring.negative for changes in smell. negative for drainage.   Eyes: negative for eye watering. negative for eye itching. negative for vision changes. negative for eye redness.  Throat: negative for hoarseness. negative for sore throat. negative for trouble swallowing.   Lungs: positive  for shortness of breath.negative for wheezing. negative for sputum production.   Cardiovascular: negative for chest pain. negative for swelling of ankles. negative for fast or irregular heartbeat.   Gastrointestinal: negative for nausea. negative for heartburn. negative for acid reflux.   Musculoskeletal: negative for joint pain. negative for joint stiffness. negative for joint swelling.   Neurologic: negative for seizures. negative for fainting. negative for weakness.   Psychiatric: negative for changes in mood. negative for anxiety.   Endocrine: negative for cold intolerance. negative for heat intolerance. negative  for tremors.   Lymphatic: negative for lower extremity swelling. negative for lymph node swelling.   Hematologic: negative for easy bruising. negative for easy bleeding.  Integumentary: negative for rash. negative for scaling. negative for nail changes.       Current Outpatient Medications:      acetaminophen (TYLENOL) 500 MG tablet, Take 1,000 mg by mouth 3 times daily, Disp: , Rfl:      albuterol (PROAIR HFA/PROVENTIL HFA/VENTOLIN HFA) 108 (90 Base) MCG/ACT inhaler, Inhale 2 puffs into the lungs every 4 hours as needed for shortness of breath / dyspnea or wheezing Use with spacer, Disp: 1 Inhaler, Rfl: 1     aspirin 81 MG EC tablet, Take 81 mg by mouth daily, Disp: , Rfl:      atorvastatin (LIPITOR) 20 MG tablet, TAKE 1/2 TABLET BY MOUTH ONCE DAILY, Disp: 45 tablet, Rfl: 1     budesonide-formoterol (SYMBICORT) 80-4.5 MCG/ACT Inhaler, Inhale 2 puffs into the lungs 2 times daily, Disp: 1 Inhaler, Rfl: 4     diltiazem (CARTIA XT) 120 MG 24 hr capsule, Take 1 capsule (120 mg) by mouth daily For blood pressure/lowers pulse. Pharmacy ok to hold prescription until due, Disp: 90 capsule, Rfl: 3     Dupilumab (DUPIXENT) 300 MG/2ML syringe, Inject 2 mLs (300 mg) Subcutaneous every 14 days, Disp: 4 mL, Rfl: 3     Spacer/Aero-Holding Chambers (SPACER/AERO-HOLD CHAMBER MASK) RAMAN, 1 Adult size spacer to use with MDI inhalers., Disp: 1 each, Rfl: 0     triamcinolone (NASACORT) 55 MCG/ACT nasal aerosol, Spray 2 sprays into both nostrils daily, Disp: , Rfl:   Immunization History   Administered Date(s) Administered     Influenza (H1N1) 12/22/2009     Influenza (High Dose) 3 valent vaccine 10/27/2010, 10/22/2011, 09/30/2014, 09/08/2015, 09/29/2016, 10/04/2017, 09/13/2018, 09/09/2019     Influenza (IIV3) PF 10/19/2002, 10/13/2003, 10/16/2004, 10/20/2005, 09/18/2009, 10/13/2012     Influenza Vaccine IM > 6 months Valent IIV4 10/17/2013     Mantoux Tuberculin Skin Test 10/09/2009     Pneumo Conj 13-V (2010&after) 09/08/2015      Pneumococcal 23 valent 10/20/2006     TD (ADULT, 7+) 10/27/2010     TDAP Vaccine (Adacel) 08/14/2012     Zoster vaccine recombinant adjuvanted (SHINGRIX) 09/13/2018, 01/03/2019     Zoster vaccine, live 11/12/2014     Allergies   Allergen Reactions     Hytrin [Terazosin Hcl]      Leg swelling and vertigo     Simvastatin Other (See Comments)     Body aches     Cats      Codeine Nausea     Dogs      Dust Mites      [place] Trees      Seasonal Allergies          EXAM:   Constitutional:  Appears well-developed and well-nourished. No distress.   HEENT:   Head: Normocephalic.   Nasal tissue pink and normal appearing.  No rhinorrhea noted.    Eyes: Conjunctivae are non-erythematous   Cardiovascular: Normal rate, regular rhythm and normal heart sounds. Exam reveals no gallop and no friction rub.   No murmur heard.  Respiratory: Effort normal and breath sounds normal. No respiratory distress. No wheezes. No rales.   Musculoskeletal: Normal range of motion.   Neuro: Oriented to person, place, and time.  Skin: Skin is warm and dry. No rash noted.   Psychiatric: Normal mood and affect.     Nursing note and vitals reviewed.    ASSESSMENT/PLAN:  Problem List Items Addressed This Visit        Respiratory    Moderate persistent asthma, uncomplicated     History of chronic chest symptoms for multiple years. Started on Spiriva and either that or mold remediation was helpful. He believes side effects from spiriva including hoarseness.   Therefore this was discontinued.  Diagnosed with asthma at [facility] 2010.  On Symbicort 80/4.5 mcg 2 puff inhaled twice daily. Now on Dupixent and huge improvement in chest symptoms.      Asthma is well controlled currently.      -Symbicort 80/4.5 mcg 2 puff inhaled twice daily.  -Albuterol 2-4 puffs inhaled (use a spacer unless using a Proair Respiclick device) every 4 hours as needed for chest tightness, wheezing, shortness of breath and/or coughing.   -Albuterol 2-4 puffs inhaled (use spacer if not  using Proair Respiclick device) 15-20 minutes prior to physical activity.   Please ensure warm up period prior to exercise.   - Dupixent 300 mg every other week.    -Updated asthma action plan provided         Seasonal allergic rhinitis due to pollen     Perennial with spring and summer nasal and ocular symptoms.  Has history of chronic sinusitis with nasal polyposis.  Follows with ENT.  Nasacort significantly helpful. Intermittent sneezing.      Skin testing:  Positive for trees, weeds, cat, dog, dust mite.     - Nasacort 2 sprays per nostril daily.         Allergic rhinitis due to animal dander    Allergic rhinitis due to dust mite - Primary       Other    Dizziness     Recent ER visit for dizziness.  Additionally having weakness of bilateral arms.  CT scan of head found old cerebral infarction.  Otherwise CBC showed slight anemia.  Chemistries were normal.  Troponin was normal.  EKG was unchanged since 2018.  No new medications.    - Encouraged patient to follow-up with his primary care provider.  Was able to schedule him an appointment next week.  Continue to really push water intake throughout the day to ensure hydration.  Agree with starting aspirin 81 mg daily.               Chart documentation with Dragon Voice recognition Software. Although reviewed after completion, some words and grammatical errors may remain.    Malcolm Del Rio DO FAAAAI  Medical Director for Allergy/Immunology at [place], [place]

## 2020-07-31 NOTE — PATIENT INSTRUCTIONS
Allergy Staff Appt Hours Shot Hours Locations    Physician     Malcolm Del Rio DO       Support Staff     YUE Corbin, ENDY  Tuesday:        [place] 7-4:20     Wednesday:        [place]: 7-5 Thursday:                    [place] 7-6:40     Friday:  [place]  7-2:40   [place]        Thursday: 1-5:50        Friday: 7-10:50     [place]        Tuesday: 7- 3:20        Wednesday: 7-4:20     [place] Monday: 7-4:20        Tuesday: 1-6:20         [facility]  [address] [place] [address]  Appt Line: [phone]  Allergy RN:  [phone]    [facility]  [address] [place] [address]  Appt Line: [phone]  Allergy RN:  [phone]       Important Scheduling Information  Aspirin Desensitization: Appt will last 2 clinic days. Please call the Allergy RN line for your clinic to schedule. Discontinue antihistamines 7 days prior to the appointment.     Food Challenges: Appt will last 3-4 hours. Please call the Allergy RN line for your clinic to schedule. Discontinue antihistamines 7 days prior to the appointment.     Penicillin Testing: Appt will last 2-3 hours. Please call the Allergy RN line for your clinic to schedule. Discontinue antihistamines 7 days prior to the appointment.     Skin Testing: Appt will about 40 minutes. Call the appointment line for your clinic to schedule. Discontinue antihistamines 7 days prior to the appointment.     Venom Testing: Appt will last 2-3 hours. Please call the Allergy RN line for your clinic to schedule. Discontinue antihistamines 7 days prior to the appointment.     Thank you for trusting us with your Allergy, Asthma, and Immunology care. Please feel free to contact us with any questions or concerns you may have.      - Remain adequately hydrated.   - Agree with aspirin 81mcg daily.   - Continue Dupixent every other week.  - Symbicort 80/4.5mcg 2 puffs inhaled every 4 hours as needed for chest tightness, coughing, wheezing or  shortness of breath.   - Albuterol 2-4 puffs inhaled (use a spacer unless using a Proair Respiclick device) every 4 hours as needed for chest tightness, wheezing, shortness of breath and/or coughing.   - Follow up with Dr. Doty.   - Continue Nasacort 2 sprays/nostril daily.

## 2020-07-31 NOTE — ASSESSMENT & PLAN NOTE
History of chronic chest symptoms for multiple years. Started on Spiriva and either that or mold remediation was helpful. He believes side effects from spiriva including hoarseness.   Therefore this was discontinued.  Diagnosed with asthma at [facility] in 2010.  On Symbicort 80/4.5 mcg 2 puff inhaled twice daily. Now on Dupixent and huge improvement in chest symptoms.      Asthma is well controlled currently.      -Symbicort 80/4.5 mcg 2 puff inhaled twice daily.  -Albuterol 2-4 puffs inhaled (use a spacer unless using a Proair Respiclick device) every 4 hours as needed for chest tightness, wheezing, shortness of breath and/or coughing.   -Albuterol 2-4 puffs inhaled (use spacer if not using Proair Respiclick device) 15-20 minutes prior to physical activity.   Please ensure warm up period prior to exercise.   - Dupixent 300 mg every other week.    -Updated asthma action plan provided

## 2020-08-01 ASSESSMENT — ASTHMA QUESTIONNAIRES: ACT_TOTALSCORE: 17

## 2020-08-04 NOTE — PROGRESS NOTES
Subjective     Guillermo Hogue is a 80 year old male who presents to clinic today for the following health issues:    HPI   ED/UC Followup:    Facility:  [facility]  Date of visit: 7/27/2020  Reason for visit: Weakness  Current Status: doing okay, patient is doing better     Patient is here for follow up after recent ED visit. He presented to the Ed for concern of  description of dizziness, disequilibrium, and weakness in his extremities. He had normal neurological exam, NIH of 0. His description was possibly symmetrical but overall unclear.CT head showed Chronic lacunar infarction in the basal ganglia on the left. Patient was discharged home.  Today he states he is feeling OK , slightly lightheaded. He started on 81 mg aspirin.   He denies any similar symptoms.   ED work up  EKG:   Time Performed: 0902   Time Resulted: 0907  Indication: Weakness   Ventricular Rate: 78  R wave axis: 34  Interpretation: Sinus rhythm with 1st degree A-V block  Otherwise normal ECG  When compared with ECG of 15-MAY-2018 13:08,  No significant change was found    Imaging:  CT Head Brain WO:  1.  No acute findings.   2.  Chronic lacunar infarction in the basal ganglia on the left.   3.  Nonspecific white matter changes are most likely due to mild small vessel   ischemic disease.   Results per radiology.     CT Angio Head Neck Carotid:  1.  No dissection or significant stenosis.   Results per radiology.     Laboratory:  CBC: HGB 12.9 (Low), MCHC 31.8 (Low), MPV 11.2 (High), o/w WNL (WBC 9.5, )  BMP:  (High) o/w WNL (Creat 0.81)   Troponin I: <0.010 WNL           Patient Active Problem List   Diagnosis     Reactive airway disease     Other chronic sinusitis     AR (allergic rhinitis)     HYPERLIPIDEMIA LDL GOAL <130     BPH (benign prostatic hyperplasia)     Palpitations     Hypertension goal BP (blood pressure) < 140/90     Fatty liver     Cataracts, bilateral     Moderate persistent asthma, uncomplicated     Seasonal allergic  rhinitis due to pollen     Allergic rhinitis due to animal dander     Allergic rhinitis due to dust mite     Paroxysmal atrial fibrillation (H)     Dizziness     Past Surgical History:   Procedure Laterality Date     COLONOSCOPY       COLONOSCOPY N/A 5/10/2017    Procedure: COMBINED COLONOSCOPY, SINGLE OR MULTIPLE BIOPSY/POLYPECTOMY BY BIOPSY;;  Surgeon: Abisai Duarte DO;  Location: [facility] OR     COLONOSCOPY WITH CO2 INSUFFLATION N/A 5/14/2015    Procedure: COLONOSCOPY WITH CO2 INSUFFLATION;  Surgeon: Jose R Richmond MD;  Location: [facility] OR     COLONOSCOPY WITH CO2 INSUFFLATION N/A 5/10/2017    Procedure: COLONOSCOPY WITH CO2 INSUFFLATION;  COLON SCREEN/ UNDERWOOD;  Surgeon: Abisai Duarte DO;  Location: [facility] OR     JOINT REPLACEMENT Left 06/2019    hip     SINUS SURGERY  2009     TONSILLECTOMY  age 22       Social History     Tobacco Use     Smoking status: Never Smoker     Smokeless tobacco: Never Used   Substance Use Topics     Alcohol use: No     Family History   Problem Relation Age of Onset     Breast Cancer Mother      Arthritis Father      C.A.D. Father      Cerebrovascular Disease Father      Respiratory Father         TB history     Rheumatoid Arthritis Father      Cerebrovascular Disease Maternal Grandmother      Hypertension Maternal Grandmother      Cancer Maternal Grandfather      C.A.D. Brother      Prostate Cancer Brother      C.A.D. Brother          Current Outpatient Medications   Medication Sig Dispense Refill     acetaminophen (TYLENOL) 500 MG tablet Take 1,000 mg by mouth 3 times daily       albuterol (PROAIR HFA/PROVENTIL HFA/VENTOLIN HFA) 108 (90 Base) MCG/ACT inhaler Inhale 2 puffs into the lungs every 4 hours as needed for shortness of breath / dyspnea or wheezing Use with spacer 1 Inhaler 1     aspirin 81 MG EC tablet Take 81 mg by mouth daily       atorvastatin (LIPITOR) 20 MG tablet TAKE 1/2 TABLET BY MOUTH ONCE DAILY 45 tablet 1     budesonide-formoterol (SYMBICORT) 80-4.5 MCG/ACT  Inhaler Inhale 2 puffs into the lungs 2 times daily 1 Inhaler 4     diltiazem (CARTIA XT) 120 MG 24 hr capsule Take 1 capsule (120 mg) by mouth daily For blood pressure/lowers pulse. Pharmacy ok to hold prescription until due 90 capsule 3     Dupilumab (DUPIXENT) 300 MG/2ML syringe Inject 2 mLs (300 mg) Subcutaneous every 14 days 4 mL 3     Spacer/Aero-Holding Chambers (SPACER/AERO-HOLD CHAMBER MASK) RAMAN 1 Adult size spacer to use with MDI inhalers. 1 each 0     triamcinolone (NASACORT) 55 MCG/ACT nasal aerosol Spray 2 sprays into both nostrils daily       Allergies   Allergen Reactions     Hytrin [Terazosin Hcl]      Leg swelling and vertigo     Simvastatin Other (See Comments)     Body aches     Cats      Codeine Nausea     Dogs      Dust Mites      Wildrose Trees      Seasonal Allergies      Recent Labs   Lab Test 10/22/19 09/09/19  1051 01/03/19  0934 03/16/18  1002 02/20/17  1013 03/21/16  1635  07/25/13  0950   LDL  --  77 83 67 82  --    < > 107   HDL  --  62 63 70 66  --    < > 62   TRIG  --  68 75 81 81  --    < > 64   ALT  --   --  29 26 32 85*   < > 61   CR 0.73 0.73 0.85 0.83 0.73 0.89   < > 0.89   GFRESTIMATED >60 89 84 90 >90  Non  GFR Calc   83   < > 84   GFRESTBLACK >60 >90 >90 >90 >90   GFR Calc   >90   GFR Calc     < > >90   POTASSIUM 4.3 3.9 4.2 4.0 3.9 3.9   < > 4.8   TSH  --   --   --   --   --  3.29  --  2.14    < > = values in this interval not displayed.      BP Readings from Last 3 Encounters:   07/31/20 120/62   06/02/20 122/60   02/17/20 132/61    Wt Readings from Last 3 Encounters:   07/31/20 100.2 kg (221 lb)   06/02/20 100.2 kg (221 lb)   02/17/20 100.5 kg (221 lb 9.6 oz)                    Reviewed and updated as needed this visit by Provider  Tobacco  Allergies  Meds  Problems  Med Hx  Surg Hx  Fam Hx         Review of Systems   Constitutional, HEENT, cardiovascular, pulmonary, gi and gu systems are negative, except as otherwise  noted.      Objective    There were no vitals taken for this visit.  There is no height or weight on file to calculate BMI.  Physical Exam  Vitals signs and nursing note reviewed.   Constitutional:       General: He is not in acute distress.     Appearance: Normal appearance. He is not ill-appearing, toxic-appearing or diaphoretic.   HENT:      Head: Normocephalic and atraumatic.   Neck:      Musculoskeletal: Normal range of motion and neck supple. No neck rigidity or muscular tenderness.      Vascular: No carotid bruit.   Cardiovascular:      Rate and Rhythm: Normal rate and regular rhythm.      Pulses: Normal pulses.      Heart sounds: Murmur present. No friction rub. No gallop.    Pulmonary:      Effort: Pulmonary effort is normal. No respiratory distress.      Breath sounds: Normal breath sounds. No stridor. No wheezing, rhonchi or rales.   Chest:      Chest wall: No tenderness.   Musculoskeletal: Normal range of motion.         General: No swelling, tenderness, deformity or signs of injury.   Lymphadenopathy:      Cervical: No cervical adenopathy.   Skin:     General: Skin is warm.      Coloration: Skin is not jaundiced or pale.      Findings: No bruising or erythema.   Neurological:      Mental Status: He is alert.                    Assessment & Plan   Patient is here for follow up after recent ED visit. He presented to the Ed for concern of  description of dizziness, disequilibrium, and weakness in his extremities. He had normal neurological exam, NIH of 0. His description was possibly symmetrical but overall unclear.CT head showed Chronic lacunar infarction in the basal ganglia on the left. Patient was discharged home.  Today he states he is feeling OK , slightly lightheaded. He started on 81 mg aspirin.   He denies any similar symptoms.   ED work up  EKG:   Time Performed: 0902   Time Resulted: 0907  Indication: Weakness   Ventricular Rate: 78  R wave axis: 34  Interpretation: Sinus rhythm with 1st degree  A-V block  Otherwise normal ECG  When compared with ECG of 15-MAY-2018 13:08,  No significant change was found    Imaging:  CT Head Brain WO:  1.  No acute findings.   2.  Chronic lacunar infarction in the basal ganglia on the left.   3.  Nonspecific white matter changes are most likely due to mild small vessel   ischemic disease.   Results per radiology.     CT Angio Head Neck Carotid:  1.  No dissection or significant stenosis.   Results per radiology.   1. Weakness     I had a  discussion with the patient about his condition , diagnosis treatment options. No acute change in his condition. Continue baby aspirin. BP is well controlled with current meds.   Asthma is well controlled, he is following up with allergy.   No change in medication   Follow up as needed   Red flags and warning signed discussed         Return in about 6 months (around 2/7/2021), or if symptoms worsen or fail to improve.    José Jesus MD  [facility]

## 2020-08-07 ENCOUNTER — OFFICE VISIT (OUTPATIENT)
Dept: FAMILY MEDICINE | Facility: CLINIC | Age: 80
End: 2020-08-07
Payer: COMMERCIAL

## 2020-08-07 VITALS
DIASTOLIC BLOOD PRESSURE: 72 MMHG | RESPIRATION RATE: 20 BRPM | WEIGHT: 224 LBS | BODY MASS INDEX: 33.08 KG/M2 | SYSTOLIC BLOOD PRESSURE: 130 MMHG | TEMPERATURE: 98.5 F | HEART RATE: 66 BPM | OXYGEN SATURATION: 96 %

## 2020-08-07 DIAGNOSIS — R53.1 WEAKNESS: Primary | ICD-10-CM

## 2020-08-07 PROCEDURE — 99214 OFFICE O/P EST MOD 30 MIN: CPT | Performed by: FAMILY MEDICINE

## 2020-08-31 DIAGNOSIS — E78.5 HYPERLIPIDEMIA LDL GOAL <130: ICD-10-CM

## 2020-08-31 RX ORDER — ATORVASTATIN CALCIUM 20 MG/1
TABLET, FILM COATED ORAL
Qty: 45 TABLET | Refills: 0 | Status: SHIPPED | OUTPATIENT
Start: 2020-08-31 | End: 2020-12-01

## 2020-08-31 NOTE — LETTER
August 31, 2020    Guillermo Hogue  [address]  [address]  [place] [address]    Dear Guillermo,       We recently received a refill request for atorvastatin (LIPITOR) 20 MG tablet .  We have refilled this for a one time 90 day supply only because you are due for a:     Fasting lab appointment-to check lipids        Please call at your earliest convenience so that there will not be a delay with your future refills.        Thank you,   Your [facility] Team/  [phone]

## 2020-09-04 ENCOUNTER — OFFICE VISIT (OUTPATIENT)
Dept: FAMILY MEDICINE | Facility: CLINIC | Age: 80
End: 2020-09-04
Payer: COMMERCIAL

## 2020-09-04 ENCOUNTER — ANCILLARY PROCEDURE (OUTPATIENT)
Dept: GENERAL RADIOLOGY | Facility: CLINIC | Age: 80
End: 2020-09-04
Attending: FAMILY MEDICINE
Payer: COMMERCIAL

## 2020-09-04 VITALS
HEART RATE: 72 BPM | TEMPERATURE: 97.3 F | BODY MASS INDEX: 33.4 KG/M2 | RESPIRATION RATE: 16 BRPM | WEIGHT: 226.2 LBS | DIASTOLIC BLOOD PRESSURE: 70 MMHG | OXYGEN SATURATION: 96 % | SYSTOLIC BLOOD PRESSURE: 128 MMHG

## 2020-09-04 DIAGNOSIS — R04.2 HEMOPTYSIS: Primary | ICD-10-CM

## 2020-09-04 LAB
BASOPHILS # BLD AUTO: 0 10E9/L (ref 0–0.2)
BASOPHILS NFR BLD AUTO: 0.2 %
DIFFERENTIAL METHOD BLD: NORMAL
EOSINOPHIL # BLD AUTO: 0.5 10E9/L (ref 0–0.7)
EOSINOPHIL NFR BLD AUTO: 5.7 %
ERYTHROCYTE [DISTWIDTH] IN BLOOD BY AUTOMATED COUNT: 14 % (ref 10–15)
HCT VFR BLD AUTO: 42.9 % (ref 40–53)
HGB BLD-MCNC: 13.6 G/DL (ref 13.3–17.7)
LYMPHOCYTES # BLD AUTO: 2.8 10E9/L (ref 0.8–5.3)
LYMPHOCYTES NFR BLD AUTO: 31.7 %
MCH RBC QN AUTO: 29.6 PG (ref 26.5–33)
MCHC RBC AUTO-ENTMCNC: 31.7 G/DL (ref 31.5–36.5)
MCV RBC AUTO: 93 FL (ref 78–100)
MONOCYTES # BLD AUTO: 1 10E9/L (ref 0–1.3)
MONOCYTES NFR BLD AUTO: 11.2 %
NEUTROPHILS # BLD AUTO: 4.5 10E9/L (ref 1.6–8.3)
NEUTROPHILS NFR BLD AUTO: 51.2 %
PLATELET # BLD AUTO: 200 10E9/L (ref 150–450)
RBC # BLD AUTO: 4.6 10E12/L (ref 4.4–5.9)
WBC # BLD AUTO: 8.7 10E9/L (ref 4–11)

## 2020-09-04 PROCEDURE — 99214 OFFICE O/P EST MOD 30 MIN: CPT | Performed by: FAMILY MEDICINE

## 2020-09-04 PROCEDURE — 85025 COMPLETE CBC W/AUTO DIFF WBC: CPT | Performed by: FAMILY MEDICINE

## 2020-09-04 PROCEDURE — 71046 X-RAY EXAM CHEST 2 VIEWS: CPT

## 2020-09-04 PROCEDURE — 36415 COLL VENOUS BLD VENIPUNCTURE: CPT | Performed by: FAMILY MEDICINE

## 2020-09-04 NOTE — PROGRESS NOTES
Subjective     Guillermo Hogue is a 80 year old male who presents to clinic today for the following health issues:    HPI       Acute Illness  Acute illness concerns:  Coughed up blood   Onset/Duration: since last night  Symptoms:  Fever: no  Chills/Sweats: no  Headache (location?): No  Sinus Pressure: YES- allergies  Conjunctivitis:  no  Ear Pain: no  Rhinorrhea: no  Congestion: no  Sore Throat: discomfort- pain and coughed up some blood last night and this morning.   Cough: no  Wheeze: no  Decreased Appetite: no  Nausea: no  Vomiting: no  Diarrhea: no  Dysuria/Freq.: no  Dysuria or Hematuria: no  Fatigue/Achiness: no  Sick/Strep Exposure: no  Therapies tried and outcome: nasonex     Reports that he sees an allergist.   Concerned today due to coughing up blood lat night. States that it's was streaks of blood in the mucus. No recurrences.     Denies any recent COVID-19 exposures. Denies having a cough or recent URI infection.      Review of Systems   Constitutional, HEENT, cardiovascular, pulmonary, gi and gu systems are negative, except as otherwise noted.      Objective    /70   Pulse 72   Temp 97.3  F (36.3  C) (Tympanic)   Resp 16   Wt 102.6 kg (226 lb 3.2 oz)   SpO2 96%   BMI 33.40 kg/m    Body mass index is 33.4 kg/m .  Physical Exam   GENERAL: healthy, alert and no distress  EYES: Eyes grossly normal to inspection, PERRL and conjunctivae and sclerae normal  HENT: ear canals and TM's normal, nose and mouth without ulcers or lesions  NECK: no adenopathy, no asymmetry, masses, or scars and thyroid normal to palpation  RESP: lungs clear to auscultation - no rales, rhonchi or wheezes  CV: regular rate and rhythm, normal S1 S2, no S3 or S4, no murmur, click or rub, no peripheral edema and peripheral pulses strong  PSYCH: mentation appears normal, affect normal/bright    DATA  Reviewed and discussed with patient prior to discharge.  Results for orders placed or performed in visit on 09/04/20   XR Chest 2  Views     Status: None    Narrative    CHEST TWO VIEWS  9/4/2020 11:11 AM     HISTORY: Hemoptysis.    COMPARISON: July 10, 2019       Impression    IMPRESSION: Stable elevated left hemidiaphragm. There are no acute  infiltrates. The cardiac silhouette is not enlarged. Pulmonary  vasculature is unremarkable.     CAROL ROSEN MD   CBC with platelets and differential     Status: None   Result Value Ref Range    WBC 8.7 4.0 - 11.0 10e9/L    RBC Count 4.60 4.4 - 5.9 10e12/L    Hemoglobin 13.6 13.3 - 17.7 g/dL    Hematocrit 42.9 40.0 - 53.0 %    MCV 93 78 - 100 fl    MCH 29.6 26.5 - 33.0 pg    MCHC 31.7 31.5 - 36.5 g/dL    RDW 14.0 10.0 - 15.0 %    Platelet Count 200 150 - 450 10e9/L    % Neutrophils 51.2 %    % Lymphocytes 31.7 %    % Monocytes 11.2 %    % Eosinophils 5.7 %    % Basophils 0.2 %    Absolute Neutrophil 4.5 1.6 - 8.3 10e9/L    Absolute Lymphocytes 2.8 0.8 - 5.3 10e9/L    Absolute Monocytes 1.0 0.0 - 1.3 10e9/L    Absolute Eosinophils 0.5 0.0 - 0.7 10e9/L    Absolute Basophils 0.0 0.0 - 0.2 10e9/L    Diff Method Automated Method            Assessment & Plan     Guillermo was seen today for throat problem.    Diagnoses and all orders for this visit:    Hemoptysis, noticed last night. No recurrences.   -     CBC with platelets and differential  -     XR Chest 2 Views  -     OTOLARYNGOLOGY REFERRAL      Initial evaluation was unremarkable.    Patient education and Handout with home care instructions given. See AVS for details.      Return in about 1 week (around 9/11/2020), or if symptoms worsen or fail to improve.    Karli Ahmadi MD  [facility]

## 2020-09-04 NOTE — PATIENT INSTRUCTIONS
Patient Education     Hemoptysis    Hemoptysis is the medical term for coughing up blood. There are many causes for this, including minor illnesses like bronchitis. Hemoptysis can also be an early sign of a more serious illness, like a blood clot in the lung (pulmonary embolism), cancer, tuberculosis, or pneumonia.  Less common causes of hemoptysis can be hard to diagnose in an emergency department or a clinic. More testing will be needed if the symptoms continue.  Home care    Stay away from cigarette smoke. Smoke irritates the bronchial passages.    Unless you are taking daily aspirin to prevent stroke or heart attack, don't take aspirin or products that contain aspirin. Check with your healthcare provider to see if you should continue the aspirin or when you should restart it if you develop hemoptysis. Aspirin affects how readily the blood clots. Medicines that prevent clotting may make hemoptysis worse.    If you have a lung infection, drinking extra fluid will help loosen secretions in the lungs.    Over-the-counter cough medicines that contain dextromethorphan may help reduce coughing. Check with your healthcare provider before taking dextromethorphan if you have a chronic illness, are pregnant, or take daily medicines.    If you were prescribed an antibiotic, take it until it is all gone. Take it even if you are feeling better after only a few days.    Follow-up care  Follow up with your healthcare provider, or as advised.  When to seek medical advice  Call your healthcare provider right away if any of these occur:    Fever of 100.4 F (38 C) or higher, or as directed by your healthcare provider  Call 911  Call 911 if any of these occur:    Coughing up an increased amount of blood    Trouble breathing, wheezing, or pain with breathing    Chest pain or chest pressure    Fainting or losing consciousness    Rapid heartbeat    Weakness or dizziness  Date Last Reviewed: 6/1/2018 2000-2019 [employer],  [facility]. [address] [place] [address]. All rights reserved. This information is not intended as a substitute for professional medical care. Always follow your healthcare professional's instructions.

## 2020-09-08 NOTE — PROGRESS NOTES
"History of Present Illness - Guillermo Hogue is a 79 year old male status post functional endoscopic sinus surgery on 2/12/2009, last seen on 9/3/2019    To review, at the visit on 11/11/2013 he told me that since the functional endoscopic sinus surgery his nose had been \"excellent.\"   However, we have continued to manage a chronic post nasal drainage.    To review, he was on BPM as an antihistamine, but because it was no longer available, I placed him on low dose atarax (hydroxyzine) to dry up his allergic rhinitis related post nasal drainage.  He told me that \"cornered the market on the BPM in [place]\" and he had run out of it.  I tried him on atarax but he tells me that it is not helping nearly as much.  He is also taking the BID mucinex, but he thinks that is making only a minimal difference.  He had been taking sleeping pills to get him to sleep.    So with those failures of therapy, I tried a new approach, and used Gent Itraconazole Dex nasal irrigations with the thought that perhaps this was inflammatory post nasal drainage.  On 12/12/12, and the rinses worked dramatically well for him.  He did have complaints of sleep cycle issues, which I then addressed with decreasing the the Dex component.  But then at the follow up on 2/6/2013 there was a return of the drainage and congestion, so I resumed the full strength dex component for two months.  At the visit on 8/5/2014, I encouraged him to try and decrease the frequency of the medicated rinses, and continue saline irrigation.  And at the most recent visit on 10/5/2015, things were going great, and I had not seen him since 4/4/2017.    At the 2018 visit he also had a new issue.  He told me that intermittently over the years he has had issues with occasional dizziness, and PT for benign paroxysmal positional vertigo has always helped in the past.  But this time it has not seemed help.  I felt that this bidirectional rotationally triggered momentary vertigo was " posterior canal benign paroxysmal positional vertigo, and referred him for Semont maneuvers.  That problem has improved.    The main reason for his March 2019 visit was because he had been having some pain in the RIGHT upper chest when he lays down.  He was seen by his PCP Dr. Doty, and there was some noise in the chest, and was treated with Amoxicillin and predniosne, and a second round.  He wanted to rule out sinus post nasal drainage as the cause of this pain in the chest. There had been no productive cough or dyspnea otherwise. I ordered a chest CT, which was thankfully clear.    The reason he has come back to me is due to a return of acute sinus symptoms with pressure in the face and pain in the upper teeth.  He is currently using only Nasacort from Dr. Del Rio.  That has really been helping his post nasal drainage and his chronic sore throats as well. He has not been on medication irrigations for years.      Past Medical History -   Patient Active Problem List   Diagnosis     Reactive airway disease     Other chronic sinusitis     AR (allergic rhinitis)     HYPERLIPIDEMIA LDL GOAL <130     BPH (benign prostatic hyperplasia)     Palpitations     Hypertension goal BP (blood pressure) < 140/90     Fatty liver     Cataracts, bilateral     Moderate persistent asthma, uncomplicated     Seasonal allergic rhinitis due to pollen     Allergic rhinitis due to animal dander     Allergic rhinitis due to dust mite     Paroxysmal atrial fibrillation (H)     Dizziness       Current Medications -   Current Outpatient Medications:      acetaminophen (TYLENOL) 500 MG tablet, Take 1,000 mg by mouth 3 times daily, Disp: , Rfl:      albuterol (PROAIR HFA/PROVENTIL HFA/VENTOLIN HFA) 108 (90 Base) MCG/ACT inhaler, Inhale 2 puffs into the lungs every 4 hours as needed for shortness of breath / dyspnea or wheezing Use with spacer, Disp: 1 Inhaler, Rfl: 1     aspirin 81 MG EC tablet, Take 81 mg by mouth daily, Disp: , Rfl:       atorvastatin (LIPITOR) 20 MG tablet, TAKE 1/2 TABLET BY MOUTH ONCE DAILY, Disp: 45 tablet, Rfl: 0     budesonide-formoterol (SYMBICORT) 80-4.5 MCG/ACT Inhaler, Inhale 2 puffs into the lungs 2 times daily, Disp: 1 Inhaler, Rfl: 4     diltiazem (CARTIA XT) 120 MG 24 hr capsule, Take 1 capsule (120 mg) by mouth daily For blood pressure/lowers pulse. Pharmacy ok to hold prescription until due, Disp: 90 capsule, Rfl: 3     Dupilumab (DUPIXENT) 300 MG/2ML syringe, Inject 2 mLs (300 mg) Subcutaneous every 14 days, Disp: 4 mL, Rfl: 3     Spacer/Aero-Holding Chambers (SPACER/AERO-HOLD CHAMBER MASK) RAMAN, 1 Adult size spacer to use with MDI inhalers., Disp: 1 each, Rfl: 0     triamcinolone (NASACORT) 55 MCG/ACT nasal aerosol, Spray 2 sprays into both nostrils daily, Disp: , Rfl:     Allergies -   Allergies   Allergen Reactions     Hytrin [Terazosin Hcl]      Leg swelling and vertigo     Simvastatin Other (See Comments)     Body aches     Cats      Codeine Nausea     Dogs      Dust Mites      [place] Trees      Seasonal Allergies        Social History -   Social History     Social History     Marital status:      Spouse name: Sanam -       Number of children: 2     Years of education: 14     Occupational History     [employer] Management Retired          Social History Main Topics     Smoking status: Never Smoker     Smokeless tobacco: Never Used     Alcohol use No     Drug use: No     Sexual activity: No     Other Topics Concern     Parent/Sibling W/ Cabg, Mi Or Angioplasty Before 65f 55m? Yes     2 Brothers and father      Service No     Blood Transfusions No     Caffeine Concern No     Occupational Exposure Yes     he was in [employer]     Hobby Hazards No     Sleep Concern No     Stress Concern No     Weight Concern Yes     Special Diet No     Back Care No     Exercise No     Bike Helmet No     Seat Belt Yes     Self-Exams No     Social History Narrative       Family History -   Family History   Problem  "Relation Age of Onset     Breast Cancer Mother      Arthritis Father      C.A.D. Father      Cerebrovascular Disease Father      Respiratory Father         TB history     Rheumatoid Arthritis Father      Cerebrovascular Disease Maternal Grandmother      Hypertension Maternal Grandmother      Cancer Maternal Grandfather      C.A.D. Brother      Prostate Cancer Brother      C.A.D. Brother        Review of Systems - As per HPI and PMHx, otherwise 10+ system review of the head and neck, and general constitution is negative.    Physical Exam  /63   Pulse 70   Resp 16   Ht 1.753 m (5' 9\")   Wt 102.5 kg (226 lb)   SpO2 94%   BMI 33.37 kg/m      General - The patient is well nourished and well developed, and appears to have good nutritional status.  Alert and oriented to person and place, answers questions and cooperates with examination appropriately.   Head and Face - Normocephalic and atraumatic, with no gross asymmetry noted of the contour of the facial features.  The facial nerve is intact, with strong symmetric movements.  Voice and Breathing - The patient was breathing comfortably without the use of accessory muscles. There was no wheezing, stridor, or stertor.  The patients voice was clear and strong, and had appropriate pitch and quality.  Ears - The tympanic membranes are normal in appearance, bony landmarks are intact.  No retraction, perforation, or masses.  No fluid or purulence was seen in the external canal or the middle ear. No evidence of infection of the middle ear or external canal, cerumen was normal in appearance.  Eyes - Extraocular movements intact, and the pupils were reactive to light.  Sclera were not icteric or injected, conjunctiva were pink and moist.  Mouth - Examination of the oral cavity showed pink, healthy oral mucosa. No lesions or ulcerations noted.  The tongue was mobile and midline, and the dentition were in good condition.    Throat - The walls of the oropharynx were " smooth, pink, moist, symmetric, and had no lesions or ulcerations.  The tonsillar pillars and soft palate were symmetric.  The uvula was midline on elevation.    Neck - Normal midline excursion of the laryngotracheal complex during swallowing.  Full range of motion on passive movement.  Palpation of the occipital, submental, submandibular, internal jugular chain, and supraclavicular nodes did not demonstrate any abnormal lymph nodes or masses.  The carotid pulse was palpable bilaterally.  Palpation of the thyroid was soft and smooth, with no nodules or goiter appreciated.  The trachea was mobile and midline.  Nose - The nose was examined with nasal speculum. On the LEFT, I was able to look up into the middle meatus all the way to the roof of the ethmoid.  The mucosa is overall more edematous than expected with some thick cloudy secretions bilaterally.      A/P - Guillermo Hogue is a 79 year old male  (J32.4) Chronic pansinusitis  (primary encounter diagnosis)  (J33.9) Nasal polyposis    Overall Guillermo has done well over the years, and only using Nasacort.  I suspect the current episode is an acute sinusitis due to a severe fall allergy season.    I roth tart simple, and only do a 10 day course of Bactrim    Call if not resolved, and I will try a different antibiotic.  He is averse to trying antibiotic nasal irrigations again.

## 2020-09-10 ENCOUNTER — OFFICE VISIT (OUTPATIENT)
Dept: OTOLARYNGOLOGY | Facility: CLINIC | Age: 80
End: 2020-09-10
Payer: COMMERCIAL

## 2020-09-10 VITALS
BODY MASS INDEX: 33.47 KG/M2 | WEIGHT: 226 LBS | RESPIRATION RATE: 16 BRPM | SYSTOLIC BLOOD PRESSURE: 125 MMHG | DIASTOLIC BLOOD PRESSURE: 63 MMHG | HEART RATE: 70 BPM | OXYGEN SATURATION: 94 % | HEIGHT: 69 IN

## 2020-09-10 DIAGNOSIS — J32.4 CHRONIC PANSINUSITIS: Primary | ICD-10-CM

## 2020-09-10 DIAGNOSIS — J33.9 NASAL POLYPOSIS: ICD-10-CM

## 2020-09-10 PROCEDURE — 99214 OFFICE O/P EST MOD 30 MIN: CPT | Performed by: OTOLARYNGOLOGY

## 2020-09-10 RX ORDER — SULFAMETHOXAZOLE/TRIMETHOPRIM 800-160 MG
1 TABLET ORAL 2 TIMES DAILY
Qty: 20 TABLET | Refills: 1 | Status: SHIPPED | OUTPATIENT
Start: 2020-09-10 | End: 2020-09-20

## 2020-09-10 ASSESSMENT — PAIN SCALES - GENERAL: PAINLEVEL: MILD PAIN (3)

## 2020-09-10 ASSESSMENT — MIFFLIN-ST. JEOR: SCORE: 1730.51

## 2020-09-10 NOTE — LETTER
"    9/10/2020         RE: Guillermo Hogue  [address]  [address]  [place] [address]        Dear Colleague,    Thank you for referring your patient, Guillermo Hogue, to the [facility]. Please see a copy of my visit note below.    History of Present Illness - Guillermo Hogue is a 79 year old male status post functional endoscopic sinus surgery on 2/12/2009, last seen on 9/3/2019    To review, at the visit on 11/11/2013 he told me that since the functional endoscopic sinus surgery his nose had been \"excellent.\"   However, we have continued to manage a chronic post nasal drainage.    To review, he was on BPM as an antihistamine, but because it was no longer available, I placed him on low dose atarax (hydroxyzine) to dry up his allergic rhinitis related post nasal drainage.  He told me that \"cornered the market on the BPM in [place]\" and he had run out of it.  I tried him on atarax but he tells me that it is not helping nearly as much.  He is also taking the BID mucinex, but he thinks that is making only a minimal difference.  He had been taking sleeping pills to get him to sleep.    So with those failures of therapy, I tried a new approach, and used Gent Itraconazole Dex nasal irrigations with the thought that perhaps this was inflammatory post nasal drainage.  On 12/12/12, and the rinses worked dramatically well for him.  He did have complaints of sleep cycle issues, which I then addressed with decreasing the the Dex component.  But then at the follow up on 2/6/2013 there was a return of the drainage and congestion, so I resumed the full strength dex component for two months.  At the visit on 8/5/2014, I encouraged him to try and decrease the frequency of the medicated rinses, and continue saline irrigation.  And at the most recent visit on 10/5/2015, things were going great, and I had not seen him since 4/4/2017.    At the 2018 visit he also had a new issue.  He told me that intermittently over " the years he has had issues with occasional dizziness, and PT for benign paroxysmal positional vertigo has always helped in the past.  But this time it has not seemed help.  I felt that this bidirectional rotationally triggered momentary vertigo was posterior canal benign paroxysmal positional vertigo, and referred him for Semont maneuvers.  That problem has improved.    The main reason for his March 2019 visit was because he had been having some pain in the RIGHT upper chest when he lays down.  He was seen by his PCP Dr. Doty, and there was some noise in the chest, and was treated with Amoxicillin and predniosne, and a second round.  He wanted to rule out sinus post nasal drainage as the cause of this pain in the chest. There had been no productive cough or dyspnea otherwise. I ordered a chest CT, which was thankfully clear.    The reason he has come back to me is due to a return of acute sinus symptoms with pressure in the face and pain in the upper teeth.  He is currently using only Nasacort from Dr. Del Rio.  That has really been helping his post nasal drainage and his chronic sore throats as well. He has not been on medication irrigations for years.      Past Medical History -   Patient Active Problem List   Diagnosis     Reactive airway disease     Other chronic sinusitis     AR (allergic rhinitis)     HYPERLIPIDEMIA LDL GOAL <130     BPH (benign prostatic hyperplasia)     Palpitations     Hypertension goal BP (blood pressure) < 140/90     Fatty liver     Cataracts, bilateral     Moderate persistent asthma, uncomplicated     Seasonal allergic rhinitis due to pollen     Allergic rhinitis due to animal dander     Allergic rhinitis due to dust mite     Paroxysmal atrial fibrillation (H)     Dizziness       Current Medications -   Current Outpatient Medications:      acetaminophen (TYLENOL) 500 MG tablet, Take 1,000 mg by mouth 3 times daily, Disp: , Rfl:      albuterol (PROAIR HFA/PROVENTIL HFA/VENTOLIN HFA) 108  (90 Base) MCG/ACT inhaler, Inhale 2 puffs into the lungs every 4 hours as needed for shortness of breath / dyspnea or wheezing Use with spacer, Disp: 1 Inhaler, Rfl: 1     aspirin 81 MG EC tablet, Take 81 mg by mouth daily, Disp: , Rfl:      atorvastatin (LIPITOR) 20 MG tablet, TAKE 1/2 TABLET BY MOUTH ONCE DAILY, Disp: 45 tablet, Rfl: 0     budesonide-formoterol (SYMBICORT) 80-4.5 MCG/ACT Inhaler, Inhale 2 puffs into the lungs 2 times daily, Disp: 1 Inhaler, Rfl: 4     diltiazem (CARTIA XT) 120 MG 24 hr capsule, Take 1 capsule (120 mg) by mouth daily For blood pressure/lowers pulse. Pharmacy ok to hold prescription until due, Disp: 90 capsule, Rfl: 3     Dupilumab (DUPIXENT) 300 MG/2ML syringe, Inject 2 mLs (300 mg) Subcutaneous every 14 days, Disp: 4 mL, Rfl: 3     Spacer/Aero-Holding Chambers (SPACER/AERO-HOLD CHAMBER MASK) RAMAN, 1 Adult size spacer to use with MDI inhalers., Disp: 1 each, Rfl: 0     triamcinolone (NASACORT) 55 MCG/ACT nasal aerosol, Spray 2 sprays into both nostrils daily, Disp: , Rfl:     Allergies -   Allergies   Allergen Reactions     Hytrin [Terazosin Hcl]      Leg swelling and vertigo     Simvastatin Other (See Comments)     Body aches     Cats      Codeine Nausea     Dogs      Dust Mites      [place] Trees      Seasonal Allergies        Social History -   Social History     Social History     Marital status:      Spouse name: Sanam -       Number of children: 2     Years of education: 14     Occupational History     Middle Management Retired          Social History Main Topics     Smoking status: Never Smoker     Smokeless tobacco: Never Used     Alcohol use No     Drug use: No     Sexual activity: No     Other Topics Concern     Parent/Sibling W/ Cabg, Mi Or Angioplasty Before 65f 55m? Yes     2 Brothers and father      Service No     Blood Transfusions No     Caffeine Concern No     Occupational Exposure Yes     he was in [employer]     Hobby Hazards No     Sleep  "Concern No     Stress Concern No     Weight Concern Yes     Special Diet No     Back Care No     Exercise No     Bike Helmet No     Seat Belt Yes     Self-Exams No     Social History Narrative       Family History -   Family History   Problem Relation Age of Onset     Breast Cancer Mother      Arthritis Father      C.A.D. Father      Cerebrovascular Disease Father      Respiratory Father         TB history     Rheumatoid Arthritis Father      Cerebrovascular Disease Maternal Grandmother      Hypertension Maternal Grandmother      Cancer Maternal Grandfather      C.A.D. Brother      Prostate Cancer Brother      C.A.D. Brother        Review of Systems - As per HPI and PMHx, otherwise 10+ system review of the head and neck, and general constitution is negative.    Physical Exam  /63   Pulse 70   Resp 16   Ht 1.753 m (5' 9\")   Wt 102.5 kg (226 lb)   SpO2 94%   BMI 33.37 kg/m      General - The patient is well nourished and well developed, and appears to have good nutritional status.  Alert and oriented to person and place, answers questions and cooperates with examination appropriately.   Head and Face - Normocephalic and atraumatic, with no gross asymmetry noted of the contour of the facial features.  The facial nerve is intact, with strong symmetric movements.  Voice and Breathing - The patient was breathing comfortably without the use of accessory muscles. There was no wheezing, stridor, or stertor.  The patients voice was clear and strong, and had appropriate pitch and quality.  Ears - The tympanic membranes are normal in appearance, bony landmarks are intact.  No retraction, perforation, or masses.  No fluid or purulence was seen in the external canal or the middle ear. No evidence of infection of the middle ear or external canal, cerumen was normal in appearance.  Eyes - Extraocular movements intact, and the pupils were reactive to light.  Sclera were not icteric or injected, conjunctiva were pink and " moist.  Mouth - Examination of the oral cavity showed pink, healthy oral mucosa. No lesions or ulcerations noted.  The tongue was mobile and midline, and the dentition were in good condition.    Throat - The walls of the oropharynx were smooth, pink, moist, symmetric, and had no lesions or ulcerations.  The tonsillar pillars and soft palate were symmetric.  The uvula was midline on elevation.    Neck - Normal midline excursion of the laryngotracheal complex during swallowing.  Full range of motion on passive movement.  Palpation of the occipital, submental, submandibular, internal jugular chain, and supraclavicular nodes did not demonstrate any abnormal lymph nodes or masses.  The carotid pulse was palpable bilaterally.  Palpation of the thyroid was soft and smooth, with no nodules or goiter appreciated.  The trachea was mobile and midline.  Nose - The nose was examined with nasal speculum. On the LEFT, I was able to look up into the middle meatus all the way to the roof of the ethmoid.  The mucosa is overall more edematous than expected with some thick cloudy secretions bilaterally.      A/P - Guillermo Hogue is a 79 year old male  (J32.4) Chronic pansinusitis  (primary encounter diagnosis)  (J33.9) Nasal polyposis    Overall Guillermo has done well over the years, and only using Nasacort.  I suspect the current episode is an acute sinusitis due to a severe fall allergy season.    I roth tart simple, and only do a 10 day course of Bactrim    Call if not resolved, and I will try a different antibiotic.  He is averse to trying antibiotic nasal irrigations again.    Again, thank you for allowing me to participate in the care of your patient.        Sincerely,        Alonso Valadez MD

## 2020-09-25 DIAGNOSIS — J45.40 MODERATE PERSISTENT ASTHMA, UNCOMPLICATED: ICD-10-CM

## 2020-09-25 RX ORDER — DUPILUMAB 300 MG/2ML
300 INJECTION, SOLUTION SUBCUTANEOUS
Qty: 4 ML | Refills: 3 | Status: SHIPPED | OUTPATIENT
Start: 2020-09-25 | End: 2021-02-02

## 2020-09-25 NOTE — TELEPHONE ENCOUNTER
Reason for call:  Medication   If this is a refill request, has the caller requested the refill from the pharmacy already? No  Will the patient be using a [facility]? No  Name of the pharmacy and phone number for the current request: [facility] [phone]    Name of the medication requested: Dupixent     Other request: n/a     Phone number to reach patient:  Home number on file [phone] (home)    Best Time:  Any     Can we leave a detailed message on this number?  YES    Travel screening: Not Applicable

## 2020-11-12 ENCOUNTER — MEDICAL CORRESPONDENCE (OUTPATIENT)
Dept: HEALTH INFORMATION MANAGEMENT | Facility: CLINIC | Age: 80
End: 2020-11-12

## 2020-11-13 ENCOUNTER — OFFICE VISIT (OUTPATIENT)
Dept: ALLERGY | Facility: CLINIC | Age: 80
End: 2020-11-13
Payer: COMMERCIAL

## 2020-11-13 VITALS
BODY MASS INDEX: 33.18 KG/M2 | WEIGHT: 224 LBS | DIASTOLIC BLOOD PRESSURE: 66 MMHG | HEART RATE: 71 BPM | OXYGEN SATURATION: 97 % | HEIGHT: 69 IN | SYSTOLIC BLOOD PRESSURE: 120 MMHG

## 2020-11-13 DIAGNOSIS — J30.1 SEASONAL ALLERGIC RHINITIS DUE TO POLLEN: ICD-10-CM

## 2020-11-13 DIAGNOSIS — J30.81 ALLERGIC RHINITIS DUE TO ANIMAL DANDER: ICD-10-CM

## 2020-11-13 DIAGNOSIS — J45.40 MODERATE PERSISTENT ASTHMA, UNCOMPLICATED: ICD-10-CM

## 2020-11-13 DIAGNOSIS — J30.89 ALLERGIC RHINITIS DUE TO DUST MITE: ICD-10-CM

## 2020-11-13 DIAGNOSIS — J33.9 NASAL POLYPOSIS: ICD-10-CM

## 2020-11-13 DIAGNOSIS — J32.9 CHRONIC SINUSITIS, UNSPECIFIED LOCATION: Primary | ICD-10-CM

## 2020-11-13 PROCEDURE — 99213 OFFICE O/P EST LOW 20 MIN: CPT | Performed by: ALLERGY & IMMUNOLOGY

## 2020-11-13 RX ORDER — DOXYCYCLINE HYCLATE 100 MG
100 TABLET ORAL 2 TIMES DAILY
Qty: 14 TABLET | Refills: 0 | Status: SHIPPED | OUTPATIENT
Start: 2020-11-13 | End: 2020-11-20

## 2020-11-13 ASSESSMENT — ASTHMA QUESTIONNAIRES
QUESTION_5 LAST FOUR WEEKS HOW WOULD YOU RATE YOUR ASTHMA CONTROL: WELL CONTROLLED
QUESTION_3 LAST FOUR WEEKS HOW OFTEN DID YOUR ASTHMA SYMPTOMS (WHEEZING, COUGHING, SHORTNESS OF BREATH, CHEST TIGHTNESS OR PAIN) WAKE YOU UP AT NIGHT OR EARLIER THAN USUAL IN THE MORNING: NOT AT ALL
QUESTION_2 LAST FOUR WEEKS HOW OFTEN HAVE YOU HAD SHORTNESS OF BREATH: ONCE OR TWICE A WEEK
ACT_TOTALSCORE: 21
QUESTION_4 LAST FOUR WEEKS HOW OFTEN HAVE YOU USED YOUR RESCUE INHALER OR NEBULIZER MEDICATION (SUCH AS ALBUTEROL): NOT AT ALL
QUESTION_1 LAST FOUR WEEKS HOW MUCH OF THE TIME DID YOUR ASTHMA KEEP YOU FROM GETTING AS MUCH DONE AT WORK, SCHOOL OR AT HOME: SOME OF THE TIME

## 2020-11-13 ASSESSMENT — MIFFLIN-ST. JEOR: SCORE: 1721.44

## 2020-11-13 NOTE — ASSESSMENT & PLAN NOTE
History of chronic chest symptoms for multiple years.  Diagnosed with asthma at [facility] in 2010.  On Symbicort 80/4.5 mcg 2 puff inhaled twice daily. Now on Dupixent and huge improvement in chest symptoms.      Asthma is well controlled currently.      -Symbicort 80/4.5 mcg 2 puff inhaled twice daily.  -Albuterol 2-4 puffs inhaled (use a spacer unless using a Proair Respiclick device) every 4 hours as needed for chest tightness, wheezing, shortness of breath and/or coughing.   -Albuterol 2-4 puffs inhaled (use spacer if not using Proair Respiclick device) 15-20 minutes prior to physical activity.   Please ensure warm up period prior to exercise.   - Dupixent 300 mg every other week.

## 2020-11-13 NOTE — ASSESSMENT & PLAN NOTE
History of chronic sinusitis with nasal polyposis.  Follows with ENT.  Now with increased sinus pressure, congestion over the last 1 week.  Sense of smell intact.  Denies fevers or chills.    -Continue Nasacort 2 sprays per nostril daily.  -He was provided a prescription for doxycycline 100 mg p.o. twice daily for 10 days.  If he continues to have symptoms over the course of the next 3 days he may start doxycycline.

## 2020-11-13 NOTE — PROGRESS NOTES
Guillermo Hogue is a 80 year old White male with previous medical history significant for chronic sinusitis with nasal polyposis, asthma who returns for a follow up visit.     The patient reports that over the course of the last 1 week he has had increased nasal congestion, frontal sinus pressure, clear mucus.  Denies postnasal drainage.  History of chronic sinusitis with nasal polyposis.  On Nasacort 2 sprays per nostril daily.  He had similar symptoms the summer and was treated with antibiotic and this was significantly beneficial.  He has asthma.  He is on Symbicort 80/4.5 mcg using 2 puffs inhaled twice daily.  He reports his asthma has been well controlled.  He has some mild shortness of breath with physical activity.  He additionally is on Dupixent.  Dupixent has been significantly beneficial.  Sense of smell and taste intact.  No fevers or chills.    ACT Total Scores 11/13/2020   ACT TOTAL SCORE -   ASTHMA ER VISITS -   ASTHMA HOSPITALIZATIONS -   ACT TOTAL SCORE (Goal Greater than or Equal to 20) 21   In the past 12 months, how many times did you visit the emergency room for your asthma without being admitted to the hospital? 0   In the past 12 months, how many times were you hospitalized overnight because of your asthma? 0            Past Medical History:   Diagnosis Date     Allergic rhinitis age 22     Asthma 2009     Chronic osteoarthritis      Nasal polyposis 2009     Osteoarthritis of left hip      Reactive airway disease 2009     Family History   Problem Relation Age of Onset     Breast Cancer Mother      Arthritis Father      C.A.D. Father      Cerebrovascular Disease Father      Respiratory Father         TB history     Rheumatoid Arthritis Father      Cerebrovascular Disease Maternal Grandmother      Hypertension Maternal Grandmother      Cancer Maternal Grandfather      [email or web address]. Brother      Prostate Cancer Brother      [email or web address]. Brother      Past Surgical History:   Procedure Laterality Date      COLONOSCOPY       COLONOSCOPY N/A 5/10/2017    Procedure: COMBINED COLONOSCOPY, SINGLE OR MULTIPLE BIOPSY/POLYPECTOMY BY BIOPSY;;  Surgeon: Abisai Duarte DO;  Location: [facility]     COLONOSCOPY WITH CO2 INSUFFLATION N/A 5/14/2015    Procedure: COLONOSCOPY WITH CO2 INSUFFLATION;  Surgeon: Jose R Richmond MD;  Location: [facility]     COLONOSCOPY WITH CO2 INSUFFLATION N/A 5/10/2017    Procedure: COLONOSCOPY WITH CO2 INSUFFLATION;  COLON SCREEN/ UNDERWOOD;  Surgeon: Absiai Duarte DO;  Location: [facility]     JOINT REPLACEMENT Left 06/2019    hip     SINUS SURGERY  2009     TONSILLECTOMY  age 22       REVIEW OF SYSTEMS:  General: negative for weight gain. negative for weight loss. negative for changes in sleep.   Ears: negative for fullness. negative for hearing loss. negative for dizziness.   Nose: negative for snoring.negative for changes in smell. negative for drainage.   Eyes: negative for eye watering. negative for eye itching. negative for vision changes. negative for eye redness.  Throat: negative for hoarseness. negative for sore throat. negative for trouble swallowing.   Lungs: positive  for shortness of breath.negative for wheezing. negative for sputum production.   Cardiovascular: negative for chest pain. negative for swelling of ankles. negative for fast or irregular heartbeat.   Gastrointestinal: negative for nausea. negative for heartburn. negative for acid reflux.   Musculoskeletal: negative for joint pain. negative for joint stiffness. negative for joint swelling.   Neurologic: negative for seizures. negative for fainting. negative for weakness.   Psychiatric: negative for changes in mood. negative for anxiety.   Endocrine: negative for cold intolerance. negative for heat intolerance. negative for tremors.   Lymphatic: negative for lower extremity swelling. negative for lymph node swelling.   Hematologic: negative for easy bruising. negative for easy bleeding.  Integumentary: negative for rash. negative  for scaling. negative for nail changes.       Current Outpatient Medications:      acetaminophen (TYLENOL) 500 MG tablet, Take 1,000 mg by mouth 3 times daily, Disp: , Rfl:      albuterol (PROAIR HFA/PROVENTIL HFA/VENTOLIN HFA) 108 (90 Base) MCG/ACT inhaler, Inhale 2 puffs into the lungs every 4 hours as needed for shortness of breath / dyspnea or wheezing Use with spacer, Disp: 1 Inhaler, Rfl: 1     aspirin 81 MG EC tablet, Take 81 mg by mouth daily, Disp: , Rfl:      atorvastatin (LIPITOR) 20 MG tablet, TAKE 1/2 TABLET BY MOUTH ONCE DAILY, Disp: 45 tablet, Rfl: 0     diltiazem (CARTIA XT) 120 MG 24 hr capsule, Take 1 capsule (120 mg) by mouth daily For blood pressure/lowers pulse. Pharmacy ok to hold prescription until due, Disp: 90 capsule, Rfl: 3     doxycycline hyclate (VIBRA-TABS) 100 MG tablet, Take 1 tablet (100 mg) by mouth 2 times daily for 7 days, Disp: 14 tablet, Rfl: 0     Dupilumab (DUPIXENT) 300 MG/2ML syringe, Inject 2 mLs (300 mg) Subcutaneous every 14 days, Disp: 4 mL, Rfl: 3     Spacer/Aero-Holding Chambers (SPACER/AERO-HOLD CHAMBER MASK) RAMAN, 1 Adult size spacer to use with MDI inhalers., Disp: 1 each, Rfl: 0     triamcinolone (NASACORT) 55 MCG/ACT nasal aerosol, Spray 2 sprays into both nostrils daily, Disp: , Rfl:      budesonide-formoterol (SYMBICORT) 80-4.5 MCG/ACT Inhaler, Inhale 2 puffs into the lungs 2 times daily (Patient not taking: Reported on 11/13/2020), Disp: 1 Inhaler, Rfl: 4  Immunization History   Administered Date(s) Administered     Influenza (H1N1) 12/22/2009     Influenza (High Dose) 3 valent vaccine 10/27/2010, 10/22/2011, 09/30/2014, 09/08/2015, 09/29/2016, 10/04/2017, 09/13/2018, 09/09/2019     Influenza (IIV3) PF 10/19/2002, 10/13/2003, 10/16/2004, 10/20/2005, 09/18/2009, 10/13/2012     Influenza Vaccine IM > 6 months Valent IIV4 10/17/2013     Influenza, Quad, High Dose, Pf, 65yr + 09/14/2020     Mantoux Tuberculin Skin Test 10/09/2009     Pneumo Conj 13-V (2010&after)  09/08/2015     Pneumococcal 23 valent 10/20/2006     TD (ADULT, 7+) 10/27/2010     TDAP Vaccine (Adacel) 08/14/2012     Zoster vaccine recombinant adjuvanted (SHINGRIX) 09/13/2018, 01/03/2019     Zoster vaccine, live 11/12/2014     Allergies   Allergen Reactions     Hytrin [Terazosin Hcl]      Leg swelling and vertigo     Simvastatin Other (See Comments)     Body aches     Cats      Codeine Nausea     Dogs      Dust Mites      [place] Trees      Seasonal Allergies          EXAM:   Constitutional:  Appears well-developed and well-nourished. No distress.   HEENT:   Head: Normocephalic.   No cobblestoning of posterior oropharynx.   Nasal tissue pink and normal appearing.  No rhinorrhea noted.    Eyes: Conjunctivae are non-erythematous   No maxillary or frontal sinus tenderness to palpation.   Cardiovascular: Normal rate, regular rhythm and normal heart sounds. Exam reveals no gallop and no friction rub.   No murmur heard.  Respiratory: Effort normal and breath sounds normal. No respiratory distress. No wheezes. No rales.   Musculoskeletal: Normal range of motion.   Neuro: Oriented to person, place, and time.  Skin: Skin is warm and dry. No rash noted.   Psychiatric: Normal mood and affect.     Nursing note and vitals reviewed.    ASSESSMENT/PLAN:  Problem List Items Addressed This Visit        Respiratory    Chronic sinusitis, unspecified location - Primary     History of chronic sinusitis with nasal polyposis.  Follows with ENT.  Now with increased sinus pressure, congestion over the last 1 week.  Sense of smell intact.  Denies fevers or chills.    -Continue Nasacort 2 sprays per nostril daily.  -He was provided a prescription for doxycycline 100 mg p.o. twice daily for 10 days.  If he continues to have symptoms over the course of the next 3 days he may start doxycycline.         Relevant Medications    doxycycline hyclate (VIBRA-TABS) 100 MG tablet    Moderate persistent asthma, uncomplicated     History of chronic chest  symptoms for multiple years.  Diagnosed with asthma at [facility] in 2010.  On Symbicort 80/4.5 mcg 2 puff inhaled twice daily. Now on Dupixent and huge improvement in chest symptoms.      Asthma is well controlled currently.      -Symbicort 80/4.5 mcg 2 puff inhaled twice daily.  -Albuterol 2-4 puffs inhaled (use a spacer unless using a Proair Respiclick device) every 4 hours as needed for chest tightness, wheezing, shortness of breath and/or coughing.   -Albuterol 2-4 puffs inhaled (use spacer if not using Proair Respiclick device) 15-20 minutes prior to physical activity.   Please ensure warm up period prior to exercise.   - Dupixent 300 mg every other week.           Seasonal allergic rhinitis due to pollen     Perennial with spring and summer nasal and ocular symptoms.  Has history of chronic sinusitis with nasal polyposis.  Follows with ENT.  Nasacort significantly helpful. Intermittent sneezing.      Skin testing:  Positive for trees, weeds, cat, dog, dust mite.     - Nasacort 2 sprays per nostril daily.         Allergic rhinitis due to animal dander    Allergic rhinitis due to dust mite    Nasal polyposis          Chart documentation with Dragon Voice recognition Software. Although reviewed after completion, some words and grammatical errors may remain.    Malcolm Del Rio DO FAAAAI  Medical Director for Allergy/Immunology at [place], [place]

## 2020-11-13 NOTE — LETTER
11/13/2020         RE: Guillermo Hogue  [address]  [address]  [place] [address]        Dear Colleague,    Thank you for referring your patient, Guillermo Hogue, to the [facility]. Please see a copy of my visit note below.    Guillermo Hogue is a 80 year old White male with previous medical history significant for chronic sinusitis with nasal polyposis, asthma who returns for a follow up visit.     The patient reports that over the course of the last 1 week he has had increased nasal congestion, frontal sinus pressure, clear mucus.  Denies postnasal drainage.  History of chronic sinusitis with nasal polyposis.  On Nasacort 2 sprays per nostril daily.  He had similar symptoms the summer and was treated with antibiotic and this was significantly beneficial.  He has asthma.  He is on Symbicort 80/4.5 mcg using 2 puffs inhaled twice daily.  He reports his asthma has been well controlled.  He has some mild shortness of breath with physical activity.  He additionally is on Dupixent.  Dupixent has been significantly beneficial.  Sense of smell and taste intact.  No fevers or chills.    ACT Total Scores 11/13/2020   ACT TOTAL SCORE -   ASTHMA ER VISITS -   ASTHMA HOSPITALIZATIONS -   ACT TOTAL SCORE (Goal Greater than or Equal to 20) 21   In the past 12 months, how many times did you visit the emergency room for your asthma without being admitted to the hospital? 0   In the past 12 months, how many times were you hospitalized overnight because of your asthma? 0            Past Medical History:   Diagnosis Date     Allergic rhinitis age 22     Asthma 2009     Chronic osteoarthritis      Nasal polyposis 2009     Osteoarthritis of left hip      Reactive airway disease 2009     Family History   Problem Relation Age of Onset     Breast Cancer Mother      Arthritis Father      C.A.D. Father      Cerebrovascular Disease Father      Respiratory Father         TB history     Rheumatoid Arthritis  Father      Cerebrovascular Disease Maternal Grandmother      Hypertension Maternal Grandmother      Cancer Maternal Grandfather      C.A.D. Brother      Prostate Cancer Brother      C.A.D. Brother      Past Surgical History:   Procedure Laterality Date     COLONOSCOPY       COLONOSCOPY N/A 5/10/2017    Procedure: COMBINED COLONOSCOPY, SINGLE OR MULTIPLE BIOPSY/POLYPECTOMY BY BIOPSY;;  Surgeon: Abisai Duarte DO;  Location: [facility]     COLONOSCOPY WITH CO2 INSUFFLATION N/A 5/14/2015    Procedure: COLONOSCOPY WITH CO2 INSUFFLATION;  Surgeon: Jose R Richmond MD;  Location: [facility]     COLONOSCOPY WITH CO2 INSUFFLATION N/A 5/10/2017    Procedure: COLONOSCOPY WITH CO2 INSUFFLATION;  COLON SCREEN/ UNDERWOOD;  Surgeon: Abisai Duarte DO;  Location: [facility]     JOINT REPLACEMENT Left 06/2019    hip     SINUS SURGERY  2009     TONSILLECTOMY  age 22       REVIEW OF SYSTEMS:  General: negative for weight gain. negative for weight loss. negative for changes in sleep.   Ears: negative for fullness. negative for hearing loss. negative for dizziness.   Nose: negative for snoring.negative for changes in smell. negative for drainage.   Eyes: negative for eye watering. negative for eye itching. negative for vision changes. negative for eye redness.  Throat: negative for hoarseness. negative for sore throat. negative for trouble swallowing.   Lungs: positive  for shortness of breath.negative for wheezing. negative for sputum production.   Cardiovascular: negative for chest pain. negative for swelling of ankles. negative for fast or irregular heartbeat.   Gastrointestinal: negative for nausea. negative for heartburn. negative for acid reflux.   Musculoskeletal: negative for joint pain. negative for joint stiffness. negative for joint swelling.   Neurologic: negative for seizures. negative for fainting. negative for weakness.   Psychiatric: negative for changes in mood. negative for anxiety.   Endocrine: negative for cold  intolerance. negative for heat intolerance. negative for tremors.   Lymphatic: negative for lower extremity swelling. negative for lymph node swelling.   Hematologic: negative for easy bruising. negative for easy bleeding.  Integumentary: negative for rash. negative for scaling. negative for nail changes.       Current Outpatient Medications:      acetaminophen (TYLENOL) 500 MG tablet, Take 1,000 mg by mouth 3 times daily, Disp: , Rfl:      albuterol (PROAIR HFA/PROVENTIL HFA/VENTOLIN HFA) 108 (90 Base) MCG/ACT inhaler, Inhale 2 puffs into the lungs every 4 hours as needed for shortness of breath / dyspnea or wheezing Use with spacer, Disp: 1 Inhaler, Rfl: 1     aspirin 81 MG EC tablet, Take 81 mg by mouth daily, Disp: , Rfl:      atorvastatin (LIPITOR) 20 MG tablet, TAKE 1/2 TABLET BY MOUTH ONCE DAILY, Disp: 45 tablet, Rfl: 0     diltiazem (CARTIA XT) 120 MG 24 hr capsule, Take 1 capsule (120 mg) by mouth daily For blood pressure/lowers pulse. Pharmacy ok to hold prescription until due, Disp: 90 capsule, Rfl: 3     doxycycline hyclate (VIBRA-TABS) 100 MG tablet, Take 1 tablet (100 mg) by mouth 2 times daily for 7 days, Disp: 14 tablet, Rfl: 0     Dupilumab (DUPIXENT) 300 MG/2ML syringe, Inject 2 mLs (300 mg) Subcutaneous every 14 days, Disp: 4 mL, Rfl: 3     Spacer/Aero-Holding Chambers (SPACER/AERO-HOLD CHAMBER MASK) RAMAN, 1 Adult size spacer to use with MDI inhalers., Disp: 1 each, Rfl: 0     triamcinolone (NASACORT) 55 MCG/ACT nasal aerosol, Spray 2 sprays into both nostrils daily, Disp: , Rfl:      budesonide-formoterol (SYMBICORT) 80-4.5 MCG/ACT Inhaler, Inhale 2 puffs into the lungs 2 times daily (Patient not taking: Reported on 11/13/2020), Disp: 1 Inhaler, Rfl: 4  Immunization History   Administered Date(s) Administered     Influenza (H1N1) 12/22/2009     Influenza (High Dose) 3 valent vaccine 10/27/2010, 10/22/2011, 09/30/2014, 09/08/2015, 09/29/2016, 10/04/2017, 09/13/2018, 09/09/2019     Influenza (IIV3)  PF 10/19/2002, 10/13/2003, 10/16/2004, 10/20/2005, 09/18/2009, 10/13/2012     Influenza Vaccine IM > 6 months Valent IIV4 10/17/2013     Influenza, Quad, High Dose, Pf, 65yr + 09/14/2020     Mantoux Tuberculin Skin Test 10/09/2009     Pneumo Conj 13-V (2010&after) 09/08/2015     Pneumococcal 23 valent 10/20/2006     TD (ADULT, 7+) 10/27/2010     TDAP Vaccine (Adacel) 08/14/2012     Zoster vaccine recombinant adjuvanted (SHINGRIX) 09/13/2018, 01/03/2019     Zoster vaccine, live 11/12/2014     Allergies   Allergen Reactions     Hytrin [Terazosin Hcl]      Leg swelling and vertigo     Simvastatin Other (See Comments)     Body aches     Cats      Codeine Nausea     Dogs      Dust Mites      [place] Trees      Seasonal Allergies          EXAM:   Constitutional:  Appears well-developed and well-nourished. No distress.   HEENT:   Head: Normocephalic.   No cobblestoning of posterior oropharynx.   Nasal tissue pink and normal appearing.  No rhinorrhea noted.    Eyes: Conjunctivae are non-erythematous   No maxillary or frontal sinus tenderness to palpation.   Cardiovascular: Normal rate, regular rhythm and normal heart sounds. Exam reveals no gallop and no friction rub.   No murmur heard.  Respiratory: Effort normal and breath sounds normal. No respiratory distress. No wheezes. No rales.   Musculoskeletal: Normal range of motion.   Neuro: Oriented to person, place, and time.  Skin: Skin is warm and dry. No rash noted.   Psychiatric: Normal mood and affect.     Nursing note and vitals reviewed.    ASSESSMENT/PLAN:  Problem List Items Addressed This Visit        Respiratory    Chronic sinusitis, unspecified location - Primary     History of chronic sinusitis with nasal polyposis.  Follows with ENT.  Now with increased sinus pressure, congestion over the last 1 week.  Sense of smell intact.  Denies fevers or chills.    -Continue Nasacort 2 sprays per nostril daily.  -He was provided a prescription for doxycycline 100 mg p.o. twice  daily for 10 days.  If he continues to have symptoms over the course of the next 3 days he may start doxycycline.         Relevant Medications    doxycycline hyclate (VIBRA-TABS) 100 MG tablet    Moderate persistent asthma, uncomplicated     History of chronic chest symptoms for multiple years.  Diagnosed with asthma at [facility] in 2010.  On Symbicort 80/4.5 mcg 2 puff inhaled twice daily. Now on Dupixent and huge improvement in chest symptoms.      Asthma is well controlled currently.      -Symbicort 80/4.5 mcg 2 puff inhaled twice daily.  -Albuterol 2-4 puffs inhaled (use a spacer unless using a Proair Respiclick device) every 4 hours as needed for chest tightness, wheezing, shortness of breath and/or coughing.   -Albuterol 2-4 puffs inhaled (use spacer if not using Proair Respiclick device) 15-20 minutes prior to physical activity.   Please ensure warm up period prior to exercise.   - Dupixent 300 mg every other week.           Seasonal allergic rhinitis due to pollen     Perennial with spring and summer nasal and ocular symptoms.  Has history of chronic sinusitis with nasal polyposis.  Follows with ENT.  Nasacort significantly helpful. Intermittent sneezing.      Skin testing:  Positive for trees, weeds, cat, dog, dust mite.     - Nasacort 2 sprays per nostril daily.         Allergic rhinitis due to animal dander    Allergic rhinitis due to dust mite    Nasal polyposis          Chart documentation with Dragon Voice recognition Software. Although reviewed after completion, some words and grammatical errors may remain.    Malcolm Del Rio DO FAAAAI  Medical Director for Allergy/Immunology at [place], [place]        Again, thank you for allowing me to participate in the care of your patient.        Sincerely,        Malcolm Del Rio DO

## 2020-11-13 NOTE — PATIENT INSTRUCTIONS
Allergy Staff Appt Hours Shot Hours Locations    Physician     Malcolm Del Rio DO       Support Staff     YUE Corbin CMA  Tuesday:        [place] 7-5 Wednesday:        [place]: 7-5     Thursday:                    [place] 7-6     Friday:  [place]  7-2   [place]        Thursday: 8-5:20        Friday: 7-12     [place]        Tuesday: 7- 3:20 Wednesday: 7-4:20     [place] Monday: 7-2:20 Tuesday: 9-5:20         [facility]  [address] [place] [address]  Appt Line: [phone]  Allergy RN:  [phone]    [facility]  [address] [place] [address]  Appt Line: [phone]  Allergy RN:  [phone]       Important Scheduling Information  Aspirin Desensitization: Appt will last 2 clinic days. Please call the Allergy RN line for your clinic to schedule. Discontinue antihistamines 7 days prior to the appointment.     Food Challenges: Appt will last 3-4 hours. Please call the Allergy RN line for your clinic to schedule. Discontinue antihistamines 7 days prior to the appointment.     Penicillin Testing: Appt will last 2-3 hours. Please call the Allergy RN line for your clinic to schedule. Discontinue antihistamines 7 days prior to the appointment.     Skin Testing: Appt will about 40 minutes. Call the appointment line for your clinic to schedule. Discontinue antihistamines 7 days prior to the appointment.     Venom Testing: Appt will last 2-3 hours. Please call the Allergy RN line for your clinic to schedule. Discontinue antihistamines 7 days prior to the appointment.     Thank you for trusting us with your Allergy, Asthma, and Immunology care. Please feel free to contact us with any questions or concerns you may have.      - If still having sinus symptoms by Monday start doxycycline 100mg by mouth twice daily for 7 days.   - Nasacort 2 sprays/nostril daily.   - Symbicort 80/4.5mcg 2 puffs inhaled every 4 hours as needed for chest tightness,  coughing, wheezing or shortness of breath.   - Continue Dupixent.   - Albuterol 2-4 puffs inhaled (use a spacer unless using a Proair Respiclick device) every 4 hours as needed for chest tightness, wheezing, shortness of breath and/or coughing.

## 2020-11-14 ASSESSMENT — ASTHMA QUESTIONNAIRES: ACT_TOTALSCORE: 21

## 2020-11-30 DIAGNOSIS — E78.5 HYPERLIPIDEMIA LDL GOAL <130: ICD-10-CM

## 2020-12-01 RX ORDER — ATORVASTATIN CALCIUM 20 MG/1
TABLET, FILM COATED ORAL
Qty: 45 TABLET | Refills: 1 | Status: SHIPPED | OUTPATIENT
Start: 2020-12-01 | End: 2021-06-08

## 2020-12-01 NOTE — TELEPHONE ENCOUNTER
Routing refill request to provider for review/approval because:  Labs not current:  Lipids    Yeimi Bob BSN, RN

## 2020-12-10 DIAGNOSIS — J45.40 MODERATE PERSISTENT ASTHMA, UNCOMPLICATED: ICD-10-CM

## 2020-12-15 ENCOUNTER — TELEPHONE (OUTPATIENT)
Dept: FAMILY MEDICINE | Facility: CLINIC | Age: 80
End: 2020-12-15

## 2020-12-15 RX ORDER — BUDESONIDE AND FORMOTEROL FUMARATE DIHYDRATE 80; 4.5 UG/1; UG/1
2 AEROSOL RESPIRATORY (INHALATION) 2 TIMES DAILY
Qty: 1 INHALER | Refills: 4 | Status: SHIPPED | OUTPATIENT
Start: 2020-12-15 | End: 2021-06-01

## 2020-12-15 NOTE — TELEPHONE ENCOUNTER
"Pending Prescriptions:                       Disp   Refills    budesonide-formoterol (SYMBICORT) 80-4.5 *1 Inha*4            Sig: Inhale 2 puffs into the lungs 2 times daily    Routing refill request to provider for review/approval because:  Failed protocol      Requested Prescriptions   Pending Prescriptions Disp Refills     budesonide-formoterol (SYMBICORT) 80-4.5 MCG/ACT Inhaler 1 Inhaler 4     Sig: Inhale 2 puffs into the lungs 2 times daily       Long-Acting Beta Agonist Inhalers Protocol  Failed - 12/14/2020 11:00 AM        Failed - Order for Serevent, Striverdi, or Foradil and pt has steroid inhaler        Passed - Patient is age 12 or older        Passed - Asthma control assessment score within normal limits in last 6 months     Please review ACT score.           Passed - Medication is active on med list        Passed - Recent (6 mo) or future (30 days) visit within the authorizing provider's specialty     Patient had office visit in the last 6 months or has a visit in the next 30 days with authorizing provider or within the authorizing provider's specialty.  See \"Patient Info\" tab in inbasket, or \"Choose Columns\" in Meds & Orders section of the refill encounter.           Inhaled Steroids Protocol Passed - 12/14/2020 11:00 AM        Passed - Patient is age 12 or older        Passed - Asthma control assessment score within normal limits in last 6 months     Please review ACT score.           Passed - Medication is active on med list        Passed - Recent (6 mo) or future (30 days) visit within the authorizing provider's specialty     Patient had office visit in the last 6 months or has a visit in the next 30 days with authorizing provider or within the authorizing provider's specialty.  See \"Patient Info\" tab in inbasket, or \"Choose Columns\" in Meds & Orders section of the refill encounter.                 "

## 2020-12-15 NOTE — TELEPHONE ENCOUNTER
Prior Authorization Retail Medication Request    Medication/Dose: budesonide-formoterol (SYMBICORT) 80-4.5 MCG/ACT Inhaler  ICD code (if different than what is on RX):  Moderate persistent asthma, uncomplicated [J45.40]   Previously Tried and Failed:    Rationale:      Covermymeds Key: [id number]

## 2020-12-16 NOTE — TELEPHONE ENCOUNTER
Central Prior Authorization Team   Phone: [phone]      Prior Authorization Not Needed per Insurance    12/16/2020  Medication: budesonide-formoterol (SYMBICORT) 80-4.5 MCG/ACT Inhaler - NOT NEEDED  Insurance Company: [employer] - Phone [phone] Fax [phone]  Expected CoPay:      Pharmacy Filling the Rx: [id number] - [facility] [address] [address]  Pharmacy Notified: Yes  Patient Notified: Yes (**Instructed pharmacy to notify patient when script is ready to /ship.**)    Pharmacy received a paid claim.

## 2020-12-31 ENCOUNTER — TELEPHONE (OUTPATIENT)
Dept: ALLERGY | Facility: OTHER | Age: 80
End: 2020-12-31

## 2020-12-31 ENCOUNTER — OFFICE VISIT (OUTPATIENT)
Dept: ALLERGY | Facility: CLINIC | Age: 80
End: 2020-12-31
Payer: COMMERCIAL

## 2020-12-31 VITALS
DIASTOLIC BLOOD PRESSURE: 78 MMHG | SYSTOLIC BLOOD PRESSURE: 143 MMHG | WEIGHT: 229.2 LBS | OXYGEN SATURATION: 98 % | BODY MASS INDEX: 33.85 KG/M2 | HEART RATE: 73 BPM

## 2020-12-31 DIAGNOSIS — J33.9 NASAL POLYPOSIS: ICD-10-CM

## 2020-12-31 DIAGNOSIS — J30.1 SEASONAL ALLERGIC RHINITIS DUE TO POLLEN: ICD-10-CM

## 2020-12-31 DIAGNOSIS — J30.81 ALLERGIC RHINITIS DUE TO ANIMAL DANDER: Primary | ICD-10-CM

## 2020-12-31 DIAGNOSIS — J45.40 MODERATE PERSISTENT ASTHMA, UNCOMPLICATED: ICD-10-CM

## 2020-12-31 DIAGNOSIS — J30.89 ALLERGIC RHINITIS DUE TO DUST MITE: ICD-10-CM

## 2020-12-31 DIAGNOSIS — J32.9 CHRONIC SINUSITIS, UNSPECIFIED LOCATION: ICD-10-CM

## 2020-12-31 PROCEDURE — 99214 OFFICE O/P EST MOD 30 MIN: CPT | Performed by: ALLERGY & IMMUNOLOGY

## 2020-12-31 RX ORDER — PREDNISONE 10 MG/1
20 TABLET ORAL 2 TIMES DAILY
Qty: 12 TABLET | Refills: 0 | Status: SHIPPED | OUTPATIENT
Start: 2020-12-31 | End: 2021-01-03

## 2020-12-31 NOTE — LETTER
12/31/2020         RE: Guillermo Hogue  [id number]  [address]  [place] [address]        Dear Colleague,    Thank you for referring your patient, Guillermo Hogue, to the [facility]. Please see a copy of my visit note below.    Guillermo Hogue is a 80 year old White male with previous medical history significant for possible sinus infection and SOB who returns for a follow up visit. Guillermo Hogue is being seen today for sinus infection and SOB.     History of allergic rhinoconjunctivitis, chronic sinusitis with nasal polyposis, asthma.  He is on Dupixent 300 mg every other week.  This has been significantly beneficial for asthma and nasal disease.  He is using Nasacort 2 sprays per nostril daily.  He reports that over the last 2 weeks he has had increased sinus pressure involving frontal sinuses, congestion, increased blowing his nose, cloudy mucus.  Denies fevers or chills.  Sense of smell and taste intact.  Asthma at baseline had been well controlled.  He is on Symbicort 80/4.5 mcg 2 puff inhaled twice daily.  Denies any recent asthma symptoms aside from shortness of breath while walking in the clinic today.    ACT Total Scores 11/13/2020   ACT TOTAL SCORE -   ASTHMA ER VISITS -   ASTHMA HOSPITALIZATIONS -   ACT TOTAL SCORE (Goal Greater than or Equal to 20) 21   In the past 12 months, how many times did you visit the emergency room for your asthma without being admitted to the hospital? 0   In the past 12 months, how many times were you hospitalized overnight because of your asthma? 0          Past Medical History:   Diagnosis Date     Allergic rhinitis age 22     Asthma 2009     Chronic osteoarthritis      Nasal polyposis 2009     Osteoarthritis of left hip      Reactive airway disease 2009     Family History   Problem Relation Age of Onset     Breast Cancer Mother      Arthritis Father      C.A.D. Father      Cerebrovascular Disease Father      Respiratory Father         TB  history     Rheumatoid Arthritis Father      Cerebrovascular Disease Maternal Grandmother      Hypertension Maternal Grandmother      Cancer Maternal Grandfather      C.A.ASHLEY. Brother      Prostate Cancer Brother      C.A.D. Brother      Past Surgical History:   Procedure Laterality Date     COLONOSCOPY       COLONOSCOPY N/A 5/10/2017    Procedure: COMBINED COLONOSCOPY, SINGLE OR MULTIPLE BIOPSY/POLYPECTOMY BY BIOPSY;;  Surgeon: Abisai Duarte DO;  Location: [facility]     COLONOSCOPY WITH CO2 INSUFFLATION N/A 5/14/2015    Procedure: COLONOSCOPY WITH CO2 INSUFFLATION;  Surgeon: Jose R Richmond MD;  Location: [facility]     COLONOSCOPY WITH CO2 INSUFFLATION N/A 5/10/2017    Procedure: COLONOSCOPY WITH CO2 INSUFFLATION;  COLON SCREEN/ UNDERWOOD;  Surgeon: Abisai Duarte DO;  Location: [facility] OR     JOINT REPLACEMENT Left 06/2019    hip     SINUS SURGERY  2009     TONSILLECTOMY  age 22       REVIEW OF SYSTEMS:  General: negative for weight gain. negative for weight loss. negative for changes in sleep.   Ears: negative for fullness. negative for hearing loss. negative for dizziness.   Nose: negative for snoring.negative for changes in smell. negative for drainage.   Eyes: negative for eye watering. negative for eye itching. negative for vision changes. negative for eye redness.  Throat: negative for hoarseness. negative for sore throat. negative for trouble swallowing.   Lungs: positive  for shortness of breath.negative for wheezing. negative for sputum production.   Cardiovascular: negative for chest pain. negative for swelling of ankles. negative for fast or irregular heartbeat.   Gastrointestinal: negative for nausea. negative for heartburn. negative for acid reflux.   Musculoskeletal: negative for joint pain. negative for joint stiffness. negative for joint swelling.   Neurologic: negative for seizures. negative for fainting. negative for weakness.   Psychiatric: negative for changes in mood. negative for anxiety.    Endocrine: negative for cold intolerance. negative for heat intolerance. negative for tremors.   Lymphatic: negative for lower extremity swelling. negative for lymph node swelling.   Hematologic: negative for easy bruising. negative for easy bleeding.  Integumentary: negative for rash. negative for scaling. negative for nail changes.       Current Outpatient Medications:      acetaminophen (TYLENOL) 500 MG tablet, Take 1,000 mg by mouth 3 times daily, Disp: , Rfl:      amoxicillin-clavulanate (AUGMENTIN) 875-125 MG tablet, Take 1 tablet by mouth 2 times daily for 10 days, Disp: 20 tablet, Rfl: 0     aspirin 81 MG EC tablet, Take 81 mg by mouth daily, Disp: , Rfl:      atorvastatin (LIPITOR) 20 MG tablet, TAKE 1/2 TABLET BY MOUTH ONCE DAILY, Disp: 45 tablet, Rfl: 1     budesonide-formoterol (SYMBICORT) 80-4.5 MCG/ACT Inhaler, Inhale 2 puffs into the lungs 2 times daily, Disp: 1 Inhaler, Rfl: 4     diltiazem (CARTIA XT) 120 MG 24 hr capsule, Take 1 capsule (120 mg) by mouth daily For blood pressure/lowers pulse. Pharmacy ok to hold prescription until due, Disp: 90 capsule, Rfl: 3     Dupilumab (DUPIXENT) 300 MG/2ML syringe, Inject 2 mLs (300 mg) Subcutaneous every 14 days, Disp: 4 mL, Rfl: 3     predniSONE (DELTASONE) 10 MG tablet, Take 2 tablets (20 mg) by mouth 2 times daily for 3 days, Disp: 12 tablet, Rfl: 0     Spacer/Aero-Holding Chambers (SPACER/AERO-HOLD CHAMBER MASK) RAMAN, 1 Adult size spacer to use with MDI inhalers., Disp: 1 each, Rfl: 0     triamcinolone (NASACORT) 55 MCG/ACT nasal aerosol, Spray 2 sprays into both nostrils daily, Disp: , Rfl:      albuterol (PROAIR HFA/PROVENTIL HFA/VENTOLIN HFA) 108 (90 Base) MCG/ACT inhaler, Inhale 2 puffs into the lungs every 4 hours as needed for shortness of breath / dyspnea or wheezing Use with spacer (Patient not taking: Reported on 12/31/2020), Disp: 1 Inhaler, Rfl: 1  Immunization History   Administered Date(s) Administered     Influenza (H1N1) 12/22/2009      Influenza (High Dose) 3 valent vaccine 10/27/2010, 10/22/2011, 09/30/2014, 09/08/2015, 09/29/2016, 10/04/2017, 09/13/2018, 09/09/2019     Influenza (IIV3) PF 10/19/2002, 10/13/2003, 10/16/2004, 10/20/2005, 09/18/2009, 10/13/2012     Influenza Vaccine IM > 6 months Valent IIV4 10/17/2013     Influenza, Quad, High Dose, Pf, 65yr + 09/14/2020     Mantoux Tuberculin Skin Test 10/09/2009     Pneumo Conj 13-V (2010&after) 09/08/2015     Pneumococcal 23 valent 10/20/2006     TD (ADULT, 7+) 10/27/2010     TDAP Vaccine (Adacel) 08/14/2012     Zoster vaccine recombinant adjuvanted (SHINGRIX) 09/13/2018, 01/03/2019     Zoster vaccine, live 11/12/2014     Allergies   Allergen Reactions     Hytrin [Terazosin Hcl]      Leg swelling and vertigo     Simvastatin Other (See Comments)     Body aches     Cats      Codeine Nausea     Dogs      Dust Mites      [place] Trees      Seasonal Allergies          EXAM:   Constitutional:  Appears well-developed and well-nourished. No distress.   HEENT:   Head: Normocephalic.   Nasal tissue pink and normal appearing.  Cloudy and thick rhinorrhea noted.    Eyes: Conjunctivae are non-erythematous   No maxillary or frontal sinus tenderness to palpation.   Cardiovascular: Normal rate, regular rhythm and normal heart sounds. Exam reveals no gallop and no friction rub.   No murmur heard.  Respiratory: Effort normal and breath sounds normal. No respiratory distress. No wheezes. No rales.   Musculoskeletal: Normal range of motion.   Neuro: Oriented to person, place, and time.  Skin: Skin is warm and dry. No rash noted.   Psychiatric: Normal mood and affect.     Nursing note and vitals reviewed.    ASSESSMENT/PLAN:  Problem List Items Addressed This Visit        Respiratory    Chronic sinusitis, unspecified location     History of chronic sinusitis with nasal polyposis.  Follows with ENT.  Now with increased sinus pressure, congestion over the last 2 weeks.  Sense of smell intact.  Denies fevers or chills.  Cloudy mucus.     Acute on chronic sinusitis for 14 days. Will treat with antibiotics.      -Continue Nasacort 2 sprays per nostril daily.  -Augmentin bid for 10 days.   -Prednisone 20 mg p.o. twice daily for 3 days.         Relevant Medications    amoxicillin-clavulanate (AUGMENTIN) 875-125 MG tablet    predniSONE (DELTASONE) 10 MG tablet    Moderate persistent asthma, uncomplicated     History of chronic chest symptoms for multiple years.  Diagnosed with asthma at [facility] in 2010.  On Symbicort 80/4.5 mcg 2 puff inhaled twice daily. Now on Dupixent and huge improvement in chest symptoms. Some recent shortness of breath with acute on chronic sinusitis.        -Symbicort 80/4.5 mcg 2 puff inhaled twice daily.  -Albuterol 2-4 puffs inhaled (use a spacer unless using a Proair Respiclick device) every 4 hours as needed for chest tightness, wheezing, shortness of breath and/or coughing.   -Albuterol 2-4 puffs inhaled (use spacer if not using Proair Respiclick device) 15-20 minutes prior to physical activity.   Please ensure warm up period prior to exercise.   - Dupixent 300 mg every other week.           Seasonal allergic rhinitis due to pollen     Perennial with spring and summer nasal and ocular symptoms.  Has history of chronic sinusitis with nasal polyposis.  Follows with ENT.  Nasacort significantly helpful.      Skin testing:  Positive for trees, weeds, cat, dog, dust mite.     - Nasacort 2 sprays per nostril daily.         Allergic rhinitis due to animal dander - Primary    Allergic rhinitis due to dust mite    Nasal polyposis    Relevant Medications    amoxicillin-clavulanate (AUGMENTIN) 875-125 MG tablet    predniSONE (DELTASONE) 10 MG tablet          Chart documentation with Dragon Voice recognition Software. Although reviewed after completion, some words and grammatical errors may remain.    Malcolm Del Rio DO FAAAAI  Medical Director for Allergy/Immunology at [facility]  [place], [place]        Again, thank you for allowing me to participate in the care of your patient.        Sincerely,        Malcolm Del Rio, DO

## 2020-12-31 NOTE — ASSESSMENT & PLAN NOTE
History of chronic sinusitis with nasal polyposis.  Follows with ENT.  Now with increased sinus pressure, congestion over the last 2 weeks.  Sense of smell intact.  Denies fevers or chills. Cloudy mucus.     Acute on chronic sinusitis for 14 days. Will treat with antibiotics.      -Continue Nasacort 2 sprays per nostril daily.  -Augmentin bid for 10 days.   -Prednisone 20 mg p.o. twice daily for 3 days.

## 2020-12-31 NOTE — ASSESSMENT & PLAN NOTE
History of chronic chest symptoms for multiple years.  Diagnosed with asthma at [facility] in 2010.  On Symbicort 80/4.5 mcg 2 puff inhaled twice daily. Now on Dupixent and huge improvement in chest symptoms. Some recent shortness of breath with acute on chronic sinusitis.        -Symbicort 80/4.5 mcg 2 puff inhaled twice daily.  -Albuterol 2-4 puffs inhaled (use a spacer unless using a Proair Respiclick device) every 4 hours as needed for chest tightness, wheezing, shortness of breath and/or coughing.   -Albuterol 2-4 puffs inhaled (use spacer if not using Proair Respiclick device) 15-20 minutes prior to physical activity.   Please ensure warm up period prior to exercise.   - Dupixent 300 mg every other week.

## 2020-12-31 NOTE — PROGRESS NOTES
Guillermo Hogue is a 80 year old White male with previous medical history significant for possible sinus infection and SOB who returns for a follow up visit. Guillermo Hogue is being seen today for sinus infection and SOB.     History of allergic rhinoconjunctivitis, chronic sinusitis with nasal polyposis, asthma.  He is on Dupixent 300 mg every other week.  This has been significantly beneficial for asthma and nasal disease.  He is using Nasacort 2 sprays per nostril daily.  He reports that over the last 2 weeks he has had increased sinus pressure involving frontal sinuses, congestion, increased blowing his nose, cloudy mucus.  Denies fevers or chills.  Sense of smell and taste intact.  Asthma at baseline had been well controlled.  He is on Symbicort 80/4.5 mcg 2 puff inhaled twice daily.  Denies any recent asthma symptoms aside from shortness of breath while walking in the clinic today.    ACT Total Scores 11/13/2020   ACT TOTAL SCORE -   ASTHMA ER VISITS -   ASTHMA HOSPITALIZATIONS -   ACT TOTAL SCORE (Goal Greater than or Equal to 20) 21   In the past 12 months, how many times did you visit the emergency room for your asthma without being admitted to the hospital? 0   In the past 12 months, how many times were you hospitalized overnight because of your asthma? 0          Past Medical History:   Diagnosis Date     Allergic rhinitis age 22     Asthma 2009     Chronic osteoarthritis      Nasal polyposis 2009     Osteoarthritis of left hip      Reactive airway disease 2009     Family History   Problem Relation Age of Onset     Breast Cancer Mother      Arthritis Father      C.A.D. Father      Cerebrovascular Disease Father      Respiratory Father         TB history     Rheumatoid Arthritis Father      Cerebrovascular Disease Maternal Grandmother      Hypertension Maternal Grandmother      Cancer Maternal Grandfather      CIELOAULICES Brother      Prostate Cancer Brother      HUNTER Brother      Past Surgical History:    Procedure Laterality Date     COLONOSCOPY       COLONOSCOPY N/A 5/10/2017    Procedure: COMBINED COLONOSCOPY, SINGLE OR MULTIPLE BIOPSY/POLYPECTOMY BY BIOPSY;;  Surgeon: Abisai Duarte DO;  Location: [facility]     COLONOSCOPY WITH CO2 INSUFFLATION N/A 5/14/2015    Procedure: COLONOSCOPY WITH CO2 INSUFFLATION;  Surgeon: Jose R Richmond MD;  Location: [facility]     COLONOSCOPY WITH CO2 INSUFFLATION N/A 5/10/2017    Procedure: COLONOSCOPY WITH CO2 INSUFFLATION;  COLON SCREEN/ UNDERWOOD;  Surgeon: Abisai Duarte DO;  Location: [facility] OR     JOINT REPLACEMENT Left 06/2019    hip     SINUS SURGERY  2009     TONSILLECTOMY  age 22       REVIEW OF SYSTEMS:  General: negative for weight gain. negative for weight loss. negative for changes in sleep.   Ears: negative for fullness. negative for hearing loss. negative for dizziness.   Nose: negative for snoring.negative for changes in smell. negative for drainage.   Eyes: negative for eye watering. negative for eye itching. negative for vision changes. negative for eye redness.  Throat: negative for hoarseness. negative for sore throat. negative for trouble swallowing.   Lungs: positive  for shortness of breath.negative for wheezing. negative for sputum production.   Cardiovascular: negative for chest pain. negative for swelling of ankles. negative for fast or irregular heartbeat.   Gastrointestinal: negative for nausea. negative for heartburn. negative for acid reflux.   Musculoskeletal: negative for joint pain. negative for joint stiffness. negative for joint swelling.   Neurologic: negative for seizures. negative for fainting. negative for weakness.   Psychiatric: negative for changes in mood. negative for anxiety.   Endocrine: negative for cold intolerance. negative for heat intolerance. negative for tremors.   Lymphatic: negative for lower extremity swelling. negative for lymph node swelling.   Hematologic: negative for easy bruising. negative for easy  bleeding.  Integumentary: negative for rash. negative for scaling. negative for nail changes.       Current Outpatient Medications:      acetaminophen (TYLENOL) 500 MG tablet, Take 1,000 mg by mouth 3 times daily, Disp: , Rfl:      amoxicillin-clavulanate (AUGMENTIN) 875-125 MG tablet, Take 1 tablet by mouth 2 times daily for 10 days, Disp: 20 tablet, Rfl: 0     aspirin 81 MG EC tablet, Take 81 mg by mouth daily, Disp: , Rfl:      atorvastatin (LIPITOR) 20 MG tablet, TAKE 1/2 TABLET BY MOUTH ONCE DAILY, Disp: 45 tablet, Rfl: 1     budesonide-formoterol (SYMBICORT) 80-4.5 MCG/ACT Inhaler, Inhale 2 puffs into the lungs 2 times daily, Disp: 1 Inhaler, Rfl: 4     diltiazem (CARTIA XT) 120 MG 24 hr capsule, Take 1 capsule (120 mg) by mouth daily For blood pressure/lowers pulse. Pharmacy ok to hold prescription until due, Disp: 90 capsule, Rfl: 3     Dupilumab (DUPIXENT) 300 MG/2ML syringe, Inject 2 mLs (300 mg) Subcutaneous every 14 days, Disp: 4 mL, Rfl: 3     predniSONE (DELTASONE) 10 MG tablet, Take 2 tablets (20 mg) by mouth 2 times daily for 3 days, Disp: 12 tablet, Rfl: 0     Spacer/Aero-Holding Chambers (SPACER/AERO-HOLD CHAMBER MASK) RAMAN, 1 Adult size spacer to use with MDI inhalers., Disp: 1 each, Rfl: 0     triamcinolone (NASACORT) 55 MCG/ACT nasal aerosol, Spray 2 sprays into both nostrils daily, Disp: , Rfl:      albuterol (PROAIR HFA/PROVENTIL HFA/VENTOLIN HFA) 108 (90 Base) MCG/ACT inhaler, Inhale 2 puffs into the lungs every 4 hours as needed for shortness of breath / dyspnea or wheezing Use with spacer (Patient not taking: Reported on 12/31/2020), Disp: 1 Inhaler, Rfl: 1  Immunization History   Administered Date(s) Administered     Influenza (H1N1) 12/22/2009     Influenza (High Dose) 3 valent vaccine 10/27/2010, 10/22/2011, 09/30/2014, 09/08/2015, 09/29/2016, 10/04/2017, 09/13/2018, 09/09/2019     Influenza (IIV3) PF 10/19/2002, 10/13/2003, 10/16/2004, 10/20/2005, 09/18/2009, 10/13/2012     Influenza  Vaccine IM > 6 months Valent IIV4 10/17/2013     Influenza, Quad, High Dose, Pf, 65yr + 09/14/2020     Mantoux Tuberculin Skin Test 10/09/2009     Pneumo Conj 13-V (2010&after) 09/08/2015     Pneumococcal 23 valent 10/20/2006     TD (ADULT, 7+) 10/27/2010     TDAP Vaccine (Adacel) 08/14/2012     Zoster vaccine recombinant adjuvanted (SHINGRIX) 09/13/2018, 01/03/2019     Zoster vaccine, live 11/12/2014     Allergies   Allergen Reactions     Hytrin [Terazosin Hcl]      Leg swelling and vertigo     Simvastatin Other (See Comments)     Body aches     Cats      Codeine Nausea     Dogs      Dust Mites      [place] Trees      Seasonal Allergies          EXAM:   Constitutional:  Appears well-developed and well-nourished. No distress.   HEENT:   Head: Normocephalic.   Nasal tissue pink and normal appearing.  Cloudy and thick rhinorrhea noted.    Eyes: Conjunctivae are non-erythematous   No maxillary or frontal sinus tenderness to palpation.   Cardiovascular: Normal rate, regular rhythm and normal heart sounds. Exam reveals no gallop and no friction rub.   No murmur heard.  Respiratory: Effort normal and breath sounds normal. No respiratory distress. No wheezes. No rales.   Musculoskeletal: Normal range of motion.   Neuro: Oriented to person, place, and time.  Skin: Skin is warm and dry. No rash noted.   Psychiatric: Normal mood and affect.     Nursing note and vitals reviewed.    ASSESSMENT/PLAN:  Problem List Items Addressed This Visit        Respiratory    Chronic sinusitis, unspecified location     History of chronic sinusitis with nasal polyposis.  Follows with ENT.  Now with increased sinus pressure, congestion over the last 2 weeks.  Sense of smell intact.  Denies fevers or chills. Cloudy mucus.     Acute on chronic sinusitis for 14 days. Will treat with antibiotics.      -Continue Nasacort 2 sprays per nostril daily.  -Augmentin bid for 10 days.   -Prednisone 20 mg p.o. twice daily for 3 days.         Relevant Medications     amoxicillin-clavulanate (AUGMENTIN) 875-125 MG tablet    predniSONE (DELTASONE) 10 MG tablet    Moderate persistent asthma, uncomplicated     History of chronic chest symptoms for multiple years.  Diagnosed with asthma at [facility] in 2010.  On Symbicort 80/4.5 mcg 2 puff inhaled twice daily. Now on Dupixent and huge improvement in chest symptoms. Some recent shortness of breath with acute on chronic sinusitis.        -Symbicort 80/4.5 mcg 2 puff inhaled twice daily.  -Albuterol 2-4 puffs inhaled (use a spacer unless using a Proair Respiclick device) every 4 hours as needed for chest tightness, wheezing, shortness of breath and/or coughing.   -Albuterol 2-4 puffs inhaled (use spacer if not using Proair Respiclick device) 15-20 minutes prior to physical activity.   Please ensure warm up period prior to exercise.   - Dupixent 300 mg every other week.           Seasonal allergic rhinitis due to pollen     Perennial with spring and summer nasal and ocular symptoms.  Has history of chronic sinusitis with nasal polyposis.  Follows with ENT.  Nasacort significantly helpful.      Skin testing:  Positive for trees, weeds, cat, dog, dust mite.     - Nasacort 2 sprays per nostril daily.         Allergic rhinitis due to animal dander - Primary    Allergic rhinitis due to dust mite    Nasal polyposis    Relevant Medications    amoxicillin-clavulanate (AUGMENTIN) 875-125 MG tablet    predniSONE (DELTASONE) 10 MG tablet          Chart documentation with Dragon Voice recognition Software. Although reviewed after completion, some words and grammatical errors may remain.    Malcolm Del Rio DO FAAAAI  Medical Director for Allergy/Immunology at [place], [place]     PAIN SCALE 8 OF 10.

## 2021-01-13 ENCOUNTER — TELEPHONE (OUTPATIENT)
Dept: ALLERGY | Facility: OTHER | Age: 81
End: 2021-01-13

## 2021-01-13 NOTE — TELEPHONE ENCOUNTER
Free Drug Application Initiated  Medication-DUPIXENT  Sponsor-BRIAN  Additional Information-as OF 01/22 ALL DOCS SENT TO PROGRAM. UNDER REVIEW        Prior Authorization Approval    Authorization Effective Date: 12/14/2020  Authorization Expiration Date: 1/13/2022  Medication: dupixent  Approved Dose/Quantity: 4/28ds  Reference #: [id number]   Insurance Company: [employer] - Phone [phone] Fax [phone]     CoPay Card Available: No    Foundation Assistance Needed: yes  Which Pharmacy is filling the prescription (Not needed for infusion/clinic administered): [facility] [address]  Pharmacy Notified: Yes

## 2021-01-29 ENCOUNTER — OFFICE VISIT (OUTPATIENT)
Dept: ALLERGY | Facility: CLINIC | Age: 81
End: 2021-01-29
Payer: COMMERCIAL

## 2021-01-29 VITALS
SYSTOLIC BLOOD PRESSURE: 128 MMHG | WEIGHT: 229 LBS | BODY MASS INDEX: 33.92 KG/M2 | OXYGEN SATURATION: 96 % | DIASTOLIC BLOOD PRESSURE: 68 MMHG | HEIGHT: 69 IN | HEART RATE: 65 BPM

## 2021-01-29 DIAGNOSIS — J33.9 NASAL POLYPOSIS: Primary | ICD-10-CM

## 2021-01-29 DIAGNOSIS — J30.89 ALLERGIC RHINITIS DUE TO DUST MITE: ICD-10-CM

## 2021-01-29 DIAGNOSIS — J45.40 MODERATE PERSISTENT ASTHMA, UNCOMPLICATED: ICD-10-CM

## 2021-01-29 DIAGNOSIS — J30.81 ALLERGIC RHINITIS DUE TO ANIMAL DANDER: ICD-10-CM

## 2021-01-29 DIAGNOSIS — J30.1 SEASONAL ALLERGIC RHINITIS DUE TO POLLEN: ICD-10-CM

## 2021-01-29 PROCEDURE — 99213 OFFICE O/P EST LOW 20 MIN: CPT | Performed by: ALLERGY & IMMUNOLOGY

## 2021-01-29 ASSESSMENT — MIFFLIN-ST. JEOR: SCORE: 1744.12

## 2021-01-29 NOTE — ASSESSMENT & PLAN NOTE
History of chronic chest symptoms for multiple years.  Diagnosed with asthma at [facility] in 2010.  On Symbicort 80/4.5 mcg 2 puff inhaled twice daily. Now on Dupixent and huge improvement in chest symptoms. Some recent shortness of breath with acute on chronic sinusitis. This resolved with prednisone and antibiotics.         -Symbicort 80/4.5 mcg 2 puff inhaled twice daily.  -Albuterol 2-4 puffs inhaled (use a spacer unless using a Proair Respiclick device) every 4 hours as needed for chest tightness, wheezing, shortness of breath and/or coughing.   -Albuterol 2-4 puffs inhaled (use spacer if not using Proair Respiclick device) 15-20 minutes prior to physical activity.   Please ensure warm up period prior to exercise.   - Dupixent 300 mg every other week.

## 2021-01-29 NOTE — LETTER
1/29/2021         RE: Guillermo Hogue  [id number] [address]  [address]  [place] [address]        Dear Colleague,    Thank you for referring your patient, Guillermo Hogue, to the [facility]. Please see a copy of my visit note below.    Guillermo Hogue is a 80 year old White male with previous medical history significant for asthma, polyps and allergic rhinitis who returns for a follow up visit.     Patient returns for follow-up.  At last visit he was treated for sinusitis with Augmentin and prednisone.  Sinus symptoms have resolved.  Has been on Dupixent for asthma and nasal polyps.  This has been significantly beneficial.  He is using Symbicort 80/4.5 mcg 2 puff inhaled twice daily.  Asthma is completely controlled.  Waiting on Dupixent approval.    Past Medical History:   Diagnosis Date     Allergic rhinitis age 22     Asthma 2009     Chronic osteoarthritis      Nasal polyposis 2009     Osteoarthritis of left hip      Reactive airway disease 2009     Family History   Problem Relation Age of Onset     Breast Cancer Mother      Arthritis Father      C.A.D. Father      Cerebrovascular Disease Father      Respiratory Father         TB history     Rheumatoid Arthritis Father      Cerebrovascular Disease Maternal Grandmother      Hypertension Maternal Grandmother      Cancer Maternal Grandfather      C.A.D. Brother      Prostate Cancer Brother      C.A.D. Brother      Past Surgical History:   Procedure Laterality Date     COLONOSCOPY       COLONOSCOPY N/A 5/10/2017    Procedure: COMBINED COLONOSCOPY, SINGLE OR MULTIPLE BIOPSY/POLYPECTOMY BY BIOPSY;;  Surgeon: Abisai Duarte DO;  Location: [facility]     COLONOSCOPY WITH CO2 INSUFFLATION N/A 5/14/2015    Procedure: COLONOSCOPY WITH CO2 INSUFFLATION;  Surgeon: Jose R Richmond MD;  Location: [facility]     COLONOSCOPY WITH CO2 INSUFFLATION N/A 5/10/2017    Procedure: COLONOSCOPY WITH CO2 INSUFFLATION;  COLON SCREEN/ UNDERWOOD;  Surgeon: Abisai Duarte  DO;  Location: [facility] OR     JOINT REPLACEMENT Left 06/2019    hip     SINUS SURGERY  2009     TONSILLECTOMY  age 22       REVIEW OF SYSTEMS:  Nose: negative for snoring.negative for changes in smell. negative for drainage.   Eyes: negative for eye watering. negative for eye itching. negative for vision changes. negative for eye redness.  Throat: negative for hoarseness. negative for sore throat. negative for trouble swallowing.   Lungs: negative for shortness of breath.negative for wheezing. negative for sputum production.   Integumentary: negative for rash. negative for scaling. negative for nail changes.       Current Outpatient Medications:      acetaminophen (TYLENOL) 500 MG tablet, Take 1,000 mg by mouth 3 times daily, Disp: , Rfl:      albuterol (PROAIR HFA/PROVENTIL HFA/VENTOLIN HFA) 108 (90 Base) MCG/ACT inhaler, Inhale 2 puffs into the lungs every 4 hours as needed for shortness of breath / dyspnea or wheezing Use with spacer, Disp: 1 Inhaler, Rfl: 1     aspirin 81 MG EC tablet, Take 81 mg by mouth daily, Disp: , Rfl:      atorvastatin (LIPITOR) 20 MG tablet, TAKE 1/2 TABLET BY MOUTH ONCE DAILY, Disp: 45 tablet, Rfl: 1     budesonide-formoterol (SYMBICORT) 80-4.5 MCG/ACT Inhaler, Inhale 2 puffs into the lungs 2 times daily, Disp: 1 Inhaler, Rfl: 4     diltiazem (CARTIA XT) 120 MG 24 hr capsule, Take 1 capsule (120 mg) by mouth daily For blood pressure/lowers pulse. Pharmacy ok to hold prescription until due, Disp: 90 capsule, Rfl: 3     Dupilumab (DUPIXENT) 300 MG/2ML syringe, Inject 2 mLs (300 mg) Subcutaneous every 14 days, Disp: 4 mL, Rfl: 3     Spacer/Aero-Holding Chambers (SPACER/AERO-HOLD CHAMBER MASK) RAMAN, 1 Adult size spacer to use with MDI inhalers., Disp: 1 each, Rfl: 0     triamcinolone (NASACORT) 55 MCG/ACT nasal aerosol, Spray 2 sprays into both nostrils daily, Disp: , Rfl:   Immunization History   Administered Date(s) Administered     Influenza (H1N1) 12/22/2009     Influenza (High Dose) 3  valent vaccine 10/27/2010, 10/22/2011, 09/30/2014, 09/08/2015, 09/29/2016, 10/04/2017, 09/13/2018, 09/09/2019     Influenza (IIV3) PF 10/19/2002, 10/13/2003, 10/16/2004, 10/20/2005, 09/18/2009, 10/13/2012     Influenza Vaccine IM > 6 months Valent IIV4 10/17/2013     Influenza, Quad, High Dose, Pf, 65yr + 09/14/2020     Mantoux Tuberculin Skin Test 10/09/2009     Pneumo Conj 13-V (2010&after) 09/08/2015     Pneumococcal 23 valent 10/20/2006     TD (ADULT, 7+) 10/27/2010     TDAP Vaccine (Adacel) 08/14/2012     Zoster vaccine recombinant adjuvanted (SHINGRIX) 09/13/2018, 01/03/2019     Zoster vaccine, live 11/12/2014     Allergies   Allergen Reactions     Hytrin [Terazosin Hcl]      Leg swelling and vertigo     Simvastatin Other (See Comments)     Body aches     Cats      Codeine Nausea     Dogs      Dust Mites      [place] Trees      Seasonal Allergies          EXAM:   Constitutional:  Appears well-developed and well-nourished. No distress.   HEENT:   Head: Normocephalic.   Cardiovascular: Normal rate, regular rhythm and normal heart sounds. Exam reveals no gallop and no friction rub.   No murmur heard.  Respiratory: Effort normal and breath sounds normal. No respiratory distress. No wheezes. No rales.   Neuro: Oriented to person, place, and time.  Skin: Skin is warm and dry. No rash noted.   Psychiatric: Normal mood and affect.     Nursing note and vitals reviewed.    ASSESSMENT/PLAN:  Problem List Items Addressed This Visit        Respiratory    Moderate persistent asthma, uncomplicated     History of chronic chest symptoms for multiple years.  Diagnosed with asthma at [facility] in 2010.  On Symbicort 80/4.5 mcg 2 puff inhaled twice daily. Now on Dupixent and huge improvement in chest symptoms. Some recent shortness of breath with acute on chronic sinusitis. This resolved with prednisone and antibiotics.         -Symbicort 80/4.5 mcg 2 puff inhaled twice daily.  -Albuterol 2-4 puffs inhaled (use a spacer unless  using a Proair Respiclick device) every 4 hours as needed for chest tightness, wheezing, shortness of breath and/or coughing.   -Albuterol 2-4 puffs inhaled (use spacer if not using Proair Respiclick device) 15-20 minutes prior to physical activity.   Please ensure warm up period prior to exercise.   - Dupixent 300 mg every other week.           Seasonal allergic rhinitis due to pollen     Perennial with spring and summer nasal and ocular symptoms.  Has history of chronic sinusitis with nasal polyposis.  Follows with ENT.  Nasacort significantly helpful.      Skin testing:  Positive for trees, weeds, cat, dog, dust mite.     - Nasacort 2 sprays per nostril daily.  - Continue Dupixent.         Allergic rhinitis due to animal dander    Allergic rhinitis due to dust mite    Nasal polyposis - Primary          Chart documentation with Dragon Voice recognition Software. Although reviewed after completion, some words and grammatical errors may remain.    Malcolm Del Rio DO FAAAAI  Medical Director for Allergy/Immunology at [place], [place]        Again, thank you for allowing me to participate in the care of your patient.        Sincerely,        Malcolm Del Rio DO

## 2021-01-29 NOTE — PATIENT INSTRUCTIONS
Allergy Staff Appt Hours Shot Hours Locations    Physician     Malcolm Del Rio DO       Support Staff     YUE Corbin CMA  Tuesday:        [place] 7-5 Wednesday:        [place]: 7-5 Thursday:                    [place] 7-6     Friday:  [place]  7-2   [place]        Thursday: 8-5:20        Friday: 7-12     [place]        Tuesday: 7- 3:20 Wednesday: 7-4:20     [place] Monday: 7-2:20 Tuesday: 9-5:20         [facility]  [address] [place] [address]  Appt Line: [phone]  Allergy RN:  [phone]    [facility]  [address] [place] [address]  Appt Line: [phone]  Allergy RN:  [phone]       Important Scheduling Information  Aspirin Desensitization: Appt will last 2 clinic days. Please call the Allergy RN line for your clinic to schedule. Discontinue antihistamines 7 days prior to the appointment.     Food Challenges: Appt will last 3-4 hours. Please call the Allergy RN line for your clinic to schedule. Discontinue antihistamines 7 days prior to the appointment.     Penicillin Testing: Appt will last 2-3 hours. Please call the Allergy RN line for your clinic to schedule. Discontinue antihistamines 7 days prior to the appointment.     Skin Testing: Appt will about 40 minutes. Call the appointment line for your clinic to schedule. Discontinue antihistamines 7 days prior to the appointment.     Venom Testing: Appt will last 2-3 hours. Please call the Allergy RN line for your clinic to schedule. Discontinue antihistamines 7 days prior to the appointment.     Thank you for trusting us with your Allergy, Asthma, and Immunology care. Please feel free to contact us with any questions or concerns you may have.

## 2021-01-29 NOTE — ASSESSMENT & PLAN NOTE
Perennial with spring and summer nasal and ocular symptoms.  Has history of chronic sinusitis with nasal polyposis.  Follows with ENT.  Nasacort significantly helpful.      Skin testing:  Positive for trees, weeds, cat, dog, dust mite.     - Nasacort 2 sprays per nostril daily.  - Continue Dupixent.

## 2021-01-29 NOTE — PROGRESS NOTES
Guillermo Hogue is a 80 year old White male with previous medical history significant for asthma, polyps and allergic rhinitis who returns for a follow up visit.     Patient returns for follow-up.  At last visit he was treated for sinusitis with Augmentin and prednisone.  Sinus symptoms have resolved.  Has been on Dupixent for asthma and nasal polyps.  This has been significantly beneficial.  He is using Symbicort 80/4.5 mcg 2 puff inhaled twice daily.  Asthma is completely controlled.  Waiting on Dupixent approval.    Past Medical History:   Diagnosis Date     Allergic rhinitis age 22     Asthma 2009     Chronic osteoarthritis      Nasal polyposis 2009     Osteoarthritis of left hip      Reactive airway disease 2009     Family History   Problem Relation Age of Onset     Breast Cancer Mother      Arthritis Father      C.A.D. Father      Cerebrovascular Disease Father      Respiratory Father         TB history     Rheumatoid Arthritis Father      Cerebrovascular Disease Maternal Grandmother      Hypertension Maternal Grandmother      Cancer Maternal Grandfather      C.A.D. Brother      Prostate Cancer Brother      C.A.D. Brother      Past Surgical History:   Procedure Laterality Date     COLONOSCOPY       COLONOSCOPY N/A 5/10/2017    Procedure: COMBINED COLONOSCOPY, SINGLE OR MULTIPLE BIOPSY/POLYPECTOMY BY BIOPSY;;  Surgeon: Abisai Duarte DO;  Location: [facility]     COLONOSCOPY WITH CO2 INSUFFLATION N/A 5/14/2015    Procedure: COLONOSCOPY WITH CO2 INSUFFLATION;  Surgeon: Jose R Richmond MD;  Location: [facility]     COLONOSCOPY WITH CO2 INSUFFLATION N/A 5/10/2017    Procedure: COLONOSCOPY WITH CO2 INSUFFLATION;  COLON SCREEN/ UNDERWOOD;  Surgeon: Abisai Duarte DO;  Location: [facility]     JOINT REPLACEMENT Left 06/2019    hip     SINUS SURGERY  2009     TONSILLECTOMY  age 22       REVIEW OF SYSTEMS:  Nose: negative for snoring.negative for changes in smell. negative for drainage.   Eyes: negative for eye watering.  negative for eye itching. negative for vision changes. negative for eye redness.  Throat: negative for hoarseness. negative for sore throat. negative for trouble swallowing.   Lungs: negative for shortness of breath.negative for wheezing. negative for sputum production.   Integumentary: negative for rash. negative for scaling. negative for nail changes.       Current Outpatient Medications:      acetaminophen (TYLENOL) 500 MG tablet, Take 1,000 mg by mouth 3 times daily, Disp: , Rfl:      albuterol (PROAIR HFA/PROVENTIL HFA/VENTOLIN HFA) 108 (90 Base) MCG/ACT inhaler, Inhale 2 puffs into the lungs every 4 hours as needed for shortness of breath / dyspnea or wheezing Use with spacer, Disp: 1 Inhaler, Rfl: 1     aspirin 81 MG EC tablet, Take 81 mg by mouth daily, Disp: , Rfl:      atorvastatin (LIPITOR) 20 MG tablet, TAKE 1/2 TABLET BY MOUTH ONCE DAILY, Disp: 45 tablet, Rfl: 1     budesonide-formoterol (SYMBICORT) 80-4.5 MCG/ACT Inhaler, Inhale 2 puffs into the lungs 2 times daily, Disp: 1 Inhaler, Rfl: 4     diltiazem (CARTIA XT) 120 MG 24 hr capsule, Take 1 capsule (120 mg) by mouth daily For blood pressure/lowers pulse. Pharmacy ok to hold prescription until due, Disp: 90 capsule, Rfl: 3     Dupilumab (DUPIXENT) 300 MG/2ML syringe, Inject 2 mLs (300 mg) Subcutaneous every 14 days, Disp: 4 mL, Rfl: 3     Spacer/Aero-Holding Chambers (SPACER/AERO-HOLD CHAMBER MASK) RAMAN, 1 Adult size spacer to use with MDI inhalers., Disp: 1 each, Rfl: 0     triamcinolone (NASACORT) 55 MCG/ACT nasal aerosol, Spray 2 sprays into both nostrils daily, Disp: , Rfl:   Immunization History   Administered Date(s) Administered     Influenza (H1N1) 12/22/2009     Influenza (High Dose) 3 valent vaccine 10/27/2010, 10/22/2011, 09/30/2014, 09/08/2015, 09/29/2016, 10/04/2017, 09/13/2018, 09/09/2019     Influenza (IIV3) PF 10/19/2002, 10/13/2003, 10/16/2004, 10/20/2005, 09/18/2009, 10/13/2012     Influenza Vaccine IM > 6 months Valent IIV4  10/17/2013     Influenza, Quad, High Dose, Pf, 65yr + 09/14/2020     Mantoux Tuberculin Skin Test 10/09/2009     Pneumo Conj 13-V (2010&after) 09/08/2015     Pneumococcal 23 valent 10/20/2006     TD (ADULT, 7+) 10/27/2010     TDAP Vaccine (Adacel) 08/14/2012     Zoster vaccine recombinant adjuvanted (SHINGRIX) 09/13/2018, 01/03/2019     Zoster vaccine, live 11/12/2014     Allergies   Allergen Reactions     Hytrin [Terazosin Hcl]      Leg swelling and vertigo     Simvastatin Other (See Comments)     Body aches     Cats      Codeine Nausea     Dogs      Dust Mites      [place] Trees      Seasonal Allergies          EXAM:   Constitutional:  Appears well-developed and well-nourished. No distress.   HEENT:   Head: Normocephalic.   Cardiovascular: Normal rate, regular rhythm and normal heart sounds. Exam reveals no gallop and no friction rub.   No murmur heard.  Respiratory: Effort normal and breath sounds normal. No respiratory distress. No wheezes. No rales.   Neuro: Oriented to person, place, and time.  Skin: Skin is warm and dry. No rash noted.   Psychiatric: Normal mood and affect.     Nursing note and vitals reviewed.    ASSESSMENT/PLAN:  Problem List Items Addressed This Visit        Respiratory    Moderate persistent asthma, uncomplicated     History of chronic chest symptoms for multiple years.  Diagnosed with asthma at [facility] in 2010.  On Symbicort 80/4.5 mcg 2 puff inhaled twice daily. Now on Dupixent and huge improvement in chest symptoms. Some recent shortness of breath with acute on chronic sinusitis. This resolved with prednisone and antibiotics.         -Symbicort 80/4.5 mcg 2 puff inhaled twice daily.  -Albuterol 2-4 puffs inhaled (use a spacer unless using a Proair Respiclick device) every 4 hours as needed for chest tightness, wheezing, shortness of breath and/or coughing.   -Albuterol 2-4 puffs inhaled (use spacer if not using Proair Respiclick device) 15-20 minutes prior to physical activity.    Please ensure warm up period prior to exercise.   - Dupixent 300 mg every other week.           Seasonal allergic rhinitis due to pollen     Perennial with spring and summer nasal and ocular symptoms.  Has history of chronic sinusitis with nasal polyposis.  Follows with ENT.  Nasacort significantly helpful.      Skin testing:  Positive for trees, weeds, cat, dog, dust mite.     - Nasacort 2 sprays per nostril daily.  - Continue Dupixent.         Allergic rhinitis due to animal dander    Allergic rhinitis due to dust mite    Nasal polyposis - Primary          Chart documentation with Dragon Voice recognition Software. Although reviewed after completion, some words and grammatical errors may remain.    Malcolm Del Rio DO FAAAAI  Medical Director for Allergy/Immunology at [facility], [place]

## 2021-01-30 ASSESSMENT — ASTHMA QUESTIONNAIRES: ACT_TOTALSCORE: 20

## 2021-02-01 DIAGNOSIS — J45.40 MODERATE PERSISTENT ASTHMA, UNCOMPLICATED: ICD-10-CM

## 2021-02-01 NOTE — TELEPHONE ENCOUNTER
Free Drug Application Approved  Effective Dates 01/30/21 TO 12/31/21  Patient notified? YES  Additional Information

## 2021-02-02 RX ORDER — DUPILUMAB 300 MG/2ML
300 INJECTION, SOLUTION SUBCUTANEOUS
Qty: 4 ML | Refills: 3 | Status: SHIPPED | OUTPATIENT
Start: 2021-02-02 | End: 2021-05-21

## 2021-02-04 ENCOUNTER — IMMUNIZATION (OUTPATIENT)
Dept: PEDIATRICS | Facility: CLINIC | Age: 81
End: 2021-02-04
Payer: COMMERCIAL

## 2021-02-04 PROCEDURE — 91300 PR COVID VAC PFIZER DIL RECON 30 MCG/0.3 ML IM: CPT

## 2021-02-04 PROCEDURE — 0001A PR COVID VAC PFIZER DIL RECON 30 MCG/0.3 ML IM: CPT

## 2021-02-08 ENCOUNTER — TELEPHONE (OUTPATIENT)
Dept: ALLERGY | Facility: CLINIC | Age: 81
End: 2021-02-08

## 2021-02-08 NOTE — TELEPHONE ENCOUNTER
Patient has had his first covid 19 vaccine and he is coming due to have his dupixent injection, he is wondering if it is ok to get this injection.  Please call to advise.  Thank you  Caller informed that calls received after 3pm may not be returned same day.

## 2021-02-09 NOTE — TELEPHONE ENCOUNTER
Spoke with patient.  Let him know that Dr. Del Rio recommends spacing the COVID19 vaccination 24 hours away from other injections.  He understands and has no additional questions.  Closing encounter.    Enriqueta Spears RN

## 2021-02-25 ENCOUNTER — IMMUNIZATION (OUTPATIENT)
Dept: PEDIATRICS | Facility: CLINIC | Age: 81
End: 2021-02-25
Attending: INTERNAL MEDICINE
Payer: COMMERCIAL

## 2021-02-25 PROCEDURE — 0002A PR COVID VAC PFIZER DIL RECON 30 MCG/0.3 ML IM: CPT

## 2021-02-25 PROCEDURE — 91300 PR COVID VAC PFIZER DIL RECON 30 MCG/0.3 ML IM: CPT

## 2021-03-23 ENCOUNTER — DOCUMENTATION ONLY (OUTPATIENT)
Dept: LAB | Facility: CLINIC | Age: 81
End: 2021-03-23

## 2021-03-23 DIAGNOSIS — Z00.00 ROUTINE HISTORY AND PHYSICAL EXAMINATION OF ADULT: ICD-10-CM

## 2021-03-23 DIAGNOSIS — E78.5 HYPERLIPIDEMIA LDL GOAL <130: Primary | ICD-10-CM

## 2021-03-23 DIAGNOSIS — I10 HYPERTENSION GOAL BP (BLOOD PRESSURE) < 140/90: ICD-10-CM

## 2021-03-23 NOTE — PROGRESS NOTES
Please review, associate diagnosis and sign pending laboratory future orders for patient's  upcoming lab appointment on 03/24/21.    Thank you,   Georgette Chase MLT

## 2021-03-24 DIAGNOSIS — I10 HYPERTENSION GOAL BP (BLOOD PRESSURE) < 140/90: ICD-10-CM

## 2021-03-24 DIAGNOSIS — Z00.00 ROUTINE HISTORY AND PHYSICAL EXAMINATION OF ADULT: ICD-10-CM

## 2021-03-24 DIAGNOSIS — E78.5 HYPERLIPIDEMIA LDL GOAL <130: ICD-10-CM

## 2021-03-24 LAB
ALBUMIN SERPL-MCNC: 3.7 G/DL (ref 3.4–5)
ALP SERPL-CCNC: 92 U/L (ref 40–150)
ALT SERPL W P-5'-P-CCNC: 25 U/L (ref 0–70)
ANION GAP SERPL CALCULATED.3IONS-SCNC: 5 MMOL/L (ref 3–14)
AST SERPL W P-5'-P-CCNC: 17 U/L (ref 0–45)
BILIRUB SERPL-MCNC: 0.6 MG/DL (ref 0.2–1.3)
BUN SERPL-MCNC: 12 MG/DL (ref 7–30)
CALCIUM SERPL-MCNC: 9.3 MG/DL (ref 8.5–10.1)
CHLORIDE SERPL-SCNC: 104 MMOL/L (ref 94–109)
CHOLEST SERPL-MCNC: 137 MG/DL
CO2 SERPL-SCNC: 29 MMOL/L (ref 20–32)
CREAT SERPL-MCNC: 0.77 MG/DL (ref 0.66–1.25)
GFR SERPL CREATININE-BSD FRML MDRD: 86 ML/MIN/{1.73_M2}
GLUCOSE SERPL-MCNC: 88 MG/DL (ref 70–99)
HDLC SERPL-MCNC: 55 MG/DL
LDLC SERPL CALC-MCNC: 69 MG/DL
NONHDLC SERPL-MCNC: 82 MG/DL
POTASSIUM SERPL-SCNC: 3.8 MMOL/L (ref 3.4–5.3)
PROT SERPL-MCNC: 7.5 G/DL (ref 6.8–8.8)
SODIUM SERPL-SCNC: 138 MMOL/L (ref 133–144)
TRIGL SERPL-MCNC: 67 MG/DL

## 2021-03-24 PROCEDURE — 36415 COLL VENOUS BLD VENIPUNCTURE: CPT | Performed by: FAMILY MEDICINE

## 2021-03-24 PROCEDURE — 80061 LIPID PANEL: CPT | Performed by: FAMILY MEDICINE

## 2021-03-24 PROCEDURE — 80053 COMPREHEN METABOLIC PANEL: CPT | Performed by: FAMILY MEDICINE

## 2021-03-26 ENCOUNTER — OFFICE VISIT (OUTPATIENT)
Dept: FAMILY MEDICINE | Facility: CLINIC | Age: 81
End: 2021-03-26
Payer: COMMERCIAL

## 2021-03-26 VITALS
DIASTOLIC BLOOD PRESSURE: 72 MMHG | HEIGHT: 69 IN | BODY MASS INDEX: 30.51 KG/M2 | SYSTOLIC BLOOD PRESSURE: 132 MMHG | HEART RATE: 71 BPM | OXYGEN SATURATION: 98 % | TEMPERATURE: 96.7 F | WEIGHT: 206 LBS

## 2021-03-26 DIAGNOSIS — Z00.00 ENCOUNTER FOR MEDICARE ANNUAL WELLNESS EXAM: Primary | ICD-10-CM

## 2021-03-26 DIAGNOSIS — R00.2 PALPITATIONS: ICD-10-CM

## 2021-03-26 DIAGNOSIS — E78.5 HYPERLIPIDEMIA LDL GOAL <130: ICD-10-CM

## 2021-03-26 DIAGNOSIS — J45.40 MODERATE PERSISTENT ASTHMA, UNCOMPLICATED: ICD-10-CM

## 2021-03-26 PROCEDURE — 99397 PER PM REEVAL EST PAT 65+ YR: CPT | Performed by: FAMILY MEDICINE

## 2021-03-26 PROCEDURE — 99213 OFFICE O/P EST LOW 20 MIN: CPT | Mod: 25 | Performed by: FAMILY MEDICINE

## 2021-03-26 ASSESSMENT — MIFFLIN-ST. JEOR: SCORE: 1639.79

## 2021-03-26 NOTE — PATIENT INSTRUCTIONS
Patient Education   Personalized Prevention Plan  You are due for the preventive services outlined below.  Your care team is available to assist you in scheduling these services.  If you have already completed any of these items, please share that information with your care team to update in your medical record.  Health Maintenance Due   Topic Date Due     PHQ-2  01/01/2021     FALL RISK ASSESSMENT  02/17/2021

## 2021-03-26 NOTE — PROGRESS NOTES
"  SUBJECTIVE:   Guillermo Hogue is a 80 year old male who presents for Preventive Visit.  Follow-up htn, high cholesterol and asthma. Had a.fib history and s/p hip replacement.  Not interested repeat colonoscopy. No black/bloody stools.   No urine changes or hematuria. Breathing stable.   No chest pain.  No nausea, vomiting or diarrhea/constipation.   No mole changes or rashes.   Exercise - needs more some walking. No vitaminD. Milk 1 glass.  Emotionally doing ok. No falls. memory ok.  Lives alone. Family/friends.   Patient has been advised of split billing requirements and indicates understanding: Yes  Are you in the first 12 months of your Medicare Part B coverage?  No    Physical Health:    In general, how would you rate your overall physical health? good    Outside of work, how many days during the week do you exercise? none    Outside of work, approximately how many minutes a day do you exercise?not applicable    If you drink alcohol do you typically have >3 drinks per day or >7 drinks per week? No    Do you usually eat at least 4 servings of fruit and vegetables a day, include whole grains & fiber and avoid regularly eating high fat or \"junk\" foods? Yes    Do you have any problems taking medications regularly?    No    Do you have any side effects from medications? none    Needs assistance for the following daily activities: no assistance needed    Which of the following safety concerns are present in your home?  none identified     Hearing impairment: No    In the past 6 months, have you been bothered by leaking of urine? no    Mental Health:    In general, how would you rate your overall mental or emotional health? good  PHQ-2 Score:      Do you feel safe in your environment? Yes    Have you ever done Advance Care Planning? (For example, a Health Directive, POLST, or a discussion with a medical provider or your loved ones about your wishes): Yes, advance care planning is on file.      Fall risk:  Fallen 2 or " more times in the past year?: No  Any fall with injury in the past year?: No    Cognitive Screenin) Repeat 3 items (Leader, Season, Table)    2) Clock draw: NORMAL  3) 3 item recall: Recalls 1 object   Results: NORMAL clock, 1-2 items recalled: COGNITIVE IMPAIRMENT LESS LIKELY    Mini-CogTM Copyright ARPIT Balbuena. Licensed by the author for use in [facility]; reprinted with permission (katieob@[facility]). All rights reserved.      Do you have sleep apnea, excessive snoring or daytime drowsiness?: no        Reviewed and updated as needed this visit by clinical staff  Tobacco  Allergies  Meds   Med Hx  Surg Hx  Fam Hx  Soc Hx        Reviewed and updated as needed this visit by Provider                Social History     Tobacco Use     Smoking status: Never Smoker     Smokeless tobacco: Never Used   Substance Use Topics     Alcohol use: No                           Current providers sharing in care for this patient include:  Patient Care Team:  Edi Doty MD as PCP - General  Jeffrey Parisi MD as Assigned Musculoskeletal Provider  Alonso Valadez MD as Assigned Surgical Provider  Malcolm Del Rio DO as Assigned Allergy Provider  Karli Ahmadi MD as Assigned PCP    The following health maintenance items are reviewed in Epic and correct as of today:  Health Maintenance   Topic Date Due     FALL RISK ASSESSMENT  2021     ASTHMA ACTION PLAN  2021     ASTHMA CONTROL TEST  2021     MEDICARE ANNUAL WELLNESS VISIT  2022     DTAP/TDAP/TD IMMUNIZATION (3 - Td) 2022     ADVANCE CARE PLANNING  2025     LIPID  [id number]     HEPATITIS C SCREENING  Completed     PHQ-2  Completed     INFLUENZA VACCINE  Completed     Pneumococcal Vaccine: Pediatrics (0 to 5 Years) and At-Risk Patients (6 to 64 Years)  Completed     Pneumococcal Vaccine: 65+ Years  Completed     ZOSTER IMMUNIZATION  Completed     COVID-19 Vaccine  Completed     IPV IMMUNIZATION  Aged Out  "    MENINGITIS IMMUNIZATION  Aged Out     HEPATITIS B IMMUNIZATION  Aged Out     Labs reviewed in EPIC      ROS:  All other ROS negative    OBJECTIVE:   /72   Pulse 71   Temp 96.7  F (35.9  C) (Tympanic)   Ht 1.753 m (5' 9\")   Wt 93.4 kg (206 lb)   SpO2 98%   BMI 30.42 kg/m   Estimated body mass index is 30.42 kg/m  as calculated from the following:    Height as of this encounter: 1.753 m (5' 9\").    Weight as of this encounter: 93.4 kg (206 lb).  EXAM:   GENERAL: healthy, alert and no distress  EYES: Eyes grossly normal to inspection, PERRL and conjunctivae and sclerae normal  HENT: ear canals and TM's normal, nose and mouth without ulcers or lesions  NECK: no adenopathy, no asymmetry, masses, or scars and thyroid normal to palpation  RESP: lungs clear to auscultation - no rales, rhonchi or wheezes  BREAST: normal without masses, tenderness or nipple discharge and no palpable axillary masses or adenopathy  CV: regular rate and rhythm, normal S1 S2, no S3 or S4, no murmur, click or rub, no peripheral edema and peripheral pulses strong  ABDOMEN: soft, nontender, no hepatosplenomegaly, no masses and bowel sounds normal   (male): patient deferred /rectal exams  MS: no gross musculoskeletal defects noted, no edema  SKIN: no suspicious lesions or rashes  NEURO: Normal strength and tone, mentation intact and speech normal  BACK: no CVA tenderness, no paralumbar tenderness  PSYCH: mentation appears normal, affect normal/bright  LYMPH: no cervical, supraclavicular, axillary adenopathy    ASSESSMENT / PLAN:   ASSESSMENT / PLAN:  (Z00.00) Encounter for Medicare annual wellness exam  (primary encounter diagnosis)  Comment: generally healthy and normal exam/labs. No falls and memory ok  Plan: Reviewed self mole/testicle check handout.  Patient NOT interested in repeat colonoscopy. vitaminD and follow-up eye exam    (J45.40) Moderate persistent asthma, uncomplicated  Comment: stable  Plan: continue meds. " "    (R00.2) Palpitations  Comment: stable  Plan: continue med.     (E78.5) Hyperlipidemia LDL goal <130  Comment: stable  Plan: continue diet/exercise. Chest pain or shortness of breath to er.         Patient has been advised of split billing requirements and indicates understanding: Yes    COUNSELING:  Reviewed preventive health counseling, as reflected in patient instructions       Regular exercise       Healthy diet/nutrition       Vision screening       Hearing screening       Dental care       Bladder control       Fall risk prevention       Colon cancer screening    Estimated body mass index is 30.42 kg/m  as calculated from the following:    Height as of this encounter: 1.753 m (5' 9\").    Weight as of this encounter: 93.4 kg (206 lb).    Weight management plan: Discussed healthy diet and exercise guidelines    He reports that he has never smoked. He has never used smokeless tobacco.    Appropriate preventive services were discussed with this patient, including applicable screening as appropriate for cardiovascular disease, diabetes, osteopenia/osteoporosis, and glaucoma.  As appropriate for age/gender, discussed screening for colorectal cancer, prostate cancer, breast cancer, and cervical cancer. Checklist reviewing preventive services available has been given to the patient.    Reviewed patients plan of care and provided an AVS. The Intermediate Care Plan ( asthma action plan, low back pain action plan, and migraine action plan) for Guillermo meets the Care Plan requirement. This Care Plan has been established and reviewed with the Patient.    Counseling Resources:  ATP IV Guidelines  Pooled Cohorts Equation Calculator  Breast Cancer Risk Calculator  BRCA-Related Cancer Risk Assessment: FHS-7 Tool  FRAX Risk Assessment  ICSI Preventive Guidelines  Dietary Guidelines for Americans, 2010  USDA's MyPlate  ASA Prophylaxis  Lung CA Screening    Edi Doty MD  [facility]  "

## 2021-04-06 ENCOUNTER — OFFICE VISIT (OUTPATIENT)
Dept: FAMILY MEDICINE | Facility: CLINIC | Age: 81
End: 2021-04-06
Payer: COMMERCIAL

## 2021-04-06 VITALS
TEMPERATURE: 97.2 F | HEART RATE: 63 BPM | RESPIRATION RATE: 16 BRPM | BODY MASS INDEX: 30.27 KG/M2 | OXYGEN SATURATION: 97 % | WEIGHT: 205 LBS | DIASTOLIC BLOOD PRESSURE: 68 MMHG | SYSTOLIC BLOOD PRESSURE: 136 MMHG

## 2021-04-06 DIAGNOSIS — K13.79 MOUTH SORE: Primary | ICD-10-CM

## 2021-04-06 PROCEDURE — 99213 OFFICE O/P EST LOW 20 MIN: CPT | Performed by: PHYSICIAN ASSISTANT

## 2021-04-06 NOTE — PROGRESS NOTES
Assessment & Plan       ICD-10-CM    1. Mouth sore  K13.79 magic mouthwash (ENTER INGREDIENTS IN COMMENTS) suspension     OTOLARYNGOLOGY REFERRAL   Talk to patient but his concerns of.  Unclear etiology.  We will do a trial of Magic mouthwash type solution.  We will have him follow-up in a week if he is not improving with the ENT.  Warning signs side effects were discussed.      Return in about 1 week (around 4/13/2021) for recheck.    Ramon Sapp PA-C  [facility]    Debra Li is a 80 year old who presents for the following health issues     HPI     Concern - Mouth sore  Onset: 1.5 week   Description: sore on top of mouth   Intensity: moderate  Progression of Symptoms:  same  Accompanying Signs & Symptoms: sore   Previous history of similar problem:   Precipitating factors:        Worsened by: food   Alleviating factors:        Improved by:   Therapies tried and outcome:   States he did not notice it until he went to the dentist and the dentist had mentioned that and then a couple days later he started noticing some soreness.  He is unsure if he may have burned the roof of his mouth with eating he states that could have happened.  He states similar symptoms like that has happened to him before.  He states he otherwise feels well.      Review of Systems   Constitutional, HEENT, cardiovascular, pulmonary, gi and gu systems are negative, except as otherwise noted.      Objective    /68   Pulse 63   Temp 97.2  F (36.2  C) (Tympanic)   Resp 16   Wt 93 kg (205 lb)   SpO2 97%   BMI 30.27 kg/m    Body mass index is 30.27 kg/m .  Physical Exam   GENERAL: healthy, alert and no distress  Mouth: Large erythematous macular lesion top roof of his mouth.

## 2021-04-08 ENCOUNTER — OFFICE VISIT (OUTPATIENT)
Dept: OTOLARYNGOLOGY | Facility: CLINIC | Age: 81
End: 2021-04-08
Payer: COMMERCIAL

## 2021-04-08 VITALS
SYSTOLIC BLOOD PRESSURE: 115 MMHG | DIASTOLIC BLOOD PRESSURE: 66 MMHG | HEART RATE: 82 BPM | BODY MASS INDEX: 30.13 KG/M2 | WEIGHT: 204 LBS

## 2021-04-08 DIAGNOSIS — K12.1 ORAL ULCER: Primary | ICD-10-CM

## 2021-04-08 PROCEDURE — 99213 OFFICE O/P EST LOW 20 MIN: CPT | Performed by: OTOLARYNGOLOGY

## 2021-04-08 RX ORDER — TRIAMCINOLONE ACETONIDE 0.1 %
PASTE (GRAM) DENTAL 2 TIMES DAILY
Qty: 5 G | Refills: 1 | Status: SHIPPED | OUTPATIENT
Start: 2021-04-08 | End: 2021-04-18

## 2021-04-08 NOTE — LETTER
4/8/2021         RE: Guillermo Hogue  [address]  [address]  [place] [address]        Dear Colleague,    Thank you for referring your patient, Guillermo Hogue, to the [facility]. Please see a copy of my visit note below.    ENT Consultation    Guillermo Hogue is a 80 year old male who is seen in consultation at the request of self.      History of Present Illness - Guillermo Hogue is a 80 year old male presents for evaluation of ulcer found on his hard palate.  Apparently it was seen by his dentist few weeks ago.  His primary care provider who prescribed Magic mouthwash that he has been using but has not noticed any difference.  He did not experience any pain any discharge any bleeding.  He was not aware of it at that time.  Now is more aware of it for touches the area gets little discomfort.  He is not a smoker never chewed tobacco.    Past Medical History -   Past Medical History:   Diagnosis Date     Allergic rhinitis age 22     Asthma 2009     Chronic osteoarthritis      Nasal polyposis 2009     Osteoarthritis of left hip      Reactive airway disease 2009       Current Medications -   Current Outpatient Medications:      acetaminophen (TYLENOL) 500 MG tablet, Take 1,000 mg by mouth 3 times daily, Disp: , Rfl:      albuterol (PROAIR HFA/PROVENTIL HFA/VENTOLIN HFA) 108 (90 Base) MCG/ACT inhaler, Inhale 2 puffs into the lungs every 4 hours as needed for shortness of breath / dyspnea or wheezing Use with spacer, Disp: 1 Inhaler, Rfl: 1     aspirin 81 MG EC tablet, Take 81 mg by mouth daily, Disp: , Rfl:      atorvastatin (LIPITOR) 20 MG tablet, TAKE 1/2 TABLET BY MOUTH ONCE DAILY, Disp: 45 tablet, Rfl: 1     budesonide-formoterol (SYMBICORT) 80-4.5 MCG/ACT Inhaler, Inhale 2 puffs into the lungs 2 times daily, Disp: 1 Inhaler, Rfl: 4     diltiazem (CARTIA XT) 120 MG 24 hr capsule, Take 1 capsule (120 mg) by mouth daily For blood pressure/lowers pulse. Pharmacy ok to hold prescription  until due, Disp: 90 capsule, Rfl: 3     Dupilumab (DUPIXENT) 300 MG/2ML syringe, Inject 2 mLs (300 mg) Subcutaneous every 14 days, Disp: 4 mL, Rfl: 3     magic mouthwash (ENTER INGREDIENTS IN COMMENTS) suspension, Take 5 mLs by mouth 3 times daily for 14 days Swish and spit., Disp: 240 mL, Rfl: 1     Spacer/Aero-Holding Chambers (SPACER/AERO-HOLD CHAMBER MASK) RAMAN, 1 Adult size spacer to use with MDI inhalers., Disp: 1 each, Rfl: 0     triamcinolone (KENALOG) 0.1 % paste, Take by mouth 2 times daily for 10 days, Disp: 5 g, Rfl: 1     triamcinolone (NASACORT) 55 MCG/ACT nasal aerosol, Spray 2 sprays into both nostrils daily, Disp: , Rfl:     Allergies -   Allergies   Allergen Reactions     Hytrin [Terazosin Hcl]      Leg swelling and vertigo     Simvastatin Other (See Comments)     Body aches     Cats      Codeine Nausea     Dogs      Dust Mites      [place] Trees      Seasonal Allergies        Social History -   Social History     Socioeconomic History     Marital status:      Spouse name: Sanam palafox 2013     Number of children: 2     Years of education: 14     Highest education level: None   Occupational History     Occupation: Middle Management     Employer: RETIRED     Comment: 2008   Social Needs     Financial resource strain: None     Food insecurity     Worry: None     Inability: None     Transportation needs     Medical: None     Non-medical: None   Tobacco Use     Smoking status: Never Smoker     Smokeless tobacco: Never Used   Substance and Sexual Activity     Alcohol use: No     Drug use: No     Sexual activity: Not Currently   Lifestyle     Physical activity     Days per week: None     Minutes per session: None     Stress: None   Relationships     Social connections     Talks on phone: None     Gets together: None     Attends [employer] service: None     Active member of club or organization: None     Attends meetings of clubs or organizations: None     Relationship status: None     Intimate  partner violence     Fear of current or ex partner: None     Emotionally abused: None     Physically abused: None     Forced sexual activity: None   Other Topics Concern     Parent/sibling w/ CABG, MI or angioplasty before 65F 55M? Yes     Comment: 2 Brothers and father      Service No     Blood Transfusions No     Caffeine Concern No     Occupational Exposure Yes     Comment: he was in [employer]     Hobby Hazards No     Sleep Concern No     Stress Concern No     Weight Concern Yes     Special Diet No     Back Care No     Exercise No     Bike Helmet No     Seat Belt Yes     Self-Exams No   Social History Narrative     None       Family History -   Family History   Problem Relation Age of Onset     Breast Cancer Mother      Arthritis Father      C.A.D. Father      Cerebrovascular Disease Father      Respiratory Father         TB history     Rheumatoid Arthritis Father      Cerebrovascular Disease Maternal Grandmother      Hypertension Maternal Grandmother      Cancer Maternal Grandfather      C.A.D. Brother      Prostate Cancer Brother      C.A.D. Brother        Review of Systems - As per HPI and PMHx, otherwise review of system review of the head and neck negative. Otherwise 10+ review systems negative.    Physical Exam  /66   Pulse 82   Wt 92.5 kg (204 lb)   BMI 30.13 kg/m    BMI: Body mass index is 30.13 kg/m .    General - The patient is well nourished and well developed, and appears to have good nutritional status.  Alert and oriented to person and place, answers questions and cooperates with examination appropriately.    SKIN - No suspicious lesions or rashes.  Respiration - No respiratory distress.  Head and Face - Normocephalic and atraumatic, with no gross asymmetry noted of the contour of the facial features.  The facial nerve is intact, with strong symmetric movements.    Voice and Breathing - The patient was breathing comfortably without the use of accessory muscles. The patients voice was  clear and strong, and had appropriate pitch and quality.    Ears - Bilateral pinna and EACs with normal appearing overlying skin. Tympanic membrane intact with good mobility on pneumatic otoscopy bilaterally. Bony landmarks of the ossicular chain are normal. The tympanic membranes are normal in appearance. No retraction, perforation, or masses.  No fluid or purulence was seen in the external canal or the middle ear.     Eyes - Extraocular movements intact.  Sclera were not icteric or injected, conjunctiva were pink and moist.    Mouth - Examination of the oral cavity showed pink, healthy oral mucosa. No lesions or ulcerations noted.  The tongue was mobile and midline, and the dentition were in good condition.  In the area of her anterior hard palate we see maybe slight area of irregularity of the mucosa but also some bony irregularities there I can then call the granulation tissue maybe a millimeter area.  He does not appear to feel any discomfort on palpation.    Throat - The walls of the oropharynx were smooth, pink, moist, symmetric, and had no lesions or ulcerations.  The tonsillar pillars and soft palate were symmetric.  The uvula was midline on elevation.    Neck - Normal midline excursion of the laryngotracheal complex during swallowing.  Full range of motion on passive movement.  Palpation of the occipital, submental, submandibular, internal jugular chain, and supraclavicular nodes did not demonstrate any abnormal lymph nodes or masses.  The carotid pulse was palpable bilaterally.  Palpation of the thyroid was soft and smooth, with no nodules or goiter appreciated.  The trachea was mobile and midline.    Nose - External contour is symmetric, no gross deflection or scars.  Nasal mucosa is pink and moist with no abnormal mucus.  The septum was midline and non-obstructive, turbinates of normal size and position.  No polyps, masses, or purulence noted on examination.    Neuro - Nonfocal neuro exam is normal, CN  2 through 12 intact, normal gait and muscle tone.      Performed in clinic today:  No procedures preformed in clinic today      A/P - Guillermo Hogue is a 80 year old male with nonspecific irritation/inflammation minimal area in the anterior hard palate.  At this point will prescribe Kenalog and Orabase paste to apply twice daily for 10 days.  Would like to reevaluate again in 4 weeks.  If there is any residual abnormality may choose to biopsy.  Currently that area is so minimal with a difficult access to any kind of biopsies.  Patient will return in 4 weeks.      Parker Lackey MD      Again, thank you for allowing me to participate in the care of your patient.        Sincerely,        Parker Lackey MD, MD

## 2021-04-08 NOTE — PROGRESS NOTES
ENT Consultation    Guillermo Hogue is a 80 year old male who is seen in consultation at the request of self.      History of Present Illness - Guillermo Hogue is a 80 year old male presents for evaluation of ulcer found on his hard palate.  Apparently it was seen by his dentist few weeks ago.  His primary care provider who prescribed Magic mouthwash that he has been using but has not noticed any difference.  He did not experience any pain any discharge any bleeding.  He was not aware of it at that time.  Now is more aware of it for touches the area gets little discomfort.  He is not a smoker never chewed tobacco.    Past Medical History -   Past Medical History:   Diagnosis Date     Allergic rhinitis age 22     Asthma 2009     Chronic osteoarthritis      Nasal polyposis 2009     Osteoarthritis of left hip      Reactive airway disease 2009       Current Medications -   Current Outpatient Medications:      acetaminophen (TYLENOL) 500 MG tablet, Take 1,000 mg by mouth 3 times daily, Disp: , Rfl:      albuterol (PROAIR HFA/PROVENTIL HFA/VENTOLIN HFA) 108 (90 Base) MCG/ACT inhaler, Inhale 2 puffs into the lungs every 4 hours as needed for shortness of breath / dyspnea or wheezing Use with spacer, Disp: 1 Inhaler, Rfl: 1     aspirin 81 MG EC tablet, Take 81 mg by mouth daily, Disp: , Rfl:      atorvastatin (LIPITOR) 20 MG tablet, TAKE 1/2 TABLET BY MOUTH ONCE DAILY, Disp: 45 tablet, Rfl: 1     budesonide-formoterol (SYMBICORT) 80-4.5 MCG/ACT Inhaler, Inhale 2 puffs into the lungs 2 times daily, Disp: 1 Inhaler, Rfl: 4     diltiazem (CARTIA XT) 120 MG 24 hr capsule, Take 1 capsule (120 mg) by mouth daily For blood pressure/lowers pulse. Pharmacy ok to hold prescription until due, Disp: 90 capsule, Rfl: 3     Dupilumab (DUPIXENT) 300 MG/2ML syringe, Inject 2 mLs (300 mg) Subcutaneous every 14 days, Disp: 4 mL, Rfl: 3     magic mouthwash (ENTER INGREDIENTS IN COMMENTS) suspension, Take 5 mLs by mouth 3 times daily for 14  days Swish and spit., Disp: 240 mL, Rfl: 1     Spacer/Aero-Holding Chambers (SPACER/AERO-HOLD CHAMBER MASK) RAMAN, 1 Adult size spacer to use with MDI inhalers., Disp: 1 each, Rfl: 0     triamcinolone (KENALOG) 0.1 % paste, Take by mouth 2 times daily for 10 days, Disp: 5 g, Rfl: 1     triamcinolone (NASACORT) 55 MCG/ACT nasal aerosol, Spray 2 sprays into both nostrils daily, Disp: , Rfl:     Allergies -   Allergies   Allergen Reactions     Hytrin [Terazosin Hcl]      Leg swelling and vertigo     Simvastatin Other (See Comments)     Body aches     Cats      Codeine Nausea     Dogs      Dust Mites      [place] Trees      Seasonal Allergies        Social History -   Social History     Socioeconomic History     Marital status:      Spouse name: Sanam palafox      Number of children: 2     Years of education: 14     Highest education level: None   Occupational History     Occupation: Middle Management     Employer: RETIRED     Comment:    Social Needs     Financial resource strain: None     Food insecurity     Worry: None     Inability: None     Transportation needs     Medical: None     Non-medical: None   Tobacco Use     Smoking status: Never Smoker     Smokeless tobacco: Never Used   Substance and Sexual Activity     Alcohol use: No     Drug use: No     Sexual activity: Not Currently   Lifestyle     Physical activity     Days per week: None     Minutes per session: None     Stress: None   Relationships     Social connections     Talks on phone: None     Gets together: None     Attends Jew service: None     Active member of club or organization: None     Attends meetings of clubs or organizations: None     Relationship status: None     Intimate partner violence     Fear of current or ex partner: None     Emotionally abused: None     Physically abused: None     Forced sexual activity: None   Other Topics Concern     Parent/sibling w/ CABG, MI or angioplasty before 65F 55M? Yes     Comment: 2 Brothers  and father      Service No     Blood Transfusions No     Caffeine Concern No     Occupational Exposure Yes     Comment: he was in [employer]     Hobby Hazards No     Sleep Concern No     Stress Concern No     Weight Concern Yes     Special Diet No     Back Care No     Exercise No     Bike Helmet No     Seat Belt Yes     Self-Exams No   Social History Narrative     None       Family History -   Family History   Problem Relation Age of Onset     Breast Cancer Mother      Arthritis Father      C.A.D. Father      Cerebrovascular Disease Father      Respiratory Father         TB history     Rheumatoid Arthritis Father      Cerebrovascular Disease Maternal Grandmother      Hypertension Maternal Grandmother      Cancer Maternal Grandfather      C.A.D. Brother      Prostate Cancer Brother      C.A.D. Brother        Review of Systems - As per HPI and PMHx, otherwise review of system review of the head and neck negative. Otherwise 10+ review systems negative.    Physical Exam  /66   Pulse 82   Wt 92.5 kg (204 lb)   BMI 30.13 kg/m    BMI: Body mass index is 30.13 kg/m .    General - The patient is well nourished and well developed, and appears to have good nutritional status.  Alert and oriented to person and place, answers questions and cooperates with examination appropriately.    SKIN - No suspicious lesions or rashes.  Respiration - No respiratory distress.  Head and Face - Normocephalic and atraumatic, with no gross asymmetry noted of the contour of the facial features.  The facial nerve is intact, with strong symmetric movements.    Voice and Breathing - The patient was breathing comfortably without the use of accessory muscles. The patients voice was clear and strong, and had appropriate pitch and quality.    Ears - Bilateral pinna and EACs with normal appearing overlying skin. Tympanic membrane intact with good mobility on pneumatic otoscopy bilaterally. Bony landmarks of the ossicular chain are normal.  The tympanic membranes are normal in appearance. No retraction, perforation, or masses.  No fluid or purulence was seen in the external canal or the middle ear.     Eyes - Extraocular movements intact.  Sclera were not icteric or injected, conjunctiva were pink and moist.    Mouth - Examination of the oral cavity showed pink, healthy oral mucosa. No lesions or ulcerations noted.  The tongue was mobile and midline, and the dentition were in good condition.  In the area of her anterior hard palate we see maybe slight area of irregularity of the mucosa but also some bony irregularities there I can then call the granulation tissue maybe a millimeter area.  He does not appear to feel any discomfort on palpation.    Throat - The walls of the oropharynx were smooth, pink, moist, symmetric, and had no lesions or ulcerations.  The tonsillar pillars and soft palate were symmetric.  The uvula was midline on elevation.    Neck - Normal midline excursion of the laryngotracheal complex during swallowing.  Full range of motion on passive movement.  Palpation of the occipital, submental, submandibular, internal jugular chain, and supraclavicular nodes did not demonstrate any abnormal lymph nodes or masses.  The carotid pulse was palpable bilaterally.  Palpation of the thyroid was soft and smooth, with no nodules or goiter appreciated.  The trachea was mobile and midline.    Nose - External contour is symmetric, no gross deflection or scars.  Nasal mucosa is pink and moist with no abnormal mucus.  The septum was midline and non-obstructive, turbinates of normal size and position.  No polyps, masses, or purulence noted on examination.    Neuro - Nonfocal neuro exam is normal, CN 2 through 12 intact, normal gait and muscle tone.      Performed in clinic today:  No procedures preformed in clinic today      A/P - Guillermo Hogue is a 80 year old male with nonspecific irritation/inflammation minimal area in the anterior hard palate.  At  this point will prescribe Kenalog and Orabase paste to apply twice daily for 10 days.  Would like to reevaluate again in 4 weeks.  If there is any residual abnormality may choose to biopsy.  Currently that area is so minimal with a difficult access to any kind of biopsies.  Patient will return in 4 weeks.      Parker Lackey MD

## 2021-04-22 NOTE — PROGRESS NOTES
"History of Present Illness - Guillermo Hogue is a very pleasant 80 year old male last seen on 9/10/2020, status post functional endoscopic sinus surgery on 2/12/2009    To review, at the visit on 11/11/2013 he told me that since the functional endoscopic sinus surgery his nose had been \"excellent.\"   However, we have continued to manage a chronic post nasal drainage.    To review, he was on BPM as an antihistamine, but because it was no longer available, I placed him on low dose atarax (hydroxyzine) to dry up his allergic rhinitis related post nasal drainage.  He told me that \"cornered the market on the BPM in [place]\" and he had run out of it.  I tried him on atarax but he tells me that it is not helping nearly as much.  He is also taking the BID mucinex, but he thinks that is making only a minimal difference.  He had been taking sleeping pills to get him to sleep.    So with those failures of therapy, I tried a new approach, and used Gent Itraconazole Dex nasal irrigations with the thought that perhaps this was inflammatory post nasal drainage.  On 12/12/12, and the rinses worked dramatically well for him.  He did have complaints of sleep cycle issues, which I then addressed with decreasing the the Dex component.  But then at the follow up on 2/6/2013 there was a return of the drainage and congestion, so I resumed the full strength dex component for two months.  At the visit on 8/5/2014, I encouraged him to try and decrease the frequency of the medicated rinses, and continue saline irrigation.  And at the most recent visit on 10/5/2015, things were going great, and I had not seen him since 4/4/2017.  He tells me that his nose has been great since being put on Dupixent by Dr. Del Rio.  He is only on Nasacort at this point.    At the 2018 visit he also had a new issue.  He told me that intermittently over the years he has had issues with occasional dizziness, and PT for benign paroxysmal positional vertigo has " always helped in the past.  But this time it has not seemed help.  I felt that this bidirectional rotationally triggered momentary vertigo was posterior canal benign paroxysmal positional vertigo, and referred him for Semont maneuvers.  That problem has improved.    He tells me that there is a bit of a new issue.  For a month, he has had a very mild ache and a lump on the roof of his mouth.  He tried some magic mouthwash and it did not help.  He was tried on Kenalog by Darya after seeing  Him on April 8.  That did not help, and that is why he is back to see me.    Past Medical History -   Patient Active Problem List   Diagnosis     Reactive airway disease     Chronic sinusitis, unspecified location     AR (allergic rhinitis)     HYPERLIPIDEMIA LDL GOAL <130     BPH (benign prostatic hyperplasia)     Palpitations     Hypertension goal BP (blood pressure) < 140/90     Fatty liver     Cataracts, bilateral     Moderate persistent asthma, uncomplicated     Seasonal allergic rhinitis due to pollen     Allergic rhinitis due to animal dander     Allergic rhinitis due to dust mite     Paroxysmal atrial fibrillation (H)     Dizziness     Nasal polyposis       Current Medications -   Current Outpatient Medications:      acetaminophen (TYLENOL) 500 MG tablet, Take 1,000 mg by mouth 3 times daily, Disp: , Rfl:      albuterol (PROAIR HFA/PROVENTIL HFA/VENTOLIN HFA) 108 (90 Base) MCG/ACT inhaler, Inhale 2 puffs into the lungs every 4 hours as needed for shortness of breath / dyspnea or wheezing Use with spacer, Disp: 1 Inhaler, Rfl: 1     aspirin 81 MG EC tablet, Take 81 mg by mouth daily, Disp: , Rfl:      atorvastatin (LIPITOR) 20 MG tablet, TAKE 1/2 TABLET BY MOUTH ONCE DAILY, Disp: 45 tablet, Rfl: 1     budesonide-formoterol (SYMBICORT) 80-4.5 MCG/ACT Inhaler, Inhale 2 puffs into the lungs 2 times daily, Disp: 1 Inhaler, Rfl: 4     diltiazem (CARTIA XT) 120 MG 24 hr capsule, Take 1 capsule (120 mg) by mouth daily For  blood pressure/lowers pulse. Pharmacy ok to hold prescription until due, Disp: 90 capsule, Rfl: 3     Dupilumab (DUPIXENT) 300 MG/2ML syringe, Inject 2 mLs (300 mg) Subcutaneous every 14 days, Disp: 4 mL, Rfl: 3     Spacer/Aero-Holding Chambers (SPACER/AERO-HOLD CHAMBER MASK) RAMAN, 1 Adult size spacer to use with MDI inhalers., Disp: 1 each, Rfl: 0     triamcinolone (NASACORT) 55 MCG/ACT nasal aerosol, Spray 2 sprays into both nostrils daily, Disp: , Rfl:     Allergies -   Allergies   Allergen Reactions     Hytrin [Terazosin Hcl]      Leg swelling and vertigo     Simvastatin Other (See Comments)     Body aches     Cats      Codeine Nausea     Dogs      Dust Mites      [place] Trees      Seasonal Allergies        Social History -   Social History     Socioeconomic History     Marital status:      Spouse name: Sanam palafox      Number of children: 2     Years of education: 14     Highest education level: Not on file   Occupational History     Occupation: Middle Management     Employer: RETIRED     Comment: 2008   Social Needs     Financial resource strain: Not on file     Food insecurity     Worry: Not on file     Inability: Not on file     Transportation needs     Medical: Not on file     Non-medical: Not on file   Tobacco Use     Smoking status: Never Smoker     Smokeless tobacco: Never Used   Substance and Sexual Activity     Alcohol use: No     Drug use: No     Sexual activity: Not Currently   Lifestyle     Physical activity     Days per week: Not on file     Minutes per session: Not on file     Stress: Not on file   Relationships     Social connections     Talks on phone: Not on file     Gets together: Not on file     Attends Catholic service: Not on file     Active member of club or organization: Not on file     Attends meetings of clubs or organizations: Not on file     Relationship status: Not on file     Intimate partner violence     Fear of current or ex partner: Not on file     Emotionally  "abused: Not on file     Physically abused: Not on file     Forced sexual activity: Not on file   Other Topics Concern     Parent/sibling w/ CABG, MI or angioplasty before 65F 55M? Yes     Comment: 2 Brothers and father      Service No     Blood Transfusions No     Caffeine Concern No     Occupational Exposure Yes     Comment: he was in [employer]     Hobby Hazards No     Sleep Concern No     Stress Concern No     Weight Concern Yes     Special Diet No     Back Care No     Exercise No     Bike Helmet No     Seat Belt Yes     Self-Exams No   Social History Narrative     Not on file       Family History -   Family History   Problem Relation Age of Onset     Breast Cancer Mother      Arthritis Father      C.A.D. Father      Cerebrovascular Disease Father      Respiratory Father         TB history     Rheumatoid Arthritis Father      Cerebrovascular Disease Maternal Grandmother      Hypertension Maternal Grandmother      Cancer Maternal Grandfather      C.A.D. Brother      Prostate Cancer Brother      C.A.D. Brother        Review of Systems - As per HPI and PMHx, otherwise 10+ system review of the head and neck, and general constitution is negative.    Physical Exam  /69   Pulse 79   Resp 16   Ht 1.753 m (5' 9\")   Wt 91.6 kg (202 lb)   SpO2 96%   BMI 29.83 kg/m      General - The patient is well nourished and well developed, and appears to have good nutritional status.  Alert and oriented to person and place, answers questions and cooperates with examination appropriately.   Head and Face - Normocephalic and atraumatic, with no gross asymmetry noted of the contour of the facial features.  The facial nerve is intact, with strong symmetric movements.  Voice and Breathing - The patient was breathing comfortably without the use of accessory muscles. There was no wheezing, stridor, or stertor.  The patients voice was clear and strong, and had appropriate pitch and quality.  Ears - The tympanic membranes are " normal in appearance, bony landmarks are intact.  No retraction, perforation, or masses.  No fluid or purulence was seen in the external canal or the middle ear. No evidence of infection of the middle ear or external canal, cerumen was normal in appearance.  Eyes - Extraocular movements intact, and the pupils were reactive to light.  Sclera were not icteric or injected, conjunctiva were pink and moist.  Mouth - the lesion in question was a discrete lump precisely in the midline on the anterior hard palate, in between the two upper front teeth.  It is almost reyes shaped, about 4mm long and 3mm wide, symmetric, and mucosally covered.  No ulceration, non tender to palpation. Otherwise, examination of the oral cavity showed pink, healthy oral mucosa. No lesions or ulcerations noted.  The tongue was mobile and midline, and the dentition were in good condition.    Throat - The walls of the oropharynx were smooth, pink, moist, symmetric, and had no lesions or ulcerations.  The tonsillar pillars and soft palate were symmetric.  The uvula was midline on elevation.    Neck - Normal midline excursion of the laryngotracheal complex during swallowing.  Full range of motion on passive movement.  Palpation of the occipital, submental, submandibular, internal jugular chain, and supraclavicular nodes did not demonstrate any abnormal lymph nodes or masses.  The carotid pulse was palpable bilaterally.  Palpation of the thyroid was soft and smooth, with no nodules or goiter appreciated.  The trachea was mobile and midline.  Nose - External contour is symmetric, no gross deflection or scars.  Nasal mucosa is pink and moist with no abnormal mucus.  The septum was midline and non-obstructive, turbinates of normal size and position.  No polyps, masses, or purulence noted on examination.      A/P - Guillermo Hogue is a 80 year old male  (K13.79) Lesion of hard palate  (primary encounter diagnosis)  This does not appear to be infections,  and I do not suspect malignancy.  It could potentially be an atypical mucocele.  But perhaps even some congenital midline cyst of the palate.      I have discussed options, and a CT scan of the sinuses would be reasonable.  However, after further discussion, he has opted for close follow up exam to monitor for any changes.  I think this is reasonable, and I will see him back in 2 months.    (J33.9) Nasal polyposis  (J32.4) Chronic pansinusitis    Dupixent has worked dramatically well for him. Continue management with allergy, and let me know if the nasal health changes.

## 2021-04-23 NOTE — PATIENT INSTRUCTIONS
Scheduling Information  To schedule your CT/MRI scan, please contact [facility] at [phone] OR [facility] at [phone]    To schedule your Surgery, please contact our Specialty Schedulers at [phone]      ENT Clinic Locations Clinic Hours Telephone Number     [facility]  [address] [place] [address]   Monday:           1:00pm -- 5:00pm    Friday:              8:00am - 12:00pm   To schedule/reschedule an appointment with   Dr. Valadez,   please contact our   Specialty Scheduling Department at:     [phone]       [facility]  [address] [place] [address] Tuesday:          8:00am -- 2:00pm         Urgent Care Locations Clinic Hours Telephone Numbers     [facility]  [address] [place] [address]     Monday-Friday:     11:00am - 9:00pm    Saturday-Sunday:  9:00am - 5:00pm   [phone]     [facility]  [address] [place] [address]     Monday-Friday:      5:00pm - 9:00pm     Saturday-Sunday:  9:00am - 5:00pm   [phone]

## 2021-04-26 ENCOUNTER — OFFICE VISIT (OUTPATIENT)
Dept: OTOLARYNGOLOGY | Facility: CLINIC | Age: 81
End: 2021-04-26
Payer: COMMERCIAL

## 2021-04-26 VITALS
DIASTOLIC BLOOD PRESSURE: 69 MMHG | HEIGHT: 69 IN | BODY MASS INDEX: 29.92 KG/M2 | RESPIRATION RATE: 16 BRPM | SYSTOLIC BLOOD PRESSURE: 122 MMHG | WEIGHT: 202 LBS | OXYGEN SATURATION: 96 % | HEART RATE: 79 BPM

## 2021-04-26 DIAGNOSIS — J33.9 NASAL POLYPOSIS: ICD-10-CM

## 2021-04-26 DIAGNOSIS — J32.4 CHRONIC PANSINUSITIS: ICD-10-CM

## 2021-04-26 DIAGNOSIS — K13.79 LESION OF HARD PALATE: Primary | ICD-10-CM

## 2021-04-26 PROCEDURE — 99214 OFFICE O/P EST MOD 30 MIN: CPT | Performed by: OTOLARYNGOLOGY

## 2021-04-26 ASSESSMENT — MIFFLIN-ST. JEOR: SCORE: 1621.65

## 2021-04-26 ASSESSMENT — PAIN SCALES - GENERAL: PAINLEVEL: NO PAIN (0)

## 2021-04-26 NOTE — LETTER
"    4/26/2021         RE: Guillermo Hogue  [address] [address]  [address]  [facility] [address]        Dear Colleague,    Thank you for referring your patient, Guillermo Hogue, to the [facility]. Please see a copy of my visit note below.    History of Present Illness - Guillermo Hogue is a very pleasant 80 year old male last seen on 9/10/2020, status post functional endoscopic sinus surgery on 2/12/2009    To review, at the visit on 11/11/2013 he told me that since the functional endoscopic sinus surgery his nose had been \"excellent.\"   However, we have continued to manage a chronic post nasal drainage.    To review, he was on BPM as an antihistamine, but because it was no longer available, I placed him on low dose atarax (hydroxyzine) to dry up his allergic rhinitis related post nasal drainage.  He told me that \"cornered the market on the BPM in [place]\" and he had run out of it.  I tried him on atarax but he tells me that it is not helping nearly as much.  He is also taking the BID mucinex, but he thinks that is making only a minimal difference.  He had been taking sleeping pills to get him to sleep.    So with those failures of therapy, I tried a new approach, and used Gent Itraconazole Dex nasal irrigations with the thought that perhaps this was inflammatory post nasal drainage.  On 12/12/12, and the rinses worked dramatically well for him.  He did have complaints of sleep cycle issues, which I then addressed with decreasing the the Dex component.  But then at the follow up on 2/6/2013 there was a return of the drainage and congestion, so I resumed the full strength dex component for two months.  At the visit on 8/5/2014, I encouraged him to try and decrease the frequency of the medicated rinses, and continue saline irrigation.  And at the most recent visit on 10/5/2015, things were going great, and I had not seen him since 4/4/2017.  He tells me that his nose has been great since being put on " Dequan by Dr. Del Rio.  He is only on Nasacort at this point.    At the 2018 visit he also had a new issue.  He told me that intermittently over the years he has had issues with occasional dizziness, and PT for benign paroxysmal positional vertigo has always helped in the past.  But this time it has not seemed help.  I felt that this bidirectional rotationally triggered momentary vertigo was posterior canal benign paroxysmal positional vertigo, and referred him for Semont maneuvers.  That problem has improved.    He tells me that there is a bit of a new issue.  For a month, he has had a very mild ache and a lump on the roof of his mouth.  He tried some magic mouthwash and it did not help.  He was tried on Kenalog by Darya after seeing  Him on April 8.  That did not help, and that is why he is back to see me.    Past Medical History -   Patient Active Problem List   Diagnosis     Reactive airway disease     Chronic sinusitis, unspecified location     AR (allergic rhinitis)     HYPERLIPIDEMIA LDL GOAL <130     BPH (benign prostatic hyperplasia)     Palpitations     Hypertension goal BP (blood pressure) < 140/90     Fatty liver     Cataracts, bilateral     Moderate persistent asthma, uncomplicated     Seasonal allergic rhinitis due to pollen     Allergic rhinitis due to animal dander     Allergic rhinitis due to dust mite     Paroxysmal atrial fibrillation (H)     Dizziness     Nasal polyposis       Current Medications -   Current Outpatient Medications:      acetaminophen (TYLENOL) 500 MG tablet, Take 1,000 mg by mouth 3 times daily, Disp: , Rfl:      albuterol (PROAIR HFA/PROVENTIL HFA/VENTOLIN HFA) 108 (90 Base) MCG/ACT inhaler, Inhale 2 puffs into the lungs every 4 hours as needed for shortness of breath / dyspnea or wheezing Use with spacer, Disp: 1 Inhaler, Rfl: 1     aspirin 81 MG EC tablet, Take 81 mg by mouth daily, Disp: , Rfl:      atorvastatin (LIPITOR) 20 MG tablet, TAKE 1/2 TABLET BY MOUTH ONCE  DAILY, Disp: 45 tablet, Rfl: 1     budesonide-formoterol (SYMBICORT) 80-4.5 MCG/ACT Inhaler, Inhale 2 puffs into the lungs 2 times daily, Disp: 1 Inhaler, Rfl: 4     diltiazem (CARTIA XT) 120 MG 24 hr capsule, Take 1 capsule (120 mg) by mouth daily For blood pressure/lowers pulse. Pharmacy ok to hold prescription until due, Disp: 90 capsule, Rfl: 3     Dupilumab (DUPIXENT) 300 MG/2ML syringe, Inject 2 mLs (300 mg) Subcutaneous every 14 days, Disp: 4 mL, Rfl: 3     Spacer/Aero-Holding Chambers (SPACER/AERO-HOLD CHAMBER MASK) RAMAN, 1 Adult size spacer to use with MDI inhalers., Disp: 1 each, Rfl: 0     triamcinolone (NASACORT) 55 MCG/ACT nasal aerosol, Spray 2 sprays into both nostrils daily, Disp: , Rfl:     Allergies -   Allergies   Allergen Reactions     Hytrin [Terazosin Hcl]      Leg swelling and vertigo     Simvastatin Other (See Comments)     Body aches     Cats      Codeine Nausea     Dogs      Dust Mites      [place] Trees      Seasonal Allergies        Social History -   Social History     Socioeconomic History     Marital status:      Spouse name: Sanam palafox 2013     Number of children: 2     Years of education: 14     Highest education level: Not on file   Occupational History     Occupation: Middle Management     Employer: RETIRED     Comment: 2008   Social Needs     Financial resource strain: Not on file     Food insecurity     Worry: Not on file     Inability: Not on file     Transportation needs     Medical: Not on file     Non-medical: Not on file   Tobacco Use     Smoking status: Never Smoker     Smokeless tobacco: Never Used   Substance and Sexual Activity     Alcohol use: No     Drug use: No     Sexual activity: Not Currently   Lifestyle     Physical activity     Days per week: Not on file     Minutes per session: Not on file     Stress: Not on file   Relationships     Social connections     Talks on phone: Not on file     Gets together: Not on file     Attends Sabianism service: Not on  "file     Active member of club or organization: Not on file     Attends meetings of clubs or organizations: Not on file     Relationship status: Not on file     Intimate partner violence     Fear of current or ex partner: Not on file     Emotionally abused: Not on file     Physically abused: Not on file     Forced sexual activity: Not on file   Other Topics Concern     Parent/sibling w/ CABG, MI or angioplasty before 65F 55M? Yes     Comment: 2 Brothers and father      Service No     Blood Transfusions No     Caffeine Concern No     Occupational Exposure Yes     Comment: he was in [employer]     Hobby Hazards No     Sleep Concern No     Stress Concern No     Weight Concern Yes     Special Diet No     Back Care No     Exercise No     Bike Helmet No     Seat Belt Yes     Self-Exams No   Social History Narrative     Not on file       Family History -   Family History   Problem Relation Age of Onset     Breast Cancer Mother      Arthritis Father      C.A.D. Father      Cerebrovascular Disease Father      Respiratory Father         TB history     Rheumatoid Arthritis Father      Cerebrovascular Disease Maternal Grandmother      Hypertension Maternal Grandmother      Cancer Maternal Grandfather      C.A.D. Brother      Prostate Cancer Brother      C.A.D. Brother        Review of Systems - As per HPI and PMHx, otherwise 10+ system review of the head and neck, and general constitution is negative.    Physical Exam  /69   Pulse 79   Resp 16   Ht 1.753 m (5' 9\")   Wt 91.6 kg (202 lb)   SpO2 96%   BMI 29.83 kg/m      General - The patient is well nourished and well developed, and appears to have good nutritional status.  Alert and oriented to person and place, answers questions and cooperates with examination appropriately.   Head and Face - Normocephalic and atraumatic, with no gross asymmetry noted of the contour of the facial features.  The facial nerve is intact, with strong symmetric movements.  Voice " and Breathing - The patient was breathing comfortably without the use of accessory muscles. There was no wheezing, stridor, or stertor.  The patients voice was clear and strong, and had appropriate pitch and quality.  Ears - The tympanic membranes are normal in appearance, bony landmarks are intact.  No retraction, perforation, or masses.  No fluid or purulence was seen in the external canal or the middle ear. No evidence of infection of the middle ear or external canal, cerumen was normal in appearance.  Eyes - Extraocular movements intact, and the pupils were reactive to light.  Sclera were not icteric or injected, conjunctiva were pink and moist.  Mouth - the lesion in question was a discrete lump precisely in the midline on the anterior hard palate, in between the two upper front teeth.  It is almost reyes shaped, about 4mm long and 3mm wide, symmetric, and mucosally covered.  No ulceration, non tender to palpation. Otherwise, examination of the oral cavity showed pink, healthy oral mucosa. No lesions or ulcerations noted.  The tongue was mobile and midline, and the dentition were in good condition.    Throat - The walls of the oropharynx were smooth, pink, moist, symmetric, and had no lesions or ulcerations.  The tonsillar pillars and soft palate were symmetric.  The uvula was midline on elevation.    Neck - Normal midline excursion of the laryngotracheal complex during swallowing.  Full range of motion on passive movement.  Palpation of the occipital, submental, submandibular, internal jugular chain, and supraclavicular nodes did not demonstrate any abnormal lymph nodes or masses.  The carotid pulse was palpable bilaterally.  Palpation of the thyroid was soft and smooth, with no nodules or goiter appreciated.  The trachea was mobile and midline.  Nose - External contour is symmetric, no gross deflection or scars.  Nasal mucosa is pink and moist with no abnormal mucus.  The septum was midline and  non-obstructive, turbinates of normal size and position.  No polyps, masses, or purulence noted on examination.      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  It could potentially be an atypical mucocele.  But perhaps even some congenital midline cyst of the palate.      I have discussed options, and a CT scan of the sinuses would be reasonable.  However, after further discussion, he has opted for close follow up exam to monitor for any changes.  I think this is reasonable, and I will see him back in 2 months.    (J33.9) Nasal polyposis  (J32.4) Chronic pansinusitis    Dupixent has worked dramatically well for him. Continue management with allergy, and let me know if the nasal health changes.        Again, thank you for allowing me to participate in the care of your patient.        Sincerely,        Alonso Valadez MD

## 2021-05-20 DIAGNOSIS — J45.40 MODERATE PERSISTENT ASTHMA, UNCOMPLICATED: ICD-10-CM

## 2021-05-20 NOTE — TELEPHONE ENCOUNTER
Requested Prescriptions   Pending Prescriptions Disp Refills     Dupilumab (DUPIXENT) 300 MG/2ML syringe 4 mL 3     Sig: Inject 2 mLs (300 mg) Subcutaneous every 14 days       There is no refill protocol information for this order

## 2021-05-21 RX ORDER — DUPILUMAB 300 MG/2ML
300 INJECTION, SOLUTION SUBCUTANEOUS
Qty: 4 ML | Refills: 3 | Status: SHIPPED | OUTPATIENT
Start: 2021-05-21 | End: 2021-09-02

## 2021-05-21 NOTE — TELEPHONE ENCOUNTER
Pending Prescriptions:                       Disp   Refills    Dupilumab (DUPIXENT) 300 MG/2ML syringe   4 mL   3            Sig: Inject 2 mLs (300 mg) Subcutaneous every 14 days    Routing refill request to provider for review/approval because:  Drug not on the [facility] refill protocol       Enriqueta Wen RN

## 2021-05-24 ENCOUNTER — TELEPHONE (OUTPATIENT)
Dept: ALLERGY | Facility: OTHER | Age: 81
End: 2021-05-24

## 2021-05-25 NOTE — TELEPHONE ENCOUNTER
This was refilled on 5/21/21.  One month w/ 3 refills.  Will contact patient to let him know.    Enriqueta Wen RN

## 2021-06-01 ENCOUNTER — TELEPHONE (OUTPATIENT)
Dept: ALLERGY | Facility: OTHER | Age: 81
End: 2021-06-01

## 2021-06-01 DIAGNOSIS — J45.40 MODERATE PERSISTENT ASTHMA, UNCOMPLICATED: ICD-10-CM

## 2021-06-01 RX ORDER — BUDESONIDE AND FORMOTEROL FUMARATE DIHYDRATE 80; 4.5 UG/1; UG/1
2 AEROSOL RESPIRATORY (INHALATION) 2 TIMES DAILY
Qty: 10.2 G | Refills: 1 | Status: SHIPPED | OUTPATIENT
Start: 2021-06-01 | End: 2021-08-03

## 2021-06-01 NOTE — TELEPHONE ENCOUNTER
Prior Authorization Retail Medication Request    Medication/Dose: budesonide-formoterol (SYMBICORT) 80-4.5 MCG/ACT Inhaler  ICD code (if different than what is on RX):  Moderate persistent asthma, uncomplicated [J45.40]   Previously Tried and Failed:    Rationale:      Insurance Name:  UCARE - UCARE MEDICARE NON [employer] ([facility] Care)  Insurance ID:  [id number]      Pharmacy Information (if different than what is on RX)  Name:  [facility]  Phone:  [phone]

## 2021-06-01 NOTE — TELEPHONE ENCOUNTER
Signed Prescriptions:                        Disp   Refills    budesonide-formoterol (SYMBICORT) 80-4.5 M*10.2 g 1        Sig: Inhale 2 puffs into the lungs 2 times daily  Authorizing Provider: ESPERANZA PHILLIPS  Ordering User: OTIS GARCIA RN refilled medication per [facility] Refill Protocol.     Otis Garcia RN

## 2021-06-02 NOTE — TELEPHONE ENCOUNTER
Central Prior Authorization Team   Phone: [phone]      PA NOT NEEDED    Medication: symbicort-PA NOT NEEDED  Insurance Company: [employer] - Phone [phone] Fax [phone]  Pharmacy Filling the Rx: [id number] - [place], [place] - [address] [address]  Filling Pharmacy Phone: [phone]  Filling Pharmacy Fax:    Start Date: 6/2/2021    Insurance prefers Brand, called pharmacy and they had already switched medication and got a paid claim.  Medication is ready for .

## 2021-06-11 ENCOUNTER — OFFICE VISIT (OUTPATIENT)
Dept: ALLERGY | Facility: CLINIC | Age: 81
End: 2021-06-11
Payer: COMMERCIAL

## 2021-06-11 VITALS
HEIGHT: 69 IN | WEIGHT: 196 LBS | BODY MASS INDEX: 29.03 KG/M2 | SYSTOLIC BLOOD PRESSURE: 136 MMHG | OXYGEN SATURATION: 97 % | DIASTOLIC BLOOD PRESSURE: 70 MMHG | HEART RATE: 67 BPM

## 2021-06-11 DIAGNOSIS — J30.1 SEASONAL ALLERGIC RHINITIS DUE TO POLLEN: ICD-10-CM

## 2021-06-11 DIAGNOSIS — J45.40 MODERATE PERSISTENT ASTHMA, UNCOMPLICATED: ICD-10-CM

## 2021-06-11 DIAGNOSIS — J32.9 CHRONIC SINUSITIS, UNSPECIFIED LOCATION: ICD-10-CM

## 2021-06-11 DIAGNOSIS — J30.89 ALLERGIC RHINITIS DUE TO DUST MITE: ICD-10-CM

## 2021-06-11 DIAGNOSIS — J33.9 NASAL POLYPOSIS: ICD-10-CM

## 2021-06-11 DIAGNOSIS — J30.81 ALLERGIC RHINITIS DUE TO ANIMAL DANDER: Primary | ICD-10-CM

## 2021-06-11 PROCEDURE — 99214 OFFICE O/P EST MOD 30 MIN: CPT | Performed by: ALLERGY & IMMUNOLOGY

## 2021-06-11 ASSESSMENT — MIFFLIN-ST. JEOR: SCORE: 1589.43

## 2021-06-11 NOTE — ASSESSMENT & PLAN NOTE
History of chronic chest symptoms for multiple years.  Diagnosed with asthma at [facility] in 2010.  On Symbicort 80/4.5 mcg 2 puff inhaled twice daily. Now on Dupixent and huge improvement in chest symptoms.         -Symbicort 80/4.5 mcg 2 puff inhaled twice daily.  -Albuterol 2-4 puffs inhaled (use a spacer unless using a Proair Respiclick device) every 4 hours as needed for chest tightness, wheezing, shortness of breath and/or coughing.   -Albuterol 2-4 puffs inhaled (use spacer if not using Proair Respiclick device) 15-20 minutes prior to physical activity.   Please ensure warm up period prior to exercise.   - Dupixent 300 mg every other week.

## 2021-06-11 NOTE — PROGRESS NOTES
Guillermo Hogue is a 81 year old White male with previous medical history significant for asthma, chronic sinusitis, and allergic rhinitis who returns for a follow up visit.     His asthma has been controlled.  He is on Symbicort 80/4.5 mcg 2 puff inhaled twice daily.  He is on Dupixent 300 mg every other week.  Tolerating Dupixent.  This has been significantly beneficial.  Has allergic rhinoconjunctivitis.  On Nasacort 2 sprays per nostril daily.  Denies any rhinorrhea, postnasal drainage, congestion, decrease sense of smell or taste.  Generally pleased with current therapy.  History of chronic sinusitis with nasal polyposis.  Follows with Dr. Valadez of ENT.    Past Medical History:   Diagnosis Date     Allergic rhinitis age 22     Asthma 2009     Chronic osteoarthritis      Nasal polyposis 2009     Osteoarthritis of left hip      Reactive airway disease 2009     Family History   Problem Relation Age of Onset     Breast Cancer Mother      Arthritis Father      C.A.D. Father      Cerebrovascular Disease Father      Respiratory Father         TB history     Rheumatoid Arthritis Father      Cerebrovascular Disease Maternal Grandmother      Hypertension Maternal Grandmother      Cancer Maternal Grandfather      C.A.D. Brother      Prostate Cancer Brother      C.A.D. Brother      Past Surgical History:   Procedure Laterality Date     COLONOSCOPY       COLONOSCOPY N/A 5/10/2017    Procedure: COMBINED COLONOSCOPY, SINGLE OR MULTIPLE BIOPSY/POLYPECTOMY BY BIOPSY;;  Surgeon: Abisai Duarte DO;  Location: [facility] OR     COLONOSCOPY WITH CO2 INSUFFLATION N/A 5/14/2015    Procedure: COLONOSCOPY WITH CO2 INSUFFLATION;  Surgeon: Jose R Richmond MD;  Location: [facility] OR     COLONOSCOPY WITH CO2 INSUFFLATION N/A 5/10/2017    Procedure: COLONOSCOPY WITH CO2 INSUFFLATION;  COLON SCREEN/ UNDERWOOD;  Surgeon: Abisai Duarte DO;  Location: [facility] OR     JOINT REPLACEMENT Left 06/2019    hip     SINUS SURGERY  2009     TONSILLECTOMY  age 22        REVIEW OF SYSTEMS:  Nose: negative for snoring.negative for changes in smell. negative for drainage.   Eyes: negative for eye watering. negative for eye itching. negative for vision changes. negative for eye redness.  Throat: negative for hoarseness. negative for sore throat. negative for trouble swallowing.   Lungs: negative for shortness of breath.negative for wheezing. negative for sputum production.   Integumentary: negative for rash. negative for scaling. negative for nail changes.       Current Outpatient Medications:      acetaminophen (TYLENOL) 500 MG tablet, Take 1,000 mg by mouth 3 times daily, Disp: , Rfl:      albuterol (PROAIR HFA/PROVENTIL HFA/VENTOLIN HFA) 108 (90 Base) MCG/ACT inhaler, Inhale 2 puffs into the lungs every 4 hours as needed for shortness of breath / dyspnea or wheezing Use with spacer, Disp: 1 Inhaler, Rfl: 1     aspirin 81 MG EC tablet, Take 81 mg by mouth daily, Disp: , Rfl:      atorvastatin (LIPITOR) 20 MG tablet, TAKE 1/2 TABLET BY MOUTH ONCE DAILY, Disp: 45 tablet, Rfl: 2     budesonide-formoterol (SYMBICORT) 80-4.5 MCG/ACT Inhaler, Inhale 2 puffs into the lungs 2 times daily, Disp: 10.2 g, Rfl: 1     diltiazem (CARTIA XT) 120 MG 24 hr capsule, Take 1 capsule (120 mg) by mouth daily For blood pressure/lowers pulse. Pharmacy ok to hold prescription until due, Disp: 90 capsule, Rfl: 3     Dupilumab (DUPIXENT) 300 MG/2ML syringe, Inject 2 mLs (300 mg) Subcutaneous every 14 days, Disp: 4 mL, Rfl: 3     Spacer/Aero-Holding Chambers (SPACER/AERO-HOLD CHAMBER MASK) RAMAN, 1 Adult size spacer to use with MDI inhalers., Disp: 1 each, Rfl: 0     triamcinolone (NASACORT) 55 MCG/ACT nasal aerosol, Spray 2 sprays into both nostrils daily, Disp: , Rfl:   Immunization History   Administered Date(s) Administered     COVID-19,PF,Pfizer 02/04/2021, 02/25/2021     Influenza (H1N1) 12/22/2009     Influenza (High Dose) 3 valent vaccine 10/27/2010, 10/22/2011, 09/30/2014, 09/08/2015, 09/29/2016,  10/04/2017, 09/13/2018, 09/09/2019     Influenza (IIV3) PF 10/19/2002, 10/13/2003, 10/16/2004, 10/20/2005, 09/18/2009, 10/13/2012     Influenza Vaccine IM > 6 months Valent IIV4 10/17/2013     Influenza, Quad, High Dose, Pf, 65yr + 09/14/2020     Mantoux Tuberculin Skin Test 10/09/2009     Pneumo Conj 13-V (2010&after) 09/08/2015     Pneumococcal 23 valent 10/20/2006     TD (ADULT, 7+) 10/27/2010     TDAP Vaccine (Adacel) 08/14/2012     Zoster vaccine recombinant adjuvanted (SHINGRIX) 09/13/2018, 01/03/2019     Zoster vaccine, live 11/12/2014     Allergies   Allergen Reactions     Hytrin [Terazosin Hcl]      Leg swelling and vertigo     Simvastatin Other (See Comments)     Body aches     Cats      Codeine Nausea     Dogs      Dust Mites      [place] Trees      Seasonal Allergies          EXAM:   Constitutional:  Appears well-developed and well-nourished. No distress.   HEENT:   Head: Normocephalic.   Nasal tissue pink and normal appearing.  No rhinorrhea noted.    Eyes: Conjunctivae are non-erythematous   Cardiovascular: Normal rate, regular rhythm and normal heart sounds. Exam reveals no gallop and no friction rub.   No murmur heard.  Respiratory: Effort normal and breath sounds normal. No respiratory distress. No wheezes. No rales.   Musculoskeletal: Normal range of motion.   Neuro: Oriented to person, place, and time.  Skin: Skin is warm and dry. No rash noted.   Psychiatric: Normal mood and affect.     Nursing note and vitals reviewed.    ASSESSMENT/PLAN:  Problem List Items Addressed This Visit        Respiratory    Chronic sinusitis, unspecified location     History of chronic sinusitis with nasal polyposis.  Follows with ENT.          -Continue Nasacort 2 sprays per nostril daily.           Moderate persistent asthma, uncomplicated     History of chronic chest symptoms for multiple years.  Diagnosed with asthma at [facility] in 2010.  On Symbicort 80/4.5 mcg 2 puff inhaled twice daily. Now on Dupixent and huge  improvement in chest symptoms.         -Symbicort 80/4.5 mcg 2 puff inhaled twice daily.  -Albuterol 2-4 puffs inhaled (use a spacer unless using a Proair Respiclick device) every 4 hours as needed for chest tightness, wheezing, shortness of breath and/or coughing.   -Albuterol 2-4 puffs inhaled (use spacer if not using Proair Respiclick device) 15-20 minutes prior to physical activity.   Please ensure warm up period prior to exercise.   - Dupixent 300 mg every other week.           Seasonal allergic rhinitis due to pollen     Perennial with spring and summer nasal and ocular symptoms.  Has history of chronic sinusitis with nasal polyposis.  Follows with ENT.  Nasacort significantly helpful.      Skin testing:  Positive for trees, weeds, cat, dog, dust mite.     - Nasacort 2 sprays per nostril daily.  - Continue Dupixent.         Allergic rhinitis due to animal dander - Primary    Allergic rhinitis due to dust mite    Nasal polyposis        Return in 6 months.     Chart documentation with Dragon Voice recognition Software. Although reviewed after completion, some words and grammatical errors may remain.    Malcolm Del Rio DO FAAAAI  Medical Director for Allergy/Immunology at [place], [place]

## 2021-06-11 NOTE — PATIENT INSTRUCTIONS
Allergy Staff Appt Hours Shot Hours Locations    Physician     Malcolm Del Rio DO       Support Staff     YUE Machado CMA  Tuesday:   [place] 7-5 Wednesday:  [place]: 7-5 Thursday:                    [place] 7-6     Friday:  [place]  7-2   [place]        Thursday: 7-5:20        Friday: 7-12:20     [place]        Tuesday: 7- 3:20 Wednesday: 7-4:20 [place] Monday: 7-2:20 Tuesday: 9-5:20         [facility]  [address] [place] [address]  Appt Line: [phone]      [facility]  [address] [place] [address]  Appt Line: [phone]         Important Scheduling Information  Aspirin Desensitization: Appt will last 2 clinic days. Please call the Allergy RN line for your clinic to schedule. Discontinue antihistamines 7 days prior to the appointment.     Food Challenges: Appt will last 3-4 hours. Please call the Allergy RN line for your clinic to schedule. Discontinue antihistamines 7 days prior to the appointment.     Penicillin Testing: Appt will last 2-3 hours. Please call the Allergy RN line for your clinic to schedule. Discontinue antihistamines 7 days prior to the appointment.     Skin Testing: Appt will about 40 minutes. Call the appointment line for your clinic to schedule. Discontinue antihistamines 7 days prior to the appointment.     Venom Testing: Appt will last 2-3 hours. Please call the Allergy RN line for your clinic to schedule. Discontinue antihistamines 7 days prior to the appointment.     Thank you for trusting us with your Allergy, Asthma, and Immunology care. Please feel free to contact us with any questions or concerns you may have.      - Follow up with either Dr. Aguirre or Dr. Aragon.   - Continue Dupixent 300mg every other week.   - Continue Symbicort 80/4.5mcg 2 puffs twice daily.   - Continue Nasacort 2 sprays/nostril daily.

## 2021-06-11 NOTE — ASSESSMENT & PLAN NOTE
History of chronic sinusitis with nasal polyposis.  Follows with ENT.          -Continue Nasacort 2 sprays per nostril daily.

## 2021-08-02 DIAGNOSIS — J45.40 MODERATE PERSISTENT ASTHMA, UNCOMPLICATED: ICD-10-CM

## 2021-08-03 RX ORDER — DILTIAZEM HYDROCHLORIDE 60 MG/1
TABLET, FILM COATED ORAL
Qty: 10.2 G | Refills: 5 | Status: SHIPPED | OUTPATIENT
Start: 2021-08-03 | End: 2021-11-30

## 2021-08-03 NOTE — TELEPHONE ENCOUNTER
Pending Prescriptions:                       Disp   Refills    SYMBICORT 80-4.5 MCG/ACT Inhaler [Pharmac*10.2 g 0            Sig: INHALE 2 PUFFS BY MOUTH TWICE DAILY    Routing refill request to provider for review/approval because:  ACT not current:  Last done 1/29/21    Enriqueta Wen RN

## 2021-09-02 DIAGNOSIS — J45.40 MODERATE PERSISTENT ASTHMA, UNCOMPLICATED: ICD-10-CM

## 2021-09-02 RX ORDER — DUPILUMAB 300 MG/2ML
300 INJECTION, SOLUTION SUBCUTANEOUS
Qty: 4 ML | Refills: 3 | Status: SHIPPED | OUTPATIENT
Start: 2021-09-02 | End: 2021-10-27

## 2021-09-02 NOTE — TELEPHONE ENCOUNTER
Routing refill request to provider for review/approval because:  Drug not on the [facility] refill protocol     Yeimi PHELPSN, RN

## 2021-09-07 ENCOUNTER — TELEPHONE (OUTPATIENT)
Dept: ALLERGY | Facility: CLINIC | Age: 81
End: 2021-09-07

## 2021-09-07 NOTE — TELEPHONE ENCOUNTER
Central Prior Authorization Team   Phone: [phone]      PA NOT NEEDED    Medication: SYMBICORT 80-4.5 MCG/ACT Inhaler-PA NOT NEEDED  Insurance Company: [employer] - Phone [phone] Fax [phone]  Pharmacy Filling the Rx: [id number] - [place] - [address] [address]  Filling Pharmacy Phone: [phone]  Filling Pharmacy Fax:    Start Date: 9/7/2021    Pharmacy switched to brand and got a paid claim. Pharmacy will notify patient when medication is ready.

## 2021-09-07 NOTE — TELEPHONE ENCOUNTER
Prior Authorization Retail Medication Request    Medication/Dose: SYMBICORT 80-4.5 MCG/ACT Inhaler  ICD code (if different than what is on RX):  Moderate persistent asthma, uncomplicated [J45.40]   Previously Tried and Failed:    Rationale:      Covermymeds Key: [id number]

## 2021-09-12 ENCOUNTER — OFFICE VISIT (OUTPATIENT)
Dept: URGENT CARE | Facility: URGENT CARE | Age: 81
End: 2021-09-12
Payer: COMMERCIAL

## 2021-09-12 VITALS
HEART RATE: 79 BPM | SYSTOLIC BLOOD PRESSURE: 135 MMHG | DIASTOLIC BLOOD PRESSURE: 67 MMHG | TEMPERATURE: 97.9 F | OXYGEN SATURATION: 97 %

## 2021-09-12 DIAGNOSIS — R42 VERTIGO: Primary | ICD-10-CM

## 2021-09-12 PROCEDURE — 99214 OFFICE O/P EST MOD 30 MIN: CPT | Performed by: INTERNAL MEDICINE

## 2021-09-12 RX ORDER — MECLIZINE HYDROCHLORIDE 25 MG/1
25 TABLET ORAL 3 TIMES DAILY PRN
Qty: 30 TABLET | Refills: 0 | Status: SHIPPED | OUTPATIENT
Start: 2021-09-12 | End: 2024-03-19

## 2021-09-12 NOTE — PROGRESS NOTES
SUBJECTIVE:  Guillermo Hogue is an 81 year old male who presents for dizziness.  Started about 3 days ago.  About every 6 months will have an episode of dizziness and takes a motion sickness pill which helps.  This episode has been lasting longer.  Took a motion sickness pill this morning which helped a little.  Feels lightheaded and balance is affected.  Did fall sideways a couple days ago when bent down to pick something up off floor; he caught himself.  Feels like is seasick.   Hasn't seen a doctor for these sxs before.  No ear pain.  No n/v.  No fevers.  No new meds or med changes.      PMH:   has a past medical history of Allergic rhinitis (age 22), Asthma (), Chronic osteoarthritis, Nasal polyposis (), Osteoarthritis of left hip, and Reactive airway disease ().  Patient Active Problem List   Diagnosis     Reactive airway disease     Chronic sinusitis, unspecified location     AR (allergic rhinitis)     HYPERLIPIDEMIA LDL GOAL <130     BPH (benign prostatic hyperplasia)     Palpitations     Hypertension goal BP (blood pressure) < 140/90     Fatty liver     Cataracts, bilateral     Moderate persistent asthma, uncomplicated     Seasonal allergic rhinitis due to pollen     Allergic rhinitis due to animal dander     Allergic rhinitis due to dust mite     Paroxysmal atrial fibrillation (H)     Dizziness     Nasal polyposis     Social History     Socioeconomic History     Marital status:      Spouse name: Sanam -  2013     Number of children: 2     Years of education: 14     Highest education level: Not on file   Occupational History     Occupation: Middle Management     Employer: RETIRED     Comment:    Tobacco Use     Smoking status: Never Smoker     Smokeless tobacco: Never Used   Substance and Sexual Activity     Alcohol use: No     Drug use: No     Sexual activity: Not Currently   Other Topics Concern     Parent/sibling w/ CABG, MI or angioplasty before 65F 55M? Yes     Comment: 2  Brothers and father      Service No     Blood Transfusions No     Caffeine Concern No     Occupational Exposure Yes     Comment: he was in [employer]     Hobby Hazards No     Sleep Concern No     Stress Concern No     Weight Concern Yes     Special Diet No     Back Care No     Exercise No     Bike Helmet No     Seat Belt Yes     Self-Exams No   Social History Narrative     Not on file     Social Determinants of Health     Financial Resource Strain:      Difficulty of Paying Living Expenses:    Food Insecurity:      Worried About Running Out of Food in the Last Year:      Ran Out of Food in the Last Year:    Transportation Needs:      Lack of Transportation (Medical):      Lack of Transportation (Non-Medical):    Physical Activity:      Days of Exercise per Week:      Minutes of Exercise per Session:    Stress:      Feeling of Stress :    Social Connections:      Frequency of Communication with Friends and Family:      Frequency of Social Gatherings with Friends and Family:      Attends Yarsani Services:      Active Member of Clubs or Organizations:      Attends Club or Organization Meetings:      Marital Status:    Intimate Partner Violence:      Fear of Current or Ex-Partner:      Emotionally Abused:      Physically Abused:      Sexually Abused:      Family History   Problem Relation Age of Onset     Breast Cancer Mother      Arthritis Father      C.A.D. Father      Cerebrovascular Disease Father      Respiratory Father         TB history     Rheumatoid Arthritis Father      Cerebrovascular Disease Maternal Grandmother      Hypertension Maternal Grandmother      Cancer Maternal Grandfather      C.A.D. Brother      Prostate Cancer Brother      C.A.D. Brother        ALLERGIES:  Hytrin [terazosin hcl], Simvastatin, Cats, Codeine, Dogs, Dust mites, Oak trees, and Seasonal allergies    Current Outpatient Medications   Medication     acetaminophen (TYLENOL) 500 MG tablet     aspirin 81 MG EC tablet      atorvastatin (LIPITOR) 20 MG tablet     diltiazem (CARTIA XT) 120 MG 24 hr capsule     Dupilumab (DUPIXENT) 300 MG/2ML syringe     SYMBICORT 80-4.5 MCG/ACT Inhaler     triamcinolone (NASACORT) 55 MCG/ACT nasal aerosol     albuterol (PROAIR HFA/PROVENTIL HFA/VENTOLIN HFA) 108 (90 Base) MCG/ACT inhaler     Spacer/Aero-Holding Chambers (SPACER/AERO-HOLD CHAMBER MASK) RAMAN     No current facility-administered medications for this visit.         ROS:  ROS is done and is negative for general/constitutional, eye, ENT, Respiratory, cardiovascular, GI, , Skin, musculoskeletal except as noted elsewhere.  All other review of systems negative except as noted elsewhere.      OBJECTIVE:  /67   Pulse 79   Temp 97.9  F (36.6  C) (Oral)   SpO2 97%   GENERAL APPEARANCE: Alert, in no acute distress  EYES: normal conjunctiva, EOMs intact with two beats of horizontal nystagmus and eom testing causes some dizziness, and PERRLA  EARS: External ears normal. Canals clear. TM's normal.  NOSE:normal  OROPHARYNX:normal  NECK:No adenopathy,masses or thyromegaly  RESP: normal and clear to auscultation  CV:2/6 syst murmur  ABDOMEN: Abdomen soft, non-tender. BS normal. No masses, organomegaly  SKIN: no ulcers, lesions or rash  MUSCULOSKELETAL:Musculoskeletal normal  NEURO: CN 2-12 grossly intact with two beats of horizontal nystagmus on eom testing.  Strength 5/5 and symmetric in bilateral upper and lower extremities.  DTRs 2+ and symmetric in bilateral upper and lower extremities.  Sensation to light touch grossly intact in bilateral upper and lower extremities.    RESULTS  No results found for any visits on 09/12/21..  No results found for this or any previous visit (from the past 48 hour(s)).    ASSESSMENT/PLAN:    ASSESSMENT / PLAN:  (R42) Vertigo  (primary encounter diagnosis)  Comment: currently sxs are most c/w benign positional vertigo.  Pt has had issues before and been treated by a dizziness center.  Currently no evidence of  stroke or intracranial hemorrhage  Plan: [facility] Referral, meclizine (ANTIVERT)         25 MG tablet        Reviewed medication instructions and side effects. Follow up if experiences side effects. Schedule to see PT asap.   I reviewed supportive care, otc meds to use if needed, expected course, and signs of concern.  Follow up as needed or if he does not improve within 5 day(s) or if worsens in any way.  Reviewed red flag symptoms and is to go to the ER if experiences any of these.      PPE worn: mask and shield.    See [facility] for orders, medications, letters, patient instructions    Izabela Ortega M.D.

## 2021-09-13 ENCOUNTER — NURSE TRIAGE (OUTPATIENT)
Dept: NURSING | Facility: CLINIC | Age: 81
End: 2021-09-13

## 2021-09-13 NOTE — TELEPHONE ENCOUNTER
"Caller is pt's son-in-law (Mayur Baxter).  States \"He just called me five mins ago, asking me to get him into a clinic for 'really bad vertigo'.\"  However upon review of pt's chart, pt just had urgent care eval yesterday (9/12) for vertigo.  Was given Rx meclizine.  Therefore advised caller triager would contact pt directly now.  Caller agrees to plan.    Reached pt directly.  Covid-screened at urgent care yesterday (9/12).  Discussed urgent care chart notes per 9/12 visit as follows:  Currently sxs are most c/w benign positional vertigo.  Pt has had issues before and been treated by a dizziness center.  Currently no evidence of stroke or intracranial hemorrhage    Pt states \"Meclizine has helped in the past.\"  However \"has not yet taken any.\"  Pt also states \"I already called Physical Therapy but earliest appt is Oct 6th with a provider who knows [facility].\"  Reports declining the Oct appt offer.  However encouraged pt to accept this appt, explaining he has not yet taken meclizine today which may indeed help during the interim time prior to PT treatment.    Pt agrees to plan; however also reports past successful treatment from [facility] in the past.  Therefore provided contact info for this resource as well.  Pt states intention to contact [facility] now, and authorizes triager to call back to his son-in-law to inform of current plan of action.  Reached voicemail -> cursory message left indicating plan of action has been established and son-in-law may f/u with pt at next opportunity.    Codie JIMENEZ Health Nurse Advisor     Additional Information    General information question, no triage required and triager able to answer question    Protocols used: INFORMATION ONLY CALL-A-AH      "

## 2021-09-14 ENCOUNTER — THERAPY VISIT (OUTPATIENT)
Dept: PHYSICAL THERAPY | Facility: CLINIC | Age: 81
End: 2021-09-14
Payer: COMMERCIAL

## 2021-09-14 DIAGNOSIS — R42 VERTIGO: ICD-10-CM

## 2021-09-14 DIAGNOSIS — H81.12 BENIGN PAROXYSMAL POSITIONAL VERTIGO OF LEFT EAR: ICD-10-CM

## 2021-09-14 PROCEDURE — 97161 PT EVAL LOW COMPLEX 20 MIN: CPT | Mod: GP | Performed by: PHYSICAL THERAPIST

## 2021-09-14 PROCEDURE — 95992 CANALITH REPOSITIONING PROC: CPT | Mod: GP | Performed by: PHYSICAL THERAPIST

## 2021-09-14 NOTE — PROGRESS NOTES
Physical Therapy Initial Evaluation  Subjective:  The history is provided by the patient.                     S:  Guillermo Hogue is a 81 year old patient complaining of dizziness/ spinning.  Onset of symptoms: 9/10/2021      Is this a recurrent problem: yes  Progression since onset: improving slowly  Frequency of episodes/time of day: 1 -2 x/ day  Duration of episodes: 10 sec or so.  Exacerbating factors: lay down or turning in bed(R to L)  Relieving factors: meclizine  Previous treatments:  PT/ meds  Special tests/diagnostics performed: none recent - had been to [facility] center many years ago.  Significant medical hx: asthma, dizziness  Meds: antiinflammatory  Occupation: retired   Work requirements: none  General health reported by patient: good  Red Flags: none      O:  Cervical ROM screen: EXT decreased 50%;  ROT and flex WNL  Oculomotor/gaze stability screen: saccades, smooth pursuits, gaze all normal   Vertebral artery test: normal   Hallpike-Kg maneuver:  R: negative  L: + x 12 seconds without visible nystagmus  Horizontal canal test:  R: negai  L: negative  Balance testing:  NT    Objective:  System    Physical Exam    General     ROS    Assessment/Plan:    Patient is a 81 year old male with dizziness complaints.    Patient has the following significant findings with corresponding treatment plan.                Diagnosis 1:  Dizziness/ BPPV  Decreased proprioception - neuro re-education and therapeutic activities  Decreased function - therapeutic activities    Therapy Evaluation Codes:   1) History comprised of:   Personal factors that impact the plan of care:      None.    Comorbidity factors that impact the plan of care are:      Asthma.     Medications impacting care: meclizine.  2) Examination of Body Systems comprised of:   Body structures and functions that impact the plan of care:      dizziness.   Activity limitations that impact the plan of care are:      Laying down and rolling in bed.  3) Clinical  presentation characteristics are:   Stable/Uncomplicated.  4) Decision-Making    Low complexity using standardized patient assessment instrument and/or measureable assessment of functional outcome.  Cumulative Therapy Evaluation is: Low complexity.    Previous and current functional limitations:  (See Goal Flow Sheet for this information)    Short term and Long term goals: (See Goal Flow Sheet for this information)     Communication ability:  Patient appears to be able to clearly communicate and understand verbal and written communication and follow directions correctly.  Treatment Explanation - The following has been discussed with the patient:   RX ordered/plan of care  Anticipated outcomes  Possible risks and side effects  This patient would benefit from PT intervention to resume normal activities.   Rehab potential is good.    Frequency:  1 X week, once daily  Duration:  for 4 weeks  Discharge Plan:  Achieve all LTG.  Independent in home treatment program.  Reach maximal therapeutic benefit.    Please refer to the daily flowsheet for treatment today, total treatment time and time spent performing 1:1 timed codes.

## 2021-10-11 ENCOUNTER — THERAPY VISIT (OUTPATIENT)
Dept: PHYSICAL THERAPY | Facility: CLINIC | Age: 81
End: 2021-10-11
Payer: COMMERCIAL

## 2021-10-11 DIAGNOSIS — H81.12 BENIGN PAROXYSMAL POSITIONAL VERTIGO OF LEFT EAR: ICD-10-CM

## 2021-10-11 PROCEDURE — 97112 NEUROMUSCULAR REEDUCATION: CPT | Mod: GP | Performed by: PHYSICAL THERAPIST

## 2021-10-11 NOTE — PROGRESS NOTES
SUBJECTIVE  Subjective: A lot better than when he was here last month.  But when he lays on the R there is still some feeling of off balance and light headedness but not necessarily a spinning sensation   Current Pain level: 0/10   Changes in function:  Yes (See Goal flowsheet attached for changes in current functional level)     Adverse reaction to treatment or activity:  None    OBJECTIVE  Objective: [place]-Hallpike negative B.  Log roll testing negative B for nystagmus but reported a mild light headedness to the R     ASSESSMENT  Guillermo continues to require intervention to meet STG and LTG's: PT  Patient is progressing as expected.  Response to therapy has shown an improvement in  dizziness  Progress made towards STG/LTG?  Yes (See Goal flowsheet attached for updates on achievement of STG and LTG)    PLAN  Current treatment program is being advanced to more complex exercises.    PTA/ATC plan:  N/A    Please refer to the daily flowsheet for treatment today, total treatment time and time spent performing 1:1 timed codes.

## 2021-10-26 ENCOUNTER — TELEPHONE (OUTPATIENT)
Dept: ALLERGY | Facility: OTHER | Age: 81
End: 2021-10-26

## 2021-10-26 DIAGNOSIS — J45.40 MODERATE PERSISTENT ASTHMA, UNCOMPLICATED: ICD-10-CM

## 2021-10-26 NOTE — TELEPHONE ENCOUNTER
Reason for Call:  Other call back    Detailed comments: Patient would like a call back he wants a new prescription for dupixent.    Phone Number Patient can be reached at: Home number on file [phone] (home) or Work number on file:  There is no work phone number on file.    Best Time: Anytime    Can we leave a detailed message on this number? YES    Call taken on 10/26/2021 at 6:12 PM by Amy Malone

## 2021-10-27 RX ORDER — DUPILUMAB 300 MG/2ML
300 INJECTION, SOLUTION SUBCUTANEOUS
Qty: 4 ML | Refills: 6 | Status: SHIPPED | OUTPATIENT
Start: 2021-10-27 | End: 2022-06-07

## 2021-10-27 NOTE — TELEPHONE ENCOUNTER
Patient is scheduled with Dr. Aragon on 11/30 for follow up. Ok to fill medication, please advise.      Vilma Husain MA

## 2021-10-27 NOTE — TELEPHONE ENCOUNTER
Left message for patient asking which clinic/allergist he would prefer to transfer care to.  Asked him to return call so we know who to ask to refill the Dupixent.    He should schedule an appointment to establish care with Dr. Aguirre or Dr. Aragon by December.    Enriqueta Wen, RN, MSN

## 2021-11-30 ENCOUNTER — OFFICE VISIT (OUTPATIENT)
Dept: ALLERGY | Facility: OTHER | Age: 81
End: 2021-11-30
Payer: COMMERCIAL

## 2021-11-30 VITALS
SYSTOLIC BLOOD PRESSURE: 138 MMHG | DIASTOLIC BLOOD PRESSURE: 71 MMHG | OXYGEN SATURATION: 96 % | HEART RATE: 70 BPM | HEIGHT: 69 IN | WEIGHT: 200 LBS | BODY MASS INDEX: 29.62 KG/M2

## 2021-11-30 DIAGNOSIS — J45.40 MODERATE PERSISTENT ASTHMA, UNCOMPLICATED: Primary | ICD-10-CM

## 2021-11-30 DIAGNOSIS — J30.89 ALLERGIC RHINITIS DUE TO DUST MITE: ICD-10-CM

## 2021-11-30 DIAGNOSIS — J30.81 ALLERGIC RHINITIS DUE TO ANIMAL DANDER: ICD-10-CM

## 2021-11-30 DIAGNOSIS — J33.9 NASAL POLYPOSIS: ICD-10-CM

## 2021-11-30 DIAGNOSIS — J30.1 SEASONAL ALLERGIC RHINITIS DUE TO POLLEN: ICD-10-CM

## 2021-11-30 PROCEDURE — 99214 OFFICE O/P EST MOD 30 MIN: CPT | Performed by: ALLERGY & IMMUNOLOGY

## 2021-11-30 RX ORDER — BUDESONIDE AND FORMOTEROL FUMARATE DIHYDRATE 80; 4.5 UG/1; UG/1
2 AEROSOL RESPIRATORY (INHALATION) 2 TIMES DAILY
Qty: 10.2 G | Refills: 5 | Status: SHIPPED | OUTPATIENT
Start: 2021-11-30 | End: 2021-11-30

## 2021-11-30 RX ORDER — BUDESONIDE AND FORMOTEROL FUMARATE DIHYDRATE 80; 4.5 UG/1; UG/1
2 AEROSOL RESPIRATORY (INHALATION) 2 TIMES DAILY
Qty: 10.2 G | Refills: 5 | Status: SHIPPED | OUTPATIENT
Start: 2021-11-30 | End: 2022-01-19

## 2021-11-30 RX ORDER — ALBUTEROL SULFATE 90 UG/1
2 AEROSOL, METERED RESPIRATORY (INHALATION) EVERY 4 HOURS PRN
Qty: 18 G | Refills: 3 | Status: SHIPPED | OUTPATIENT
Start: 2021-11-30 | End: 2023-09-05

## 2021-11-30 ASSESSMENT — ASTHMA QUESTIONNAIRES
ACT_TOTALSCORE: 23
QUESTION_1 LAST FOUR WEEKS HOW MUCH OF THE TIME DID YOUR ASTHMA KEEP YOU FROM GETTING AS MUCH DONE AT WORK, SCHOOL OR AT HOME: A LITTLE OF THE TIME
QUESTION_5 LAST FOUR WEEKS HOW WOULD YOU RATE YOUR ASTHMA CONTROL: COMPLETELY CONTROLLED
QUESTION_3 LAST FOUR WEEKS HOW OFTEN DID YOUR ASTHMA SYMPTOMS (WHEEZING, COUGHING, SHORTNESS OF BREATH, CHEST TIGHTNESS OR PAIN) WAKE YOU UP AT NIGHT OR EARLIER THAN USUAL IN THE MORNING: NOT AT ALL
QUESTION_4 LAST FOUR WEEKS HOW OFTEN HAVE YOU USED YOUR RESCUE INHALER OR NEBULIZER MEDICATION (SUCH AS ALBUTEROL): NOT AT ALL
QUESTION_2 LAST FOUR WEEKS HOW OFTEN HAVE YOU HAD SHORTNESS OF BREATH: ONCE OR TWICE A WEEK

## 2021-11-30 ASSESSMENT — ENCOUNTER SYMPTOMS
WHEEZING: 0
COUGH: 0
SHORTNESS OF BREATH: 0
NAUSEA: 0
STRIDOR: 0
CHEST TIGHTNESS: 0
FEVER: 0
ADENOPATHY: 0
FATIGUE: 0
MYALGIAS: 0
DIARRHEA: 0
EYE ITCHING: 0
ACTIVITY CHANGE: 0
SINUS PRESSURE: 0
VOMITING: 0
EYE REDNESS: 0
EYE DISCHARGE: 0
RHINORRHEA: 0
HEADACHES: 0
FACIAL SWELLING: 0

## 2021-11-30 ASSESSMENT — MIFFLIN-ST. JEOR: SCORE: 1607.57

## 2021-11-30 NOTE — PATIENT INSTRUCTIONS
Continue current medication therapy.    Allergy Staff Appt Hours Shot Hours Locations    Physician     Mohinder Aragon MD       Support Staff     Enriqueta MEJIA, YUE MEJIA CMA  Tuesday:   [facility]     Wednesday:  [place]: :         [place]:  [place] [place]        Tuesday: : : : :  & Wed: :       Mon & Thurs:        Fri: :         [facility]  [address] [place] [address]  Appt Line: [phone]    [facility]  [address] [place] [address]  Appt Line: [phone]    Pulmonary Function Scheduling:  [place]: [phone]  [place]: [phone]  [phone]     Important Scheduling Information    Appointments for challenges (oral food challenge, penicillin testing, aspirin desensitization) will need to be scheduled directly through the allergy department.  Please contact them via [employer] or on  and  at [phone].  They will provide additional information and instructions for the appointment.  Discontinue oral antihistamines 7 days prior to the appointment.  Discontinue nasal and ocular antihistamines 4 days prior to the appointment.    Appointments for skin testing: Appointment will last approximately 45 minutes.  Please call the appointment line for your clinic to schedule.  Discontinue oral antihistamines 7 days prior to the appointment.  Discontinue nasal and ocular antihistamines 4 days prior to appointment.    Thank you for trusting us with your Allergy, Asthma, and Immunology care. Please feel free to contact us with any questions or concerns you may have.

## 2021-11-30 NOTE — LETTER
11/30/2021         RE: Guillermo Hogue  [address]  [address]  [place] [address]        Dear Colleague,    Thank you for referring your patient, Guillermo Hogue, to the [facility]. Please see a copy of my visit note below.    SUBJECTIVE:                                                               Guillermo Hogue presents today to our Allergy Clinic at [facility] for a follow-up visit. He is an 81-year-old male with asthma, chronic sinusitis with nasal polyposis, and allergic rhinitis.    A new patient for me. Previous patient of Dr. Del Rio.  History of chronic chest symptoms for multiple years.  Diagnosed with asthma at [facility] in 2010.   Allergic rhinoconjunctivitis: Perennial with spring and summer nasal and ocular symptoms. SPT for aeroallergens performed in August 2019 showed sensitivity to cat, dog, dust mites, tree pollen, and weed pollen. History of nasal polyps.    Follows with Dr. Valadez of ENT.  Polyps are well controlled. He also uses Nasacort 2 sprays per nostril daily. The patient denies persistent clear rhinorrhea, nasal itch, stuffiness, sneezing, interval (since the last visit with Dr. Del Rio) sinusitis symptoms of fever, facial pain, or purulent rhinorrhea. He has no issues with smell or taste.   He is on Symbicort 80/4.5 mcg 2 puff inhaled twice daily. Asthma is well controlled. Currently on Dupixent 300 mg every other week. He tolerarates it well.  The reason for Dupixent was not nasal polyposis, but not well controlled asthma.  Guillermo is using albuterol HFA  less than twice per week for rescue from chest symptoms. He is waking up less than twice per month due to chest symptoms.  There has been no use of oral steroids since last visit. No ED/PCP/urgent care/other specialist visits for asthma flare since the previous visit. The patient denies current chest tightness, cough, wheeze, or shortness of breath.      Patient Active Problem List    Diagnosis     Reactive airway disease     Chronic sinusitis, unspecified location     AR (allergic rhinitis)     HYPERLIPIDEMIA LDL GOAL <130     BPH (benign prostatic hyperplasia)     Palpitations     Hypertension goal BP (blood pressure) < 140/90     Fatty liver     Cataracts, bilateral     Moderate persistent asthma, uncomplicated     Seasonal allergic rhinitis due to pollen     Allergic rhinitis due to animal dander     Allergic rhinitis due to dust mite     Paroxysmal atrial fibrillation (H)     Dizziness     Nasal polyposis     Benign paroxysmal positional vertigo of left ear       Past Medical History:   Diagnosis Date     Allergic rhinitis age 22     Asthma 2009     Chronic osteoarthritis      Nasal polyposis 2009     Osteoarthritis of left hip      Reactive airway disease 2009      Problem (# of Occurrences) Relation (Name,Age of Onset)    Arthritis (1) Father    Breast Cancer (1) Mother    C.A.D. (3) Father, Brother, Brother    Cancer (1) Maternal Grandfather    Cerebrovascular Disease (2) Father, Maternal Grandmother    Hypertension (1) Maternal Grandmother    Prostate Cancer (1) Brother    Respiratory (1) Father: TB history    Rheumatoid Arthritis (1) Father        Past Surgical History:   Procedure Laterality Date     COLONOSCOPY       COLONOSCOPY N/A 5/10/2017    Procedure: COMBINED COLONOSCOPY, SINGLE OR MULTIPLE BIOPSY/POLYPECTOMY BY BIOPSY;;  Surgeon: Abisai Duarte DO;  Location: [facility] OR     COLONOSCOPY WITH CO2 INSUFFLATION N/A 5/14/2015    Procedure: COLONOSCOPY WITH CO2 INSUFFLATION;  Surgeon: Jose R Richmond MD;  Location: [facility] OR     COLONOSCOPY WITH CO2 INSUFFLATION N/A 5/10/2017    Procedure: COLONOSCOPY WITH CO2 INSUFFLATION;  COLON SCREEN/ UNDERWOOD;  Surgeon: Abisai Duarte DO;  Location: [facility] OR     JOINT REPLACEMENT Left 06/2019    hip     SINUS SURGERY  2009     TONSILLECTOMY  age 22     Social History     Socioeconomic History     Marital status:      Spouse name: Sanam  -  2013     Number of children: 2     Years of education: 14     Highest education level: None   Occupational History     Occupation: Middle Management     Employer: RETIRED     Comment: 2008   Tobacco Use     Smoking status: Never Smoker     Smokeless tobacco: Never Used   Substance and Sexual Activity     Alcohol use: No     Drug use: No     Sexual activity: Not Currently   Other Topics Concern     Parent/sibling w/ CABG, MI or angioplasty before 65F 55M? Yes     Comment: 2 Brothers and father      Service No     Blood Transfusions No     Caffeine Concern No     Occupational Exposure Yes     Comment: he was in [employer]     Hobby Hazards No     Sleep Concern No     Stress Concern No     Weight Concern Yes     Special Diet No     Back Care No     Exercise No     Bike Helmet No     Seat Belt Yes     Self-Exams No   Social History Narrative    ENVIRONMENTAL HISTORY: The family lives in a [facility] [place]. The home is heated with a forced air. They do have central air conditioning. The patient's bedroom is furnished with carpeting in bedroom.  Pets inside the house include 0 animals. There is no history of cockroach or mice infestation. There is/are 0 smokers in the house.  The house does not have a damp basement.      Social Determinants of Health     Financial Resource Strain: Not on file   Food Insecurity: Not on file   Transportation Needs: Not on file   Physical Activity: Not on file   Stress: Not on file   Social Connections: Not on file   Intimate Partner Violence: Not on file   Housing Stability: Not on file           Review of Systems   Constitutional: Negative for activity change, fatigue and fever.   HENT: Negative for congestion, ear pain, facial swelling, nosebleeds, postnasal drip, rhinorrhea, sinus pressure and sneezing.    Eyes: Negative for discharge, redness and itching.   Respiratory: Negative for cough, chest tightness, shortness of breath, wheezing and stridor.     Gastrointestinal: Negative for diarrhea, nausea and vomiting.   Musculoskeletal: Negative for myalgias.   Skin: Negative for rash.   Allergic/Immunologic: Positive for environmental allergies.   Neurological: Negative for headaches.   Hematological: Negative for adenopathy.   Psychiatric/Behavioral: Negative for behavioral problems and self-injury.           Current Outpatient Medications:      acetaminophen (TYLENOL) 500 MG tablet, Take 1,000 mg by mouth 3 times daily, Disp: , Rfl:      albuterol (PROAIR HFA/PROVENTIL HFA/VENTOLIN HFA) 108 (90 Base) MCG/ACT inhaler, Inhale 2 puffs into the lungs every 4 hours as needed for shortness of breath / dyspnea or wheezing Use with spacer, Disp: 18 g, Rfl: 3     aspirin 81 MG EC tablet, Take 81 mg by mouth daily, Disp: , Rfl:      atorvastatin (LIPITOR) 20 MG tablet, TAKE 1/2 TABLET BY MOUTH ONCE DAILY, Disp: 45 tablet, Rfl: 2     budesonide-formoterol (SYMBICORT) 80-4.5 MCG/ACT Inhaler, Inhale 2 puffs into the lungs 2 times daily, Disp: 10.2 g, Rfl: 5     diltiazem (CARTIA XT) 120 MG 24 hr capsule, Take 1 capsule (120 mg) by mouth daily For blood pressure/lowers pulse. Pharmacy ok to hold prescription until due, Disp: 90 capsule, Rfl: 3     Dupilumab (DUPIXENT) 300 MG/2ML syringe, Inject 2 mLs (300 mg) Subcutaneous every 14 days, Disp: 4 mL, Rfl: 6     meclizine (ANTIVERT) 25 MG tablet, Take 1 tablet (25 mg) by mouth 3 times daily as needed for dizziness, Disp: 30 tablet, Rfl: 0     Spacer/Aero-Holding Chambers (SPACER/AERO-HOLD CHAMBER MASK) RAMAN, 1 Adult size spacer to use with MDI inhalers., Disp: 1 each, Rfl: 0     triamcinolone (NASACORT) 55 MCG/ACT nasal aerosol, Spray 2 sprays into both nostrils daily, Disp: , Rfl:   Immunization History   Administered Date(s) Administered     COVID-19,JV,Pfizer (12+ Yrs) 02/04/2021, 02/25/2021, 10/04/2021     Influenza (H1N1) 12/22/2009     Influenza (High Dose) 3 valent vaccine 10/27/2010, 10/22/2011, 09/30/2014, 09/08/2015,  "09/29/2016, 10/04/2017, 09/13/2018, 09/09/2019     Influenza (IIV3) PF 10/19/2002, 10/13/2003, 10/16/2004, 10/20/2005, 09/18/2009, 10/13/2012     Influenza Vaccine IM > 6 months Valent IIV4 (Alfuria,Fluzone) 10/17/2013     Influenza, Quad, High Dose, Pf, 65yr+ (Fluzone HD) 09/14/2020, 09/23/2021     Mantoux Tuberculin Skin Test 10/09/2009     Pneumo Conj 13-V (2010&after) 09/08/2015     Pneumococcal 23 valent 10/20/2006     TD (ADULT, 7+) 10/27/2010     TDAP Vaccine (Adacel) 08/14/2012     Zoster vaccine recombinant adjuvanted (SHINGRIX) 09/13/2018, 01/03/2019     Zoster vaccine, live 11/12/2014     Allergies   Allergen Reactions     Hytrin [Terazosin Hcl]      Leg swelling and vertigo     Simvastatin Other (See Comments)     Body aches     Cats      Codeine Nausea     Dogs      Dust Mites      [place] Trees      Seasonal Allergies      OBJECTIVE:                                                                 /71   Pulse 70   Ht 1.753 m (5' 9\")   Wt 90.7 kg (200 lb)   SpO2 96%   BMI 29.53 kg/m          Physical Exam  Vitals and nursing note reviewed.   Constitutional:       General: He is not in acute distress.     Appearance: He is not diaphoretic.   HENT:      Head: Normocephalic and atraumatic.      Right Ear: Tympanic membrane, ear canal and external ear normal.      Left Ear: Tympanic membrane, ear canal and external ear normal.      Nose: No mucosal edema or rhinorrhea.      Right Turbinates: Not enlarged, swollen or pale.      Left Turbinates: Not enlarged, swollen or pale.      Mouth/Throat:      Lips: Pink.      Mouth: Mucous membranes are moist.      Pharynx: Oropharynx is clear. No pharyngeal swelling, oropharyngeal exudate or posterior oropharyngeal erythema.   Eyes:      General:         Right eye: No discharge.         Left eye: No discharge.      Conjunctiva/sclera: Conjunctivae normal.   Cardiovascular:      Rate and Rhythm: Normal rate and regular rhythm.      Heart sounds: Murmur heard. "       Pulmonary:      Effort: No respiratory distress.      Breath sounds: Normal breath sounds and air entry. No stridor, decreased air movement or transmitted upper airway sounds. No decreased breath sounds, wheezing, rhonchi or rales.   Musculoskeletal:         General: Normal range of motion.   Skin:     General: Skin is warm.   Neurological:      Mental Status: He is alert.   Psychiatric:         Mood and Affect: Mood normal.         Behavior: Behavior normal.         WORKUP: ACT 23     ASSESSMENT/PLAN:    Moderate persistent asthma, uncomplicated  Currently well controlled with dupilumab 300 mg every other week, Symbicort 80/4.5 mcg 2 puffs twice daily, and albuterol inhaler on as needed basis.  -Continue as is.    - albuterol (PROAIR HFA/PROVENTIL HFA/VENTOLIN HFA) 108 (90 Base) MCG/ACT inhaler  Dispense: 18 g; Refill: 3  - budesonide-formoterol (SYMBICORT) 80-4.5 MCG/ACT Inhaler  Dispense: 10.2 g; Refill: 5    Seasonal allergic rhinitis due to pollen  Allergic rhinitis due to animal dander  Allergic rhinitis due to dust mite  Nasal polyposis  Well-controlled with Nasacort 2 sprays in each nostril once daily and dupilumab.  -Continue as is.    Return in about 6 months (around 5/30/2022), or if symptoms worsen or fail to improve.    Thank you for allowing us to participate in the care of this patient. Please feel free to contact us if there are any questions or concerns about the patient.    Disclaimer: This note consists of symbols derived from keyboarding, dictation and/or voice recognition software. As a result, there may be errors in the script that have gone undetected. Please consider this when interpreting information found in this chart.    Mohinder Aragon MD, FAAAAI, FACAAI  Allergy, Asthma and Immunology     [facility]       Again, thank you for allowing me to participate in the care of your patient.        Sincerely,        Mohinder  MD Mahesh

## 2021-11-30 NOTE — PROGRESS NOTES
SUBJECTIVE:                                                               Guillermo Hogue presents today to our Allergy Clinic at [facility] for a follow-up visit. He is an 81-year-old male with asthma, chronic sinusitis with nasal polyposis, and allergic rhinitis.    A new patient for me. Previous patient of Dr. Del Rio.  History of chronic chest symptoms for multiple years.  Diagnosed with asthma at [facility] in 2010.   Allergic rhinoconjunctivitis: Perennial with spring and summer nasal and ocular symptoms. SPT for aeroallergens performed in August 2019 showed sensitivity to cat, dog, dust mites, tree pollen, and weed pollen. History of nasal polyps.    Follows with Dr. Valadez of ENT.  Polyps are well controlled. He also uses Nasacort 2 sprays per nostril daily. The patient denies persistent clear rhinorrhea, nasal itch, stuffiness, sneezing, interval (since the last visit with Dr. Del Rio) sinusitis symptoms of fever, facial pain, or purulent rhinorrhea. He has no issues with smell or taste.   He is on Symbicort 80/4.5 mcg 2 puff inhaled twice daily. Asthma is well controlled. Currently on Dupixent 300 mg every other week. He tolerarates it well.  The reason for Dupixent was not nasal polyposis, but not well controlled asthma.  Guillermo is using albuterol HFA  less than twice per week for rescue from chest symptoms. He is waking up less than twice per month due to chest symptoms.  There has been no use of oral steroids since last visit. No ED/PCP/urgent care/other specialist visits for asthma flare since the previous visit. The patient denies current chest tightness, cough, wheeze, or shortness of breath.      Patient Active Problem List   Diagnosis     Reactive airway disease     Chronic sinusitis, unspecified location     AR (allergic rhinitis)     HYPERLIPIDEMIA LDL GOAL <130     BPH (benign prostatic hyperplasia)     Palpitations     Hypertension goal BP (blood pressure) < 140/90     Fatty  liver     Cataracts, bilateral     Moderate persistent asthma, uncomplicated     Seasonal allergic rhinitis due to pollen     Allergic rhinitis due to animal dander     Allergic rhinitis due to dust mite     Paroxysmal atrial fibrillation (H)     Dizziness     Nasal polyposis     Benign paroxysmal positional vertigo of left ear       Past Medical History:   Diagnosis Date     Allergic rhinitis age 22     Asthma 2009     Chronic osteoarthritis      Nasal polyposis      Osteoarthritis of left hip      Reactive airway disease 2009      Problem (# of Occurrences) Relation (Name,Age of Onset)    Arthritis (1) Father    Breast Cancer (1) Mother    C.A.D. (3) Father, Brother, Brother    Cancer (1) Maternal Grandfather    Cerebrovascular Disease (2) Father, Maternal Grandmother    Hypertension (1) Maternal Grandmother    Prostate Cancer (1) Brother    Respiratory (1) Father: TB history    Rheumatoid Arthritis (1) Father        Past Surgical History:   Procedure Laterality Date     COLONOSCOPY       COLONOSCOPY N/A 5/10/2017    Procedure: COMBINED COLONOSCOPY, SINGLE OR MULTIPLE BIOPSY/POLYPECTOMY BY BIOPSY;;  Surgeon: Abisai Duarte DO;  Location: [facility] OR     COLONOSCOPY WITH CO2 INSUFFLATION N/A 2015    Procedure: COLONOSCOPY WITH CO2 INSUFFLATION;  Surgeon: Jose R Richmond MD;  Location: [facility] OR     COLONOSCOPY WITH CO2 INSUFFLATION N/A 5/10/2017    Procedure: COLONOSCOPY WITH CO2 INSUFFLATION;  COLON SCREEN/ UNDERWOOD;  Surgeon: Abisai Duarte DO;  Location: [facility] OR     JOINT REPLACEMENT Left 2019    hip     SINUS SURGERY  2009     TONSILLECTOMY  age 22     Social History     Socioeconomic History     Marital status:      Spouse name: Sanam palafox      Number of children: 2     Years of education: 14     Highest education level: None   Occupational History     Occupation: Middle Management     Employer: RETIRED     Comment: 2008   Tobacco Use     Smoking status: Never Smoker     Smokeless  tobacco: Never Used   Substance and Sexual Activity     Alcohol use: No     Drug use: No     Sexual activity: Not Currently   Other Topics Concern     Parent/sibling w/ CABG, MI or angioplasty before 65F 55M? Yes     Comment: 2 Brothers and father      Service No     Blood Transfusions No     Caffeine Concern No     Occupational Exposure Yes     Comment: he was in [employer]     Hobby Hazards No     Sleep Concern No     Stress Concern No     Weight Concern Yes     Special Diet No     Back Care No     Exercise No     Bike Helmet No     Seat Belt Yes     Self-Exams No   Social History Narrative    ENVIRONMENTAL HISTORY: The family lives in a [facility] home [place]. The home is heated with a forced air. They do have central air conditioning. The patient's bedroom is furnished with carpeting in bedroom.  Pets inside the house include 0 animals. There is no history of cockroach or mice infestation. There is/are 0 smokers in the house.  The house does not have a damp basement.      Social Determinants of Health     Financial Resource Strain: Not on file   Food Insecurity: Not on file   Transportation Needs: Not on file   Physical Activity: Not on file   Stress: Not on file   Social Connections: Not on file   Intimate Partner Violence: Not on file   Housing Stability: Not on file           Review of Systems   Constitutional: Negative for activity change, fatigue and fever.   HENT: Negative for congestion, ear pain, facial swelling, nosebleeds, postnasal drip, rhinorrhea, sinus pressure and sneezing.    Eyes: Negative for discharge, redness and itching.   Respiratory: Negative for cough, chest tightness, shortness of breath, wheezing and stridor.    Gastrointestinal: Negative for diarrhea, nausea and vomiting.   Musculoskeletal: Negative for myalgias.   Skin: Negative for rash.   Allergic/Immunologic: Positive for environmental allergies.   Neurological: Negative for headaches.   Hematological: Negative for  adenopathy.   Psychiatric/Behavioral: Negative for behavioral problems and self-injury.           Current Outpatient Medications:      acetaminophen (TYLENOL) 500 MG tablet, Take 1,000 mg by mouth 3 times daily, Disp: , Rfl:      albuterol (PROAIR HFA/PROVENTIL HFA/VENTOLIN HFA) 108 (90 Base) MCG/ACT inhaler, Inhale 2 puffs into the lungs every 4 hours as needed for shortness of breath / dyspnea or wheezing Use with spacer, Disp: 18 g, Rfl: 3     aspirin 81 MG EC tablet, Take 81 mg by mouth daily, Disp: , Rfl:      atorvastatin (LIPITOR) 20 MG tablet, TAKE 1/2 TABLET BY MOUTH ONCE DAILY, Disp: 45 tablet, Rfl: 2     budesonide-formoterol (SYMBICORT) 80-4.5 MCG/ACT Inhaler, Inhale 2 puffs into the lungs 2 times daily, Disp: 10.2 g, Rfl: 5     diltiazem (CARTIA XT) 120 MG 24 hr capsule, Take 1 capsule (120 mg) by mouth daily For blood pressure/lowers pulse. Pharmacy ok to hold prescription until due, Disp: 90 capsule, Rfl: 3     Dupilumab (DUPIXENT) 300 MG/2ML syringe, Inject 2 mLs (300 mg) Subcutaneous every 14 days, Disp: 4 mL, Rfl: 6     meclizine (ANTIVERT) 25 MG tablet, Take 1 tablet (25 mg) by mouth 3 times daily as needed for dizziness, Disp: 30 tablet, Rfl: 0     Spacer/Aero-Holding Chambers (SPACER/AERO-HOLD CHAMBER MASK) RAMAN, 1 Adult size spacer to use with MDI inhalers., Disp: 1 each, Rfl: 0     triamcinolone (NASACORT) 55 MCG/ACT nasal aerosol, Spray 2 sprays into both nostrils daily, Disp: , Rfl:   Immunization History   Administered Date(s) Administered     COVID-19,PF,Pfizer (12+ Yrs) 02/04/2021, 02/25/2021, 10/04/2021     Influenza (H1N1) 12/22/2009     Influenza (High Dose) 3 valent vaccine 10/27/2010, 10/22/2011, 09/30/2014, 09/08/2015, 09/29/2016, 10/04/2017, 09/13/2018, 09/09/2019     Influenza (IIV3) PF 10/19/2002, 10/13/2003, 10/16/2004, 10/20/2005, 09/18/2009, 10/13/2012     Influenza Vaccine IM > 6 months Valent IIV4 (Brigida,Fluzone) 10/17/2013     Influenza, Quad, High Dose, Pf, 65yr+  "(Fluzone HD) 09/14/2020, 09/23/2021     Mantoux Tuberculin Skin Test 10/09/2009     Pneumo Conj 13-V (2010&after) 09/08/2015     Pneumococcal 23 valent 10/20/2006     TD (ADULT, 7+) 10/27/2010     TDAP Vaccine (Adacel) 08/14/2012     Zoster vaccine recombinant adjuvanted (SHINGRIX) 09/13/2018, 01/03/2019     Zoster vaccine, live 11/12/2014     Allergies   Allergen Reactions     Hytrin [Terazosin Hcl]      Leg swelling and vertigo     Simvastatin Other (See Comments)     Body aches     Cats      Codeine Nausea     Dogs      Dust Mites      [place] Trees      Seasonal Allergies      OBJECTIVE:                                                                 /71   Pulse 70   Ht 1.753 m (5' 9\")   Wt 90.7 kg (200 lb)   SpO2 96%   BMI 29.53 kg/m          Physical Exam  Vitals and nursing note reviewed.   Constitutional:       General: He is not in acute distress.     Appearance: He is not diaphoretic.   HENT:      Head: Normocephalic and atraumatic.      Right Ear: Tympanic membrane, ear canal and external ear normal.      Left Ear: Tympanic membrane, ear canal and external ear normal.      Nose: No mucosal edema or rhinorrhea.      Right Turbinates: Not enlarged, swollen or pale.      Left Turbinates: Not enlarged, swollen or pale.      Mouth/Throat:      Lips: Pink.      Mouth: Mucous membranes are moist.      Pharynx: Oropharynx is clear. No pharyngeal swelling, oropharyngeal exudate or posterior oropharyngeal erythema.   Eyes:      General:         Right eye: No discharge.         Left eye: No discharge.      Conjunctiva/sclera: Conjunctivae normal.   Cardiovascular:      Rate and Rhythm: Normal rate and regular rhythm.      Heart sounds: Murmur heard.       Pulmonary:      Effort: No respiratory distress.      Breath sounds: Normal breath sounds and air entry. No stridor, decreased air movement or transmitted upper airway sounds. No decreased breath sounds, wheezing, rhonchi or rales.   Musculoskeletal:         " General: Normal range of motion.   Skin:     General: Skin is warm.   Neurological:      Mental Status: He is alert.   Psychiatric:         Mood and Affect: Mood normal.         Behavior: Behavior normal.         WORKUP: ACT 23     ASSESSMENT/PLAN:    Moderate persistent asthma, uncomplicated  Currently well controlled with dupilumab 300 mg every other week, Symbicort 80/4.5 mcg 2 puffs twice daily, and albuterol inhaler on as needed basis.  -Continue as is.    - albuterol (PROAIR HFA/PROVENTIL HFA/VENTOLIN HFA) 108 (90 Base) MCG/ACT inhaler  Dispense: 18 g; Refill: 3  - budesonide-formoterol (SYMBICORT) 80-4.5 MCG/ACT Inhaler  Dispense: 10.2 g; Refill: 5    Seasonal allergic rhinitis due to pollen  Allergic rhinitis due to animal dander  Allergic rhinitis due to dust mite  Nasal polyposis  Well-controlled with Nasacort 2 sprays in each nostril once daily and dupilumab.  -Continue as is.    Return in about 6 months (around 5/30/2022), or if symptoms worsen or fail to improve.    Thank you for allowing us to participate in the care of this patient. Please feel free to contact us if there are any questions or concerns about the patient.    Disclaimer: This note consists of symbols derived from keyboarding, dictation and/or voice recognition software. As a result, there may be errors in the script that have gone undetected. Please consider this when interpreting information found in this chart.    Mohinder Aragon MD, FAAAAI, FACAAI  Allergy, Asthma and Immunology     [facility]

## 2021-12-01 ASSESSMENT — ASTHMA QUESTIONNAIRES: ACT_TOTALSCORE: 23

## 2021-12-21 ENCOUNTER — TELEPHONE (OUTPATIENT)
Dept: ALLERGY | Facility: CLINIC | Age: 81
End: 2021-12-21
Payer: COMMERCIAL

## 2021-12-21 NOTE — TELEPHONE ENCOUNTER
Prior Authorization Approval    Authorization Effective Date: 11/21/2021  Authorization Expiration Date: 12/21/2022  Medication: dupixent   Approved Dose/Quantity: 4/28ds  Reference #: [id number]   Insurance Company: [employer] - Phone [phone] Fax [phone]  CoPay Card Available: No    Foundation Assistance Needed: enrolled   Which Pharmacy is filling the prescription (Not needed for infusion/clinic administered): [facility]

## 2022-01-19 ENCOUNTER — OFFICE VISIT (OUTPATIENT)
Dept: ALLERGY | Facility: OTHER | Age: 82
End: 2022-01-19
Payer: COMMERCIAL

## 2022-01-19 VITALS
SYSTOLIC BLOOD PRESSURE: 122 MMHG | BODY MASS INDEX: 29.62 KG/M2 | HEIGHT: 69 IN | OXYGEN SATURATION: 98 % | DIASTOLIC BLOOD PRESSURE: 63 MMHG | HEART RATE: 65 BPM | WEIGHT: 200 LBS

## 2022-01-19 DIAGNOSIS — J30.81 ALLERGIC RHINITIS DUE TO ANIMAL DANDER: ICD-10-CM

## 2022-01-19 DIAGNOSIS — J32.8 OTHER CHRONIC SINUSITIS: ICD-10-CM

## 2022-01-19 DIAGNOSIS — G62.9 NEUROPATHY: ICD-10-CM

## 2022-01-19 DIAGNOSIS — J45.40 MODERATE PERSISTENT ASTHMA, UNCOMPLICATED: Primary | ICD-10-CM

## 2022-01-19 DIAGNOSIS — J33.9 NASAL POLYPOSIS: ICD-10-CM

## 2022-01-19 DIAGNOSIS — J30.1 SEASONAL ALLERGIC RHINITIS DUE TO POLLEN: ICD-10-CM

## 2022-01-19 DIAGNOSIS — J30.89 ALLERGIC RHINITIS DUE TO DUST MITE: ICD-10-CM

## 2022-01-19 LAB
BASOPHILS # BLD AUTO: 0 10E3/UL (ref 0–0.2)
BASOPHILS NFR BLD AUTO: 0 %
EOSINOPHIL # BLD AUTO: 0.3 10E3/UL (ref 0–0.7)
EOSINOPHIL NFR BLD AUTO: 3 %
ERYTHROCYTE [DISTWIDTH] IN BLOOD BY AUTOMATED COUNT: 14.3 % (ref 10–15)
HCT VFR BLD AUTO: 40.4 % (ref 40–53)
HGB BLD-MCNC: 12.6 G/DL (ref 13.3–17.7)
LYMPHOCYTES # BLD AUTO: 2.4 10E3/UL (ref 0.8–5.3)
LYMPHOCYTES NFR BLD AUTO: 30 %
MCH RBC QN AUTO: 29.5 PG (ref 26.5–33)
MCHC RBC AUTO-ENTMCNC: 31.2 G/DL (ref 31.5–36.5)
MCV RBC AUTO: 95 FL (ref 78–100)
MONOCYTES # BLD AUTO: 1.1 10E3/UL (ref 0–1.3)
MONOCYTES NFR BLD AUTO: 14 %
NEUTROPHILS # BLD AUTO: 4.2 10E3/UL (ref 1.6–8.3)
NEUTROPHILS NFR BLD AUTO: 53 %
PLATELET # BLD AUTO: 181 10E3/UL (ref 150–450)
RBC # BLD AUTO: 4.27 10E6/UL (ref 4.4–5.9)
WBC # BLD AUTO: 7.9 10E3/UL (ref 4–11)

## 2022-01-19 PROCEDURE — 99214 OFFICE O/P EST MOD 30 MIN: CPT | Performed by: ALLERGY & IMMUNOLOGY

## 2022-01-19 PROCEDURE — 36415 COLL VENOUS BLD VENIPUNCTURE: CPT | Performed by: ALLERGY & IMMUNOLOGY

## 2022-01-19 PROCEDURE — 85025 COMPLETE CBC W/AUTO DIFF WBC: CPT | Performed by: ALLERGY & IMMUNOLOGY

## 2022-01-19 RX ORDER — BUDESONIDE AND FORMOTEROL FUMARATE DIHYDRATE 160; 4.5 UG/1; UG/1
2 AEROSOL RESPIRATORY (INHALATION) 2 TIMES DAILY
Qty: 10.2 G | Refills: 3 | Status: SHIPPED | OUTPATIENT
Start: 2022-01-19 | End: 2022-03-16

## 2022-01-19 ASSESSMENT — ENCOUNTER SYMPTOMS
MYALGIAS: 0
EYE ITCHING: 0
VOMITING: 0
ADENOPATHY: 0
SINUS PRESSURE: 0
DIARRHEA: 0
EYE DISCHARGE: 0
CHEST TIGHTNESS: 0
STRIDOR: 0
RHINORRHEA: 0
ACTIVITY CHANGE: 0
FATIGUE: 0
FEVER: 0
HEADACHES: 0
NUMBNESS: 1
EYE REDNESS: 0
WHEEZING: 0
SHORTNESS OF BREATH: 0
COUGH: 0
NAUSEA: 0
FACIAL SWELLING: 0

## 2022-01-19 ASSESSMENT — ASTHMA QUESTIONNAIRES: ACT_TOTALSCORE: 23

## 2022-01-19 ASSESSMENT — MIFFLIN-ST. JEOR: SCORE: 1607.57

## 2022-01-19 NOTE — LETTER
1/19/2022         RE: Guillermo Hogue  [address]  [address]  [facility] [address]        Dear Colleague,    Thank you for referring your patient, Guillermo Hogue, to the [facility]. Please see a copy of my visit note below.    SUBJECTIVE:                                                                   Guillermo Hogue presents today to our Allergy Clinic at [facility]; He is being seen for a follow up visit. He is an 81 year old male with with asthma, chronic sinusitis with nasal polyposis, and allergic rhinitis.  History of chronic chest symptoms for multiple years. Diagnosed with asthma at [facility] in 2010.   Allergic rhinoconjunctivitis: Perennial with spring and summer nasal and ocular symptoms. SPT for aeroallergens performed in August 2019 showed sensitivity to cat, dog, dust mites, tree pollen, and weed pollen. History of nasal polyps.    Follows with Dr. Valadez of ENT.   Currently on Dupixent 300 mg every other week. The reason for Dupixent was not nasal polyposis, but not well controlled asthma.    The patient denies persistent clear rhinorrhea, nasal itching, stuffiness, sneezing, or interval sinusitis symptoms (fever, facial pain, or purulent rhinorrhea). He feels the polyps are well controlled. Uses Nasacort 2 sprays in each nostril once daily. He has no issues with smell or taste.    Regarding his asthma, he uses Symbicort 80/4.5 mcg 2 puffs twice daily. He tolerated Dupixent well until recently.  He is back because he developed a persistent tingling sensation in both his legs approximately 3 weeks ago. The right leg seems to be affected more than the left.  Sometimes, he feels numbness as well.  He read the side effects of Dupixent and decided to ask my opinion.    Patient Active Problem List   Diagnosis     Reactive airway disease     Chronic sinusitis, unspecified location     AR (allergic rhinitis)     HYPERLIPIDEMIA LDL GOAL <130     BPH  (benign prostatic hyperplasia)     Palpitations     Hypertension goal BP (blood pressure) < 140/90     Fatty liver     Cataracts, bilateral     Moderate persistent asthma, uncomplicated     Seasonal allergic rhinitis due to pollen     Allergic rhinitis due to animal dander     Allergic rhinitis due to dust mite     Paroxysmal atrial fibrillation (H)     Dizziness     Nasal polyposis     Benign paroxysmal positional vertigo of left ear       Past Medical History:   Diagnosis Date     Allergic rhinitis age 22     Asthma 2009     Chronic osteoarthritis      Nasal polyposis 2009     Osteoarthritis of left hip      Reactive airway disease 2009      Problem (# of Occurrences) Relation (Name,Age of Onset)    Arthritis (1) Father    Breast Cancer (1) Mother    C.A.D. (3) Father, Brother, Brother    Cancer (1) Maternal Grandfather    Cerebrovascular Disease (2) Father, Maternal Grandmother    Hypertension (1) Maternal Grandmother    Prostate Cancer (1) Brother    Respiratory (1) Father: TB history    Rheumatoid Arthritis (1) Father        Past Surgical History:   Procedure Laterality Date     COLONOSCOPY       COLONOSCOPY N/A 5/10/2017    Procedure: COMBINED COLONOSCOPY, SINGLE OR MULTIPLE BIOPSY/POLYPECTOMY BY BIOPSY;;  Surgeon: Abisai Duarte DO;  Location: [facility] OR     COLONOSCOPY WITH CO2 INSUFFLATION N/A 2015    Procedure: COLONOSCOPY WITH CO2 INSUFFLATION;  Surgeon: Jose R Richmond MD;  Location: [facility] OR     COLONOSCOPY WITH CO2 INSUFFLATION N/A 5/10/2017    Procedure: COLONOSCOPY WITH CO2 INSUFFLATION;  COLON SCREEN/ UNDERWOOD;  Surgeon: Abisai Duarte DO;  Location: [facility] OR     JOINT REPLACEMENT Left 2019    hip     SINUS SURGERY  2009     TONSILLECTOMY  age 22     Social History     Socioeconomic History     Marital status:      Spouse name: Sanam palafox      Number of children: 2     Years of education: 14     Highest education level: None   Occupational History     Occupation: Middle  Management     Employer: RETIRED     Comment: 2008   Tobacco Use     Smoking status: Never Smoker     Smokeless tobacco: Never Used   Vaping Use     Vaping Use: Never used   Substance and Sexual Activity     Alcohol use: No     Drug use: No     Sexual activity: Not Currently   Other Topics Concern     Parent/sibling w/ CABG, MI or angioplasty before 65F 55M? Yes     Comment: 2 Brothers and father      Service No     Blood Transfusions No     Caffeine Concern No     Occupational Exposure Yes     Comment: he was in [employer]     Hobby Hazards No     Sleep Concern No     Stress Concern No     Weight Concern Yes     Special Diet No     Back Care No     Exercise No     Bike Helmet No     Seat Belt Yes     Self-Exams No   Social History Narrative    ENVIRONMENTAL HISTORY: The family lives in a [facility] [place]. The home is heated with a forced air. They do have central air conditioning. The patient's bedroom is furnished with carpeting in bedroom.  Pets inside the house include 0 animals. There is no history of cockroach or mice infestation. There is/are 0 smokers in the house.  The house does not have a damp basement.      Social Determinants of Health     Financial Resource Strain: Not on file   Food Insecurity: Not on file   Transportation Needs: Not on file   Physical Activity: Not on file   Stress: Not on file   Social Connections: Not on file   Intimate Partner Violence: Not on file   Housing Stability: Not on file           Review of Systems   Constitutional: Negative for activity change, fatigue and fever.   HENT: Negative for congestion, ear pain, facial swelling, nosebleeds, postnasal drip, rhinorrhea, sinus pressure and sneezing.    Eyes: Negative for discharge, redness and itching.   Respiratory: Negative for cough, chest tightness, shortness of breath, wheezing and stridor.    Gastrointestinal: Negative for diarrhea, nausea and vomiting.   Musculoskeletal: Negative for myalgias.    Skin: Negative for rash.   Allergic/Immunologic: Positive for environmental allergies.   Neurological: Positive for numbness. Negative for headaches.        Right foot numb/tingling   Hematological: Negative for adenopathy.   Psychiatric/Behavioral: Negative for behavioral problems and self-injury.           Current Outpatient Medications:      acetaminophen (TYLENOL) 500 MG tablet, Take 1,000 mg by mouth 3 times daily, Disp: , Rfl:      albuterol (PROAIR HFA/PROVENTIL HFA/VENTOLIN HFA) 108 (90 Base) MCG/ACT inhaler, Inhale 2 puffs into the lungs every 4 hours as needed for shortness of breath / dyspnea or wheezing Use with spacer, Disp: 18 g, Rfl: 3     aspirin 81 MG EC tablet, Take 81 mg by mouth daily, Disp: , Rfl:      atorvastatin (LIPITOR) 20 MG tablet, TAKE 1/2 TABLET BY MOUTH ONCE DAILY, Disp: 45 tablet, Rfl: 2     budesonide-formoterol (SYMBICORT) 160-4.5 MCG/ACT Inhaler, Inhale 2 puffs into the lungs 2 times daily, Disp: 10.2 g, Rfl: 3     diltiazem (CARTIA XT) 120 MG 24 hr capsule, Take 1 capsule (120 mg) by mouth daily For blood pressure/lowers pulse. Pharmacy ok to hold prescription until due, Disp: 90 capsule, Rfl: 3     Dupilumab (DUPIXENT) 300 MG/2ML syringe, Inject 2 mLs (300 mg) Subcutaneous every 14 days, Disp: 4 mL, Rfl: 6     meclizine (ANTIVERT) 25 MG tablet, Take 1 tablet (25 mg) by mouth 3 times daily as needed for dizziness, Disp: 30 tablet, Rfl: 0     Spacer/Aero-Holding Chambers (SPACER/AERO-HOLD CHAMBER MASK) RAMAN, 1 Adult size spacer to use with MDI inhalers., Disp: 1 each, Rfl: 0     triamcinolone (NASACORT) 55 MCG/ACT nasal aerosol, Spray 2 sprays into both nostrils daily, Disp: , Rfl:   Immunization History   Administered Date(s) Administered     COVID-JV Wade,Pfizer (12+ Yrs) 02/04/2021, 02/25/2021, 10/04/2021     Influenza (H1N1) 12/22/2009     Influenza (High Dose) 3 valent vaccine 10/27/2010, 10/22/2011, 09/30/2014, 09/08/2015, 09/29/2016, 10/04/2017, 09/13/2018, 09/09/2019      "Influenza (IIV3) PF 10/19/2002, 10/13/2003, 10/16/2004, 10/20/2005, 09/18/2009, 10/13/2012     Influenza Vaccine IM > 6 months Valent IIV4 (Alfuria,Fluzone) 10/17/2013     Influenza, Quad, High Dose, Pf, 65yr+ (Fluzone HD) 09/14/2020, 09/23/2021     Mantoux Tuberculin Skin Test 10/09/2009     Pneumo Conj 13-V (2010&after) 09/08/2015     Pneumococcal 23 valent 10/20/2006     TD (ADULT, 7+) 10/27/2010     TDAP Vaccine (Adacel) 08/14/2012     Zoster vaccine recombinant adjuvanted (SHINGRIX) 09/13/2018, 01/03/2019     Zoster vaccine, live 11/12/2014     Allergies   Allergen Reactions     Hytrin [Terazosin Hcl]      Leg swelling and vertigo     Simvastatin Other (See Comments)     Body aches     Cats      Codeine Nausea     Dogs      Dust Mites      White Trees      Seasonal Allergies      OBJECTIVE:                                                                 /63   Pulse 65   Ht 1.753 m (5' 9\")   Wt 90.7 kg (200 lb)   SpO2 98%   BMI 29.53 kg/m          Physical Exam  Vitals and nursing note reviewed.   Constitutional:       General: He is not in acute distress.     Appearance: He is not diaphoretic.   HENT:      Head: Normocephalic and atraumatic.      Right Ear: Tympanic membrane, ear canal and external ear normal.      Left Ear: Tympanic membrane, ear canal and external ear normal.      Nose: No mucosal edema or rhinorrhea.      Right Turbinates: Not enlarged, swollen or pale.      Left Turbinates: Not enlarged, swollen or pale.      Mouth/Throat:      Lips: Pink.      Mouth: Mucous membranes are moist.      Pharynx: Oropharynx is clear. No pharyngeal swelling, oropharyngeal exudate or posterior oropharyngeal erythema.   Eyes:      General:         Right eye: No discharge.         Left eye: No discharge.      Conjunctiva/sclera: Conjunctivae normal.   Cardiovascular:      Rate and Rhythm: Normal rate and regular rhythm.      Heart sounds: Murmur heard.       Pulmonary:      Effort: No respiratory distress. "      Breath sounds: Normal breath sounds and air entry. No stridor, decreased air movement or transmitted upper airway sounds. No decreased breath sounds, wheezing, rhonchi or rales.   Musculoskeletal:         General: No swelling or tenderness. Normal range of motion.      Right lower leg: No edema.      Left lower leg: No edema.      Comments: Unremarkable exam of lower extremities.   Skin:     General: Skin is warm.   Neurological:      Mental Status: He is alert and oriented to person, place, and time.   Psychiatric:         Mood and Affect: Mood normal.         Behavior: Behavior normal.               WORKUP: ACT 23      ASSESSMENT/PLAN:    Moderate persistent asthma, uncomplicated  Neuropathy    Asthma is well controlled; however, I think that stopping dupilumab is the next step in light of numbness and tingling in both legs.  I spoke to [facility]'s , Destin Ortega.   According to him, there were not many episodes of neuropathy in patients treated with Dupixent. Most of them developed within the first 6 months of treatment. However, since there were just a few cases, it is impossible to say whether Guillermo's current symptoms are unrelated.  I talked to Guillermo about it as well. We both agreed to stop the medicine. Depending on future asthma symptom control, we can try other Biologics that could improve both asthma and maintain polyp control.  -Meanwhile, I will order CBC with differential. Eosinophilia?  -In anticipation that his asthma may get worse, will increase Symbicort to 160/4.5 mcg 2 puffs twice daily.  -Continue using albuterol inhaler 2 to 4 puffs every 4 hours as needed for persistent cough/chest tightness/wheezing/shortness of breath.  If he continues having numbness for more than 4 weeks, I suggest to see Neurology.    - CBC with Platelets & Differential  - budesonide-formoterol (SYMBICORT) 160-4.5 MCG/ACT Inhaler  Dispense: 10.2 g; Refill: 3      Other chronic sinusitis  Nasal  polyposis  Aeasonal allergic rhinitis due to pollen  Allergic rhinitis due to animal dander  Allergic rhinitis due to dust mite    Well-controlled.  -Continue Nasacort 2 sprays in each nostril once daily.      Return in about 8 weeks (around 3/16/2022), or if symptoms worsen or fail to improve.    Thank you for allowing us to participate in the care of this patient. Please feel free to contact us if there are any questions or concerns about the patient.    Disclaimer: This note consists of symbols derived from keyboarding, dictation and/or voice recognition software. As a result, there may be errors in the script that have gone undetected. Please consider this when interpreting information found in this chart.    Mohinder Aragon MD, FAAAAI, FACAAI  Allergy, Asthma and Immunology     [facility]       Again, thank you for allowing me to participate in the care of your patient.        Sincerely,        Mohinder Aragon MD

## 2022-01-19 NOTE — PATIENT INSTRUCTIONS
Stop Dupxent.   Increase Symbicort to 160/4.5 mcg 2 puffs twice daily.  The rest of management is the same.     If numbness continues for more than 4 weeks, will need consult with Neurology.     Allergy Staff Appt Hours Shot Hours Locations    Physician     Mohinder Aragon MD       Support Staff     YUE Machado CMA  Tuesday:   [place] :  [place]: :         [place]:  [place]        [place]: [place] [facility] [place]       Tues & Wed: : & Thurs:        Fri:          [facility]  [address] [place] [address]  Appt Line: [phone]    [facility]  [address] [place] [address]  Appt Line: [phone]    Pulmonary Function Scheduling:  [place]: [phone]  [place]: [phone]  [phone]     Important Scheduling Information    Appointments for challenges (oral food challenge, penicillin testing, aspirin desensitization) will need to be scheduled directly through the allergy department.  Please contact them via [employer] or on  and  at [phone].  They will provide additional information and instructions for the appointment.  Discontinue oral antihistamines 7 days prior to the appointment.  Discontinue nasal and ocular antihistamines 4 days prior to the appointment.    Appointments for skin testing: Appointment will last approximately 45 minutes.  Please call the appointment line for your clinic to schedule.  Discontinue oral antihistamines 7 days prior to the appointment.  Discontinue nasal and ocular antihistamines 4 days prior to appointment.    Thank you for trusting us with your Allergy, Asthma, and Immunology care. Please feel free to contact us with any questions or concerns you may have.

## 2022-01-19 NOTE — PROGRESS NOTES
SUBJECTIVE:                                                                   Guillermo Hogue presents today to our Allergy Clinic at [facility]; He is being seen for a follow up visit. He is an 81 year old male with with asthma, chronic sinusitis with nasal polyposis, and allergic rhinitis.  History of chronic chest symptoms for multiple years. Diagnosed with asthma at [facility] in 2010.   Allergic rhinoconjunctivitis: Perennial with spring and summer nasal and ocular symptoms. SPT for aeroallergens performed in August 2019 showed sensitivity to cat, dog, dust mites, tree pollen, and weed pollen. History of nasal polyps.    Follows with Dr. Valadez of ENT.   Currently on Dupixent 300 mg every other week. The reason for Dupixent was not nasal polyposis, but not well controlled asthma.    The patient denies persistent clear rhinorrhea, nasal itching, stuffiness, sneezing, or interval sinusitis symptoms (fever, facial pain, or purulent rhinorrhea). He feels the polyps are well controlled. Uses Nasacort 2 sprays in each nostril once daily. He has no issues with smell or taste.    Regarding his asthma, he uses Symbicort 80/4.5 mcg 2 puffs twice daily. He tolerated Dupixent well until recently.  He is back because he developed a persistent tingling sensation in both his legs approximately 3 weeks ago. The right leg seems to be affected more than the left.  Sometimes, he feels numbness as well.  He read the side effects of Dupixent and decided to ask my opinion.    Patient Active Problem List   Diagnosis     Reactive airway disease     Chronic sinusitis, unspecified location     AR (allergic rhinitis)     HYPERLIPIDEMIA LDL GOAL <130     BPH (benign prostatic hyperplasia)     Palpitations     Hypertension goal BP (blood pressure) < 140/90     Fatty liver     Cataracts, bilateral     Moderate persistent asthma, uncomplicated     Seasonal allergic rhinitis due to pollen     Allergic rhinitis due to  animal dander     Allergic rhinitis due to dust mite     Paroxysmal atrial fibrillation (H)     Dizziness     Nasal polyposis     Benign paroxysmal positional vertigo of left ear       Past Medical History:   Diagnosis Date     Allergic rhinitis age 22     Asthma 2009     Chronic osteoarthritis      Nasal polyposis 2009     Osteoarthritis of left hip      Reactive airway disease 2009      Problem (# of Occurrences) Relation (Name,Age of Onset)    Arthritis (1) Father    Breast Cancer (1) Mother    C.A.D. (3) Father, Brother, Brother    Cancer (1) Maternal Grandfather    Cerebrovascular Disease (2) Father, Maternal Grandmother    Hypertension (1) Maternal Grandmother    Prostate Cancer (1) Brother    Respiratory (1) Father: TB history    Rheumatoid Arthritis (1) Father        Past Surgical History:   Procedure Laterality Date     COLONOSCOPY       COLONOSCOPY N/A 5/10/2017    Procedure: COMBINED COLONOSCOPY, SINGLE OR MULTIPLE BIOPSY/POLYPECTOMY BY BIOPSY;;  Surgeon: Abisai Duarte DO;  Location: [facility] OR     COLONOSCOPY WITH CO2 INSUFFLATION N/A 2015    Procedure: COLONOSCOPY WITH CO2 INSUFFLATION;  Surgeon: Jose R Richmond MD;  Location: [facility] OR     COLONOSCOPY WITH CO2 INSUFFLATION N/A 5/10/2017    Procedure: COLONOSCOPY WITH CO2 INSUFFLATION;  COLON SCREEN/ UNDERWOOD;  Surgeon: Abisai Duarte DO;  Location: [facility] OR     JOINT REPLACEMENT Left 2019    hip     SINUS SURGERY  2009     TONSILLECTOMY  age 22     Social History     Socioeconomic History     Marital status:      Spouse name: Sanam palafox      Number of children: 2     Years of education: 14     Highest education level: None   Occupational History     Occupation: Middle Management     Employer: RETIRED     Comment: 2008   Tobacco Use     Smoking status: Never Smoker     Smokeless tobacco: Never Used   Vaping Use     Vaping Use: Never used   Substance and Sexual Activity     Alcohol use: No     Drug use: No     Sexual activity: Not  Currently   Other Topics Concern     Parent/sibling w/ CABG, MI or angioplasty before 65F 55M? Yes     Comment: 2 Brothers and father      Service No     Blood Transfusions No     Caffeine Concern No     Occupational Exposure Yes     Comment: he was in [employer]     Hobby Hazards No     Sleep Concern No     Stress Concern No     Weight Concern Yes     Special Diet No     Back Care No     Exercise No     Bike Helmet No     Seat Belt Yes     Self-Exams No   Social History Narrative    ENVIRONMENTAL HISTORY: The family lives in a [facility] [place]. The home is heated with a forced air. They do have central air conditioning. The patient's bedroom is furnished with carpeting in bedroom.  Pets inside the house include 0 animals. There is no history of cockroach or mice infestation. There is/are 0 smokers in the house.  The house does not have a damp basement.      Social Determinants of Health     Financial Resource Strain: Not on file   Food Insecurity: Not on file   Transportation Needs: Not on file   Physical Activity: Not on file   Stress: Not on file   Social Connections: Not on file   Intimate Partner Violence: Not on file   Housing Stability: Not on file           Review of Systems   Constitutional: Negative for activity change, fatigue and fever.   HENT: Negative for congestion, ear pain, facial swelling, nosebleeds, postnasal drip, rhinorrhea, sinus pressure and sneezing.    Eyes: Negative for discharge, redness and itching.   Respiratory: Negative for cough, chest tightness, shortness of breath, wheezing and stridor.    Gastrointestinal: Negative for diarrhea, nausea and vomiting.   Musculoskeletal: Negative for myalgias.   Skin: Negative for rash.   Allergic/Immunologic: Positive for environmental allergies.   Neurological: Positive for numbness. Negative for headaches.        Right foot numb/tingling   Hematological: Negative for adenopathy.   Psychiatric/Behavioral: Negative for  behavioral problems and self-injury.           Current Outpatient Medications:      acetaminophen (TYLENOL) 500 MG tablet, Take 1,000 mg by mouth 3 times daily, Disp: , Rfl:      albuterol (PROAIR HFA/PROVENTIL HFA/VENTOLIN HFA) 108 (90 Base) MCG/ACT inhaler, Inhale 2 puffs into the lungs every 4 hours as needed for shortness of breath / dyspnea or wheezing Use with spacer, Disp: 18 g, Rfl: 3     aspirin 81 MG EC tablet, Take 81 mg by mouth daily, Disp: , Rfl:      atorvastatin (LIPITOR) 20 MG tablet, TAKE 1/2 TABLET BY MOUTH ONCE DAILY, Disp: 45 tablet, Rfl: 2     budesonide-formoterol (SYMBICORT) 160-4.5 MCG/ACT Inhaler, Inhale 2 puffs into the lungs 2 times daily, Disp: 10.2 g, Rfl: 3     diltiazem (CARTIA XT) 120 MG 24 hr capsule, Take 1 capsule (120 mg) by mouth daily For blood pressure/lowers pulse. Pharmacy ok to hold prescription until due, Disp: 90 capsule, Rfl: 3     Dupilumab (DUPIXENT) 300 MG/2ML syringe, Inject 2 mLs (300 mg) Subcutaneous every 14 days, Disp: 4 mL, Rfl: 6     meclizine (ANTIVERT) 25 MG tablet, Take 1 tablet (25 mg) by mouth 3 times daily as needed for dizziness, Disp: 30 tablet, Rfl: 0     Spacer/Aero-Holding Chambers (SPACER/AERO-HOLD CHAMBER MASK) RAMAN, 1 Adult size spacer to use with MDI inhalers., Disp: 1 each, Rfl: 0     triamcinolone (NASACORT) 55 MCG/ACT nasal aerosol, Spray 2 sprays into both nostrils daily, Disp: , Rfl:   Immunization History   Administered Date(s) Administered     COVID-19,PF,Pfizer (12+ Yrs) 02/04/2021, 02/25/2021, 10/04/2021     Influenza (H1N1) 12/22/2009     Influenza (High Dose) 3 valent vaccine 10/27/2010, 10/22/2011, 09/30/2014, 09/08/2015, 09/29/2016, 10/04/2017, 09/13/2018, 09/09/2019     Influenza (IIV3) PF 10/19/2002, 10/13/2003, 10/16/2004, 10/20/2005, 09/18/2009, 10/13/2012     Influenza Vaccine IM > 6 months Valent IIV4 (Alfuria,Fluzone) 10/17/2013     Influenza, Quad, High Dose, Pf, 65yr+ (Fluzone HD) 09/14/2020, 09/23/2021     Simone  "Tuberculin Skin Test 10/09/2009     Pneumo Conj 13-V (2010&after) 09/08/2015     Pneumococcal 23 valent 10/20/2006     TD (ADULT, 7+) 10/27/2010     TDAP Vaccine (Adacel) 08/14/2012     Zoster vaccine recombinant adjuvanted (SHINGRIX) 09/13/2018, 01/03/2019     Zoster vaccine, live 11/12/2014     Allergies   Allergen Reactions     Hytrin [Terazosin Hcl]      Leg swelling and vertigo     Simvastatin Other (See Comments)     Body aches     Cats      Codeine Nausea     Dogs      Dust Mites      [place]      Seasonal Allergies      OBJECTIVE:                                                                 /63   Pulse 65   Ht 1.753 m (5' 9\")   Wt 90.7 kg (200 lb)   SpO2 98%   BMI 29.53 kg/m          Physical Exam  Vitals and nursing note reviewed.   Constitutional:       General: He is not in acute distress.     Appearance: He is not diaphoretic.   HENT:      Head: Normocephalic and atraumatic.      Right Ear: Tympanic membrane, ear canal and external ear normal.      Left Ear: Tympanic membrane, ear canal and external ear normal.      Nose: No mucosal edema or rhinorrhea.      Right Turbinates: Not enlarged, swollen or pale.      Left Turbinates: Not enlarged, swollen or pale.      Mouth/Throat:      Lips: Pink.      Mouth: Mucous membranes are moist.      Pharynx: Oropharynx is clear. No pharyngeal swelling, oropharyngeal exudate or posterior oropharyngeal erythema.   Eyes:      General:         Right eye: No discharge.         Left eye: No discharge.      Conjunctiva/sclera: Conjunctivae normal.   Cardiovascular:      Rate and Rhythm: Normal rate and regular rhythm.      Heart sounds: Murmur heard.       Pulmonary:      Effort: No respiratory distress.      Breath sounds: Normal breath sounds and air entry. No stridor, decreased air movement or transmitted upper airway sounds. No decreased breath sounds, wheezing, rhonchi or rales.   Musculoskeletal:         General: No swelling or tenderness. Normal " range of motion.      Right lower leg: No edema.      Left lower leg: No edema.      Comments: Unremarkable exam of lower extremities.   Skin:     General: Skin is warm.   Neurological:      Mental Status: He is alert and oriented to person, place, and time.   Psychiatric:         Mood and Affect: Mood normal.         Behavior: Behavior normal.               WORKUP: ACT 23      ASSESSMENT/PLAN:    Moderate persistent asthma, uncomplicated  Neuropathy    Asthma is well controlled; however, I think that stopping dupilumab is the next step in light of numbness and tingling in both legs.  I spoke to [facility]'s , Destin Ortega.   According to him, there were not many episodes of neuropathy in patients treated with Dupixent. Most of them developed within the first 6 months of treatment. However, since there were just a few cases, it is impossible to say whether Guillermo's current symptoms are unrelated.  I talked to Guillermo about it as well. We both agreed to stop the medicine. Depending on future asthma symptom control, we can try other Biologics that could improve both asthma and maintain polyp control.  -Meanwhile, I will order CBC with differential. Eosinophilia?  -In anticipation that his asthma may get worse, will increase Symbicort to 160/4.5 mcg 2 puffs twice daily.  -Continue using albuterol inhaler 2 to 4 puffs every 4 hours as needed for persistent cough/chest tightness/wheezing/shortness of breath.  If he continues having numbness for more than 4 weeks, I suggest to see Neurology.    - CBC with Platelets & Differential  - budesonide-formoterol (SYMBICORT) 160-4.5 MCG/ACT Inhaler  Dispense: 10.2 g; Refill: 3      Other chronic sinusitis  Nasal polyposis  Aeasonal allergic rhinitis due to pollen  Allergic rhinitis due to animal dander  Allergic rhinitis due to dust mite    Well-controlled.  -Continue Nasacort 2 sprays in each nostril once daily.      Return in about 8 weeks (around 3/16/2022),  or if symptoms worsen or fail to improve.    Thank you for allowing us to participate in the care of this patient. Please feel free to contact us if there are any questions or concerns about the patient.    Disclaimer: This note consists of symbols derived from keyboarding, dictation and/or voice recognition software. As a result, there may be errors in the script that have gone undetected. Please consider this when interpreting information found in this chart.    Mohinder Aragon MD, FAAAAI, FACAAI  Allergy, Asthma and Immunology     [facility]

## 2022-01-20 NOTE — RESULT ENCOUNTER NOTE
Darren message sent:     CBC with differential with mild anemia.  I ordered the lab to check if he has hypereosinophilia.  He does not.  In regards to mild anemia, I suggest discussing that with PCP.

## 2022-01-27 ENCOUNTER — OFFICE VISIT (OUTPATIENT)
Dept: FAMILY MEDICINE | Facility: CLINIC | Age: 82
End: 2022-01-27
Payer: COMMERCIAL

## 2022-01-27 VITALS
HEART RATE: 87 BPM | DIASTOLIC BLOOD PRESSURE: 71 MMHG | SYSTOLIC BLOOD PRESSURE: 124 MMHG | TEMPERATURE: 97.7 F | BODY MASS INDEX: 30.42 KG/M2 | WEIGHT: 206 LBS | OXYGEN SATURATION: 95 % | RESPIRATION RATE: 22 BRPM

## 2022-01-27 DIAGNOSIS — R00.2 PALPITATIONS: ICD-10-CM

## 2022-01-27 DIAGNOSIS — D64.9 ANEMIA, UNSPECIFIED TYPE: Primary | ICD-10-CM

## 2022-01-27 LAB
ALBUMIN SERPL-MCNC: 3.6 G/DL (ref 3.4–5)
ALP SERPL-CCNC: 74 U/L (ref 40–150)
ALT SERPL W P-5'-P-CCNC: 27 U/L (ref 0–70)
ANION GAP SERPL CALCULATED.3IONS-SCNC: 5 MMOL/L (ref 3–14)
AST SERPL W P-5'-P-CCNC: 17 U/L (ref 0–45)
BILIRUB SERPL-MCNC: 0.4 MG/DL (ref 0.2–1.3)
BUN SERPL-MCNC: 14 MG/DL (ref 7–30)
CALCIUM SERPL-MCNC: 9.4 MG/DL (ref 8.5–10.1)
CHLORIDE BLD-SCNC: 105 MMOL/L (ref 94–109)
CO2 SERPL-SCNC: 29 MMOL/L (ref 20–32)
CREAT SERPL-MCNC: 0.7 MG/DL (ref 0.66–1.25)
FERRITIN SERPL-MCNC: 127 NG/ML (ref 26–388)
GFR SERPL CREATININE-BSD FRML MDRD: >90 ML/MIN/1.73M2
GLUCOSE BLD-MCNC: 95 MG/DL (ref 70–99)
HGB BLD-MCNC: 12.9 G/DL (ref 13.3–17.7)
POTASSIUM BLD-SCNC: 4 MMOL/L (ref 3.4–5.3)
PROT SERPL-MCNC: 7.3 G/DL (ref 6.8–8.8)
SODIUM SERPL-SCNC: 139 MMOL/L (ref 133–144)

## 2022-01-27 PROCEDURE — 36415 COLL VENOUS BLD VENIPUNCTURE: CPT | Performed by: FAMILY MEDICINE

## 2022-01-27 PROCEDURE — 82728 ASSAY OF FERRITIN: CPT | Performed by: FAMILY MEDICINE

## 2022-01-27 PROCEDURE — 99214 OFFICE O/P EST MOD 30 MIN: CPT | Performed by: FAMILY MEDICINE

## 2022-01-27 PROCEDURE — 80053 COMPREHEN METABOLIC PANEL: CPT | Performed by: FAMILY MEDICINE

## 2022-01-27 PROCEDURE — 85018 HEMOGLOBIN: CPT | Performed by: FAMILY MEDICINE

## 2022-01-27 NOTE — PROGRESS NOTES
SUBJECTIVE:  Guillermo Hogue, a 81 year old male scheduled an appointment to discuss the following issues:  Follow-up anemia  Had colonoscopy 4.5 years ago =polyps. Seeing allergist.   Not taking asa. No ibuprofen or ALCOHOL or fish oil.   Not eating a lot of meat - fish/chicken.   No black/bloody stools. No abdominal surgery in past. Occasionally bloating but no pain. No nausea, vomiting or diarrhea/constipation. VitaminD3. No iron supplements. No history ulcers. No shortness of breath or fatigued. Asthma stable.   Off dupixent now. Seen cardiology and heart looks.   Outside blood pressure ok.   Medical, social, surgical, and family histories reviewed.    ROS:  All other ROS negative.  OBJECTIVE:  /71   Pulse 87   Temp 97.7  F (36.5  C) (Tympanic)   Resp 22   Wt 93.4 kg (206 lb)   SpO2 95%   BMI 30.42 kg/m     EXAM:  GENERAL APPEARANCE: healthy, alert and no distress  EYES: EOMI,  PERRL  HENT: ear canals and TM's normal and nose and mouth without ulcers or lesions  NECK: no adenopathy, no asymmetry, masses, or scars and thyroid normal to palpation  RESP: lungs clear to auscultation - no rales, rhonchi or wheezes  CV: regular rates and rhythm, normal S1 S2, no S3 or S4 and no murmur, click or rub -  ABDOMEN:  soft, nontender, no HSM or masses and bowel sounds normal  MS: extremities normal- no gross deformities noted, no evidence of inflammation in joints, FROM in all extremities.  PSYCH: mentation appears normal and affect normal/bright    ASSESSMENT / PLAN:  (D64.9) Anemia, unspecified type  (primary encounter diagnosis)  Comment: ACD vs iron def vs?  Plan: Ferritin, Hemoglobin, Comprehensive metabolic         panel        Await labs. Iron supplement if needed and possible repeat colonoscopy ( patient not interested at this point). Return to clinic is symptoms. Recheck in 3 months  If stable    (R00.2) Palpitations  Comment: stable  Plan: Comprehensive metabolic panel        Continue med. Recheck in 3  months  For fasting lab/wellness.     Edi Doty MD

## 2022-03-08 NOTE — PATIENT INSTRUCTIONS
Allergy Staff Appt Hours Shot Hours Locations    Physician     Malcolm Del Rio DO       Support Staff     Enriqueta PARISH RN      Vilma MEJIA CMA  Tuesday:        [place] 7-5 Wednesday:        [place]: 7-5 Thursday:                    [place] 7-6     Friday:  [place]  7-2   [place]        Thursday: 8-5:20        Friday: 7-12     [place]        Tuesday: 7- 3:20 Wednesday: 7-4:20     [place] Monday: 7-2:20 Tuesday: 9-5:20         [facility]  [address] [place] [address]  Appt Line: [phone]  Allergy RN:  [phone]    [facility]  [address] [place] [address]  Appt Line: [phone]  Allergy RN:  [phone]       Important Scheduling Information  Aspirin Desensitization: Appt will last 2 clinic days. Please call the Allergy RN line for your clinic to schedule. Discontinue antihistamines 7 days prior to the appointment.     Food Challenges: Appt will last 3-4 hours. Please call the Allergy RN line for your clinic to schedule. Discontinue antihistamines 7 days prior to the appointment.     Penicillin Testing: Appt will last 2-3 hours. Please call the Allergy RN line for your clinic to schedule. Discontinue antihistamines 7 days prior to the appointment.     Skin Testing: Appt will about 40 minutes. Call the appointment line for your clinic to schedule. Discontinue antihistamines 7 days prior to the appointment.     Venom Testing: Appt will last 2-3 hours. Please call the Allergy RN line for your clinic to schedule. Discontinue antihistamines 7 days prior to the appointment.     Thank you for trusting us with your Allergy, Asthma, and Immunology care. Please feel free to contact us with any questions or concerns you may have.      -Continue Symbicort.  -Continue Nasacort.  -Utero 2 puffs inhaled every 4 hours as needed for coughing, wheezing, shortness of breath or tightness in chest.  -Continue Dupixent.  -Augmentin twice daily for 10 days.  -Prednisone  20 mg by mouth twice daily for 3 days.   Normal

## 2022-03-16 ENCOUNTER — OFFICE VISIT (OUTPATIENT)
Dept: ALLERGY | Facility: OTHER | Age: 82
End: 2022-03-16
Payer: COMMERCIAL

## 2022-03-16 VITALS
TEMPERATURE: 97.4 F | OXYGEN SATURATION: 96 % | HEIGHT: 69 IN | SYSTOLIC BLOOD PRESSURE: 110 MMHG | BODY MASS INDEX: 29.62 KG/M2 | HEART RATE: 61 BPM | WEIGHT: 200 LBS | DIASTOLIC BLOOD PRESSURE: 74 MMHG

## 2022-03-16 DIAGNOSIS — J30.81 ALLERGIC RHINITIS DUE TO ANIMAL DANDER: ICD-10-CM

## 2022-03-16 DIAGNOSIS — J33.9 NASAL POLYPOSIS: ICD-10-CM

## 2022-03-16 DIAGNOSIS — J32.8 OTHER CHRONIC SINUSITIS: ICD-10-CM

## 2022-03-16 DIAGNOSIS — G62.9 NEUROPATHY: ICD-10-CM

## 2022-03-16 DIAGNOSIS — J45.40 MODERATE PERSISTENT ASTHMA, UNCOMPLICATED: Primary | ICD-10-CM

## 2022-03-16 DIAGNOSIS — J30.1 SEASONAL ALLERGIC RHINITIS DUE TO POLLEN: ICD-10-CM

## 2022-03-16 DIAGNOSIS — J30.89 ALLERGIC RHINITIS DUE TO DUST MITE: ICD-10-CM

## 2022-03-16 PROCEDURE — 99214 OFFICE O/P EST MOD 30 MIN: CPT | Performed by: ALLERGY & IMMUNOLOGY

## 2022-03-16 RX ORDER — BUDESONIDE AND FORMOTEROL FUMARATE DIHYDRATE 160; 4.5 UG/1; UG/1
2 AEROSOL RESPIRATORY (INHALATION) 2 TIMES DAILY
Qty: 10.2 G | Refills: 3 | Status: SHIPPED | OUTPATIENT
Start: 2022-03-16 | End: 2022-06-07

## 2022-03-16 ASSESSMENT — ENCOUNTER SYMPTOMS
WHEEZING: 0
ABDOMINAL PAIN: 0
FACIAL SWELLING: 0
ACTIVITY CHANGE: 0
HEADACHES: 0
SINUS PRESSURE: 0
FATIGUE: 0
EYE DISCHARGE: 0
EYE ITCHING: 0
CHEST TIGHTNESS: 0
EYE REDNESS: 0
FEVER: 0
RHINORRHEA: 0
NAUSEA: 0
SHORTNESS OF BREATH: 0
COUGH: 0
STRIDOR: 0
MYALGIAS: 0
ADENOPATHY: 0
DIARRHEA: 0
VOMITING: 0

## 2022-03-16 ASSESSMENT — ASTHMA QUESTIONNAIRES: ACT_TOTALSCORE: 22

## 2022-03-16 NOTE — LETTER
3/16/2022         RE: Guillermo Hogue  [address] [address]  [address]  [place] [address]        Dear Colleague,    Thank you for referring your patient, Guillermo Hogue, to the [facility]. Please see a copy of my visit note below.    SUBJECTIVE:                                                               Guillermo Hogue presents today to our Allergy Clinic at [facility] for a follow up visit. He is a 81 year old male with asthma, chronic sinusitis with nasal polyposis, and allergic rhinitis.  History of chronic chest symptoms for multiple years. Diagnosed with asthma at [facility] in 2010.   Allergic rhinoconjunctivitis: Perennial with spring and summer nasal and ocular symptoms. SPT for aeroallergens performed in August 2019 showed sensitivity to cat, dog, dust mites, tree pollen, and weed pollen. History of nasal polyps.    Follows with Dr. Valadez of ENT.   Currently on Dupixent 300 mg every other week. The reason for Dupixent was not nasal polyposis, but not well controlled asthma. Developed persistent tingling sensation in both his legs approximately 3 weeks ago. The right leg seemed to be affected more than the left. Stopped Dupixent due to that.    Regarding his asthma, he uses Symbicort 160/4.5 mcg 2 puffs twice daily.  The patient denies current chest tightness, cough, wheeze, or shortness of breath.  Guillermo is using albuterol HFA  less than twice per week for rescue from chest symptoms. He is waking up less than twice per month due to chest symptoms.  There has been no use of oral steroids since last visit. .     The patient denies persistent clear rhinorrhea, nasal itching, stuffiness, sneezing, or interval sinusitis symptoms (fever, facial pain, or purulent rhinorrhea). He feels the polyps are well controlled. Uses Nasacort 2 sprays in each nostril once daily. He has no issues with smell or taste. Tingling sensation in both lower extremities continues.        Patient Active Problem List   Diagnosis     Reactive airway disease     Chronic sinusitis, unspecified location     AR (allergic rhinitis)     HYPERLIPIDEMIA LDL GOAL <130     BPH (benign prostatic hyperplasia)     Palpitations     Hypertension goal BP (blood pressure) < 140/90     Fatty liver     Cataracts, bilateral     Moderate persistent asthma, uncomplicated     Seasonal allergic rhinitis due to pollen     Allergic rhinitis due to animal dander     Allergic rhinitis due to dust mite     Paroxysmal atrial fibrillation (H)     Dizziness     Nasal polyposis     Benign paroxysmal positional vertigo of left ear       Past Medical History:   Diagnosis Date     Allergic rhinitis age 22     Asthma 2009     Chronic osteoarthritis      Nasal polyposis 2009     Osteoarthritis of left hip      Reactive airway disease 2009      Problem (# of Occurrences) Relation (Name,Age of Onset)    Arthritis (1) Father    Breast Cancer (1) Mother    C.A.D. (3) Father, Brother, Brother    Cancer (1) Maternal Grandfather    Cerebrovascular Disease (2) Father, Maternal Grandmother    Hypertension (1) Maternal Grandmother    Prostate Cancer (1) Brother    Respiratory (1) Father: TB history    Rheumatoid Arthritis (1) Father        Past Surgical History:   Procedure Laterality Date     COLONOSCOPY       COLONOSCOPY N/A 5/10/2017    Procedure: COMBINED COLONOSCOPY, SINGLE OR MULTIPLE BIOPSY/POLYPECTOMY BY BIOPSY;;  Surgeon: Abisai Duarte DO;  Location: [facility] OR     COLONOSCOPY WITH CO2 INSUFFLATION N/A 5/14/2015    Procedure: COLONOSCOPY WITH CO2 INSUFFLATION;  Surgeon: Jose R Richmond MD;  Location: [facility] OR     COLONOSCOPY WITH CO2 INSUFFLATION N/A 5/10/2017    Procedure: COLONOSCOPY WITH CO2 INSUFFLATION;  COLON SCREEN/ UNDERWOOD;  Surgeon: Abisai Duarte DO;  Location: [facility] OR     JOINT REPLACEMENT Left 06/2019    hip     SINUS SURGERY  2009     TONSILLECTOMY  age 22     Social History     Socioeconomic History     Marital status:       Spouse name: Sanam palafox      Number of children: 2     Years of education: 14     Highest education level: None   Occupational History     Occupation: Middle Management     Employer: RETIRED     Comment: 2008   Tobacco Use     Smoking status: Never Smoker     Smokeless tobacco: Never Used   Vaping Use     Vaping Use: Never used   Substance and Sexual Activity     Alcohol use: No     Drug use: No     Sexual activity: Not Currently   Other Topics Concern     Parent/sibling w/ CABG, MI or angioplasty before 65F 55M? Yes     Comment: 2 Brothers and father      Service No     Blood Transfusions No     Caffeine Concern No     Occupational Exposure Yes     Comment: he was in [employer]     Hobby Hazards No     Sleep Concern No     Stress Concern No     Weight Concern Yes     Special Diet No     Back Care No     Exercise No     Bike Helmet No     Seat Belt Yes     Self-Exams No   Social History Narrative    ENVIRONMENTAL HISTORY: The family lives in a [facility] [place]. The home is heated with a forced air. They do have central air conditioning. The patient's bedroom is furnished with carpeting in bedroom.  Pets inside the house include 0 animals. There is no history of cockroach or mice infestation. There is/are 0 smokers in the house.  The house does not have a damp basement.      Social Determinants of Health     Financial Resource Strain: Not on file   Food Insecurity: Not on file   Transportation Needs: Not on file   Physical Activity: Not on file   Stress: Not on file   Social Connections: Not on file   Intimate Partner Violence: Not on file   Housing Stability: Not on file           Review of Systems   Constitutional: Negative for activity change, fatigue and fever.   HENT: Negative for congestion, ear pain, facial swelling, nosebleeds, postnasal drip, rhinorrhea, sinus pressure and sneezing.    Eyes: Negative for discharge, redness and itching.   Respiratory: Negative for  cough, chest tightness, shortness of breath, wheezing and stridor.    Gastrointestinal: Negative for abdominal pain, diarrhea, nausea and vomiting.   Musculoskeletal: Negative for myalgias.   Skin: Negative for rash.   Allergic/Immunologic: Negative for environmental allergies.   Neurological: Negative for headaches.   Hematological: Negative for adenopathy.   Psychiatric/Behavioral: Negative for behavioral problems and self-injury.           Current Outpatient Medications:      acetaminophen (TYLENOL) 500 MG tablet, Take 1,000 mg by mouth 3 times daily, Disp: , Rfl:      albuterol (PROAIR HFA/PROVENTIL HFA/VENTOLIN HFA) 108 (90 Base) MCG/ACT inhaler, Inhale 2 puffs into the lungs every 4 hours as needed for shortness of breath / dyspnea or wheezing Use with spacer, Disp: 18 g, Rfl: 3     atorvastatin (LIPITOR) 20 MG tablet, TAKE 1/2 TABLET BY MOUTH ONCE DAILY, Disp: 45 tablet, Rfl: 2     budesonide-formoterol (SYMBICORT) 160-4.5 MCG/ACT Inhaler, Inhale 2 puffs into the lungs 2 times daily, Disp: 10.2 g, Rfl: 3     diltiazem (CARTIA XT) 120 MG 24 hr capsule, Take 1 capsule (120 mg) by mouth daily For blood pressure/lowers pulse. Pharmacy ok to hold prescription until due, Disp: 90 capsule, Rfl: 3     meclizine (ANTIVERT) 25 MG tablet, Take 1 tablet (25 mg) by mouth 3 times daily as needed for dizziness, Disp: 30 tablet, Rfl: 0     Spacer/Aero-Holding Chambers (SPACER/AERO-HOLD CHAMBER MASK) RAMAN, 1 Adult size spacer to use with MDI inhalers., Disp: 1 each, Rfl: 0     triamcinolone (NASACORT) 55 MCG/ACT nasal aerosol, Spray 2 sprays into both nostrils daily, Disp: , Rfl:      Dupilumab (DUPIXENT) 300 MG/2ML syringe, Inject 2 mLs (300 mg) Subcutaneous every 14 days (Patient not taking: Reported on 3/16/2022), Disp: 4 mL, Rfl: 6  Immunization History   Administered Date(s) Administered     COVID-19,PF,Pfizer (12+ Yrs) 02/04/2021, 02/25/2021, 10/04/2021     Influenza (H1N1) 12/22/2009     Influenza (High Dose) 3 valent  "vaccine 10/27/2010, 10/22/2011, 09/30/2014, 09/08/2015, 09/29/2016, 10/04/2017, 09/13/2018, 09/09/2019     Influenza (IIV3) PF 10/19/2002, 10/13/2003, 10/16/2004, 10/20/2005, 09/18/2009, 10/13/2012     Influenza Vaccine IM > 6 months Valent IIV4 (Alfuria,Fluzone) 10/17/2013     Influenza, Quad, High Dose, Pf, 65yr+ (Fluzone HD) 09/14/2020, 09/23/2021     Mantoux Tuberculin Skin Test 10/09/2009     Pneumo Conj 13-V (2010&after) 09/08/2015     Pneumococcal 23 valent 10/20/2006     TD (ADULT, 7+) 10/27/2010     TDAP Vaccine (Adacel) 08/14/2012     Zoster vaccine recombinant adjuvanted (SHINGRIX) 09/13/2018, 01/03/2019     Zoster vaccine, live 11/12/2014     Allergies   Allergen Reactions     Hytrin [Terazosin Hcl]      Leg swelling and vertigo     Simvastatin Other (See Comments)     Body aches     Cats      Codeine Nausea     Dogs      Dupixent [Dupilumab] Other (See Comments)     Tingling and numbness in lower extremities     Dust Mites      [place]      Seasonal Allergies      OBJECTIVE:                                                                 /74   Pulse 61   Temp 97.4  F (36.3  C) (Temporal)   Ht 1.753 m (5' 9\")   Wt 90.7 kg (200 lb)   SpO2 96%   BMI 29.53 kg/m          Physical Exam  Vitals and nursing note reviewed.   Constitutional:       General: He is not in acute distress.     Appearance: He is not diaphoretic.   HENT:      Head: Normocephalic and atraumatic.      Right Ear: Tympanic membrane, ear canal and external ear normal.      Left Ear: Tympanic membrane, ear canal and external ear normal.      Nose: No mucosal edema or rhinorrhea.      Right Turbinates: Not enlarged, swollen or pale.      Left Turbinates: Not enlarged, swollen or pale.      Mouth/Throat:      Lips: Pink.      Mouth: Mucous membranes are moist.      Pharynx: Oropharynx is clear. No pharyngeal swelling, oropharyngeal exudate or posterior oropharyngeal erythema.   Eyes:      General:         Right eye: No discharge.  "        Left eye: No discharge.      Conjunctiva/sclera: Conjunctivae normal.   Cardiovascular:      Rate and Rhythm: Normal rate and regular rhythm.      Heart sounds: Murmur heard.   Pulmonary:      Effort: No respiratory distress.      Breath sounds: Normal breath sounds and air entry. No stridor, decreased air movement or transmitted upper airway sounds. No decreased breath sounds, wheezing, rhonchi or rales.   Skin:     General: Skin is warm.   Neurological:      Mental Status: He is alert and oriented to person, place, and time.   Psychiatric:         Mood and Affect: Mood normal.         Behavior: Behavior normal.         WORKUP: ACT 22     ASSESSMENT/PLAN:    Moderate persistent asthma, uncomplicated  Well-controlled with Symbicort 160/4.5 mcg 2 puffs twice daily and albuterol on as-needed basis.  -Continue as is.  We agreed to hold on dupilumab.  If he has asthma gets worse, we can either restart it or consider a different biologic.  It is still unclear whether his neuropathy is directly related to the pillow map.  Reported reports of neuropathy with Dupixent in the patient started within for 6 months of treatment.  Guillermo started pillow map in 2020.    - budesonide-formoterol (SYMBICORT) 160-4.5 MCG/ACT Inhaler  Dispense: 10.2 g; Refill: 3    Other chronic sinusitis  Nasal polyposis  Seasonal allergic rhinitis due to pollen  Allergic rhinitis due to animal dander  Allergic rhinitis due to dust mite  Currently well controlled with Nasacort 2 sprays in each nostril once daily.  -Continue as is.    Neuropathy  Considering persistent symptoms, referred to Neurology.    - Adult Neurology  Referral       Return in about 3 months (around 6/16/2022), or if symptoms worsen or fail to improve.    Thank you for allowing us to participate in the care of this patient. Please feel free to contact us if there are any questions or concerns about the patient.    Disclaimer: This note consists of symbols derived from  keyboarding, dictation and/or voice recognition software. As a result, there may be errors in the script that have gone undetected. Please consider this when interpreting information found in this chart.    Mohinder Aragon MD, FAAAAI, FACAAI  Allergy, Asthma and Immunology     [facility]       Again, thank you for allowing me to participate in the care of your patient.        Sincerely,        Mohinder Aragon MD

## 2022-03-16 NOTE — PATIENT INSTRUCTIONS
Continue current medication therapy.   DO NOT RESTART DUPIXENT YET.       See Neurology!      Allergy Staff Appt Hours Shot Hours Locations    Physician     Mohinder Aragon MD       Support Staff     YUE Machado CMA  Tuesday:   [place] :  [place]: :         [place]:  [place]        Tuesday: : : : : [facility] [place], [place]       Tues & Wed:  & Thurs:        Fri:          [facility]  [address] [place] [address]  Appt Line: [phone]    [facility]  [address] [place] [address]  Appt Line: [phone]    Pulmonary Function Scheduling:  [place]: [phone]  [place]: [phone]  [phone]     Prescription Assistance    If you need assistance with your prescriptions (cost, coverage, etc) please contact: [facility] [phone]    Important Scheduling Information    Appointments for skin testing: Appointment will last approximately 45 minutes.  Please call the appointment line for your clinic to schedule.  Discontinue oral antihistamines 7 days prior to the appointment.  Discontinue nasal and ocular antihistamines 4 days prior to appointment.    Appointments for challenges (oral food challenge, penicillin testing, aspirin desensitization) If your provider instructs you to that this additional testing is indicated, it will need to be scheduled directly through the allergy department.  Please contact them via V[facility] or by calling the clinic and asking to speak with the allergy team.  They will provide additional information and instructions for the appointment.  Discontinue oral antihistamines 7 days prior to the appointment.  Discontinue nasal and ocular antihistamines 4 days prior to the appointment.    Thank you for trusting us with  your care. Please feel free to contact us with any questions or concerns you may have.

## 2022-03-16 NOTE — PROGRESS NOTES
SUBJECTIVE:                                                               Guillermo Hogue presents today to our Allergy Clinic at [facility] for a follow up visit. He is an 81 year old male with asthma, chronic sinusitis with nasal polyposis, and allergic rhinitis.  History of chronic chest symptoms for multiple years. Diagnosed with asthma at [facility] in 2010.   Allergic rhinoconjunctivitis: Perennial with spring and summer nasal and ocular symptoms. SPT for aeroallergens performed in August 2019 showed sensitivity to cat, dog, dust mites, tree pollen, and weed pollen. History of nasal polyps.    Follows with Dr. Valadez of ENT.   Currently on Dupixent 300 mg every other week. The reason for Dupixent was not nasal polyposis, but not well controlled asthma. Developed persistent tingling sensation in both his legs approximately 3 weeks ago. The right leg seemed to be affected more than the left. Stopped Dupixent due to that.    Regarding his asthma, he uses Symbicort 160/4.5 mcg 2 puffs twice daily.  The patient denies current chest tightness, cough, wheeze, or shortness of breath.  Guillermo is using albuterol HFA  less than twice per week for rescue from chest symptoms. He is waking up less than twice per month due to chest symptoms.  There has been no use of oral steroids since last visit. .     The patient denies persistent clear rhinorrhea, nasal itching, stuffiness, sneezing, or interval sinusitis symptoms (fever, facial pain, or purulent rhinorrhea). He feels the polyps are well controlled. Uses Nasacort 2 sprays in each nostril once daily. He has no issues with smell or taste. Tingling sensation in both lower extremities continues.       Patient Active Problem List   Diagnosis     Reactive airway disease     Chronic sinusitis, unspecified location     AR (allergic rhinitis)     HYPERLIPIDEMIA LDL GOAL <130     BPH (benign prostatic hyperplasia)     Palpitations     Hypertension goal BP  (blood pressure) < 140/90     Fatty liver     Cataracts, bilateral     Moderate persistent asthma, uncomplicated     Seasonal allergic rhinitis due to pollen     Allergic rhinitis due to animal dander     Allergic rhinitis due to dust mite     Paroxysmal atrial fibrillation (H)     Dizziness     Nasal polyposis     Benign paroxysmal positional vertigo of left ear       Past Medical History:   Diagnosis Date     Allergic rhinitis age 22     Asthma 2009     Chronic osteoarthritis      Nasal polyposis      Osteoarthritis of left hip      Reactive airway disease 2009      Problem (# of Occurrences) Relation (Name,Age of Onset)    Arthritis (1) Father    Breast Cancer (1) Mother    C.A.D. (3) Father, Brother, Brother    Cancer (1) Maternal Grandfather    Cerebrovascular Disease (2) Father, Maternal Grandmother    Hypertension (1) Maternal Grandmother    Prostate Cancer (1) Brother    Respiratory (1) Father: TB history    Rheumatoid Arthritis (1) Father        Past Surgical History:   Procedure Laterality Date     COLONOSCOPY       COLONOSCOPY N/A 5/10/2017    Procedure: COMBINED COLONOSCOPY, SINGLE OR MULTIPLE BIOPSY/POLYPECTOMY BY BIOPSY;;  Surgeon: Abisai Duarte DO;  Location: [facility] OR     COLONOSCOPY WITH CO2 INSUFFLATION N/A 2015    Procedure: COLONOSCOPY WITH CO2 INSUFFLATION;  Surgeon: Jose R Richmond MD;  Location: [facility] OR     COLONOSCOPY WITH CO2 INSUFFLATION N/A 5/10/2017    Procedure: COLONOSCOPY WITH CO2 INSUFFLATION;  COLON SCREEN/ UNDERWOOD;  Surgeon: Abisai Duarte DO;  Location: [facility] OR     JOINT REPLACEMENT Left 2019    hip     SINUS SURGERY  2009     TONSILLECTOMY  age 22     Social History     Socioeconomic History     Marital status:      Spouse name: Sanam palafox      Number of children: 2     Years of education: 14     Highest education level: None   Occupational History     Occupation: Middle Management     Employer: RETIRED     Comment: 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 Yes     Comment: 2 Brothers and father      Service No     Blood Transfusions No     Caffeine Concern No     Occupational Exposure Yes     Comment: he was in [employer]     Hobby Hazards No     Sleep Concern No     Stress Concern No     Weight Concern Yes     Special Diet No     Back Care No     Exercise No     Bike Helmet No     Seat Belt Yes     Self-Exams No   Social History Narrative    ENVIRONMENTAL HISTORY: The family lives in a [facility] home [place]. The home is heated with a forced air. They do have central air conditioning. The patient's bedroom is furnished with carpeting in bedroom.  Pets inside the house include 0 animals. There is no history of cockroach or mice infestation. There is/are 0 smokers in the house.  The house does not have a damp basement.      Social Determinants of Health     Financial Resource Strain: Not on file   Food Insecurity: Not on file   Transportation Needs: Not on file   Physical Activity: Not on file   Stress: Not on file   Social Connections: Not on file   Intimate Partner Violence: Not on file   Housing Stability: Not on file           Review of Systems   Constitutional: Negative for activity change, fatigue and fever.   HENT: Negative for congestion, ear pain, facial swelling, nosebleeds, postnasal drip, rhinorrhea, sinus pressure and sneezing.    Eyes: Negative for discharge, redness and itching.   Respiratory: Negative for cough, chest tightness, shortness of breath, wheezing and stridor.    Gastrointestinal: Negative for abdominal pain, diarrhea, nausea and vomiting.   Musculoskeletal: Negative for myalgias.   Skin: Negative for rash.   Allergic/Immunologic: Negative for  environmental allergies.   Neurological: Negative for headaches.   Hematological: Negative for adenopathy.   Psychiatric/Behavioral: Negative for behavioral problems and self-injury.           Current Outpatient Medications:      acetaminophen (TYLENOL) 500 MG tablet, Take 1,000 mg by mouth 3 times daily, Disp: , Rfl:      albuterol (PROAIR HFA/PROVENTIL HFA/VENTOLIN HFA) 108 (90 Base) MCG/ACT inhaler, Inhale 2 puffs into the lungs every 4 hours as needed for shortness of breath / dyspnea or wheezing Use with spacer, Disp: 18 g, Rfl: 3     atorvastatin (LIPITOR) 20 MG tablet, TAKE 1/2 TABLET BY MOUTH ONCE DAILY, Disp: 45 tablet, Rfl: 2     budesonide-formoterol (SYMBICORT) 160-4.5 MCG/ACT Inhaler, Inhale 2 puffs into the lungs 2 times daily, Disp: 10.2 g, Rfl: 3     diltiazem (CARTIA XT) 120 MG 24 hr capsule, Take 1 capsule (120 mg) by mouth daily For blood pressure/lowers pulse. Pharmacy ok to hold prescription until due, Disp: 90 capsule, Rfl: 3     meclizine (ANTIVERT) 25 MG tablet, Take 1 tablet (25 mg) by mouth 3 times daily as needed for dizziness, Disp: 30 tablet, Rfl: 0     Spacer/Aero-Holding Chambers (SPACER/AERO-HOLD CHAMBER MASK) RAMAN, 1 Adult size spacer to use with MDI inhalers., Disp: 1 each, Rfl: 0     triamcinolone (NASACORT) 55 MCG/ACT nasal aerosol, Spray 2 sprays into both nostrils daily, Disp: , Rfl:      Dupilumab (DUPIXENT) 300 MG/2ML syringe, Inject 2 mLs (300 mg) Subcutaneous every 14 days (Patient not taking: Reported on 3/16/2022), Disp: 4 mL, Rfl: 6  Immunization History   Administered Date(s) Administered     COVID-19,JV,Pfizer (12+ Yrs) 02/04/2021, 02/25/2021, 10/04/2021     Influenza (H1N1) 12/22/2009     Influenza (High Dose) 3 valent vaccine 10/27/2010, 10/22/2011, 09/30/2014, 09/08/2015, 09/29/2016, 10/04/2017, 09/13/2018, 09/09/2019     Influenza (IIV3) PF 10/19/2002, 10/13/2003, 10/16/2004, 10/20/2005, 09/18/2009, 10/13/2012     Influenza Vaccine IM > 6 months Valent IIV4  "(Alfuria,Fluzone) 10/17/2013     Influenza, Quad, High Dose, Pf, 65yr+ (Fluzone HD) 09/14/2020, 09/23/2021     Mantoux Tuberculin Skin Test 10/09/2009     Pneumo Conj 13-V (2010&after) 09/08/2015     Pneumococcal 23 valent 10/20/2006     TD (ADULT, 7+) 10/27/2010     TDAP Vaccine (Adacel) 08/14/2012     Zoster vaccine recombinant adjuvanted (SHINGRIX) 09/13/2018, 01/03/2019     Zoster vaccine, live 11/12/2014     Allergies   Allergen Reactions     Hytrin [Terazosin Hcl]      Leg swelling and vertigo     Simvastatin Other (See Comments)     Body aches     Cats      Codeine Nausea     Dogs      Dupixent [Dupilumab] Other (See Comments)     Tingling and numbness in lower extremities     Dust Mites      [place]rees      Seasonal Allergies      OBJECTIVE:                                                                 /74   Pulse 61   Temp 97.4  F (36.3  C) (Temporal)   Ht 1.753 m (5' 9\")   Wt 90.7 kg (200 lb)   SpO2 96%   BMI 29.53 kg/m          Physical Exam  Vitals and nursing note reviewed.   Constitutional:       General: He is not in acute distress.     Appearance: He is not diaphoretic.   HENT:      Head: Normocephalic and atraumatic.      Right Ear: Tympanic membrane, ear canal and external ear normal.      Left Ear: Tympanic membrane, ear canal and external ear normal.      Nose: No mucosal edema or rhinorrhea.      Right Turbinates: Not enlarged, swollen or pale.      Left Turbinates: Not enlarged, swollen or pale.      Mouth/Throat:      Lips: Pink.      Mouth: Mucous membranes are moist.      Pharynx: Oropharynx is clear. No pharyngeal swelling, oropharyngeal exudate or posterior oropharyngeal erythema.   Eyes:      General:         Right eye: No discharge.         Left eye: No discharge.      Conjunctiva/sclera: Conjunctivae normal.   Cardiovascular:      Rate and Rhythm: Normal rate and regular rhythm.      Heart sounds: Murmur heard.   Pulmonary:      Effort: No respiratory distress.      Breath " sounds: Normal breath sounds and air entry. No stridor, decreased air movement or transmitted upper airway sounds. No decreased breath sounds, wheezing, rhonchi or rales.   Skin:     General: Skin is warm.   Neurological:      Mental Status: He is alert and oriented to person, place, and time.   Psychiatric:         Mood and Affect: Mood normal.         Behavior: Behavior normal.         WORKUP: ACT 22     ASSESSMENT/PLAN:    Moderate persistent asthma, uncomplicated  Well-controlled with Symbicort 160/4.5 mcg 2 puffs twice daily and albuterol on as-needed basis.  -Continue as is.  We agreed to hold on dupilumab.  If he has asthma gets worse, we can either restart it or consider a different biologic.  It is still unclear whether his neuropathy is directly related to the pillow map.  Reported reports of neuropathy with Dupixent in the patient started within for 6 months of treatment.  Guillermo started dupilumab in 2020.    - budesonide-formoterol (SYMBICORT) 160-4.5 MCG/ACT Inhaler  Dispense: 10.2 g; Refill: 3    Other chronic sinusitis  Nasal polyposis  Seasonal allergic rhinitis due to pollen  Allergic rhinitis due to animal dander  Allergic rhinitis due to dust mite  Currently well controlled with Nasacort 2 sprays in each nostril once daily.  -Continue as is.    Neuropathy  Considering persistent symptoms, referred to Neurology.    - Adult Neurology  Referral       Return in about 3 months (around 6/16/2022), or if symptoms worsen or fail to improve.    Thank you for allowing us to participate in the care of this patient. Please feel free to contact us if there are any questions or concerns about the patient.    Disclaimer: This note consists of symbols derived from keyboarding, dictation and/or voice recognition software. As a result, there may be errors in the script that have gone undetected. Please consider this when interpreting information found in this chart.    Mohinder Aragon MD, FAAAAI,  RIRI  Allergy, Asthma and Immunology     [facility]

## 2022-03-23 DIAGNOSIS — E78.5 HYPERLIPIDEMIA LDL GOAL <130: ICD-10-CM

## 2022-03-23 RX ORDER — ATORVASTATIN CALCIUM 20 MG/1
TABLET, FILM COATED ORAL
Qty: 45 TABLET | Refills: 3 | Status: SHIPPED | OUTPATIENT
Start: 2022-03-23 | End: 2023-01-23

## 2022-03-23 NOTE — TELEPHONE ENCOUNTER
Routing refill request to provider for review/approval because:  Labs not current:  Last LDL was on 3/24/21     Cherri PHELPSN, RN

## 2022-04-20 NOTE — PROGRESS NOTES
"History of Present Illness - Guillermo Hogue is a 81 year old male status post functional endoscopic sinus surgery on 2/12/2009, last seen on 9/10/2020    To review, at the visit on 11/11/2013 he told me that since the functional endoscopic sinus surgery his nose had been \"excellent.\"   However, we have continued to manage a chronic post nasal drainage.    To review, he was on BPM as an antihistamine, but because it was no longer available, I placed him on low dose atarax (hydroxyzine) to dry up his allergic rhinitis related post nasal drainage.  He told me that \"cornered the market on the BPM in [place]\" and he had run out of it.  I tried him on atarax but he tells me that it is not helping nearly as much.  He is also taking the BID mucinex, but he thinks that is making only a minimal difference.  He had been taking sleeping pills to get him to sleep.    So with those failures of therapy, I tried a new approach, and used Gent Itraconazole Dex nasal irrigations with the thought that perhaps this was inflammatory post nasal drainage.  On 12/12/12, and the rinses worked dramatically well for him.  He did have complaints of sleep cycle issues, which I then addressed with decreasing the the Dex component.  But then at the follow up on 2/6/2013 there was a return of the drainage and congestion, so I resumed the full strength dex component for two months.  At the visit on 8/5/2014, I encouraged him to try and decrease the frequency of the medicated rinses, and continue saline irrigation.  And at the most recent visit on 10/5/2015, things were going great, and I had not seen him since 4/4/2017.    At the 2018 visit he also had a new issue.  He told me that intermittently over the years he has had issues with occasional dizziness, and PT for benign paroxysmal positional vertigo has always helped in the past.  But this time it has not seemed help.  I felt that this bidirectional rotationally triggered momentary vertigo was " posterior canal benign paroxysmal positional vertigo, and referred him for Semont maneuvers.  That problem has improved.    The main reason for his March 2019 visit was because he had been having some pain in the RIGHT upper chest when he lays down.  He was seen by his PCP Dr. Doty, and there was some noise in the chest, and was treated with Amoxicillin and predniosne, and a second round.  He wanted to rule out sinus post nasal drainage as the cause of this pain in the chest. There had been no productive cough or dyspnea otherwise. I ordered a chest CT, which was thankfully clear.    The reason he has come back to me is due to a return of acute sinus symptoms with pressure in the face and pain in the upper teeth.  He is currently using only Nasacort from Dr. Del Rio.  That has really been helping his post nasal drainage and his chronic sore throats as well. He has not been on medication irrigations for years.    Since he saw me in September 2020, his nose and breathing have been fine and stable.  But he has been on Dupixent, and that has caused him some leg cramps.  He is being managed by Dr Aragon, who has taken him off dupixent for 12 weeks.  His nose and sinuses have stayed clear.    Past Medical History -   Patient Active Problem List   Diagnosis     Reactive airway disease     Chronic sinusitis, unspecified location     AR (allergic rhinitis)     HYPERLIPIDEMIA LDL GOAL <130     BPH (benign prostatic hyperplasia)     Palpitations     Hypertension goal BP (blood pressure) < 140/90     Fatty liver     Cataracts, bilateral     Moderate persistent asthma, uncomplicated     Seasonal allergic rhinitis due to pollen     Allergic rhinitis due to animal dander     Allergic rhinitis due to dust mite     Paroxysmal atrial fibrillation (H)     Dizziness     Nasal polyposis     Benign paroxysmal positional vertigo of left ear       Current Medications -   Current Outpatient Medications:      acetaminophen (TYLENOL) 500 MG  tablet, Take 1,000 mg by mouth 3 times daily, Disp: , Rfl:      albuterol (PROAIR HFA/PROVENTIL HFA/VENTOLIN HFA) 108 (90 Base) MCG/ACT inhaler, Inhale 2 puffs into the lungs every 4 hours as needed for shortness of breath / dyspnea or wheezing Use with spacer, Disp: 18 g, Rfl: 3     atorvastatin (LIPITOR) 20 MG tablet, TAKE 1/2 TABLET BY MOUTH ONCE DAILY, Disp: 45 tablet, Rfl: 3     budesonide-formoterol (SYMBICORT) 160-4.5 MCG/ACT Inhaler, Inhale 2 puffs into the lungs 2 times daily, Disp: 10.2 g, Rfl: 3     diltiazem (CARTIA XT) 120 MG 24 hr capsule, Take 1 capsule (120 mg) by mouth daily For blood pressure/lowers pulse. Pharmacy ok to hold prescription until due, Disp: 90 capsule, Rfl: 3     Dupilumab (DUPIXENT) 300 MG/2ML syringe, Inject 2 mLs (300 mg) Subcutaneous every 14 days (Patient not taking: Reported on 3/16/2022), Disp: 4 mL, Rfl: 6     meclizine (ANTIVERT) 25 MG tablet, Take 1 tablet (25 mg) by mouth 3 times daily as needed for dizziness, Disp: 30 tablet, Rfl: 0     Spacer/Aero-Holding Chambers (SPACER/AERO-HOLD CHAMBER MASK) RAMAN, 1 Adult size spacer to use with MDI inhalers., Disp: 1 each, Rfl: 0     triamcinolone (NASACORT) 55 MCG/ACT nasal aerosol, Spray 2 sprays into both nostrils daily, Disp: , Rfl:     Allergies -   Allergies   Allergen Reactions     Hytrin [Terazosin Hcl]      Leg swelling and vertigo     Simvastatin Other (See Comments)     Body aches     Cats      Codeine Nausea     Dogs      Dupixent [Dupilumab] Other (See Comments)     Tingling and numbness in lower extremities     Dust Mites      [place]rees      Seasonal Allergies        Social History -   Social History     Social History     Marital status:      Spouse name: Sanam -       Number of children: 2     Years of education: 14     Occupational History     Middle Management Retired          Social History Main Topics     Smoking status: Never Smoker     Smokeless tobacco: Never Used     Alcohol use No      Drug use: No     Sexual activity: No     Other Topics Concern     Parent/Sibling W/ Cabg, Mi Or Angioplasty Before 65f 55m? Yes     2 Brothers and father      Service No     Blood Transfusions No     Caffeine Concern No     Occupational Exposure Yes     he was in [employer]     Hobby Hazards No     Sleep Concern No     Stress Concern No     Weight Concern Yes     Special Diet No     Back Care No     Exercise No     Bike Helmet No     Seat Belt Yes     Self-Exams No     Social History Narrative       Family History -   Family History   Problem Relation Age of Onset     Breast Cancer Mother      Arthritis Father      C.A.D. Father      Cerebrovascular Disease Father      Respiratory Father         TB history     Rheumatoid Arthritis Father      Cerebrovascular Disease Maternal Grandmother      Hypertension Maternal Grandmother      Cancer Maternal Grandfather      C.A.D. Brother      Prostate Cancer Brother      C.A.D. Brother        Review of Systems - As per HPI and PMHx, otherwise 10+ system review of the head and neck, and general constitution is negative.    Physical Exam    /66 (BP Location: Right arm, Patient Position: Sitting, Cuff Size: Adult Large)   Pulse 66   Wt 90.7 kg (200 lb)   SpO2 98%   BMI 29.53 kg/m      General - The patient is well nourished and well developed, and appears to have good nutritional status.  Alert and oriented to person and place, answers questions and cooperates with examination appropriately.   Head and Face - Normocephalic and atraumatic, with no gross asymmetry noted of the contour of the facial features.  The facial nerve is intact, with strong symmetric movements.  Voice and Breathing - The patient was breathing comfortably without the use of accessory muscles. There was no wheezing, stridor, or stertor.  The patients voice was clear and strong, and had appropriate pitch and quality.  Ears - The tympanic membranes are normal in appearance, bony landmarks are  intact.  No retraction, perforation, or masses.  No fluid or purulence was seen in the external canal or the middle ear. No evidence of infection of the middle ear or external canal, cerumen was normal in appearance.  Eyes - Extraocular movements intact, and the pupils were reactive to light.  Sclera were not icteric or injected, conjunctiva were pink and moist.  Mouth - Examination of the oral cavity showed pink, healthy oral mucosa. No lesions or ulcerations noted.  The tongue was mobile and midline, and the dentition were in good condition.    Throat - The walls of the oropharynx were smooth, pink, moist, symmetric, and had no lesions or ulcerations.  The tonsillar pillars and soft palate were symmetric.  The uvula was midline on elevation.    Neck - Normal midline excursion of the laryngotracheal complex during swallowing.  Full range of motion on passive movement.  Palpation of the occipital, submental, submandibular, internal jugular chain, and supraclavicular nodes did not demonstrate any abnormal lymph nodes or masses.  The carotid pulse was palpable bilaterally.  Palpation of the thyroid was soft and smooth, with no nodules or goiter appreciated.  The trachea was mobile and midline.  Nose - The nose was examined with nasal speculum. On the LEFT, I was able to look up into the middle meatus all the way to the roof of the ethmoid.  The mucosa is overall more edematous than expected with some thick cloudy secretions bilaterally.      A/P - Guillermo Hogue is a 81 year old male  (J32.4) Chronic pansinusitis  (primary encounter diagnosis)  (J33.9) Nasal polyposis    Overall Guillermo has done well over the years, and only using Nasacort.  He has had to stop Dupixent for three months and his nose has stayed great.    The question is whether or not to stay off of it.  I think it would be reasonable to stop it, but he is currently fully covered for it, so it would be a challenge if we ever had to restart it.    I  support stopping the medication for now.

## 2022-04-25 ENCOUNTER — OFFICE VISIT (OUTPATIENT)
Dept: OTOLARYNGOLOGY | Facility: CLINIC | Age: 82
End: 2022-04-25
Payer: COMMERCIAL

## 2022-04-25 VITALS
HEART RATE: 66 BPM | BODY MASS INDEX: 29.53 KG/M2 | WEIGHT: 200 LBS | SYSTOLIC BLOOD PRESSURE: 130 MMHG | DIASTOLIC BLOOD PRESSURE: 66 MMHG | OXYGEN SATURATION: 98 %

## 2022-04-25 DIAGNOSIS — J32.4 CHRONIC PANSINUSITIS: ICD-10-CM

## 2022-04-25 DIAGNOSIS — J33.9 NASAL POLYPOSIS: Primary | ICD-10-CM

## 2022-04-25 PROCEDURE — 99213 OFFICE O/P EST LOW 20 MIN: CPT | Performed by: OTOLARYNGOLOGY

## 2022-04-25 NOTE — LETTER
"    4/25/2022         RE: Guillermo Hogue  [address] [facility]  [address]  [facility] [address]        Dear Colleague,    Thank you for referring your patient, Guillermo Hogue, to the [facility]. Please see a copy of my visit note below.    History of Present Illness - Guillermo Hogue is a 81 year old male status post functional endoscopic sinus surgery on 2/12/2009, last seen on 9/10/2020    To review, at the visit on 11/11/2013 he told me that since the functional endoscopic sinus surgery his nose had been \"excellent.\"   However, we have continued to manage a chronic post nasal drainage.    To review, he was on BPM as an antihistamine, but because it was no longer available, I placed him on low dose atarax (hydroxyzine) to dry up his allergic rhinitis related post nasal drainage.  He told me that \"cornered the market on the BPM in [place]\" and he had run out of it.  I tried him on atarax but he tells me that it is not helping nearly as much.  He is also taking the BID mucinex, but he thinks that is making only a minimal difference.  He had been taking sleeping pills to get him to sleep.    So with those failures of therapy, I tried a new approach, and used Gent Itraconazole Dex nasal irrigations with the thought that perhaps this was inflammatory post nasal drainage.  On 12/12/12, and the rinses worked dramatically well for him.  He did have complaints of sleep cycle issues, which I then addressed with decreasing the the Dex component.  But then at the follow up on 2/6/2013 there was a return of the drainage and congestion, so I resumed the full strength dex component for two months.  At the visit on 8/5/2014, I encouraged him to try and decrease the frequency of the medicated rinses, and continue saline irrigation.  And at the most recent visit on 10/5/2015, things were going great, and I had not seen him since 4/4/2017.    At the 2018 visit he also had a new issue.  He told me that " intermittently over the years he has had issues with occasional dizziness, and PT for benign paroxysmal positional vertigo has always helped in the past.  But this time it has not seemed help.  I felt that this bidirectional rotationally triggered momentary vertigo was posterior canal benign paroxysmal positional vertigo, and referred him for Semont maneuvers.  That problem has improved.    The main reason for his March 2019 visit was because he had been having some pain in the RIGHT upper chest when he lays down.  He was seen by his PCP Dr. Doty, and there was some noise in the chest, and was treated with Amoxicillin and predniosne, and a second round.  He wanted to rule out sinus post nasal drainage as the cause of this pain in the chest. There had been no productive cough or dyspnea otherwise. I ordered a chest CT, which was thankfully clear.    The reason he has come back to me is due to a return of acute sinus symptoms with pressure in the face and pain in the upper teeth.  He is currently using only Nasacort from Dr. Del Rio.  That has really been helping his post nasal drainage and his chronic sore throats as well. He has not been on medication irrigations for years.    Since he saw me in September 2020, his nose and breathing have been fine and stable.  But he has been on Dupixent, and that has caused him some leg cramps.  He is being managed by Dr Aragon, who has taken him off dupixent for 12 weeks.  His nose and sinuses have stayed clear.    Past Medical History -   Patient Active Problem List   Diagnosis     Reactive airway disease     Chronic sinusitis, unspecified location     AR (allergic rhinitis)     HYPERLIPIDEMIA LDL GOAL <130     BPH (benign prostatic hyperplasia)     Palpitations     Hypertension goal BP (blood pressure) < 140/90     Fatty liver     Cataracts, bilateral     Moderate persistent asthma, uncomplicated     Seasonal allergic rhinitis due to pollen     Allergic rhinitis due to animal  dander     Allergic rhinitis due to dust mite     Paroxysmal atrial fibrillation (H)     Dizziness     Nasal polyposis     Benign paroxysmal positional vertigo of left ear       Current Medications -   Current Outpatient Medications:      acetaminophen (TYLENOL) 500 MG tablet, Take 1,000 mg by mouth 3 times daily, Disp: , Rfl:      albuterol (PROAIR HFA/PROVENTIL HFA/VENTOLIN HFA) 108 (90 Base) MCG/ACT inhaler, Inhale 2 puffs into the lungs every 4 hours as needed for shortness of breath / dyspnea or wheezing Use with spacer, Disp: 18 g, Rfl: 3     atorvastatin (LIPITOR) 20 MG tablet, TAKE 1/2 TABLET BY MOUTH ONCE DAILY, Disp: 45 tablet, Rfl: 3     budesonide-formoterol (SYMBICORT) 160-4.5 MCG/ACT Inhaler, Inhale 2 puffs into the lungs 2 times daily, Disp: 10.2 g, Rfl: 3     diltiazem (CARTIA XT) 120 MG 24 hr capsule, Take 1 capsule (120 mg) by mouth daily For blood pressure/lowers pulse. Pharmacy ok to hold prescription until due, Disp: 90 capsule, Rfl: 3     Dupilumab (DUPIXENT) 300 MG/2ML syringe, Inject 2 mLs (300 mg) Subcutaneous every 14 days (Patient not taking: Reported on 3/16/2022), Disp: 4 mL, Rfl: 6     meclizine (ANTIVERT) 25 MG tablet, Take 1 tablet (25 mg) by mouth 3 times daily as needed for dizziness, Disp: 30 tablet, Rfl: 0     Spacer/Aero-Holding Chambers (SPACER/AERO-HOLD CHAMBER MASK) RAMAN, 1 Adult size spacer to use with MDI inhalers., Disp: 1 each, Rfl: 0     triamcinolone (NASACORT) 55 MCG/ACT nasal aerosol, Spray 2 sprays into both nostrils daily, Disp: , Rfl:     Allergies -   Allergies   Allergen Reactions     Hytrin [Terazosin Hcl]      Leg swelling and vertigo     Simvastatin Other (See Comments)     Body aches     Cats      Codeine Nausea     Dogs      Dupixent [Dupilumab] Other (See Comments)     Tingling and numbness in lower extremities     Dust Mites      [place]      Seasonal Allergies        Social History -   Social History     Social History     Marital status:       Spouse name: Sanam -       Number of children: 2     Years of education: 14     Occupational History     [employer] Management Retired          Social History Main Topics     Smoking status: Never Smoker     Smokeless tobacco: Never Used     Alcohol use No     Drug use: No     Sexual activity: No     Other Topics Concern     Parent/Sibling W/ Cabg, Mi Or Angioplasty Before 65f 55m? Yes     2 Brothers and father      Service No     Blood Transfusions No     Caffeine Concern No     Occupational Exposure Yes     he was in [employer]     Hobby Hazards No     Sleep Concern No     Stress Concern No     Weight Concern Yes     Special Diet No     Back Care No     Exercise No     Bike Helmet No     Seat Belt Yes     Self-Exams No     Social History Narrative       Family History -   Family History   Problem Relation Age of Onset     Breast Cancer Mother      Arthritis Father      C.A.D. Father      Cerebrovascular Disease Father      Respiratory Father         TB history     Rheumatoid Arthritis Father      Cerebrovascular Disease Maternal Grandmother      Hypertension Maternal Grandmother      Cancer Maternal Grandfather      C.A.D. Brother      Prostate Cancer Brother      C.A.D. Brother        Review of Systems - As per HPI and PMHx, otherwise 10+ system review of the head and neck, and general constitution is negative.    Physical Exam    /66 (BP Location: Right arm, Patient Position: Sitting, Cuff Size: Adult Large)   Pulse 66   Wt 90.7 kg (200 lb)   SpO2 98%   BMI 29.53 kg/m      General - The patient is well nourished and well developed, and appears to have good nutritional status.  Alert and oriented to person and place, answers questions and cooperates with examination appropriately.   Head and Face - Normocephalic and atraumatic, with no gross asymmetry noted of the contour of the facial features.  The facial nerve is intact, with strong symmetric movements.  Voice and Breathing - The  patient was breathing comfortably without the use of accessory muscles. There was no wheezing, stridor, or stertor.  The patients voice was clear and strong, and had appropriate pitch and quality.  Ears - The tympanic membranes are normal in appearance, bony landmarks are intact.  No retraction, perforation, or masses.  No fluid or purulence was seen in the external canal or the middle ear. No evidence of infection of the middle ear or external canal, cerumen was normal in appearance.  Eyes - Extraocular movements intact, and the pupils were reactive to light.  Sclera were not icteric or injected, conjunctiva were pink and moist.  Mouth - Examination of the oral cavity showed pink, healthy oral mucosa. No lesions or ulcerations noted.  The tongue was mobile and midline, and the dentition were in good condition.    Throat - The walls of the oropharynx were smooth, pink, moist, symmetric, and had no lesions or ulcerations.  The tonsillar pillars and soft palate were symmetric.  The uvula was midline on elevation.    Neck - Normal midline excursion of the laryngotracheal complex during swallowing.  Full range of motion on passive movement.  Palpation of the occipital, submental, submandibular, internal jugular chain, and supraclavicular nodes did not demonstrate any abnormal lymph nodes or masses.  The carotid pulse was palpable bilaterally.  Palpation of the thyroid was soft and smooth, with no nodules or goiter appreciated.  The trachea was mobile and midline.  Nose - The nose was examined with nasal speculum. On the LEFT, I was able to look up into the middle meatus all the way to the roof of the ethmoid.  The mucosa is overall more edematous than expected with some thick cloudy secretions bilaterally.      A/P - Guillermo Hogue is a 81 year old male  (J32.4) Chronic pansinusitis  (primary encounter diagnosis)  (J33.9) Nasal polyposis    Overall Guillermo has done well over the years, and only using Nasacort.  He has  had to stop Dupixent for three months and his nose has stayed great.    The question is whether or not to stay off of it.  I think it would be reasonable to stop it, but he is currently fully covered for it, so it would be a challenge if we ever had to restart it.    I support stopping the medication for now.      Again, thank you for allowing me to participate in the care of your patient.        Sincerely,        Alonso Valadez MD

## 2022-04-25 NOTE — NURSING NOTE
"Chief Complaint   Patient presents with     RECHECK     Doing better       Vitals:    04/25/22 1056   BP: 130/66   BP Location: Right arm   Patient Position: Sitting   Cuff Size: Adult Large   Pulse: 66   SpO2: 98%   Weight: 90.7 kg (200 lb)     Wt Readings from Last 1 Encounters:   04/25/22 90.7 kg (200 lb)     Ht Readings from Last 1 Encounters:   03/16/22 1.753 m (5' 9\")       Maikel Archer [facility], 4/25/2022 10:58 AM    "

## 2022-05-08 ENCOUNTER — HEALTH MAINTENANCE LETTER (OUTPATIENT)
Age: 82
End: 2022-05-08

## 2022-05-24 ENCOUNTER — OFFICE VISIT (OUTPATIENT)
Dept: FAMILY MEDICINE | Facility: CLINIC | Age: 82
End: 2022-05-24
Payer: COMMERCIAL

## 2022-05-24 VITALS
WEIGHT: 205.8 LBS | OXYGEN SATURATION: 97 % | TEMPERATURE: 97.6 F | SYSTOLIC BLOOD PRESSURE: 128 MMHG | HEART RATE: 66 BPM | DIASTOLIC BLOOD PRESSURE: 64 MMHG | BODY MASS INDEX: 30.39 KG/M2

## 2022-05-24 DIAGNOSIS — I48.0 PAROXYSMAL ATRIAL FIBRILLATION (H): ICD-10-CM

## 2022-05-24 DIAGNOSIS — L60.0 INGROWN TOENAIL OF LEFT FOOT: ICD-10-CM

## 2022-05-24 DIAGNOSIS — B35.1 ONYCHOMYCOSIS: ICD-10-CM

## 2022-05-24 DIAGNOSIS — R21 RASH AND NONSPECIFIC SKIN ERUPTION: Primary | ICD-10-CM

## 2022-05-24 PROCEDURE — 99214 OFFICE O/P EST MOD 30 MIN: CPT | Performed by: FAMILY MEDICINE

## 2022-05-24 NOTE — PROGRESS NOTES
Assessment & Plan       ICD-10-CM    1. Rash and nonspecific skin eruption  R21 Adult Dermatology Referral     Orthopedic  Referral   2. Ingrown toenail of left foot  L60.0 Orthopedic  Referral   3. Onychomycosis  B35.1 terbinafine (LAMISIL) 250 MG tablet   4. Paroxysmal atrial fibrillation (H)  I48.0          Review of external notes as documented elsewhere in note      Return in about 2 weeks (around 6/7/2022) for With Specialist.    Destin Gamez MD  [facility] ALEJANDRINA Li is a 82 year old who presents for the following health issues     Chief Complaint   Patient presents with     Nail Problem     Fungus under toe     Derm Problem     Rash left forearm     Right foot, toe fungus  X4 months    HPI     Rash  Onset/Duration: 2 weeks  Description  Location: left forearm  Character: round, red  Itching: no  Intensity:  moderate  Progression of Symptoms:  improving  Accompanying signs and symptoms:   Fever: no  Body aches or joint pain: no  Sore throat symptoms: no  Recent cold symptoms: no  History:           Previous episodes of similar rash: None  New exposures:  None  Recent travel: no  Exposure to similar rash: no  Precipitating or alleviating factors:   Therapies tried and outcome: hydrocortisone cream -  effective      Left arm rash  May 7th  Received anesthetic in left arm on the 2nd, not sure if the cause  Hydrocortisone helps a little  Not incredibly            Review of Systems   Constitutional, HEENT, cardiovascular, pulmonary, gi and gu systems are negative, except as otherwise noted.      Objective    /64   Pulse 66   Temp 97.6  F (36.4  C) (Oral)   Wt 93.4 kg (205 lb 12.8 oz)   SpO2 97%   BMI 30.39 kg/m    Body mass index is 30.39 kg/m .  Physical Exam  Constitutional:       General: He is not in acute distress.     Appearance: Normal appearance. He is well-developed.   HENT:      Head: Normocephalic and atraumatic.      Right Ear:  External ear normal.      Left Ear: External ear normal.   Eyes:      General: No scleral icterus.     Conjunctiva/sclera: Conjunctivae normal.   Pulmonary:      Effort: Pulmonary effort is normal.   Musculoskeletal:      Cervical back: Normal range of motion and neck supple.   Skin:     General: Skin is warm and dry.      Comments: Thick toenail, sharp downward angle at edges.  Erythema left arm   Neurological:      Mental Status: He is alert and oriented to person, place, and time.   Psychiatric:         Mood and Affect: Mood normal.         Behavior: Behavior normal.         Thought Content: Thought content normal.         Judgment: Judgment normal.

## 2022-05-25 ENCOUNTER — TELEPHONE (OUTPATIENT)
Dept: FAMILY MEDICINE | Facility: CLINIC | Age: 82
End: 2022-05-25
Payer: COMMERCIAL

## 2022-05-25 DIAGNOSIS — J45.40 MODERATE PERSISTENT ASTHMA, UNCOMPLICATED: ICD-10-CM

## 2022-05-25 NOTE — TELEPHONE ENCOUNTER
Dr. Schmitt,    Patient called, stated had visit with you yesterday and thought he was suppose to receive and oral medication for his foot fungus but when he called his pharmacy there were no medications pending. I looked through notes but did not find anything regarding medication.     Please advise. I cued pharmacy if appropriate/needed.     [phone], ok for detailed vm.     Thanks,  YUE Miranda  [facility]

## 2022-05-26 RX ORDER — TERBINAFINE HYDROCHLORIDE 250 MG/1
250 TABLET ORAL DAILY
Qty: 90 TABLET | Refills: 0 | Status: SHIPPED | OUTPATIENT
Start: 2022-05-26 | End: 2022-08-24

## 2022-05-27 NOTE — TELEPHONE ENCOUNTER
Patient returned call. Provided information regarding this prescription. Patient verbalized understanding.    HAKEEM Ortiz RN  [facility], [facility]

## 2022-05-27 NOTE — TELEPHONE ENCOUNTER
Attempted to call patient, no answer. Left voicemail and requested patient call back at [phone].     Marina Devine RN   [facility]

## 2022-06-07 ENCOUNTER — OFFICE VISIT (OUTPATIENT)
Dept: ALLERGY | Facility: OTHER | Age: 82
End: 2022-06-07
Payer: COMMERCIAL

## 2022-06-07 VITALS
WEIGHT: 205 LBS | OXYGEN SATURATION: 96 % | HEART RATE: 75 BPM | DIASTOLIC BLOOD PRESSURE: 54 MMHG | HEIGHT: 69 IN | BODY MASS INDEX: 30.36 KG/M2 | SYSTOLIC BLOOD PRESSURE: 112 MMHG

## 2022-06-07 DIAGNOSIS — J30.89 ALLERGIC RHINITIS DUE TO DUST MITE: ICD-10-CM

## 2022-06-07 DIAGNOSIS — J30.1 SEASONAL ALLERGIC RHINITIS DUE TO POLLEN: ICD-10-CM

## 2022-06-07 DIAGNOSIS — J32.8 OTHER CHRONIC SINUSITIS: ICD-10-CM

## 2022-06-07 DIAGNOSIS — J30.81 ALLERGIC RHINITIS DUE TO ANIMAL DANDER: ICD-10-CM

## 2022-06-07 DIAGNOSIS — J33.9 NASAL POLYPOSIS: ICD-10-CM

## 2022-06-07 DIAGNOSIS — J45.40 MODERATE PERSISTENT ASTHMA, UNCOMPLICATED: Primary | ICD-10-CM

## 2022-06-07 PROCEDURE — 99214 OFFICE O/P EST MOD 30 MIN: CPT | Performed by: ALLERGY & IMMUNOLOGY

## 2022-06-07 RX ORDER — DUPILUMAB 300 MG/2ML
300 INJECTION, SOLUTION SUBCUTANEOUS
Qty: 4 ML | Refills: 6 | Status: SHIPPED | OUTPATIENT
Start: 2022-06-07 | End: 2023-02-07

## 2022-06-07 RX ORDER — BUDESONIDE AND FORMOTEROL FUMARATE DIHYDRATE 160; 4.5 UG/1; UG/1
2 AEROSOL RESPIRATORY (INHALATION) 2 TIMES DAILY
Qty: 10.2 G | Refills: 3 | Status: SHIPPED | OUTPATIENT
Start: 2022-06-07 | End: 2023-02-21

## 2022-06-07 ASSESSMENT — ASTHMA QUESTIONNAIRES: ACT_TOTALSCORE: 22

## 2022-06-07 ASSESSMENT — ENCOUNTER SYMPTOMS
STRIDOR: 0
WHEEZING: 0
EYE DISCHARGE: 0
DIARRHEA: 0
CHEST TIGHTNESS: 0
RHINORRHEA: 0
FATIGUE: 0
SHORTNESS OF BREATH: 0
EYE ITCHING: 0
NAUSEA: 0
FEVER: 0
FACIAL SWELLING: 0
SINUS PRESSURE: 0
VOMITING: 0
COUGH: 0
EYE REDNESS: 0
ACTIVITY CHANGE: 0

## 2022-06-07 NOTE — PROGRESS NOTES
SUBJECTIVE:                                                                   Guillermo Hogue presents today to our Allergy Clinic at [facility]; He is being seen for a follow up visit. He is an 82 year old male with asthma, chronic sinusitis with nasal polyposis, and allergic rhinitis.    History of chronic chest symptoms for multiple years. Diagnosed with asthma at [facility] in 2010.   Allergic rhinoconjunctivitis: Perennial with spring and summer nasal and ocular symptoms. SPT for aeroallergens performed in August 2019 showed sensitivity to cat, dog, dust mites, tree pollen, and weed pollen. History of nasal polyps.    Follows with Dr. Valadez of ENT.   Currently on Dupixent 300 mg every other week. The reason for Dupixent was not nasal polyposis, but not well controlled asthma. Developed persistent tingling sensation in both his legs at the end of December 2021-beginning of January 2022.. The right leg seemed to be affected more than the left. Stopped Dupixent due to that.    The patient states that tingling sensation in his legs continue to more than 3 months. Stopping dupilumab did not help. He is saw somebody at the pain clinic. States that he had an MRI of the back done (I do not see the copy of the test) and was told that he had some issues with vertebrae. He received a steroid injection. It helped.  3 weeks ago, he restarted dupilumab on his own. States that neurological symptoms did not get worse. He would like to continue dupilumab further.    He uses Symbicort 160/4.5 mcg 2 puffs twice daily regarding his asthma.  The patient denies current chest tightness, cough, wheeze, or shortness of breath. Guillermo is using albuterol HFA  less than twice per week for rescue from chest symptoms. He is waking up less than twice per month due to chest symptoms. There has been no use of oral steroids since last visit. .      The patient denies persistent clear rhinorrhea, nasal itching, stuffiness,  sneezing, or interval sinusitis symptoms (fever, facial pain, or purulent rhinorrhea). He feels the polyps are well controlled. Uses Nasacort 2 sprays in each nostril once daily. He has no issues with smell or taste.     Patient Active Problem List   Diagnosis     Reactive airway disease     Chronic sinusitis, unspecified location     AR (allergic rhinitis)     HYPERLIPIDEMIA LDL GOAL <130     BPH (benign prostatic hyperplasia)     Palpitations     Hypertension goal BP (blood pressure) < 140/90     Fatty liver     Cataracts, bilateral     Moderate persistent asthma, uncomplicated     Seasonal allergic rhinitis due to pollen     Allergic rhinitis due to animal dander     Allergic rhinitis due to dust mite     Paroxysmal atrial fibrillation (H)     Dizziness     Nasal polyposis     Benign paroxysmal positional vertigo of left ear       Past Medical History:   Diagnosis Date     Allergic rhinitis age 22     Asthma 2009     Chronic osteoarthritis      Nasal polyposis 2009     Osteoarthritis of left hip      Reactive airway disease 2009      Problem (# of Occurrences) Relation (Name,Age of Onset)    Arthritis (1) Father    Breast Cancer (1) Mother    C.A.D. (3) Father, Brother, Brother    Cancer (1) Maternal Grandfather    Cerebrovascular Disease (2) Father, Maternal Grandmother    Hypertension (1) Maternal Grandmother    Prostate Cancer (1) Brother    Respiratory (1) Father: TB history    Rheumatoid Arthritis (1) Father        Past Surgical History:   Procedure Laterality Date     COLONOSCOPY       COLONOSCOPY N/A 5/10/2017    Procedure: COMBINED COLONOSCOPY, SINGLE OR MULTIPLE BIOPSY/POLYPECTOMY BY BIOPSY;;  Surgeon: Abisai Duarte DO;  Location: [facility] OR     COLONOSCOPY WITH CO2 INSUFFLATION N/A 5/14/2015    Procedure: COLONOSCOPY WITH CO2 INSUFFLATION;  Surgeon: Jose R Richmond MD;  Location: [facility] OR     COLONOSCOPY WITH CO2 INSUFFLATION N/A 5/10/2017    Procedure: COLONOSCOPY WITH CO2 INSUFFLATION;  COLON  SCREEN/ UNDERWOOD;  Surgeon: Abisai Duarte DO;  Location: [facility] OR     JOINT REPLACEMENT Left 2019    hip     SINUS SURGERY  2009     TONSILLECTOMY  age 22     Social History     Socioeconomic History     Marital status:      Spouse name: Sanam palafox      Number of children: 2     Years of education: 14     Highest education level: None   Occupational History     Occupation: Middle Management     Employer: RETIRED     Comment:    Tobacco Use     Smoking status: Never Smoker     Smokeless tobacco: Never Used   Vaping Use     Vaping Use: Never used   Substance and Sexual Activity     Alcohol use: No     Drug use: No     Sexual activity: Not Currently   Other Topics Concern     Parent/sibling w/ CABG, MI or angioplasty before 65F 55M? Yes     Comment: 2 Brothers and father      Service No     Blood Transfusions No     Caffeine Concern No     Occupational Exposure Yes     Comment: he was in [employer]     Hobby Hazards No     Sleep Concern No     Stress Concern No     Weight Concern Yes     Special Diet No     Back Care No     Exercise No     Bike Helmet No     Seat Belt Yes     Self-Exams No   Social History Narrative    ENVIRONMENTAL HISTORY: The family lives in a [facility] [place]. The home is heated with a forced air. They do have central air conditioning. The patient's bedroom is furnished with carpeting in bedroom.  Pets inside the house include 0 animals. There is no history of cockroach or mice infestation. There is/are 0 smokers in the house.  The house does not have a damp basement.            Review of Systems   Constitutional: Negative for activity change, fatigue and fever.   HENT: Negative for congestion, ear pain, facial swelling, nosebleeds, postnasal drip, rhinorrhea, sinus pressure and sneezing.    Eyes: Negative for discharge, redness and itching.   Respiratory: Negative for cough, chest tightness, shortness of breath, wheezing and stridor.    Gastrointestinal:  Negative for diarrhea, nausea and vomiting.   Allergic/Immunologic: Positive for environmental allergies.   Psychiatric/Behavioral: Negative for self-injury.           Current Outpatient Medications:      acetaminophen (TYLENOL) 500 MG tablet, Take 1,000 mg by mouth 3 times daily, Disp: , Rfl:      atorvastatin (LIPITOR) 20 MG tablet, TAKE 1/2 TABLET BY MOUTH ONCE DAILY, Disp: 45 tablet, Rfl: 3     diltiazem (CARTIA XT) 120 MG 24 hr capsule, Take 1 capsule (120 mg) by mouth daily For blood pressure/lowers pulse. Pharmacy ok to hold prescription until due, Disp: 90 capsule, Rfl: 3     Dupilumab (DUPIXENT) 300 MG/2ML syringe, Inject 2 mLs (300 mg) Subcutaneous every 14 days, Disp: 4 mL, Rfl: 6     meclizine (ANTIVERT) 25 MG tablet, Take 1 tablet (25 mg) by mouth 3 times daily as needed for dizziness, Disp: 30 tablet, Rfl: 0     Spacer/Aero-Holding Chambers (SPACER/AERO-HOLD CHAMBER MASK) RAMAN, 1 Adult size spacer to use with MDI inhalers., Disp: 1 each, Rfl: 0     SYMBICORT 160-4.5 MCG/ACT Inhaler, Inhale 2 puffs into the lungs 2 times daily, Disp: 10.2 g, Rfl: 3     terbinafine (LAMISIL) 250 MG tablet, Take 1 tablet (250 mg) by mouth daily, Disp: 90 tablet, Rfl: 0     triamcinolone (NASACORT) 55 MCG/ACT nasal aerosol, Spray 2 sprays into both nostrils daily, Disp: , Rfl:      albuterol (PROAIR HFA/PROVENTIL HFA/VENTOLIN HFA) 108 (90 Base) MCG/ACT inhaler, Inhale 2 puffs into the lungs every 4 hours as needed for shortness of breath / dyspnea or wheezing Use with spacer (Patient not taking: Reported on 6/7/2022), Disp: 18 g, Rfl: 3  Immunization History   Administered Date(s) Administered     COVID-19,PF,Pfizer (12+ Yrs) 02/04/2021, 02/25/2021, 10/04/2021     Influenza (H1N1) 12/22/2009     Influenza (High Dose) 3 valent vaccine 10/27/2010, 10/22/2011, 09/30/2014, 09/08/2015, 09/29/2016, 10/04/2017, 09/13/2018, 09/09/2019     Influenza (IIV3) PF 10/19/2002, 10/13/2003, 10/16/2004, 10/20/2005, 09/18/2009, 10/13/2012  "    Influenza Vaccine IM > 6 months Valent IIV4 (Alfuria,Fluzone) 10/17/2013     Influenza, Quad, High Dose, Pf, 65yr+ (Fluzone HD) 09/14/2020, 09/23/2021     Mantoux Tuberculin Skin Test 10/09/2009     Pneumo Conj 13-V (2010&after) 09/08/2015     Pneumococcal 23 valent 10/20/2006     TD (ADULT, 7+) 10/27/2010     TDAP Vaccine (Adacel) 08/14/2012     Zoster vaccine recombinant adjuvanted (SHINGRIX) 09/13/2018, 01/03/2019     Zoster vaccine, live 11/12/2014     Allergies   Allergen Reactions     Hytrin [Terazosin Hcl]      Leg swelling and vertigo     Simvastatin Other (See Comments)     Body aches     Cats      Codeine Nausea     Dogs      Dust Mites      [place]      Seasonal Allergies      OBJECTIVE:                                                                 /54   Pulse 75   Ht 1.753 m (5' 9\")   Wt 93 kg (205 lb)   SpO2 96%   BMI 30.27 kg/m          Physical Exam  Vitals and nursing note reviewed.   Constitutional:       General: He is not in acute distress.     Appearance: He is not diaphoretic.   HENT:      Head: Normocephalic and atraumatic.      Right Ear: Tympanic membrane, ear canal and external ear normal.      Left Ear: Tympanic membrane, ear canal and external ear normal.      Nose: No mucosal edema or rhinorrhea.      Right Turbinates: Not enlarged, swollen or pale.      Left Turbinates: Not enlarged, swollen or pale.      Mouth/Throat:      Lips: Pink.      Mouth: Mucous membranes are moist.      Pharynx: Oropharynx is clear. No pharyngeal swelling, oropharyngeal exudate or posterior oropharyngeal erythema.   Eyes:      General:         Right eye: No discharge.         Left eye: No discharge.      Conjunctiva/sclera: Conjunctivae normal.   Pulmonary:      Effort: No respiratory distress.      Breath sounds: Normal breath sounds and air entry. No stridor, decreased air movement or transmitted upper airway sounds. No decreased breath sounds, wheezing, rhonchi or rales.   Neurological:    "   Mental Status: He is alert and oriented to person, place, and time.   Psychiatric:         Mood and Affect: Mood normal.         Behavior: Behavior normal.           WORKUP: ACT 22      ASSESSMENT/PLAN:    Moderate persistent asthma, uncomplicated  Currently well controlled with dupilumab 300 mg every other week, Symbicort 80/4.5 mcg 2 puffs twice daily, and albuterol inhaler on as needed basis.    -Continue as is.    He restarted dupilumab on his own.  Feels that his back problem was a contributor for the numbness, and not dupilumab.  We agreed to keep a close eye on his neurological symptoms.  At this point, since he already started the medicine, will continue as is.    - Dupilumab (DUPIXENT) 300 MG/2ML syringe  Dispense: 4 mL; Refill: 6  - SYMBICORT 160-4.5 MCG/ACT Inhaler  Dispense: 10.2 g; Refill: 3    Other chronic sinusitis  Nasal polyposis  Seasonal allergic rhinitis due to pollen  Allergic rhinitis due to animal dander  Allergic rhinitis due to dust mite    Well-controlled with Nasacort 2 sprays in each nostril once daily and dupilumab.  -Continue as is.       Return in about 6 months (around 12/7/2022), or if symptoms worsen or fail to improve.    Thank you for allowing us to participate in the care of this patient. Please feel free to contact us if there are any questions or concerns about the patient.    Disclaimer: This note consists of symbols derived from keyboarding, dictation and/or voice recognition software. As a result, there may be errors in the script that have gone undetected. Please consider this when interpreting information found in this chart.    Mohinder Aragon MD, FAAAAI, FACAAI  Allergy, Asthma and Immunology     [facility]

## 2022-06-07 NOTE — LETTER
6/7/2022         RE: Guillermo Hogue  [address]  [address]  [place] [address]        Dear Colleague,    Thank you for referring your patient, Guillermo Hogue, to the [facility]. Please see a copy of my visit note below.    SUBJECTIVE:                                                                   Guillermo Hogue presents today to our Allergy Clinic at [facility]; He is being seen for a follow up visit. He is an 82 year old male with asthma, chronic sinusitis with nasal polyposis, and allergic rhinitis.    History of chronic chest symptoms for multiple years. Diagnosed with asthma at [facility] in 2010.   Allergic rhinoconjunctivitis: Perennial with spring and summer nasal and ocular symptoms. SPT for aeroallergens performed in August 2019 showed sensitivity to cat, dog, dust mites, tree pollen, and weed pollen. History of nasal polyps.    Follows with Dr. Valadez of ENT.   Currently on Dupixent 300 mg every other week. The reason for Dupixent was not nasal polyposis, but not well controlled asthma. Developed persistent tingling sensation in both his legs at the end of December 2021-beginning of January 2022.. The right leg seemed to be affected more than the left. Stopped Dupixent due to that.    The patient states that tingling sensation in his legs continue to more than 3 months. Stopping dupilumab did not help. He is saw somebody at the pain clinic. States that he had an MRI of the back done (I do not see the copy of the test) and was told that he had some issues with vertebrae. He received a steroid injection. It helped.  3 weeks ago, he restarted dupilumab on his own. States that neurological symptoms did not get worse. He would like to continue dupilumab further.    He uses Symbicort 160/4.5 mcg 2 puffs twice daily regarding his asthma.  The patient denies current chest tightness, cough, wheeze, or shortness of breath. Guillermo is using albuterol HFA   less than twice per week for rescue from chest symptoms. He is waking up less than twice per month due to chest symptoms. There has been no use of oral steroids since last visit. .      The patient denies persistent clear rhinorrhea, nasal itching, stuffiness, sneezing, or interval sinusitis symptoms (fever, facial pain, or purulent rhinorrhea). He feels the polyps are well controlled. Uses Nasacort 2 sprays in each nostril once daily. He has no issues with smell or taste.     Patient Active Problem List   Diagnosis     Reactive airway disease     Chronic sinusitis, unspecified location     AR (allergic rhinitis)     HYPERLIPIDEMIA LDL GOAL <130     BPH (benign prostatic hyperplasia)     Palpitations     Hypertension goal BP (blood pressure) < 140/90     Fatty liver     Cataracts, bilateral     Moderate persistent asthma, uncomplicated     Seasonal allergic rhinitis due to pollen     Allergic rhinitis due to animal dander     Allergic rhinitis due to dust mite     Paroxysmal atrial fibrillation (H)     Dizziness     Nasal polyposis     Benign paroxysmal positional vertigo of left ear       Past Medical History:   Diagnosis Date     Allergic rhinitis age 22     Asthma 2009     Chronic osteoarthritis      Nasal polyposis 2009     Osteoarthritis of left hip      Reactive airway disease 2009      Problem (# of Occurrences) Relation (Name,Age of Onset)    Arthritis (1) Father    Breast Cancer (1) Mother    C.A.D. (3) Father, Brother, Brother    Cancer (1) Maternal Grandfather    Cerebrovascular Disease (2) Father, Maternal Grandmother    Hypertension (1) Maternal Grandmother    Prostate Cancer (1) Brother    Respiratory (1) Father: TB history    Rheumatoid Arthritis (1) Father        Past Surgical History:   Procedure Laterality Date     COLONOSCOPY       COLONOSCOPY N/A 5/10/2017    Procedure: COMBINED COLONOSCOPY, SINGLE OR MULTIPLE BIOPSY/POLYPECTOMY BY BIOPSY;;  Surgeon: Abisai Duarte DO;  Location:  OR      COLONOSCOPY WITH CO2 INSUFFLATION N/A 2015    Procedure: COLONOSCOPY WITH CO2 INSUFFLATION;  Surgeon: Jose R Richmond MD;  Location: [facility] OR     COLONOSCOPY WITH CO2 INSUFFLATION N/A 5/10/2017    Procedure: COLONOSCOPY WITH CO2 INSUFFLATION;  COLON SCREEN/ UNDERWOOD;  Surgeon: Abisai Daurte DO;  Location: [facility] OR     JOINT REPLACEMENT Left 2019    hip     SINUS SURGERY  2009     TONSILLECTOMY  age 22     Social History     Socioeconomic History     Marital status:      Spouse name: Sanma palafox      Number of children: 2     Years of education: 14     Highest education level: None   Occupational History     Occupation: Middle Management     Employer: RETIRED     Comment:    Tobacco Use     Smoking status: Never Smoker     Smokeless tobacco: Never Used   Vaping Use     Vaping Use: Never used   Substance and Sexual Activity     Alcohol use: No     Drug use: No     Sexual activity: Not Currently   Other Topics Concern     Parent/sibling w/ CABG, MI or angioplasty before 65F 55M? Yes     Comment: 2 Brothers and father      Service No     Blood Transfusions No     Caffeine Concern No     Occupational Exposure Yes     Comment: he was in [employer]     Hobby Hazards No     Sleep Concern No     Stress Concern No     Weight Concern Yes     Special Diet No     Back Care No     Exercise No     Bike Helmet No     Seat Belt Yes     Self-Exams No   Social History Narrative    ENVIRONMENTAL HISTORY: The family lives in a [facility] home [place]. The home is heated with a forced air. They do have central air conditioning. The patient's bedroom is furnished with carpeting in bedroom.  Pets inside the house include 0 animals. There is no history of cockroach or mice infestation. There is/are 0 smokers in the house.  The house does not have a damp basement.            Review of Systems   Constitutional: Negative for activity change, fatigue and fever.   HENT: Negative for congestion, ear  pain, facial swelling, nosebleeds, postnasal drip, rhinorrhea, sinus pressure and sneezing.    Eyes: Negative for discharge, redness and itching.   Respiratory: Negative for cough, chest tightness, shortness of breath, wheezing and stridor.    Gastrointestinal: Negative for diarrhea, nausea and vomiting.   Allergic/Immunologic: Positive for environmental allergies.   Psychiatric/Behavioral: Negative for self-injury.           Current Outpatient Medications:      acetaminophen (TYLENOL) 500 MG tablet, Take 1,000 mg by mouth 3 times daily, Disp: , Rfl:      atorvastatin (LIPITOR) 20 MG tablet, TAKE 1/2 TABLET BY MOUTH ONCE DAILY, Disp: 45 tablet, Rfl: 3     diltiazem (CARTIA XT) 120 MG 24 hr capsule, Take 1 capsule (120 mg) by mouth daily For blood pressure/lowers pulse. Pharmacy ok to hold prescription until due, Disp: 90 capsule, Rfl: 3     Dupilumab (DUPIXENT) 300 MG/2ML syringe, Inject 2 mLs (300 mg) Subcutaneous every 14 days, Disp: 4 mL, Rfl: 6     meclizine (ANTIVERT) 25 MG tablet, Take 1 tablet (25 mg) by mouth 3 times daily as needed for dizziness, Disp: 30 tablet, Rfl: 0     Spacer/Aero-Holding Chambers (SPACER/AERO-HOLD CHAMBER MASK) RAMAN, 1 Adult size spacer to use with MDI inhalers., Disp: 1 each, Rfl: 0     SYMBICORT 160-4.5 MCG/ACT Inhaler, Inhale 2 puffs into the lungs 2 times daily, Disp: 10.2 g, Rfl: 3     terbinafine (LAMISIL) 250 MG tablet, Take 1 tablet (250 mg) by mouth daily, Disp: 90 tablet, Rfl: 0     triamcinolone (NASACORT) 55 MCG/ACT nasal aerosol, Spray 2 sprays into both nostrils daily, Disp: , Rfl:      albuterol (PROAIR HFA/PROVENTIL HFA/VENTOLIN HFA) 108 (90 Base) MCG/ACT inhaler, Inhale 2 puffs into the lungs every 4 hours as needed for shortness of breath / dyspnea or wheezing Use with spacer (Patient not taking: Reported on 6/7/2022), Disp: 18 g, Rfl: 3  Immunization History   Administered Date(s) Administered     COVID-19,,Pfizer (12+ Yrs) 02/04/2021, 02/25/2021, 10/04/2021      "Influenza (H1N1) 12/22/2009     Influenza (High Dose) 3 valent vaccine 10/27/2010, 10/22/2011, 09/30/2014, 09/08/2015, 09/29/2016, 10/04/2017, 09/13/2018, 09/09/2019     Influenza (IIV3) PF 10/19/2002, 10/13/2003, 10/16/2004, 10/20/2005, 09/18/2009, 10/13/2012     Influenza Vaccine IM > 6 months Valent IIV4 (Alfuria,Fluzone) 10/17/2013     Influenza, Quad, High Dose, Pf, 65yr+ (Fluzone HD) 09/14/2020, 09/23/2021     Mantoux Tuberculin Skin Test 10/09/2009     Pneumo Conj 13-V (2010&after) 09/08/2015     Pneumococcal 23 valent 10/20/2006     TD (ADULT, 7+) 10/27/2010     TDAP Vaccine (Adacel) 08/14/2012     Zoster vaccine recombinant adjuvanted (SHINGRIX) 09/13/2018, 01/03/2019     Zoster vaccine, live 11/12/2014     Allergies   Allergen Reactions     Hytrin [Terazosin Hcl]      Leg swelling and vertigo     Simvastatin Other (See Comments)     Body aches     Cats      Codeine Nausea     Dogs      Dust Mites      [place]      Seasonal Allergies      OBJECTIVE:                                                                 /54   Pulse 75   Ht 1.753 m (5' 9\")   Wt 93 kg (205 lb)   SpO2 96%   BMI 30.27 kg/m          Physical Exam  Vitals and nursing note reviewed.   Constitutional:       General: He is not in acute distress.     Appearance: He is not diaphoretic.   HENT:      Head: Normocephalic and atraumatic.      Right Ear: Tympanic membrane, ear canal and external ear normal.      Left Ear: Tympanic membrane, ear canal and external ear normal.      Nose: No mucosal edema or rhinorrhea.      Right Turbinates: Not enlarged, swollen or pale.      Left Turbinates: Not enlarged, swollen or pale.      Mouth/Throat:      Lips: Pink.      Mouth: Mucous membranes are moist.      Pharynx: Oropharynx is clear. No pharyngeal swelling, oropharyngeal exudate or posterior oropharyngeal erythema.   Eyes:      General:         Right eye: No discharge.         Left eye: No discharge.      Conjunctiva/sclera: Conjunctivae " normal.   Pulmonary:      Effort: No respiratory distress.      Breath sounds: Normal breath sounds and air entry. No stridor, decreased air movement or transmitted upper airway sounds. No decreased breath sounds, wheezing, rhonchi or rales.   Neurological:      Mental Status: He is alert and oriented to person, place, and time.   Psychiatric:         Mood and Affect: Mood normal.         Behavior: Behavior normal.           WORKUP: ACT 22      ASSESSMENT/PLAN:    Moderate persistent asthma, uncomplicated  Currently well controlled with dupilumab 300 mg every other week, Symbicort 80/4.5 mcg 2 puffs twice daily, and albuterol inhaler on as needed basis.    -Continue as is.    He restarted dupilumab on his own.  Feels that his back problem was a contributor for the numbness, and not dupilumab.  We agreed to keep a close eye on his neurological symptoms.  At this point, since he already started the medicine, will continue as is.    - Dupilumab (DUPIXENT) 300 MG/2ML syringe  Dispense: 4 mL; Refill: 6  - SYMBICORT 160-4.5 MCG/ACT Inhaler  Dispense: 10.2 g; Refill: 3    Other chronic sinusitis  Nasal polyposis  Seasonal allergic rhinitis due to pollen  Allergic rhinitis due to animal dander  Allergic rhinitis due to dust mite    Well-controlled with Nasacort 2 sprays in each nostril once daily and dupilumab.  -Continue as is.       Return in about 6 months (around 12/7/2022), or if symptoms worsen or fail to improve.    Thank you for allowing us to participate in the care of this patient. Please feel free to contact us if there are any questions or concerns about the patient.    Disclaimer: This note consists of symbols derived from keyboarding, dictation and/or voice recognition software. As a result, there may be errors in the script that have gone undetected. Please consider this when interpreting information found in this chart.    Mohinder Aragon MD, FAAAAI, FACAAI  Allergy, Asthma and Immunology     [facility] [place]  [facility]       Again, thank you for allowing me to participate in the care of your patient.        Sincerely,        Mohinder Aragon MD

## 2022-06-07 NOTE — PATIENT INSTRUCTIONS
Continue current medication therapy.          Allergy Staff Appt Hours Shot Hours Locations    Physician     Mohinder Aragon MD       Support Staff     Enriqueta MEJIA, YUE MEJIA CMA  Tuesday:   [place] :  [place]: :         [place]  [facility] [place]        Tuesday: : : : : [facility]       Tues & Wed: :       Mon & Thurs:        Fri:          [facility]  [address] [place] [address]  Appt Line: [phone]    [facility]  [address] [place] [address]  Appt Line: [phone]    Pulmonary Function Scheduling:  [place]: [phone]  [place]: [phone]  [phone]     Prescription Assistance    If you need assistance with your prescriptions (cost, coverage, etc) please contact: [employer] Prescription Assistance Program [phone]    Important Scheduling Information    Appointments for skin testing: Appointment will last approximately 45 minutes.  Please call the appointment line for your clinic to schedule.  Discontinue oral antihistamines 7 days prior to the appointment.  Discontinue nasal and ocular antihistamines 4 days prior to appointment.    Appointments for challenges (oral food challenge, penicillin testing, aspirin desensitization) If your provider instructs you to that this additional testing is indicated, it will need to be scheduled directly through the allergy department.  Please contact them via [employer] or by calling the clinic and asking to speak with the allergy team.  They will provide additional information and instructions for the appointment.  Discontinue oral antihistamines 7 days prior to the appointment.  Discontinue nasal and ocular antihistamines 4 days prior to the appointment.    Thank you for trusting us with your care. Please feel free to contact us with  any questions or concerns you may have.

## 2022-07-20 ENCOUNTER — MEDICAL CORRESPONDENCE (OUTPATIENT)
Dept: HEALTH INFORMATION MANAGEMENT | Facility: CLINIC | Age: 82
End: 2022-07-20

## 2022-07-21 ENCOUNTER — TELEPHONE (OUTPATIENT)
Dept: FAMILY MEDICINE | Facility: CLINIC | Age: 82
End: 2022-07-21
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
## 2022-07-22 NOTE — TELEPHONE ENCOUNTER
I'm not sure if patient is currently getting wound cares or needs home wound care or who I should direct the referral to. If can wait until Monday I'd prefer to address then otherwise I'll refer to whoever needed now. Edi Doty MD

## 2022-07-22 NOTE — TELEPHONE ENCOUNTER
Called and spoke to patent. No where specific, Maybe though [place]? He just does not want to go downtown.Anny Gaytan MA/TING

## 2022-07-22 NOTE — TELEPHONE ENCOUNTER
Wound is on right leg on calf. It has to be re bandaged everyday.   Future Appointments   Date Time Provider Department Center   7/25/2022  9:30 AM Edi Doty MD [facility]   Patient is scheduled.     Marcela Grijalva,     [facility]

## 2022-07-22 NOTE — TELEPHONE ENCOUNTER
We can double book my 9:30 this Monday. I can do an order for wound care in mean time but need to know where the wound is. Edi Doty MD

## 2022-07-25 ENCOUNTER — OFFICE VISIT (OUTPATIENT)
Dept: FAMILY MEDICINE | Facility: CLINIC | Age: 82
End: 2022-07-25
Payer: COMMERCIAL

## 2022-07-25 VITALS
RESPIRATION RATE: 18 BRPM | DIASTOLIC BLOOD PRESSURE: 68 MMHG | WEIGHT: 205 LBS | BODY MASS INDEX: 30.36 KG/M2 | TEMPERATURE: 98.6 F | HEIGHT: 69 IN | SYSTOLIC BLOOD PRESSURE: 133 MMHG | OXYGEN SATURATION: 96 % | HEART RATE: 74 BPM

## 2022-07-25 DIAGNOSIS — S81.002D OPEN WOUND OF LEFT KNEE, LEG, AND ANKLE, SUBSEQUENT ENCOUNTER: Primary | ICD-10-CM

## 2022-07-25 DIAGNOSIS — S22.41XS CLOSED FRACTURE OF MULTIPLE RIBS OF RIGHT SIDE, SEQUELA: ICD-10-CM

## 2022-07-25 DIAGNOSIS — S81.802D OPEN WOUND OF LEFT KNEE, LEG, AND ANKLE, SUBSEQUENT ENCOUNTER: Primary | ICD-10-CM

## 2022-07-25 DIAGNOSIS — J45.40 MODERATE PERSISTENT ASTHMA, UNCOMPLICATED: ICD-10-CM

## 2022-07-25 DIAGNOSIS — I48.0 PAROXYSMAL ATRIAL FIBRILLATION (H): ICD-10-CM

## 2022-07-25 DIAGNOSIS — S91.002D OPEN WOUND OF LEFT KNEE, LEG, AND ANKLE, SUBSEQUENT ENCOUNTER: Primary | ICD-10-CM

## 2022-07-25 PROCEDURE — 99214 OFFICE O/P EST MOD 30 MIN: CPT | Performed by: FAMILY MEDICINE

## 2022-07-25 RX ORDER — CEPHALEXIN 500 MG/1
CAPSULE ORAL
COMMUNITY
Start: 2022-07-18 | End: 2024-06-24

## 2022-07-25 ASSESSMENT — PAIN SCALES - GENERAL: PAINLEVEL: NO PAIN (0)
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
## 2022-07-26 ENCOUNTER — TELEPHONE (OUTPATIENT)
Dept: FAMILY MEDICINE | Facility: CLINIC | Age: 82
End: 2022-07-26

## 2022-07-26 NOTE — TELEPHONE ENCOUNTER
Reason for Call:  Form, our goal is to have forms completed with 72 hours, however, some forms may require a visit or additional information.    Type of letter, form or note:  Home Health Certification    Who is the form from?: Home care    Where did the form come from: form was faxed in    What clinic location was the form placed at?: [place]    Where the form was placed: Given to physician    What number is listed as a contact on the form?: [facility] - [phone]       Additional comments: place in provider's box to complete and route back to TC    Call taken on 7/26/2022 at 1:43 PM by Colleen Mora

## 2022-07-27 ENCOUNTER — MEDICAL CORRESPONDENCE (OUTPATIENT)
Dept: FAMILY MEDICINE | Facility: CLINIC | Age: 82
End: 2022-07-27

## 2022-08-03 ENCOUNTER — TELEPHONE (OUTPATIENT)
Dept: FAMILY MEDICINE | Facility: CLINIC | Age: 82
End: 2022-08-03
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
## 2022-08-03 NOTE — TELEPHONE ENCOUNTER
Reason for Call:  Form, our goal is to have forms completed with 72 hours, however, some forms may require a visit or additional information.    Type of letter, form or note:  legal    Who is the form from?: Patient    Where did the form come from: Patient or family brought in       What clinic location was the form placed at?: [place]    Where the form was placed: Given to MA/RN    What number is listed as a contact on the form?: [phone]       Additional comments:    Call taken on 8/3/2022 at 12:35 PM by Dannielle Babcock

## 2022-08-04 NOTE — TELEPHONE ENCOUNTER
Reason for Call:  Other call back    Detailed comments: patient's daughter calling again about the letter and needs a form to be filled out. Just needs a form to be filled out to give patient clearance to drive and that he does not have any medical issues that will interfere with him driving. Letter expires August 6, 2022 needs to be done asap.    Phone Number Patient can be reached at: Home number on file [phone] (home)    Best Time: asap    Can we leave a detailed message on this number? YES    Call taken on 8/4/2022 at 12:23 PM by Marcela Grijalva

## 2022-08-04 NOTE — TELEPHONE ENCOUNTER
Faxed signed HHC to [facility] and to stat scanning.Anny Gaytan MA/TC
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
## 2022-08-05 NOTE — TELEPHONE ENCOUNTER
Called and informed patient that the note was ready to be picked up at the . Note brought to the  for .Anny Gaytan MA/TING

## 2022-08-29 ENCOUNTER — TELEPHONE (OUTPATIENT)
Dept: FAMILY MEDICINE | Facility: CLINIC | Age: 82
End: 2022-08-29

## 2022-08-29 NOTE — TELEPHONE ENCOUNTER
SUZANNE for daughter Kelley 6/26/19.  Returned call to Kelley and read her providers message below.   She said she has been in contact with DMV and they just reinstated patients drivers license.  No action is needed from us.    Yeimi PHELPSN, RN
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
## 2022-08-29 NOTE — TELEPHONE ENCOUNTER
I honestly don't know WHY the ER provider recommended patient get a driving exam. I don't see anything in their note nor did patient lose conscience  I feel like I'm either missing some important information here and this really should go back to the ER on why they thought this and they should get a redaction. Ultimately patient might need a driving exam to override the ER providers concerns. Edi Doty MD

## 2022-12-02 ENCOUNTER — TELEPHONE (OUTPATIENT)
Dept: ALLERGY | Facility: CLINIC | Age: 82
End: 2022-12-02

## 2022-12-02 NOTE — TELEPHONE ENCOUNTER
Prior Authorization Approval    Authorization Effective Date: 11/2/2022  Authorization Expiration Date: 12/2/2023  Medication: Dupixent PA Renewal   Approved Dose/Quantity: 4ml per 28 days  Reference #: Key: [id number]   Insurance Company: [employer] - Phone [phone] Fax [phone]  Expected CoPay:       CoPay Card Available:      Foundation Assistance Needed:    Which Pharmacy is filling the prescription (Not needed for infusion/clinic administered):    Pharmacy Notified:    Patient Notified:

## 2022-12-02 NOTE — TELEPHONE ENCOUNTER
PA Initiation    Medication: Dupixent PA Renewal   Insurance Company: [employer] - Phone [phone] Fax [phone]  Pharmacy Filling the Rx:    Filling Pharmacy Phone:    Filling Pharmacy Fax:    Start Date: 12/2/2022    Key: [id number]

## 2022-12-02 NOTE — TELEPHONE ENCOUNTER
Free Drug Application Initiated  Medication: Dupixent  Sponsor: [employer]  Phone #: [phone] opt 1  Fax #: [phone]  Additional Information: received fax review in process no action needed yet

## 2023-01-16 ENCOUNTER — TELEPHONE (OUTPATIENT)
Dept: FAMILY MEDICINE | Facility: CLINIC | Age: 83
End: 2023-01-16

## 2023-01-16 DIAGNOSIS — E78.5 HYPERLIPIDEMIA LDL GOAL <130: Primary | ICD-10-CM

## 2023-01-16 DIAGNOSIS — I10 HYPERTENSION GOAL BP (BLOOD PRESSURE) < 140/90: ICD-10-CM

## 2023-01-16 DIAGNOSIS — D64.9 ANEMIA, UNSPECIFIED TYPE: ICD-10-CM

## 2023-01-17 ENCOUNTER — DOCUMENTATION ONLY (OUTPATIENT)
Dept: LAB | Facility: CLINIC | Age: 83
End: 2023-01-17

## 2023-01-17 NOTE — TELEPHONE ENCOUNTER
Reason for Call: Request for an order or referral:    Order or referral being requested: labs prior to annual wellness    Date needed: before my next appointment    Has the patient been seen by the PCP for this problem? YES    Additional comments: Guillermo would like to have lab work done prior to his appointment with Dr. Doty but the only time the lab had an appointment that worked with his schedule was on Saturday 1/21/23. He would like to have Dr. Doty tell the lab which labs they should draw for his upcoming appointment so that they can go over the results when he is in clinic on Monday 1/23/23.    Phone number Patient can be reached at:  Home number on file [phone] (home)    Best Time:  any    Can we leave a detailed message on this number?  Pt did not specify    Call taken on 1/16/2023 at 6:58 PM by Kaci Camarillo

## 2023-01-17 NOTE — PROGRESS NOTES
Guillermo Hogue has an upcoming lab appointment:    Future Appointments   Date Time Provider Department Center   1/21/2023 10:30 AM [facility]   1/23/2023 12:30 [facility]       patient is scheduled for the following   There is no order available. Please review and place either future orders or HMPO (Review of Health Maintenance Protocol Orders), as appropriate.    Health Maintenance Due   Topic     ANNUAL REVIEW OF HM ORDERS      Yi SELF

## 2023-01-21 ENCOUNTER — LAB (OUTPATIENT)
Dept: LAB | Facility: CLINIC | Age: 83
End: 2023-01-21
Payer: COMMERCIAL

## 2023-01-21 DIAGNOSIS — D64.9 ANEMIA, UNSPECIFIED TYPE: ICD-10-CM

## 2023-01-21 DIAGNOSIS — E78.5 HYPERLIPIDEMIA LDL GOAL <130: ICD-10-CM

## 2023-01-21 DIAGNOSIS — I10 HYPERTENSION GOAL BP (BLOOD PRESSURE) < 140/90: ICD-10-CM

## 2023-01-21 LAB
ERYTHROCYTE [DISTWIDTH] IN BLOOD BY AUTOMATED COUNT: 14.6 % (ref 10–15)
HCT VFR BLD AUTO: 41.1 % (ref 40–53)
HGB BLD-MCNC: 13.3 G/DL (ref 13.3–17.7)
MCH RBC QN AUTO: 29.4 PG (ref 26.5–33)
MCHC RBC AUTO-ENTMCNC: 32.4 G/DL (ref 31.5–36.5)
MCV RBC AUTO: 91 FL (ref 78–100)
PLATELET # BLD AUTO: 197 10E3/UL (ref 150–450)
RBC # BLD AUTO: 4.52 10E6/UL (ref 4.4–5.9)
WBC # BLD AUTO: 8.9 10E3/UL (ref 4–11)

## 2023-01-21 PROCEDURE — 80061 LIPID PANEL: CPT

## 2023-01-21 PROCEDURE — 80053 COMPREHEN METABOLIC PANEL: CPT

## 2023-01-21 PROCEDURE — 85027 COMPLETE CBC AUTOMATED: CPT

## 2023-01-21 PROCEDURE — 36415 COLL VENOUS BLD VENIPUNCTURE: CPT

## 2023-01-23 ENCOUNTER — OFFICE VISIT (OUTPATIENT)
Dept: FAMILY MEDICINE | Facility: CLINIC | Age: 83
End: 2023-01-23
Payer: COMMERCIAL

## 2023-01-23 VITALS
SYSTOLIC BLOOD PRESSURE: 136 MMHG | RESPIRATION RATE: 20 BRPM | HEIGHT: 69 IN | WEIGHT: 214.4 LBS | HEART RATE: 72 BPM | BODY MASS INDEX: 31.76 KG/M2 | OXYGEN SATURATION: 96 % | TEMPERATURE: 98 F | DIASTOLIC BLOOD PRESSURE: 70 MMHG

## 2023-01-23 DIAGNOSIS — J45.40 MODERATE PERSISTENT ASTHMA, UNCOMPLICATED: ICD-10-CM

## 2023-01-23 DIAGNOSIS — E78.5 HYPERLIPIDEMIA LDL GOAL <130: ICD-10-CM

## 2023-01-23 DIAGNOSIS — R00.2 PALPITATIONS: ICD-10-CM

## 2023-01-23 DIAGNOSIS — I48.0 PAROXYSMAL ATRIAL FIBRILLATION (H): ICD-10-CM

## 2023-01-23 DIAGNOSIS — Z00.00 ENCOUNTER FOR MEDICARE ANNUAL WELLNESS EXAM: Primary | ICD-10-CM

## 2023-01-23 LAB
ALBUMIN SERPL-MCNC: 3.8 G/DL (ref 3.4–5)
ALP SERPL-CCNC: 83 U/L (ref 40–150)
ALT SERPL W P-5'-P-CCNC: 23 U/L (ref 0–70)
ANION GAP SERPL CALCULATED.3IONS-SCNC: 6 MMOL/L (ref 3–14)
AST SERPL W P-5'-P-CCNC: 14 U/L (ref 0–45)
BILIRUB SERPL-MCNC: 0.5 MG/DL (ref 0.2–1.3)
BUN SERPL-MCNC: 14 MG/DL (ref 7–30)
CALCIUM SERPL-MCNC: 9.2 MG/DL (ref 8.5–10.1)
CHLORIDE BLD-SCNC: 103 MMOL/L (ref 94–109)
CHOLEST SERPL-MCNC: 163 MG/DL
CO2 SERPL-SCNC: 28 MMOL/L (ref 20–32)
CREAT SERPL-MCNC: 0.81 MG/DL (ref 0.66–1.25)
FASTING STATUS PATIENT QL REPORTED: YES
GFR SERPL CREATININE-BSD FRML MDRD: 89 ML/MIN/1.73M2
GLUCOSE BLD-MCNC: 105 MG/DL (ref 70–99)
HDLC SERPL-MCNC: 77 MG/DL
LDLC SERPL CALC-MCNC: 71 MG/DL
NONHDLC SERPL-MCNC: 86 MG/DL
POTASSIUM BLD-SCNC: 4.3 MMOL/L (ref 3.4–5.3)
PROT SERPL-MCNC: 7.6 G/DL (ref 6.8–8.8)
SODIUM SERPL-SCNC: 137 MMOL/L (ref 133–144)
TRIGL SERPL-MCNC: 75 MG/DL

## 2023-01-23 PROCEDURE — 90471 IMMUNIZATION ADMIN: CPT | Performed by: FAMILY MEDICINE

## 2023-01-23 PROCEDURE — G0438 PPPS, INITIAL VISIT: HCPCS | Performed by: FAMILY MEDICINE

## 2023-01-23 PROCEDURE — 90715 TDAP VACCINE 7 YRS/> IM: CPT | Performed by: FAMILY MEDICINE

## 2023-01-23 PROCEDURE — 99214 OFFICE O/P EST MOD 30 MIN: CPT | Mod: 25 | Performed by: FAMILY MEDICINE

## 2023-01-23 RX ORDER — ATORVASTATIN CALCIUM 20 MG/1
10 TABLET, FILM COATED ORAL DAILY
Qty: 45 TABLET | Refills: 3 | Status: SHIPPED | OUTPATIENT
Start: 2023-01-23 | End: 2024-04-01

## 2023-01-23 RX ORDER — DILTIAZEM HYDROCHLORIDE 120 MG/1
120 CAPSULE, COATED, EXTENDED RELEASE ORAL DAILY
Qty: 90 CAPSULE | Refills: 3 | Status: SHIPPED | OUTPATIENT
Start: 2023-01-23

## 2023-01-23 ASSESSMENT — PAIN SCALES - GENERAL: PAINLEVEL: NO PAIN (0)

## 2023-01-23 ASSESSMENT — ENCOUNTER SYMPTOMS
HEMATOCHEZIA: 0
CONSTIPATION: 0
PARESTHESIAS: 0
NAUSEA: 0
WEAKNESS: 1
PALPITATIONS: 0
FREQUENCY: 1
ABDOMINAL PAIN: 0
EYE PAIN: 0
ARTHRALGIAS: 1
NERVOUS/ANXIOUS: 0
DIARRHEA: 0
DYSURIA: 0
HEMATURIA: 0
CHILLS: 0
FEVER: 0
HEADACHES: 0
COUGH: 0
MYALGIAS: 0
DIZZINESS: 0
SORE THROAT: 0
SHORTNESS OF BREATH: 0
JOINT SWELLING: 0
HEARTBURN: 0

## 2023-01-23 ASSESSMENT — ASTHMA QUESTIONNAIRES
ACT_TOTALSCORE: 21
QUESTION_1 LAST FOUR WEEKS HOW MUCH OF THE TIME DID YOUR ASTHMA KEEP YOU FROM GETTING AS MUCH DONE AT WORK, SCHOOL OR AT HOME: SOME OF THE TIME
ACT_TOTALSCORE: 21
QUESTION_3 LAST FOUR WEEKS HOW OFTEN DID YOUR ASTHMA SYMPTOMS (WHEEZING, COUGHING, SHORTNESS OF BREATH, CHEST TIGHTNESS OR PAIN) WAKE YOU UP AT NIGHT OR EARLIER THAN USUAL IN THE MORNING: NOT AT ALL
QUESTION_5 LAST FOUR WEEKS HOW WOULD YOU RATE YOUR ASTHMA CONTROL: WELL CONTROLLED
QUESTION_4 LAST FOUR WEEKS HOW OFTEN HAVE YOU USED YOUR RESCUE INHALER OR NEBULIZER MEDICATION (SUCH AS ALBUTEROL): NOT AT ALL
QUESTION_2 LAST FOUR WEEKS HOW OFTEN HAVE YOU HAD SHORTNESS OF BREATH: ONCE OR TWICE A WEEK

## 2023-01-23 ASSESSMENT — ACTIVITIES OF DAILY LIVING (ADL): CURRENT_FUNCTION: NO ASSISTANCE NEEDED

## 2023-01-23 NOTE — PROGRESS NOTES
"SUBJECTIVE:   Guillermo is a 82 year old who presents for Preventive Visit.  History PAF, htn, high cholesterol. and asthma.   Seeing allergist.asthma stable.   Needs more exercise. No nausea, vomiting or diarrhea or black/bloody stools. No urine changes or hematuria. Outside blood pressure readings ok.  Leg wound resolved. Seeing wound swelling RN.   No chest pain.   Lives alone. No dating. Seeing kids - son in [place] and [place].   Emotionally doing ok. Sleep overall. No falls. No memory doing ok   Active with friends.    Patient has been advised of split billing requirements and indicates understanding: Yes  Are you in the first 12 months of your Medicare coverage?  No    Healthy Habits:     In general, how would you rate your overall health?  Good    Duration of exercise:  Less than 15 minutes    Do you usually eat at least 4 servings of fruit and vegetables a day, include whole grains    & fiber and avoid regularly eating high fat or \"junk\" foods?  Yes    Taking medications regularly:  Yes    Medication side effects:  Not applicable    Ability to successfully perform activities of daily living:  No assistance needed    Home Safety:  No safety concerns identified    Hearing Impairment:  No hearing concerns    In the past 6 months, have you been bothered by leaking of urine?  No    In general, how would you rate your overall mental or emotional health?  Good      PHQ-2 Total Score: 0    Additional concerns today:  No      Have you ever done Advance Care Planning? (For example, a Health Directive, POLST, or a discussion with a medical provider or your loved ones about your wishes): Yes, advance care planning is on file.    Normal conversational hearing.   Fall risk  Fallen 2 or more times in the past year?: No  Any fall with injury in the past year?: No    Cognitive Screening   1) Repeat 3 items (Leader, Season, Table)    2) Clock draw: NORMAL  3) 3 item recall: Recalls 2 objects   Results: NORMAL clock, 1-2 " items recalled: COGNITIVE IMPAIRMENT LESS LIKELY    Mini-CogTM Copyright ARPIT [employer]ena. Licensed by the author for use in [facility]; reprinted with permission (mecca@[facility]). All rights reserved.      Do you have sleep apnea, excessive snoring or daytime drowsiness?: no    Reviewed and updated as needed this visit by clinical staff                  Reviewed and updated as needed this visit by Provider                 Social History     Tobacco Use     Smoking status: Never     Smokeless tobacco: Never   Substance Use Topics     Alcohol use: No         Alcohol Use 2/17/2020   Prescreen: >3 drinks/day or >7 drinks/week? No   Prescreen: >3 drinks/day or >7 drinks/week? -       Current providers sharing in care for this patient include:   Patient Care Team:  Edi Doty MD as PCP - General  Edi Doty MD as Assigned PCP  Alonso Valadez MD as Assigned Surgical Provider  Mohinder Aragon MD as Assigned Allergy Provider    The following health maintenance items are reviewed in Epic and correct as of today:  Health Maintenance   Topic Date Due     ANNUAL REVIEW OF HM ORDERS  Never done     ASTHMA ACTION PLAN  06/02/2021     MEDICARE ANNUAL WELLNESS VISIT  03/26/2022     FALL RISK ASSESSMENT  03/26/2022     DTAP/TDAP/TD IMMUNIZATION (3 - Td or Tdap) 08/14/2022     ASTHMA CONTROL TEST  12/07/2022     PHQ-2 (once per calendar year)  01/01/2023     ADVANCE CARE PLANNING  03/26/2026     INFLUENZA VACCINE  Completed     Pneumococcal Vaccine: 65+ Years  Completed     ZOSTER IMMUNIZATION  Completed     COVID-19 Vaccine  Completed     IPV IMMUNIZATION  Aged Out     MENINGITIS IMMUNIZATION  Aged Out     Lab work is in process      Review of Systems   Constitutional: Negative for chills and fever.   HENT: Negative for congestion, ear pain, hearing loss and sore throat.    Eyes: Negative for pain and visual disturbance.   Respiratory: Negative for cough and shortness of breath.    Cardiovascular: Positive  "for peripheral edema. Negative for chest pain and palpitations.   Gastrointestinal: Negative for abdominal pain, constipation, diarrhea, heartburn, hematochezia and nausea.   Genitourinary: Positive for frequency and urgency. Negative for dysuria, genital sores, hematuria, impotence and penile discharge.   Musculoskeletal: Positive for arthralgias. Negative for joint swelling and myalgias.   Skin: Negative for rash.   Neurological: Positive for weakness. Negative for dizziness, headaches and paresthesias.   Psychiatric/Behavioral: Negative for mood changes. The patient is not nervous/anxious.        OBJECTIVE:   There were no vitals taken for this visit. Estimated body mass index is 30.27 kg/m  as calculated from the following:    Height as of 7/25/22: 1.753 m (5' 9\").    Weight as of 7/25/22: 93 kg (205 lb).   /70   Pulse 72   Temp 98  F (36.7  C) (Oral)   Resp 20   Ht 1.753 m (5' 9\")   Wt 97.3 kg (214 lb 6.4 oz)   SpO2 96%   BMI 31.66 kg/m       Physical Exam  GENERAL: healthy, alert and no distress  EYES: Eyes grossly normal to inspection, PERRL and conjunctivae and sclerae normal  HENT: ear canals and TM's normal, nose and mouth without ulcers or lesions  NECK: no adenopathy, no asymmetry, masses, or scars and thyroid normal to palpation  RESP: lungs clear to auscultation - no rales, rhonchi or wheezes  BREAST: normal without masses, tenderness or nipple discharge and no palpable axillary masses or adenopathy  CV: regular rate and rhythm, normal S1 S2, no S3 or S4, no murmur, click or rub, no peripheral edema and peripheral pulses strong  ABDOMEN: soft, nontender, no hepatosplenomegaly, no masses and bowel sounds normal   (male): patient deferred /rectal exams  MS: no gross musculoskeletal defects noted, no edema  SKIN: no suspicious lesions or rashes  NEURO: Normal strength and tone, mentation intact and speech normal  PSYCH: mentation appears normal, affect normal/bright  LYMPH: no cervical, " "supraclavicular, axillary adenopathy    ASSESSMENT / PLAN:   ASSESSMENT / PLAN:  (Z00.00) Encounter for Medicare annual wellness exam  (primary encounter diagnosis)  Comment: generally healthy and normal exam. No falls and memory ok. Friends/family good support  Plan: vitaminD. Follow-up eye exam. Reviewed self mole/testicle check handout.      (E78.5) Hyperlipidemia LDL goal <130  Comment: stable in past  Plan: atorvastatin (LIPITOR) 20 MG tablet        Chest pain or shortness of breath to er. exercise    (R00.2) Palpitations  Comment: stable  Plan: diltiazem ER COATED BEADS (CARTIA XT) 120 MG 24        hr capsule        To ER if prolonged or chest pain or shortness of breath     (J45.40) Moderate persistent asthma, uncomplicated  Comment: stalbe  Plan: per specialist.     (I48.0) Paroxysmal atrial fibrillation (H)  Comment: stable. In sinus  Plan: continue monitor. Expected course and warning signs reviewed.         Patient has been advised of split billing requirements and indicates understanding: Yes      COUNSELING:  Reviewed preventive health counseling, as reflected in patient instructions       Regular exercise       Healthy diet/nutrition       Vision screening       Dental care       Aspirin prophylaxis        Alcohol Use       BMI:   Estimated body mass index is 30.27 kg/m  as calculated from the following:    Height as of 7/25/22: 1.753 m (5' 9\").    Weight as of 7/25/22: 93 kg (205 lb).       He reports that he has never smoked. He has never used smokeless tobacco.      Appropriate preventive services were discussed with this patient, including applicable screening as appropriate for cardiovascular disease, diabetes, osteopenia/osteoporosis, and glaucoma.  As appropriate for age/gender, discussed screening for colorectal cancer, prostate cancer, breast cancer, and cervical cancer. Checklist reviewing preventive services available has been given to the patient.    Reviewed patients plan of care and " provided an AVS. The Intermediate Care Plan ( asthma action plan, low back pain action plan, and migraine action plan) for Guillermo meets the Care Plan requirement. This Care Plan has been established and reviewed with the Patient.          Edi Doty MD  [facility]    Identified Health Risks:

## 2023-01-23 NOTE — NURSING NOTE
Prior to immunization administration, verified patients identity using patient s name and date of birth. Please see Immunization Activity for additional information.     Screening Questionnaire for Adult Immunization    Are you sick today?   No   Do you have allergies to medications, food, a vaccine component or latex?   No   Have you ever had a serious reaction after receiving a vaccination?   No   Do you have a long-term health problem with heart, lung, kidney, or metabolic disease (e.g., diabetes), asthma, a blood disorder, no spleen, complement component deficiency, a cochlear implant, or a spinal fluid leak?  Are you on long-term aspirin therapy?   No   Do you have cancer, leukemia, HIV/AIDS, or any other immune system problem?   No   Do you have a parent, brother, or sister with an immune system problem?   No   In the past 3 months, have you taken medications that affect  your immune system, such as prednisone, other steroids, or anticancer drugs; drugs for the treatment of rheumatoid arthritis, Crohn s disease, or psoriasis; or have you had radiation treatments?   No   Have you had a seizure, or a brain or other nervous system problem?   No   During the past year, have you received a transfusion of blood or blood    products, or been given immune (gamma) globulin or antiviral drug?   No   For women: Are you pregnant or is there a chance you could become       pregnant during the next month?   No   Have you received any vaccinations in the past 4 weeks?   No     Immunization questionnaire answers were all negative.        Per orders of Dr. Doty, injection of TDAP given by Linnea Fraser. Patient instructed to remain in clinic for 15 minutes afterwards, and to report any adverse reaction to me immediately.       Screening performed by Linnea Fraser on 1/23/2023 at 12:54 PM.  2

## 2023-01-27 NOTE — TELEPHONE ENCOUNTER
Called Dupixent [employer] to check free drug enrollment status. Patient is approved until 12/31/2023 for Dupixent free drug.    Mary Jo Pearson [facility] Liaison  [email or web address]  Phone: [phone]  Fax: [phone]

## 2023-02-02 ENCOUNTER — TELEPHONE (OUTPATIENT)
Dept: ALLERGY | Facility: CLINIC | Age: 83
End: 2023-02-02
Payer: COMMERCIAL

## 2023-02-02 DIAGNOSIS — J45.40 MODERATE PERSISTENT ASTHMA, UNCOMPLICATED: ICD-10-CM

## 2023-02-02 NOTE — TELEPHONE ENCOUNTER
Refill requested from [facility], [facility].  Patient has been approved for 2023.

## 2023-02-02 NOTE — TELEPHONE ENCOUNTER
Requested Prescriptions   Pending Prescriptions Disp Refills     dupilumab (DUPIXENT) 300 MG/2ML prefilled syringe 4 mL 6     Sig: Inject 2 mLs (300 mg) Subcutaneous every 14 days       There is no refill protocol information for this order          Vilma Husain MA    Refill request      LV: 6/7/22  FV: not scheduled, Provider wanted to see patient back 12/7/22   Available

## 2023-02-06 ENCOUNTER — TELEPHONE (OUTPATIENT)
Dept: FAMILY MEDICINE | Facility: CLINIC | Age: 83
End: 2023-02-06
Payer: COMMERCIAL

## 2023-02-06 NOTE — TELEPHONE ENCOUNTER
Patient calling clinic to check on any follow up from his office visit with Dr. Doty. RN notes there is a message for the labs done on 1/21/23.     RN relayed the message from the provider below:     Generally normal results except blood sugars slightly high. Normal liver, kidneys, cholesterol and blood count.      Patient:  Guillermo Hogue     Provider:  Edi Doty MD MD  Please call/email with questions or concerns.    Patient denies questions at this time.     Routing to provider to review and advise if there was anything additional the patient needed to know following his office visit on 1/23/23.     Michelle Farmer, MATTIEN, RN  [facility]

## 2023-02-07 RX ORDER — DUPILUMAB 300 MG/2ML
300 INJECTION, SOLUTION SUBCUTANEOUS
Qty: 4 ML | Refills: 6 | Status: SHIPPED | OUTPATIENT
Start: 2023-02-07 | End: 2023-08-21

## 2023-02-21 DIAGNOSIS — J45.40 MODERATE PERSISTENT ASTHMA, UNCOMPLICATED: ICD-10-CM

## 2023-02-21 RX ORDER — BUDESONIDE AND FORMOTEROL FUMARATE DIHYDRATE 160; 4.5 UG/1; UG/1
2 AEROSOL RESPIRATORY (INHALATION) 2 TIMES DAILY
Qty: 10.2 G | Refills: 3 | Status: SHIPPED | OUTPATIENT
Start: 2023-02-21 | End: 2023-07-13

## 2023-02-21 NOTE — TELEPHONE ENCOUNTER
Pending Prescriptions:                       Disp   Refills    SYMBICORT 160-4.5 MCG/ACT Inhaler         10.2 g 3            Sig: Inhale 2 puffs into the lungs 2 times daily    Routing refill request to provider for review/approval because:  LOV > 6 mos ago (6/7/22)    Enriqueta Wen RN

## 2023-02-21 NOTE — TELEPHONE ENCOUNTER
Requested Prescriptions   Pending Prescriptions Disp Refills     SYMBICORT 160-4.5 MCG/ACT Inhaler 10.2 g 3     Sig: Inhale 2 puffs into the lungs 2 times daily       There is no refill protocol information for this order            Vilma Husain MA

## 2023-04-04 ENCOUNTER — OFFICE VISIT (OUTPATIENT)
Dept: FAMILY MEDICINE | Facility: CLINIC | Age: 83
End: 2023-04-04
Payer: COMMERCIAL

## 2023-04-04 VITALS
HEIGHT: 69 IN | DIASTOLIC BLOOD PRESSURE: 73 MMHG | WEIGHT: 216.2 LBS | SYSTOLIC BLOOD PRESSURE: 131 MMHG | TEMPERATURE: 98 F | BODY MASS INDEX: 32.02 KG/M2 | OXYGEN SATURATION: 96 % | HEART RATE: 80 BPM

## 2023-04-04 DIAGNOSIS — H81.12 BENIGN PAROXYSMAL POSITIONAL VERTIGO OF LEFT EAR: Primary | ICD-10-CM

## 2023-04-04 DIAGNOSIS — M25.559 CHRONIC HIP PAIN, UNSPECIFIED LATERALITY: ICD-10-CM

## 2023-04-04 DIAGNOSIS — G89.29 CHRONIC HIP PAIN, UNSPECIFIED LATERALITY: ICD-10-CM

## 2023-04-04 PROCEDURE — 99213 OFFICE O/P EST LOW 20 MIN: CPT | Performed by: FAMILY MEDICINE

## 2023-04-04 ASSESSMENT — PAIN SCALES - GENERAL: PAINLEVEL: NO PAIN (0)

## 2023-04-04 NOTE — PROGRESS NOTES
ASSESSMENT / PLAN:  (H81.12) Benign paroxysmal positional vertigo of left ear  (primary encounter diagnosis)  Comment: improving  Plan: follow-up p.t. and consider neurology. Back to ER if prolonged or new neuro changes/emesis. Expected course and warning signs reviewed. Call/email with questions/concerns  Continue prn meclizine. Consider valium prn too. Keep well hydrated and no sudden head turns.     (M25.559,  G89.29) Chronic hip pain, unspecified laterality  Comment: stable  Plan: needs new handicap sticker with pain and asthma.         Subjective   Guillermo is a 82 year old, presenting for the following health issues:  ER F/U  follow-up er visit for vertigo  Had mri brain- no acute changes.given meclizine and valium (given for MRI) and p.t. referral done. No nausea with vertigo. No headaches or blurry vision. No confusion. Some left arm tingling. No neurology in past. Emotionally doing ok. No chest pain. Asthma - stable currently.   Going back to p.t. for vertigo at [facility].   Had cmp/cbc and trop and ekg done. No LOSS OF CONSCIENCE   History PAF, htn, high cholesterol. and asthma.    Per ED note:  Guillermo Hogue is a 82 y.o. male with a history of benign paroxysmal positional vertigo, atrial fibrillation and subdural hematoma who presents to the ED via private car for evaluation of dizziness. The patient reports two days ago he began experiencing dizziness, unsteadiness and left upper extremity pain. The patient reports he has experienced this previous and takes a meclizine tablet and his symptoms resolve. The patient reports he took one, but this times his symptoms did not resolve, thus prompting his visit to the emergency department today. Upon arrival, the patient reports his symptoms are present now, but improved. Patient denies previous stroke or anticoagulation. No other complaints or concerns at this time  Guillermo Hogue is a 82 y.o. male who comes into the emergency department complaining of room  "spinning dizziness and disequilibrium that started yesterday morning when waking. Patient reports that he has had vertigo before and previously graduated from \"dizziness school\". MRI obtained to rule out cerebellar stroke was negative. Patient improved with Valium and meclizine. PT referral given. Patient expressed understanding and was in agreement with the plan of care. Reasons to return and follow-up instructions were discussed with the patient        4/4/2023     1:46 PM   Additional Questions   Roomed by Carmen MCKEON     ED/UC Followup:    Facility:  [facility]   Date of visit: 3/28    Reason for visit: Vertigo  Current Status: improved             Objective    /73 (BP Location: Right arm, Patient Position: Sitting, Cuff Size: Adult Large)   Pulse 80   Temp 98  F (36.7  C) (Oral)   Ht 1.74 m (5' 8.5\")   Wt 98.1 kg (216 lb 3.2 oz)   SpO2 96%   BMI 32.39 kg/m    Body mass index is 32.39 kg/m .  Physical Exam   GENERAL: healthy, alert and no distress  EYES: Eyes grossly normal to inspection, PERRL and conjunctivae and sclerae normal  HENT: ear canals and TM's normal, nose and mouth without ulcers or lesions  NECK: no adenopathy, no asymmetry, masses, or scars and thyroid normal to palpation  RESP: lungs clear to auscultation - no rales, rhonchi or wheezes  CV: regular rate and rhythm, normal S1 S2, no S3 or S4, no murmur, click or rub, no peripheral edema and peripheral pulses strong  ABDOMEN: soft, nontender, no hepatosplenomegaly, no masses and bowel sounds normal  MS: no gross musculoskeletal defects noted, no edema  NEURO: Normal strength and tone, mentation intact and speech normal  PSYCH: mentation appears normal, affect normal/bright            "

## 2023-07-06 NOTE — PROGRESS NOTES
"History of Present Illness - Guillermo Hogue is a 83 year old male status post functional endoscopic sinus surgery on 2/12/2009, last seen on 4/25/2022    To review, at the visit on 11/11/2013 he told me that since the functional endoscopic sinus surgery his nose had been \"excellent.\"   However, we have continued to manage a chronic post nasal drainage.    To review, he was on BPM as an antihistamine, but because it was no longer available, I placed him on low dose atarax (hydroxyzine) to dry up his allergic rhinitis related post nasal drainage.  He told me that \"cornered the market on the BPM in [place]\" and he had run out of it.  I tried him on atarax but he tells me that it is not helping nearly as much.  He is also taking the BID mucinex, but he thinks that is making only a minimal difference.  He had been taking sleeping pills to get him to sleep.    So with those failures of therapy, I tried a new approach, and used Gent Itraconazole Dex nasal irrigations with the thought that perhaps this was inflammatory post nasal drainage.  On 12/12/12, and the rinses worked dramatically well for him.  He did have complaints of sleep cycle issues, which I then addressed with decreasing the the Dex component.  But then at the follow up on 2/6/2013 there was a return of the drainage and congestion, so I resumed the full strength dex component for two months.  At the visit on 8/5/2014, I encouraged him to try and decrease the frequency of the medicated rinses, and continue saline irrigation.  And at the most recent visit on 10/5/2015, things were going great, and I had not seen him since 4/4/2017.    At the 2018 visit he also had a new issue.  He told me that intermittently over the years he has had issues with occasional dizziness, and PT for benign paroxysmal positional vertigo has always helped in the past.  But this time it has not seemed help.  I felt that this bidirectional rotationally triggered momentary vertigo was " posterior canal benign paroxysmal positional vertigo, and referred him for Semont maneuvers.  That problem has improved.    The main reason for his March 2019 visit was because he had been having some pain in the RIGHT upper chest when he lays down.  He was seen by his PCP Dr. Doty, and there was some noise in the chest, and was treated with Amoxicillin and predniosne, and a second round.  He wanted to rule out sinus post nasal drainage as the cause of this pain in the chest. There had been no productive cough or dyspnea otherwise. I ordered a chest CT, which was thankfully clear.    Things have overall gone well since seeing me in 2022, and he has been on Dupixent, and that has caused him some leg cramps    He tells me that overall the sinuses have been fine thus far in 2023. But he does note that with poor air quality he has had a few days of hard time breathing.    Past Medical History -   Patient Active Problem List   Diagnosis     Reactive airway disease     Chronic sinusitis, unspecified location     AR (allergic rhinitis)     HYPERLIPIDEMIA LDL GOAL <130     BPH (benign prostatic hyperplasia)     Palpitations     Hypertension goal BP (blood pressure) < 140/90     Fatty liver     Cataracts, bilateral     Moderate persistent asthma, uncomplicated     Seasonal allergic rhinitis due to pollen     Allergic rhinitis due to animal dander     Allergic rhinitis due to dust mite     Paroxysmal atrial fibrillation (H)     Dizziness     Nasal polyposis     Benign paroxysmal positional vertigo of left ear       Current Medications -   Current Outpatient Medications:      acetaminophen (TYLENOL) 500 MG tablet, Take 1,000 mg by mouth 3 times daily, Disp: , Rfl:      albuterol (PROAIR HFA/PROVENTIL HFA/VENTOLIN HFA) 108 (90 Base) MCG/ACT inhaler, Inhale 2 puffs into the lungs every 4 hours as needed for shortness of breath / dyspnea or wheezing Use with spacer, Disp: 18 g, Rfl: 3     atorvastatin (LIPITOR) 20 MG tablet, Take  0.5 tablets (10 mg) by mouth daily, Disp: 45 tablet, Rfl: 3     cephALEXin (KEFLEX) 500 MG capsule, , Disp: , Rfl:      diltiazem ER COATED BEADS (CARTIA XT) 120 MG 24 hr capsule, Take 1 capsule (120 mg) by mouth daily For blood pressure/lowers pulse. Pharmacy ok to hold prescription until due (Patient not taking: Reported on 2023), Disp: 90 capsule, Rfl: 3     dupilumab (DUPIXENT) 300 MG/2ML prefilled syringe, Inject 2 mLs (300 mg) Subcutaneous every 14 days, Disp: 4 mL, Rfl: 6     meclizine (ANTIVERT) 25 MG tablet, Take 1 tablet (25 mg) by mouth 3 times daily as needed for dizziness (Patient not taking: Reported on 2023), Disp: 30 tablet, Rfl: 0     Spacer/Aero-Holding Chambers (SPACER/AERO-HOLD CHAMBER MASK) RAMAN, 1 Adult size spacer to use with MDI inhalers., Disp: 1 each, Rfl: 0     SYMBICORT 160-4.5 MCG/ACT Inhaler, Inhale 2 puffs into the lungs 2 times daily, Disp: 10.2 g, Rfl: 3     triamcinolone (NASACORT) 55 MCG/ACT nasal aerosol, Spray 2 sprays into both nostrils daily, Disp: , Rfl:     Allergies -   Allergies   Allergen Reactions     Hytrin [Terazosin Hcl]      Leg swelling and vertigo     Simvastatin Other (See Comments)     Body aches     Cats      Dogs      Dust Mites      Morphine And Related Nausea     [place]      Seasonal Allergies        Social History -   Social History     Social History     Marital status:      Spouse name: Sanam -       Number of children: 2     Years of education: 14     Occupational History     [employer] Management Retired          Social History Main Topics     Smoking status: Never Smoker     Smokeless tobacco: Never Used     Alcohol use No     Drug use: No     Sexual activity: No     Other Topics Concern     Parent/Sibling W/ Cabg, Mi Or Angioplasty Before 65f 55m? Yes     2 Brothers and father      Service No     Blood Transfusions No     Caffeine Concern No     Occupational Exposure Yes     he was in [employer]     Hobby Hazards No      Sleep Concern No     Stress Concern No     Weight Concern Yes     Special Diet No     Back Care No     Exercise No     Bike Helmet No     Seat Belt Yes     Self-Exams No     Social History Narrative       Family History -   Family History   Problem Relation Age of Onset     Breast Cancer Mother      Arthritis Father      C.A.D. Father      Cerebrovascular Disease Father      Respiratory Father         TB history     Rheumatoid Arthritis Father      Cerebrovascular Disease Maternal Grandmother      Hypertension Maternal Grandmother      Cancer Maternal Grandfather      C.A.D. Brother      Prostate Cancer Brother      C.A.D. Brother        Review of Systems - As per HPI and PMHx, otherwise 10+ system review of the head and neck, and general constitution is negative.    Physical Exam  BP (!) 149/77   Pulse 88   Wt 98.4 kg (217 lb)   BMI 32.51 kg/m      General - The patient is well nourished and well developed, and appears to have good nutritional status.  Alert and oriented to person and place, answers questions and cooperates with examination appropriately.   Head and Face - Normocephalic and atraumatic, with no gross asymmetry noted of the contour of the facial features.  The facial nerve is intact, with strong symmetric movements.  Voice and Breathing - The patient was breathing comfortably without the use of accessory muscles. There was no wheezing, stridor, or stertor.  The patients voice was clear and strong, and had appropriate pitch and quality.  Ears - The tympanic membranes are normal in appearance, bony landmarks are intact.  No retraction, perforation, or masses.  No fluid or purulence was seen in the external canal or the middle ear. No evidence of infection of the middle ear or external canal, cerumen was normal in appearance.  Eyes - Extraocular movements intact, and the pupils were reactive to light.  Sclera were not icteric or injected, conjunctiva were pink and moist.  Mouth - Examination of  the oral cavity showed pink, healthy oral mucosa. No lesions or ulcerations noted.  The tongue was mobile and midline, and the dentition were in good condition.    Throat - The walls of the oropharynx were smooth, pink, moist, symmetric, and had no lesions or ulcerations.  The tonsillar pillars and soft palate were symmetric.  The uvula was midline on elevation.    Neck - Normal midline excursion of the laryngotracheal complex during swallowing.  Full range of motion on passive movement.  Palpation of the occipital, submental, submandibular, internal jugular chain, and supraclavicular nodes did not demonstrate any abnormal lymph nodes or masses.  The carotid pulse was palpable bilaterally.  Palpation of the thyroid was soft and smooth, with no nodules or goiter appreciated.  The trachea was mobile and midline.  Nose - The nose was examined with nasal speculum. On the LEFT, I was able to look up into the middle meatus all the way to the roof of the ethmoid.  The mucosa is overall more edematous than expected with some thick cloudy secretions bilaterally.      A/P - Guillermo Hogue is a 83 year old male  (J32.4) Chronic pansinusitis  (primary encounter diagnosis)  (J33.9) Nasal polyposis    Overall Guillermo has done well over the years, and only using Nasacort.  He has had to stop Dupixent for three months and his nose has stayed great.    Continue only the Dupixent and Nasacort for now.    With regards to the breathing issues, I think if they continue, let me know and I can increase the dose of Symbicort.

## 2023-07-07 DIAGNOSIS — J45.40 MODERATE PERSISTENT ASTHMA, UNCOMPLICATED: ICD-10-CM

## 2023-07-07 NOTE — TELEPHONE ENCOUNTER
"Pending Prescriptions:                       Disp   Refills    SYMBICORT 160-4.5 MCG/ACT Inhaler          10.2 g 3        Sig: Inhale 2 puffs into the lungs 2 times daily    Routing refill request to provider for review/approval because:  Patient needs to be seen because it has been more than 1 year since last office visit.    PHQ-9 score:         No data to display                Requested Prescriptions   Pending Prescriptions Disp Refills    SYMBICORT 160-4.5 MCG/ACT Inhaler 10.2 g 3     Sig: Inhale 2 puffs into the lungs 2 times daily       Inhaled Steroids Protocol Failed - 7/7/2023  4:43 PM        Failed - Recent (6 mo) or future (30 days) visit within the authorizing provider's specialty     Patient had office visit in the last 6 months or has a visit in the next 30 days with authorizing provider or within the authorizing provider's specialty.  See \"Patient Info\" tab in inbasket, or \"Choose Columns\" in Meds & Orders section of the refill encounter.            Passed - Patient is age 12 or older        Passed - Asthma control assessment score within normal limits in last 6 months     Please review ACT score.           Passed - Medication is active on med list       Long-Acting Beta Agonist Inhalers Protocol  Failed - 7/7/2023  4:43 PM        Failed - Recent (6 mo) or future (30 days) visit within the authorizing provider's specialty     Patient had office visit in the last 6 months or has a visit in the next 30 days with authorizing provider or within the authorizing provider's specialty.  See \"Patient Info\" tab in inbasket, or \"Choose Columns\" in Meds & Orders section of the refill encounter.            Passed - Patient is age 12 or older        Passed - Asthma control assessment score within normal limits in last 6 months     Please review ACT score.           Passed - Order for Serevent, Striverdi, or Foradil and pt has steroid inhaler        Passed - Medication is active on med list                Janey " RILEY Elena RN on 7/7/2023 at 4:53 PM

## 2023-07-11 NOTE — TELEPHONE ENCOUNTER
Attempted to reach patient by phone.  No answer, no voicemail.    Enriqueta Wen MSN, RN   [facility], 7/11/2023 1:45 PM

## 2023-07-12 NOTE — TELEPHONE ENCOUNTER
Pt is scheduled 9/5-next available. Pt is wondering if his rx will be approved. Stated he needs is refilled now. Or is pt able to be seen sooner? Please call. [phone]

## 2023-07-13 RX ORDER — BUDESONIDE AND FORMOTEROL FUMARATE DIHYDRATE 160; 4.5 UG/1; UG/1
2 AEROSOL RESPIRATORY (INHALATION) 2 TIMES DAILY
Qty: 10.2 G | Refills: 0 | Status: SHIPPED | OUTPATIENT
Start: 2023-07-13 | End: 2023-08-15

## 2023-07-13 NOTE — TELEPHONE ENCOUNTER
Pending Prescriptions:                       Disp   Refills    SYMBICORT 160-4.5 MCG/ACT Inhaler         10.2 g 3            Sig: Inhale 2 puffs into the lungs 2 times daily    Routing refill request to provider for review/approval because:  Patient needs to be seen because it has been more than 1 year since last office visit.  LOV 6/7/23.  Patient was notified and has scheduled in next soonest available which is 9/5/23.  Can this be filled to last until his appt?    Dr Aragon out of office, forwarding to on call allergist for consideration, thank you.    Enriqueta Wen MSN, RN   [facility], 7/13/2023 9:38 AM

## 2023-07-17 ENCOUNTER — OFFICE VISIT (OUTPATIENT)
Dept: OTOLARYNGOLOGY | Facility: CLINIC | Age: 83
End: 2023-07-17
Payer: COMMERCIAL

## 2023-07-17 VITALS
SYSTOLIC BLOOD PRESSURE: 149 MMHG | WEIGHT: 217 LBS | BODY MASS INDEX: 32.51 KG/M2 | HEART RATE: 88 BPM | DIASTOLIC BLOOD PRESSURE: 77 MMHG

## 2023-07-17 DIAGNOSIS — J32.4 CHRONIC PANSINUSITIS: ICD-10-CM

## 2023-07-17 DIAGNOSIS — J33.9 NASAL POLYPOSIS: Primary | ICD-10-CM

## 2023-07-17 PROCEDURE — 99214 OFFICE O/P EST MOD 30 MIN: CPT | Performed by: OTOLARYNGOLOGY

## 2023-07-17 RX ORDER — MULTIPLE VITAMINS W/ MINERALS TAB 9MG-400MCG
1 TAB ORAL DAILY
COMMUNITY
End: 2024-06-24

## 2023-07-17 RX ORDER — TRIAMCINOLONE ACETONIDE 55 UG/1
2 SPRAY, METERED NASAL DAILY
Qty: 10.8 ML | Refills: 11 | Status: SHIPPED | OUTPATIENT
Start: 2023-07-17 | End: 2024-09-10

## 2023-08-12 DIAGNOSIS — J45.40 MODERATE PERSISTENT ASTHMA, UNCOMPLICATED: ICD-10-CM

## 2023-08-15 RX ORDER — BUDESONIDE AND FORMOTEROL FUMARATE DIHYDRATE 160; 4.5 UG/1; UG/1
2 AEROSOL RESPIRATORY (INHALATION) 2 TIMES DAILY
Qty: 10.2 G | Refills: 0 | Status: SHIPPED | OUTPATIENT
Start: 2023-08-15 | End: 2023-09-05

## 2023-08-15 NOTE — TELEPHONE ENCOUNTER
Pending Prescriptions:                       Disp   Refills    SYMBICORT 160-4.5 MCG/ACT Inhaler [Pharma*10.2 g 0            Sig: INHALE 2 PUFFS BY MOUTH TWICE DAILY    Routing refill request to provider for review/approval because:  Patient needs to be seen because it has been more than 1 year since last office visit.  LOV 6/7/22, he is scheduled to see you on 9/5/23.    Enriqueta Wen MSN, RN   [facility], 8/15/2023 9:55 AM

## 2023-08-21 DIAGNOSIS — J45.40 MODERATE PERSISTENT ASTHMA, UNCOMPLICATED: ICD-10-CM

## 2023-08-21 NOTE — TELEPHONE ENCOUNTER
LF: not indicated  Qty: # boxes (6 syringes)  Last Allergy Visit: none found in Epic   Next Scheduled Allergy Visit: 9/5/23

## 2023-08-22 RX ORDER — DUPILUMAB 300 MG/2ML
300 INJECTION, SOLUTION SUBCUTANEOUS
Qty: 4 ML | Refills: 6 | Status: SHIPPED | OUTPATIENT
Start: 2023-08-22 | End: 2023-09-05

## 2023-08-22 NOTE — TELEPHONE ENCOUNTER
Requested Prescriptions   Pending Prescriptions Disp Refills    dupilumab (DUPIXENT) 300 MG/2ML prefilled syringe 4 mL 6     Sig: Inject 2 mLs (300 mg) Subcutaneous every 14 days       There is no refill protocol information for this order        Routing refill request to provider for review/approval because:  Drug not on the [facility] refill protocol   LOV 6/7/22  Next 9/5/23    Karina VINCENT, Specialty RN 8/22/2023 9:08 AM

## 2023-09-05 ENCOUNTER — OFFICE VISIT (OUTPATIENT)
Dept: ALLERGY | Facility: OTHER | Age: 83
End: 2023-09-05
Payer: COMMERCIAL

## 2023-09-05 VITALS
BODY MASS INDEX: 32.01 KG/M2 | SYSTOLIC BLOOD PRESSURE: 128 MMHG | OXYGEN SATURATION: 95 % | HEART RATE: 71 BPM | DIASTOLIC BLOOD PRESSURE: 76 MMHG | WEIGHT: 213.63 LBS

## 2023-09-05 DIAGNOSIS — J32.8 OTHER CHRONIC SINUSITIS: ICD-10-CM

## 2023-09-05 DIAGNOSIS — J30.81 ALLERGIC RHINITIS DUE TO ANIMAL DANDER: ICD-10-CM

## 2023-09-05 DIAGNOSIS — J33.9 NASAL POLYPOSIS: ICD-10-CM

## 2023-09-05 DIAGNOSIS — J30.1 SEASONAL ALLERGIC RHINITIS DUE TO POLLEN: ICD-10-CM

## 2023-09-05 DIAGNOSIS — J45.40 MODERATE PERSISTENT ASTHMA, UNCOMPLICATED: Primary | ICD-10-CM

## 2023-09-05 DIAGNOSIS — J30.89 ALLERGIC RHINITIS DUE TO DUST MITE: ICD-10-CM

## 2023-09-05 PROCEDURE — 99214 OFFICE O/P EST MOD 30 MIN: CPT | Performed by: ALLERGY & IMMUNOLOGY

## 2023-09-05 RX ORDER — BUDESONIDE AND FORMOTEROL FUMARATE DIHYDRATE 160; 4.5 UG/1; UG/1
2 AEROSOL RESPIRATORY (INHALATION) 2 TIMES DAILY
Qty: 10.2 G | Refills: 0 | Status: SHIPPED | OUTPATIENT
Start: 2023-09-05 | End: 2023-10-24

## 2023-09-05 RX ORDER — DUPILUMAB 300 MG/2ML
300 INJECTION, SOLUTION SUBCUTANEOUS
Qty: 4 ML | Refills: 6 | Status: SHIPPED | OUTPATIENT
Start: 2023-09-05 | End: 2024-05-17

## 2023-09-05 RX ORDER — ALBUTEROL SULFATE 90 UG/1
2 AEROSOL, METERED RESPIRATORY (INHALATION) EVERY 4 HOURS PRN
Qty: 18 G | Refills: 3 | Status: SHIPPED | OUTPATIENT
Start: 2023-09-05 | End: 2024-06-24

## 2023-09-05 ASSESSMENT — ENCOUNTER SYMPTOMS
FEVER: 0
CONSTIPATION: 0
ABDOMINAL PAIN: 0
VOMITING: 0
WHEEZING: 0
SHORTNESS OF BREATH: 0
EYE ITCHING: 0
DIZZINESS: 0
SINUS PRESSURE: 1
RHINORRHEA: 0
NERVOUS/ANXIOUS: 0
DIARRHEA: 0
EYE REDNESS: 0
ADENOPATHY: 0
COUGH: 0
CHILLS: 0
ACTIVITY CHANGE: 0
NAUSEA: 0
FACIAL SWELLING: 0
EYE DISCHARGE: 0
HEADACHES: 0
JOINT SWELLING: 0
CHEST TIGHTNESS: 0
FATIGUE: 0
SORE THROAT: 0
LIGHT-HEADEDNESS: 0

## 2023-09-05 ASSESSMENT — ASTHMA QUESTIONNAIRES: ACT_TOTALSCORE: 22

## 2023-09-05 NOTE — PROGRESS NOTES
SUBJECTIVE:                                                                   Guillermo Hogue presents today to our Allergy Clinic at [facility] for a follow up visit. He is a 83 year old male with asthma, chronic sinusitis with nasal polyposis, and allergic rhinitis.     History of chronic chest symptoms for multiple years. Diagnosed with asthma at [facility] in 2010.   Allergic rhinoconjunctivitis: Perennial with spring and summer nasal and ocular symptoms. SPT for aeroallergens performed in August 2019 showed sensitivity to cat, dog, dust mites, tree pollen, and weed pollen. History of nasal polyps.    Follows with Dr. Valadez of ENT.   Currently on Dupixent 300 mg every other week. The reason for Dupixent was not nasal polyposis, but not well controlled asthma. Developed persistent tingling sensation in both his legs at the end of December 2021-beginning of January 2022.. The right leg seemed to be affected more than the left. Stopped Dupixent due to that, but then restarted it again.  He stated that he had an MRI of the back done and was told that he had some issues with vertebrae.  It did not make symptoms worse.    From the asthma standpoint, Guillermo states that it is stable and well-controlled with Symbicort 160/4.5 mcg 2 puffs twice daily, dupilumab 300 mg every 14 days, and albuterol as needed.  The patient denies current chest tightness, cough, wheezing, or shortness of breath. Guillermo uses albuterol HFA  less than twice weekly for rescue from chest symptoms. He is waking up less than twice per month due to chest symptoms. There has been no use of oral steroids since the last visit.   From chronic sinusitis with nasal polyposis, he has been doing fairly well. He uses Nasacort 2 sprays in each nostril once daily and dupilumab 300 mg every 14 days.  He did not have any issues for about a year. Last week, due to excessive heat and poor pollution, he had sinus pressure and nasal congestion  for a couple of days, making him feel uncomfortable. Subsequently, symptoms got better. He is way better now. He has no issues with smell or taste.   Patient Active Problem List   Diagnosis    Reactive airway disease    Chronic sinusitis, unspecified location    AR (allergic rhinitis)    HYPERLIPIDEMIA LDL GOAL <130    BPH (benign prostatic hyperplasia)    Palpitations    Hypertension goal BP (blood pressure) < 140/90    Fatty liver    Cataracts, bilateral    Moderate persistent asthma, uncomplicated    Seasonal allergic rhinitis due to pollen    Allergic rhinitis due to animal dander    Allergic rhinitis due to dust mite    Paroxysmal atrial fibrillation (H)    Dizziness    Nasal polyposis    Benign paroxysmal positional vertigo of left ear       Past Medical History:   Diagnosis Date    Allergic rhinitis age 22    Asthma 2009    Chronic osteoarthritis     Nasal polyposis 2009    Osteoarthritis of left hip     Reactive airway disease 2009      Problem (# of Occurrences) Relation (Name,Age of Onset)    Arthritis (1) Father    Cancer (1) Maternal Grandfather    Hypertension (1) Maternal Grandmother    Respiratory (1) Father: TB history    Cerebrovascular Disease (2) Father, Maternal Grandmother    Breast Cancer (1) Mother    Prostate Cancer (1) Brother    C.A.D. (3) Father, Brother, Brother    Rheumatoid Arthritis (1) Father          Past Surgical History:   Procedure Laterality Date    COLONOSCOPY      COLONOSCOPY N/A 5/10/2017    Procedure: COMBINED COLONOSCOPY, SINGLE OR MULTIPLE BIOPSY/POLYPECTOMY BY BIOPSY;;  Surgeon: Abisai Duarte DO;  Location: [facility] OR    COLONOSCOPY WITH CO2 INSUFFLATION N/A 5/14/2015    Procedure: COLONOSCOPY WITH CO2 INSUFFLATION;  Surgeon: Jose R Richmond MD;  Location: [facility] OR    COLONOSCOPY WITH CO2 INSUFFLATION N/A 5/10/2017    Procedure: COLONOSCOPY WITH CO2 INSUFFLATION;  COLON SCREEN/ UNDERWOOD;  Surgeon: Abisai Duarte DO;  Location: [facility] OR    JOINT REPLACEMENT Left 06/2019     hip    SINUS SURGERY  2009    TONSILLECTOMY  age 22     Social History     Socioeconomic History    Marital status:      Spouse name: Sanam palafox 2013    Number of children: 2    Years of education: 14    Highest education level: None   Occupational History    Occupation: Middle Management     Employer: RETIRED     Comment: 2008   Tobacco Use    Smoking status: Never    Smokeless tobacco: Never   Vaping Use    Vaping Use: Never used   Substance and Sexual Activity    Alcohol use: No    Drug use: No    Sexual activity: Not Currently   Other Topics Concern    Parent/sibling w/ CABG, MI or angioplasty before 65F 55M? Yes     Comment: 2 Brothers and father     Service No    Blood Transfusions No    Caffeine Concern No    Occupational Exposure Yes     Comment: he was in [employer]    Hobby Hazards No    Sleep Concern No    Stress Concern No    Weight Concern Yes    Special Diet No    Back Care No    Exercise No    Bike Helmet No    Seat Belt Yes    Self-Exams No   Social History Narrative    2023            ENVIRONMENTAL HISTORY: The family lives in a [facility] [place]. The home is heated with a forced air. They do have central air conditioning. The patient's bedroom is furnished with carpeting in bedroom.  Pets inside the house include 0 animals. There is no history of cockroach or mice infestation. There is/are 0 smokers in the house.  The house does not have a damp basement.            Review of Systems   Constitutional:  Negative for activity change, chills, fatigue and fever.   HENT:  Positive for congestion and sinus pressure. Negative for ear pain, facial swelling, nosebleeds, postnasal drip, rhinorrhea, sneezing and sore throat.    Eyes:  Negative for discharge, redness and itching.   Respiratory:  Negative for cough, chest tightness, shortness of breath and wheezing.    Cardiovascular:  Negative for chest pain.   Gastrointestinal:  Negative for abdominal pain,  constipation, diarrhea, nausea and vomiting.   Musculoskeletal:  Negative for joint swelling.   Skin:  Negative for rash.   Neurological:  Negative for dizziness, light-headedness and headaches.   Hematological:  Negative for adenopathy.   Psychiatric/Behavioral:  Negative for behavioral problems. The patient is not nervous/anxious.            Current Outpatient Medications:     acetaminophen (TYLENOL) 500 MG tablet, Take 1,000 mg by mouth 3 times daily, Disp: , Rfl:     albuterol (PROAIR HFA/PROVENTIL HFA/VENTOLIN HFA) 108 (90 Base) MCG/ACT inhaler, Inhale 2 puffs into the lungs every 4 hours as needed for shortness of breath or wheezing Use with spacer, Disp: 18 g, Rfl: 3    atorvastatin (LIPITOR) 20 MG tablet, Take 0.5 tablets (10 mg) by mouth daily, Disp: 45 tablet, Rfl: 3    diltiazem ER COATED BEADS (CARTIA XT) 120 MG 24 hr capsule, Take 1 capsule (120 mg) by mouth daily For blood pressure/lowers pulse. Pharmacy ok to hold prescription until due, Disp: 90 capsule, Rfl: 3    dupilumab (DUPIXENT) 300 MG/2ML prefilled syringe, Inject 2 mLs (300 mg) Subcutaneous every 14 days, Disp: 4 mL, Rfl: 6    meclizine (ANTIVERT) 25 MG tablet, Take 1 tablet (25 mg) by mouth 3 times daily as needed for dizziness, Disp: 30 tablet, Rfl: 0    Spacer/Aero-Holding Chambers (SPACER/AERO-HOLD CHAMBER MASK) RAMAN, 1 Adult size spacer to use with MDI inhalers., Disp: 1 each, Rfl: 0    SYMBICORT 160-4.5 MCG/ACT Inhaler, Inhale 2 puffs into the lungs 2 times daily, Disp: 10.2 g, Rfl: 0    triamcinolone (NASACORT) 55 MCG/ACT nasal aerosol, Spray 2 sprays into both nostrils daily, Disp: 10.8 mL, Rfl: 11    cephALEXin (KEFLEX) 500 MG capsule, , Disp: , Rfl:     multivitamin w/minerals (THERA-VIT-M) tablet, Take 1 tablet by mouth daily (Patient not taking: Reported on 9/5/2023), Disp: , Rfl:   Immunization History   Administered Date(s) Administered    COVID-19 Bivalent 12+ (Pfizer) 09/26/2022    COVID-19 MONOVALENT 12+ (Pfizer) 02/04/2021,  02/25/2021, 10/04/2021    COVID-19 Monovalent 12+ (Pfizer 2022) 04/11/2022    Influenza (H1N1) 12/22/2009    Influenza (High Dose) 3 valent vaccine 10/27/2010, 10/22/2011, 09/30/2014, 09/08/2015, 09/29/2016, 10/04/2017, 09/13/2018, 09/09/2019    Influenza (IIV3) PF 10/19/2002, 10/13/2003, 10/16/2004, 10/20/2005, 10/20/2006, 10/27/2007, 10/27/2008, 09/18/2009, 10/13/2012    Influenza Vaccine 65+ (Fluzone HD) 09/14/2020, 09/23/2021, 09/26/2022    Influenza Vaccine >6 months (Alfuria,Fluzone) 10/17/2013    Mantoux Tuberculin Skin Test 10/09/2009    Pneumo Conj 13-V (2010&after) 09/08/2015    Pneumococcal 23 valent 10/20/2006    TD,PF 7+ (Tenivac) 10/27/2010    TDAP (Adacel,Boostrix) 01/23/2023    TDAP Vaccine (Adacel) 08/14/2012    Zoster recombinant adjuvanted (SHINGRIX) 09/13/2018, 01/03/2019    Zoster vaccine, live 11/12/2014     Allergies   Allergen Reactions    Hytrin [Terazosin Hcl]      Leg swelling and vertigo    Simvastatin Other (See Comments)     Body aches    Cats     Dogs     Dust Mites     Morphine And Related Nausea    [place]     Seasonal Allergies      OBJECTIVE:                                                                 /76   Pulse 71   Wt 96.9 kg (213 lb 10 oz)   SpO2 95%   BMI 32.01 kg/m          Physical Exam  Vitals and nursing note reviewed.   Constitutional:       General: He is not in acute distress.     Appearance: He is not diaphoretic.   HENT:      Head: Normocephalic and atraumatic.      Right Ear: Tympanic membrane, ear canal and external ear normal.      Left Ear: Tympanic membrane, ear canal and external ear normal.      Nose: No mucosal edema or rhinorrhea.      Right Turbinates: Not enlarged, swollen or pale.      Left Turbinates: Not enlarged, swollen or pale.      Mouth/Throat:      Lips: Pink.      Mouth: Mucous membranes are moist.      Pharynx: Oropharynx is clear. No pharyngeal swelling, oropharyngeal exudate or posterior oropharyngeal erythema.   Eyes:       General:         Right eye: No discharge.         Left eye: No discharge.      Conjunctiva/sclera: Conjunctivae normal.   Pulmonary:      Effort: No respiratory distress.      Breath sounds: Normal breath sounds and air entry. No stridor, decreased air movement or transmitted upper airway sounds. No decreased breath sounds, wheezing, rhonchi or rales.   Neurological:      Mental Status: He is alert and oriented to person, place, and time.   Psychiatric:         Mood and Affect: Mood normal.         Behavior: Behavior normal.           WORKUP: ACT 22    ASSESSMENT/PLAN:    Moderate persistent asthma, uncomplicated  The patient reports well-controlled asthma with Symbicort 160/4.5 mcg 2 puffs twice daily, dupilumab 300 mg every 14 days, and albuterol as needed.  - Continue as is.    - albuterol (PROAIR HFA/PROVENTIL HFA/VENTOLIN HFA) 108 (90 Base) MCG/ACT inhaler  Dispense: 18 g; Refill: 3  - SYMBICORT 160-4.5 MCG/ACT Inhaler  Dispense: 10.2 g; Refill: 0  - dupilumab (DUPIXENT) 300 MG/2ML prefilled syringe  Dispense: 4 mL; Refill: 6      Other chronic sinusitis  Nasal polyposis  Seasonal allergic rhinitis due to pollen  Allergic rhinitis due to animal dander  Allergic rhinitis due to dust mite    Most of the time, his symptoms are fairly well controlled with Nasacort 2 sprays in each nostril once daily and dupilumab 300 mg every 14 days.  He reports an improvement in sinus symptoms since last week, with only 1 episode of nasal congestion and sinus pressure due to heat and smoke exposure.  - Continue Nasacort 1-2 sprays in each nostril once daily and dupilumab 300 mg every 14 days.     Follow-up in 1 year or sooner if needed.      Thank you for allowing us to participate in the care of this patient. Please feel free to contact us if there are any questions or concerns about the patient.    Disclaimer: This note consists of symbols derived from keyboarding, dictation and/or voice recognition software. As a result, there  may be errors in the script that have gone undetected. Please consider this when interpreting information found in this chart.    Mohinder Aragon MD, FAAAAI, FACAAI  Allergy, Asthma and Immunology     [facility]

## 2023-09-05 NOTE — LETTER
9/5/2023         RE: Guillermo Hogue  [address]  [address]  [place] [address]        Dear Colleague,    Thank you for referring your patient, Guillermo Hogue, to the [facility]. Please see a copy of my visit note below.    SUBJECTIVE:                                                                   Guillermo Hogue presents today to our Allergy Clinic at [facility] for a follow up visit. He is a 83 year old male with asthma, chronic sinusitis with nasal polyposis, and allergic rhinitis.     History of chronic chest symptoms for multiple years. Diagnosed with asthma at [facility] in 2010.   Allergic rhinoconjunctivitis: Perennial with spring and summer nasal and ocular symptoms. SPT for aeroallergens performed in August 2019 showed sensitivity to cat, dog, dust mites, tree pollen, and weed pollen. History of nasal polyps.    Follows with Dr. Valadez of ENT.   Currently on Dupixent 300 mg every other week. The reason for Dupixent was not nasal polyposis, but not well controlled asthma. Developed persistent tingling sensation in both his legs at the end of December 2021-beginning of January 2022.. The right leg seemed to be affected more than the left. Stopped Dupixent due to that, but then restarted it again.  He stated that he had an MRI of the back done and was told that he had some issues with vertebrae.  It did not make symptoms worse.    From the asthma standpoint, Guillermo states that it is stable and well-controlled with Symbicort 160/4.5 mcg 2 puffs twice daily, dupilumab 300 mg every 14 days, and albuterol as needed.  The patient denies current chest tightness, cough, wheezing, or shortness of breath. Guillermo uses albuterol HFA  less than twice weekly for rescue from chest symptoms. He is waking up less than twice per month due to chest symptoms. There has been no use of oral steroids since the last visit.   From chronic sinusitis with nasal polyposis, he  has been doing fairly well. He uses Nasacort 2 sprays in each nostril once daily and dupilumab 300 mg every 14 days.  He did not have any issues for about a year. Last week, due to excessive heat and poor pollution, he had sinus pressure and nasal congestion for a couple of days, making him feel uncomfortable. Subsequently, symptoms got better. He is way better now. He has no issues with smell or taste.   Patient Active Problem List   Diagnosis     Reactive airway disease     Chronic sinusitis, unspecified location     AR (allergic rhinitis)     HYPERLIPIDEMIA LDL GOAL <130     BPH (benign prostatic hyperplasia)     Palpitations     Hypertension goal BP (blood pressure) < 140/90     Fatty liver     Cataracts, bilateral     Moderate persistent asthma, uncomplicated     Seasonal allergic rhinitis due to pollen     Allergic rhinitis due to animal dander     Allergic rhinitis due to dust mite     Paroxysmal atrial fibrillation (H)     Dizziness     Nasal polyposis     Benign paroxysmal positional vertigo of left ear       Past Medical History:   Diagnosis Date     Allergic rhinitis age 22     Asthma 2009     Chronic osteoarthritis      Nasal polyposis 2009     Osteoarthritis of left hip      Reactive airway disease 2009      Problem (# of Occurrences) Relation (Name,Age of Onset)    Arthritis (1) Father    Cancer (1) Maternal Grandfather    Hypertension (1) Maternal Grandmother    Respiratory (1) Father: TB history    Cerebrovascular Disease (2) Father, Maternal Grandmother    Breast Cancer (1) Mother    Prostate Cancer (1) Brother    C.A.D. (3) Father, Brother, Brother    Rheumatoid Arthritis (1) Father          Past Surgical History:   Procedure Laterality Date     COLONOSCOPY       COLONOSCOPY N/A 5/10/2017    Procedure: COMBINED COLONOSCOPY, SINGLE OR MULTIPLE BIOPSY/POLYPECTOMY BY BIOPSY;;  Surgeon: Abisai Duarte DO;  Location: [facility] OR     COLONOSCOPY WITH CO2 INSUFFLATION N/A 5/14/2015    Procedure: COLONOSCOPY  WITH CO2 INSUFFLATION;  Surgeon: Jose R Richmond MD;  Location: [facility] OR     COLONOSCOPY WITH CO2 INSUFFLATION N/A 5/10/2017    Procedure: COLONOSCOPY WITH CO2 INSUFFLATION;  COLON SCREEN/ UNDERWOOD;  Surgeon: Abisai Duarte DO;  Location: [facility] OR     JOINT REPLACEMENT Left 2019    hip     SINUS SURGERY  2009     TONSILLECTOMY  age 22     Social History     Socioeconomic History     Marital status:      Spouse name: Sanam palafox      Number of children: 2     Years of education: 14     Highest education level: None   Occupational History     Occupation: Middle Management     Employer: RETIRED     Comment:    Tobacco Use     Smoking status: Never     Smokeless tobacco: Never   Vaping Use     Vaping Use: Never used   Substance and Sexual Activity     Alcohol use: No     Drug use: No     Sexual activity: Not Currently   Other Topics Concern     Parent/sibling w/ CABG, MI or angioplasty before 65F 55M? Yes     Comment: 2 Brothers and father      Service No     Blood Transfusions No     Caffeine Concern No     Occupational Exposure Yes     Comment: he was in [employer]     Hobby Hazards No     Sleep Concern No     Stress Concern No     Weight Concern Yes     Special Diet No     Back Care No     Exercise No     Bike Helmet No     Seat Belt Yes     Self-Exams No   Social History Narrative    2023            ENVIRONMENTAL HISTORY: The family [place] [facility] [place]. The home is heated with a forced air. They do have central air conditioning. The patient's bedroom is furnished with carpeting in bedroom.  Pets inside the house include 0 animals. There is no history of cockroach or mice infestation. There is/are 0 smokers in the house.  The house does not have a damp basement.            Review of Systems   Constitutional:  Negative for activity change, chills, fatigue and fever.   HENT:  Positive for congestion and sinus pressure. Negative for ear pain, facial  swelling, nosebleeds, postnasal drip, rhinorrhea, sneezing and sore throat.    Eyes:  Negative for discharge, redness and itching.   Respiratory:  Negative for cough, chest tightness, shortness of breath and wheezing.    Cardiovascular:  Negative for chest pain.   Gastrointestinal:  Negative for abdominal pain, constipation, diarrhea, nausea and vomiting.   Musculoskeletal:  Negative for joint swelling.   Skin:  Negative for rash.   Neurological:  Negative for dizziness, light-headedness and headaches.   Hematological:  Negative for adenopathy.   Psychiatric/Behavioral:  Negative for behavioral problems. The patient is not nervous/anxious.            Current Outpatient Medications:      acetaminophen (TYLENOL) 500 MG tablet, Take 1,000 mg by mouth 3 times daily, Disp: , Rfl:      albuterol (PROAIR HFA/PROVENTIL HFA/VENTOLIN HFA) 108 (90 Base) MCG/ACT inhaler, Inhale 2 puffs into the lungs every 4 hours as needed for shortness of breath or wheezing Use with spacer, Disp: 18 g, Rfl: 3     atorvastatin (LIPITOR) 20 MG tablet, Take 0.5 tablets (10 mg) by mouth daily, Disp: 45 tablet, Rfl: 3     diltiazem ER COATED BEADS (CARTIA XT) 120 MG 24 hr capsule, Take 1 capsule (120 mg) by mouth daily For blood pressure/lowers pulse. Pharmacy ok to hold prescription until due, Disp: 90 capsule, Rfl: 3     dupilumab (DUPIXENT) 300 MG/2ML prefilled syringe, Inject 2 mLs (300 mg) Subcutaneous every 14 days, Disp: 4 mL, Rfl: 6     meclizine (ANTIVERT) 25 MG tablet, Take 1 tablet (25 mg) by mouth 3 times daily as needed for dizziness, Disp: 30 tablet, Rfl: 0     Spacer/Aero-Holding Chambers (SPACER/AERO-HOLD CHAMBER MASK) RAMAN, 1 Adult size spacer to use with MDI inhalers., Disp: 1 each, Rfl: 0     SYMBICORT 160-4.5 MCG/ACT Inhaler, Inhale 2 puffs into the lungs 2 times daily, Disp: 10.2 g, Rfl: 0     triamcinolone (NASACORT) 55 MCG/ACT nasal aerosol, Spray 2 sprays into both nostrils daily, Disp: 10.8 mL, Rfl: 11     cephALEXin  (KEFLEX) 500 MG capsule, , Disp: , Rfl:      multivitamin w/minerals (THERA-VIT-M) tablet, Take 1 tablet by mouth daily (Patient not taking: Reported on 9/5/2023), Disp: , Rfl:   Immunization History   Administered Date(s) Administered     COVID-19 Bivalent 12+ (Pfizer) 09/26/2022     COVID-19 MONOVALENT 12+ (Pfizer) 02/04/2021, 02/25/2021, 10/04/2021     COVID-19 Monovalent 12+ (Pfizer 2022) 04/11/2022     Influenza (H1N1) 12/22/2009     Influenza (High Dose) 3 valent vaccine 10/27/2010, 10/22/2011, 09/30/2014, 09/08/2015, 09/29/2016, 10/04/2017, 09/13/2018, 09/09/2019     Influenza (IIV3) PF 10/19/2002, 10/13/2003, 10/16/2004, 10/20/2005, 10/20/2006, 10/27/2007, 10/27/2008, 09/18/2009, 10/13/2012     Influenza Vaccine 65+ (Fluzone HD) 09/14/2020, 09/23/2021, 09/26/2022     Influenza Vaccine >6 months (Alfuria,Fluzone) 10/17/2013     Mantoux Tuberculin Skin Test 10/09/2009     Pneumo Conj 13-V (2010&after) 09/08/2015     Pneumococcal 23 valent 10/20/2006     TD,PF 7+ (Tenivac) 10/27/2010     TDAP (Adacel,Boostrix) 01/23/2023     TDAP Vaccine (Adacel) 08/14/2012     Zoster recombinant adjuvanted (SHINGRIX) 09/13/2018, 01/03/2019     Zoster vaccine, live 11/12/2014     Allergies   Allergen Reactions     Hytrin [Terazosin Hcl]      Leg swelling and vertigo     Simvastatin Other (See Comments)     Body aches     Cats      Dogs      Dust Mites      Morphine And Related Nausea     [place]      Seasonal Allergies      OBJECTIVE:                                                                 /76   Pulse 71   Wt 96.9 kg (213 lb 10 oz)   SpO2 95%   BMI 32.01 kg/m          Physical Exam  Vitals and nursing note reviewed.   Constitutional:       General: He is not in acute distress.     Appearance: He is not diaphoretic.   HENT:      Head: Normocephalic and atraumatic.      Right Ear: Tympanic membrane, ear canal and external ear normal.      Left Ear: Tympanic membrane, ear canal and external ear normal.       Nose: No mucosal edema or rhinorrhea.      Right Turbinates: Not enlarged, swollen or pale.      Left Turbinates: Not enlarged, swollen or pale.      Mouth/Throat:      Lips: Pink.      Mouth: Mucous membranes are moist.      Pharynx: Oropharynx is clear. No pharyngeal swelling, oropharyngeal exudate or posterior oropharyngeal erythema.   Eyes:      General:         Right eye: No discharge.         Left eye: No discharge.      Conjunctiva/sclera: Conjunctivae normal.   Pulmonary:      Effort: No respiratory distress.      Breath sounds: Normal breath sounds and air entry. No stridor, decreased air movement or transmitted upper airway sounds. No decreased breath sounds, wheezing, rhonchi or rales.   Neurological:      Mental Status: He is alert and oriented to person, place, and time.   Psychiatric:         Mood and Affect: Mood normal.         Behavior: Behavior normal.           WORKUP: ACT 22    ASSESSMENT/PLAN:    Moderate persistent asthma, uncomplicated  The patient reports well-controlled asthma with Symbicort 160/4.5 mcg 2 puffs twice daily, dupilumab 300 mg every 14 days, and albuterol as needed.  - Continue as is.    - albuterol (PROAIR HFA/PROVENTIL HFA/VENTOLIN HFA) 108 (90 Base) MCG/ACT inhaler  Dispense: 18 g; Refill: 3  - SYMBICORT 160-4.5 MCG/ACT Inhaler  Dispense: 10.2 g; Refill: 0  - dupilumab (DUPIXENT) 300 MG/2ML prefilled syringe  Dispense: 4 mL; Refill: 6      Other chronic sinusitis  Nasal polyposis  Seasonal allergic rhinitis due to pollen  Allergic rhinitis due to animal dander  Allergic rhinitis due to dust mite    Most of the time, his symptoms are fairly well controlled with Nasacort 2 sprays in each nostril once daily and dupilumab 300 mg every 14 days.  He reports an improvement in sinus symptoms since last week, with only 1 episode of nasal congestion and sinus pressure due to heat and smoke exposure.  - Continue Nasacort 1-2 sprays in each nostril once daily and dupilumab 300 mg every  14 days.     Follow-up in 1 year or sooner if needed.      Thank you for allowing us to participate in the care of this patient. Please feel free to contact us if there are any questions or concerns about the patient.    Disclaimer: This note consists of symbols derived from keyboarding, dictation and/or voice recognition software. As a result, there may be errors in the script that have gone undetected. Please consider this when interpreting information found in this chart.    Mohinder Aragon MD, FAAAAI, FACAAI  Allergy, Asthma and Immunology     [facility]        Again, thank you for allowing me to participate in the care of your patient.        Sincerely,        Mohinder Aragon MD

## 2023-09-05 NOTE — PATIENT INSTRUCTIONS
Continue current medication therapy. From asthma and polyp stand point.       Dr Aragon Scheduling:  [facility] (Tues / Wed) appointment line: [phone]  [facility] allergy shot room: [phone]  [facility] (Thurs / Fri) appointment line: [phone]    Pulmonary Function Scheduling:  [place] - [phone]  [facility] [phone]  [place] - [phone]     Prescription Assistance  If you need assistance with your prescriptions (cost, coverage, etc) please contact: [place] [facility] Assistance [facility] [phone]

## 2023-10-23 ENCOUNTER — TELEPHONE (OUTPATIENT)
Dept: FAMILY MEDICINE | Facility: CLINIC | Age: 83
End: 2023-10-23
Payer: COMMERCIAL

## 2023-10-23 ENCOUNTER — TELEPHONE (OUTPATIENT)
Dept: ALLERGY | Facility: CLINIC | Age: 83
End: 2023-10-23
Payer: COMMERCIAL

## 2023-10-23 DIAGNOSIS — J45.40 MODERATE PERSISTENT ASTHMA, UNCOMPLICATED: ICD-10-CM

## 2023-10-23 NOTE — TELEPHONE ENCOUNTER
[facility] Call Center    Phone Message    May a detailed message be left on voicemail: yes     Reason for Call: Medication Refill Request    Has the patient contacted the pharmacy for the refill? Yes   Name of medication being requested: SYMBICORT 160-4.5 MCG/ACT Inhaler  Provider who prescribed the medication: Dr. Mahesh KUMAR  Pharmacy: [facility] [id number] [facility] [address] [address]  Date medication is needed: more than a month worth.    Thanks     Action Taken: Message routed to:  Other: ER allergy     Travel Screening: Not Applicable

## 2023-10-24 RX ORDER — BUDESONIDE AND FORMOTEROL FUMARATE DIHYDRATE 160; 4.5 UG/1; UG/1
2 AEROSOL RESPIRATORY (INHALATION) 2 TIMES DAILY
Qty: 30.6 G | Refills: 2 | Status: SHIPPED | OUTPATIENT
Start: 2023-10-24 | End: 2024-02-21

## 2023-10-24 NOTE — TELEPHONE ENCOUNTER
Signed Prescriptions:                        Disp   Refills    SYMBICORT 160-4.5 MCG/ACT Inhaler          30.6 g 2        Sig: Inhale 2 puffs into the lungs 2 times daily  Authorizing Provider: CLARE PEDRAZA  Ordering User: OTIS WEN RN refilled medication per [facility] Refill Protocol.     Otis Wen MSN, RN   Specialty Clinic, 10/24/2023 9:05 AM

## 2023-11-30 NOTE — LETTER
6/11/2021         RE: Guillermo Hogue  [address]  [address]  [place] [address]        Dear Colleague,    Thank you for referring your patient, Guillermo Hogue, to the [facility]. Please see a copy of my visit note below.    Guillermo Hogue is a 81 year old White male with previous medical history significant for asthma, chronic sinusitis, and allergic rhinitis who returns for a follow up visit.     His asthma has been controlled.  He is on Symbicort 80/4.5 mcg 2 puff inhaled twice daily.  He is on Dupixent 300 mg every other week.  Tolerating Dupixent.  This has been significantly beneficial.  Has allergic rhinoconjunctivitis.  On Nasacort 2 sprays per nostril daily.  Denies any rhinorrhea, postnasal drainage, congestion, decrease sense of smell or taste.  Generally pleased with current therapy.  History of chronic sinusitis with nasal polyposis.  Follows with Dr. Valadez of ENT.    Past Medical History:   Diagnosis Date     Allergic rhinitis age 22     Asthma 2009     Chronic osteoarthritis      Nasal polyposis 2009     Osteoarthritis of left hip      Reactive airway disease 2009     Family History   Problem Relation Age of Onset     Breast Cancer Mother      Arthritis Father      C.A.D. Father      Cerebrovascular Disease Father      Respiratory Father         TB history     Rheumatoid Arthritis Father      Cerebrovascular Disease Maternal Grandmother      Hypertension Maternal Grandmother      Cancer Maternal Grandfather      C.A.D. Brother      Prostate Cancer Brother      C.A.D. Brother      Past Surgical History:   Procedure Laterality Date     COLONOSCOPY       COLONOSCOPY N/A 5/10/2017    Procedure: COMBINED COLONOSCOPY, SINGLE OR MULTIPLE BIOPSY/POLYPECTOMY BY BIOPSY;;  Surgeon: Abisai Duarte DO;  Location: [facility] OR     COLONOSCOPY WITH CO2 INSUFFLATION N/A 5/14/2015    Procedure: COLONOSCOPY WITH CO2 INSUFFLATION;  Surgeon: Jose R Richmond MD;  Location: [facility] OR      COLONOSCOPY WITH CO2 INSUFFLATION N/A 5/10/2017    Procedure: COLONOSCOPY WITH CO2 INSUFFLATION;  COLON SCREEN/ UNDERWOOD;  Surgeon: Abisai Duarte DO;  Location: [facility] OR     JOINT REPLACEMENT Left 06/2019    hip     SINUS SURGERY  2009     TONSILLECTOMY  age 22       REVIEW OF SYSTEMS:  Nose: negative for snoring.negative for changes in smell. negative for drainage.   Eyes: negative for eye watering. negative for eye itching. negative for vision changes. negative for eye redness.  Throat: negative for hoarseness. negative for sore throat. negative for trouble swallowing.   Lungs: negative for shortness of breath.negative for wheezing. negative for sputum production.   Integumentary: negative for rash. negative for scaling. negative for nail changes.       Current Outpatient Medications:      acetaminophen (TYLENOL) 500 MG tablet, Take 1,000 mg by mouth 3 times daily, Disp: , Rfl:      albuterol (PROAIR HFA/PROVENTIL HFA/VENTOLIN HFA) 108 (90 Base) MCG/ACT inhaler, Inhale 2 puffs into the lungs every 4 hours as needed for shortness of breath / dyspnea or wheezing Use with spacer, Disp: 1 Inhaler, Rfl: 1     aspirin 81 MG EC tablet, Take 81 mg by mouth daily, Disp: , Rfl:      atorvastatin (LIPITOR) 20 MG tablet, TAKE 1/2 TABLET BY MOUTH ONCE DAILY, Disp: 45 tablet, Rfl: 2     budesonide-formoterol (SYMBICORT) 80-4.5 MCG/ACT Inhaler, Inhale 2 puffs into the lungs 2 times daily, Disp: 10.2 g, Rfl: 1     diltiazem (CARTIA XT) 120 MG 24 hr capsule, Take 1 capsule (120 mg) by mouth daily For blood pressure/lowers pulse. Pharmacy ok to hold prescription until due, Disp: 90 capsule, Rfl: 3     Dupilumab (DUPIXENT) 300 MG/2ML syringe, Inject 2 mLs (300 mg) Subcutaneous every 14 days, Disp: 4 mL, Rfl: 3     Spacer/Aero-Holding Chambers (SPACER/AERO-HOLD CHAMBER MASK) RMAAN, 1 Adult size spacer to use with MDI inhalers., Disp: 1 each, Rfl: 0     triamcinolone (NASACORT) 55 MCG/ACT nasal aerosol, Spray 2 sprays into both nostrils  daily, Disp: , Rfl:   Immunization History   Administered Date(s) Administered     COVID-19,PF,Pfizer 02/04/2021, 02/25/2021     Influenza (H1N1) 12/22/2009     Influenza (High Dose) 3 valent vaccine 10/27/2010, 10/22/2011, 09/30/2014, 09/08/2015, 09/29/2016, 10/04/2017, 09/13/2018, 09/09/2019     Influenza (IIV3) PF 10/19/2002, 10/13/2003, 10/16/2004, 10/20/2005, 09/18/2009, 10/13/2012     Influenza Vaccine IM > 6 months Valent IIV4 10/17/2013     Influenza, Quad, High Dose, Pf, 65yr + 09/14/2020     Mantoux Tuberculin Skin Test 10/09/2009     Pneumo Conj 13-V (2010&after) 09/08/2015     Pneumococcal 23 valent 10/20/2006     TD (ADULT, 7+) 10/27/2010     TDAP Vaccine (Adacel) 08/14/2012     Zoster vaccine recombinant adjuvanted (SHINGRIX) 09/13/2018, 01/03/2019     Zoster vaccine, live 11/12/2014     Allergies   Allergen Reactions     Hytrin [Terazosin Hcl]      Leg swelling and vertigo     Simvastatin Other (See Comments)     Body aches     Cats      Codeine Nausea     Dogs      Dust Mites      [place] Trees      Seasonal Allergies          EXAM:   Constitutional:  Appears well-developed and well-nourished. No distress.   HEENT:   Head: Normocephalic.   Nasal tissue pink and normal appearing.  No rhinorrhea noted.    Eyes: Conjunctivae are non-erythematous   Cardiovascular: Normal rate, regular rhythm and normal heart sounds. Exam reveals no gallop and no friction rub.   No murmur heard.  Respiratory: Effort normal and breath sounds normal. No respiratory distress. No wheezes. No rales.   Musculoskeletal: Normal range of motion.   Neuro: Oriented to person, place, and time.  Skin: Skin is warm and dry. No rash noted.   Psychiatric: Normal mood and affect.     Nursing note and vitals reviewed.    ASSESSMENT/PLAN:  Problem List Items Addressed This Visit        Respiratory    Chronic sinusitis, unspecified location     History of chronic sinusitis with nasal polyposis.  Follows with ENT.          -Continue Nasacort 2  sprays per nostril daily.           Moderate persistent asthma, uncomplicated     History of chronic chest symptoms for multiple years.  Diagnosed with asthma at [facility] in 2010.  On Symbicort 80/4.5 mcg 2 puff inhaled twice daily. Now on Dupixent and huge improvement in chest symptoms.         -Symbicort 80/4.5 mcg 2 puff inhaled twice daily.  -Albuterol 2-4 puffs inhaled (use a spacer unless using a Proair Respiclick device) every 4 hours as needed for chest tightness, wheezing, shortness of breath and/or coughing.   -Albuterol 2-4 puffs inhaled (use spacer if not using Proair Respiclick device) 15-20 minutes prior to physical activity.   Please ensure warm up period prior to exercise.   - Dupixent 300 mg every other week.           Seasonal allergic rhinitis due to pollen     Perennial with spring and summer nasal and ocular symptoms.  Has history of chronic sinusitis with nasal polyposis.  Follows with ENT.  Nasacort significantly helpful.      Skin testing:  Positive for trees, weeds, cat, dog, dust mite.     - Nasacort 2 sprays per nostril daily.  - Continue Dupixent.         Allergic rhinitis due to animal dander - Primary    Allergic rhinitis due to dust mite    Nasal polyposis        Return in 6 months.     Chart documentation with Dragon Voice recognition Software. Although reviewed after completion, some words and grammatical errors may remain.    DO CARLOS BlancoI  Medical Director for Allergy/Immunology at [place], [place]        Again, thank you for allowing me to participate in the care of your patient.        Sincerely,        Malcolm Del Rio DO     stated

## 2023-12-05 ENCOUNTER — TELEPHONE (OUTPATIENT)
Dept: ALLERGY | Facility: CLINIC | Age: 83
End: 2023-12-05
Payer: COMMERCIAL

## 2023-12-05 NOTE — TELEPHONE ENCOUNTER
Prior Authorization Approval    Medication: DUPIXENT 200 MG/1.14ML SC SOSY  Authorization Effective Date: 11/5/2023  Authorization Expiration Date: 12/4/2024  Approved Dose/Quantity: 4ml per 28 days  Reference #: [id number]   Insurance Company: [employer] Non-Specialty PA's - Phone [phone] Fax [phone]  Expected CoPay: $    CoPay Card Available:      Financial Assistance Needed: na  Which Pharmacy is filling the prescription:    Pharmacy Notified: no, on free drug  Patient Notified:           Mary Jo Gregory CphT  [facility] Liaison  [email or web address]  Phone: [phone]  Fax: [phone]

## 2023-12-22 NOTE — TELEPHONE ENCOUNTER
FREE DRUG APPLICATION APPROVED    Medication: DUPIXENT 200 MG/1.14ML SC SOSY  Program Name:  Dupixent [place]  Effective Date: 12/20/2023  Expiration Date: 12/31/2024  Pharmacy Filling the Rx:    Patient Notified: yes  Additional Information: n/a

## 2024-01-18 ENCOUNTER — PATIENT OUTREACH (OUTPATIENT)
Dept: CARE COORDINATION | Facility: CLINIC | Age: 84
End: 2024-01-18
Payer: COMMERCIAL

## 2024-01-29 ENCOUNTER — TELEPHONE (OUTPATIENT)
Dept: FAMILY MEDICINE | Facility: CLINIC | Age: 84
End: 2024-01-29
Payer: COMMERCIAL

## 2024-02-01 ENCOUNTER — PATIENT OUTREACH (OUTPATIENT)
Dept: CARE COORDINATION | Facility: CLINIC | Age: 84
End: 2024-02-01
Payer: COMMERCIAL

## 2024-02-06 ENCOUNTER — TELEPHONE (OUTPATIENT)
Dept: ALLERGY | Facility: OTHER | Age: 84
End: 2024-02-06
Payer: COMMERCIAL

## 2024-02-06 ENCOUNTER — TELEPHONE (OUTPATIENT)
Dept: ALLERGY | Facility: CLINIC | Age: 84
End: 2024-02-06
Payer: COMMERCIAL

## 2024-02-06 DIAGNOSIS — J45.40 MODERATE PERSISTENT ASTHMA, UNCOMPLICATED: ICD-10-CM
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
## 2024-02-06 NOTE — TELEPHONE ENCOUNTER
Fax received stating prior authorization is required for patient's Symbicort. Forwarding to PA team.    Karina VINCENT, Specialty RN 2/6/2024 6:58 AM

## 2024-02-06 NOTE — TELEPHONE ENCOUNTER
[facility] Call Center    Phone Message    May a detailed message be left on voicemail: yes     Reason for Call: Pt would like to speak with nurse regarding dupilumab (DUPIXENT) 300 MG/2ML prefilled syringe, pt states he's been off the medication for 30 days and is having side effects, please call pt to discuss, thank you.     Action Taken: Message routed to:  [facility] ([facility] ER Allergy     Travel Screening: Not Applicable

## 2024-02-10 NOTE — TELEPHONE ENCOUNTER
In reviewing the patient's history, I note that he presented with similar complaints in 2022. At that time, cessation of Dupixent (dupilumab) did not result in symptom improvement. Subsequently, the patient was evaluated at the pain clinic, where an MRI revealed some vertebral anomalies. This context suggests that his symptoms may not be directly related to Dupixent therapy.  Given the patient's current concerns and considering past experiences, if he is inclined to discontinue Dupixent to assess its impact on his symptoms, I support this decision. However, I strongly recommend monitoring symptoms closely and letting us know if there are any issues.    Mohinder Aragon MD

## 2024-02-12 NOTE — TELEPHONE ENCOUNTER
RN left message for patient to return call to clinic.  Provided call back number of [phone] to return call today until 330.  Directed to call main line if returning call after that time.    Enriqueta Wen MSN, RN   [facility], 2/12/2024 9:38 AM

## 2024-02-12 NOTE — TELEPHONE ENCOUNTER
Patient returned call and informed him of Dr Aragon's message.  He will let us know if the asthma worsens while off the Dupixent.  Cancelled appt for tomorrow.    Enriqueta Wen MSN, RN   [facility], 2/12/2024 12:58 PM

## 2024-02-19 NOTE — TELEPHONE ENCOUNTER
Central Prior Authorization Team   Phone: [phone]    PA Initiation    Medication: Symbicort -4.5  Insurance Company: [employer] - Phone [phone] Fax [phone]  Pharmacy Filling the Rx: [id number] - [place], [place] - [address] [facility]  Filling Pharmacy Phone: [phone]  Filling Pharmacy Fax:    Start Date: 2/19/2024

## 2024-02-21 RX ORDER — BUDESONIDE AND FORMOTEROL FUMARATE DIHYDRATE 160; 4.5 UG/1; UG/1
2 AEROSOL RESPIRATORY (INHALATION) 2 TIMES DAILY
Qty: 30.6 G | Refills: 2 | Status: SHIPPED | OUTPATIENT
Start: 2024-02-21 | End: 2024-09-10

## 2024-02-21 NOTE — TELEPHONE ENCOUNTER
Called [facility] and spoke with pharmacist.  They were able to get brand to run through and patient has already picked it up.  She was unsure why they sent us a PA request.    Enriqueta Wen MSN, RN   [facility], 2/21/2024 11:04 AM

## 2024-02-21 NOTE — TELEPHONE ENCOUNTER
No, it looks like they may cover the generic version.  Let me put another order.    Mohinder Aragon MD

## 2024-02-21 NOTE — TELEPHONE ENCOUNTER
PRIOR AUTHORIZATION DENIED    Medication: Symbicort -4.5-PA DENIED     Denial Date: 2/21/2024    Denial Rational:           Appeal Information:

## 2024-03-16 ENCOUNTER — TELEPHONE (OUTPATIENT)
Dept: FAMILY MEDICINE | Facility: CLINIC | Age: 84
End: 2024-03-16
Payer: COMMERCIAL

## 2024-03-16 NOTE — TELEPHONE ENCOUNTER
"  General Call      Reason for Call:    IF Dr Doty is covering Urgent Care today then please reach out Kelley or patient.    What are your questions or concerns:    dizzy for last month, controlling with vertigo meds, but the \"room is spinning\"     Date of last appointment with provider:   4.4.23    Could we send this information to you in [employer] or would you prefer to receive a phone call?:   Patient would prefer a phone call   Okay to leave a detailed message?: Yes at Cell number on file:    Telephone Information:   [phone]  Kelley                                             "

## 2024-03-16 NOTE — TELEPHONE ENCOUNTER
Called Kelley, demetrio and informed her Dr. Doty does not work [facility] he is primary care provider.   Kelley, said she will follow up on Monday   Nothing further needed  Yaneli Darby, Lead MA

## 2024-03-16 NOTE — TELEPHONE ENCOUNTER
"  Reason for Call:  Appointment Request    Patient requesting this type of appt:    dizzy for last month, controlling with vertigo meds, but the \"room is spinning\"     Requested provider: Edi Doty    Reason patient unable to be scheduled: Not within requested timeframe    When does patient want to be seen/preferred time: Same day    Comments:   Speak with Kelley    Could we send this information to you in [facility] or would you prefer to receive a phone call?:   Patient would prefer a phone call   Okay to leave a detailed message?: Yes at Home number on file [phone] Kelley    Call taken on 3/16/2024 at 12:02 PM by Paz Ashraf  "

## 2024-03-18 NOTE — TELEPHONE ENCOUNTER
General Call        Reason for Call: Patient went to hospital;   this weekend, set up a hospital follow up with Doty     What are your questions or concerns:   Patient had been experiencing vertigo and went to the hospital this weekend. The hospital made a referral for him to see Physical Therapy but is not able to get the patient in for a few weeks. Pt would like to discuss doing PT with Daniel to get in sooner. Pt will bring referral to Hospital follow up appointment 3/19/24    Dannielle Babcock  Patient Registration  [phone]

## 2024-03-19 ENCOUNTER — OFFICE VISIT (OUTPATIENT)
Dept: FAMILY MEDICINE | Facility: CLINIC | Age: 84
End: 2024-03-19
Payer: COMMERCIAL

## 2024-03-19 VITALS
RESPIRATION RATE: 24 BRPM | HEIGHT: 69 IN | OXYGEN SATURATION: 96 % | HEART RATE: 77 BPM | TEMPERATURE: 97.9 F | WEIGHT: 218 LBS | DIASTOLIC BLOOD PRESSURE: 71 MMHG | BODY MASS INDEX: 32.29 KG/M2 | SYSTOLIC BLOOD PRESSURE: 130 MMHG

## 2024-03-19 DIAGNOSIS — R42 VERTIGO: ICD-10-CM

## 2024-03-19 DIAGNOSIS — R42 DIZZINESS: Primary | ICD-10-CM

## 2024-03-19 DIAGNOSIS — T14.8XXA ABRASION: ICD-10-CM

## 2024-03-19 PROCEDURE — 99214 OFFICE O/P EST MOD 30 MIN: CPT | Performed by: FAMILY MEDICINE

## 2024-03-19 RX ORDER — MECLIZINE HYDROCHLORIDE 25 MG/1
25 TABLET ORAL 3 TIMES DAILY PRN
Qty: 90 TABLET | Refills: 1 | Status: SHIPPED | OUTPATIENT
Start: 2024-03-19

## 2024-03-19 ASSESSMENT — ASTHMA QUESTIONNAIRES
ACT_TOTALSCORE: 21
QUESTION_1 LAST FOUR WEEKS HOW MUCH OF THE TIME DID YOUR ASTHMA KEEP YOU FROM GETTING AS MUCH DONE AT WORK, SCHOOL OR AT HOME: NONE OF THE TIME
QUESTION_5 LAST FOUR WEEKS HOW WOULD YOU RATE YOUR ASTHMA CONTROL: WELL CONTROLLED
QUESTION_3 LAST FOUR WEEKS HOW OFTEN DID YOUR ASTHMA SYMPTOMS (WHEEZING, COUGHING, SHORTNESS OF BREATH, CHEST TIGHTNESS OR PAIN) WAKE YOU UP AT NIGHT OR EARLIER THAN USUAL IN THE MORNING: NOT AT ALL
ACT_TOTALSCORE: 21
QUESTION_4 LAST FOUR WEEKS HOW OFTEN HAVE YOU USED YOUR RESCUE INHALER OR NEBULIZER MEDICATION (SUCH AS ALBUTEROL): NOT AT ALL
QUESTION_2 LAST FOUR WEEKS HOW OFTEN HAVE YOU HAD SHORTNESS OF BREATH: ONCE A DAY

## 2024-03-19 ASSESSMENT — PAIN SCALES - GENERAL: PAINLEVEL: NO PAIN (0)

## 2024-03-19 NOTE — PROGRESS NOTES
ASSESSMENT / PLAN:  (R42) Dizziness  (primary encounter diagnosis)  Comment: chronic issues  Plan: Adult ENT  Referral, meclizine         (ANTIVERT) 25 MG tablet        Seeing p.t. Friday. Patient interesting in reviisting vertigo clinic since it's been years. Keep hdyrated and get up slowly. Expected course and warning signs reviewed. To ER if a lot worse/can't keep hydrated or new neuro symptoms or palpitations/ chest pain or shortness of breath. Expected course and warning signs reviewed. Call/email with questions/concerns      (T14.8XXA) Abrasion  Comment: on elbow. superficial  Plan: gauze/tape given. Air out for 4 hours before bedtime    (R42) Vertigo  Plan: Adult ENT  Referral, meclizine         (ANTIVERT) 25 MG tablet              Debra Li is a 83 year old, presenting for the following health issues:    Follow-up er visit for vertigo. Had cbc/renal panel done.   History chronic  vertigo issues and normal mri in past.   Graduated from dizziness/vertigo clinic 15 years ago. .   Taking meclizine. No nauseated. No new symptoms - confussion/slurred speech.   Fatigue. No vision changes.   Sleep overall ok. No sea/major snoring.  One cup coffee/day. Water intake ok.   No headaches. Fall onto bottoms 2 weeks ago - was getting out of shower. No ear pain. Hearing ok. No ear discharge.   Sinuses doing ok. Taking nasocort.  Sleeping on right side mainly.       History PAF, htn, high cholesterol. and asthma.   Per ED note:  Guillermo Hogue is a 83 y.o. male with a PMH of vertigo dizziness and hypertension, who presents to the emergency department via EMS for evaluation of dizzy. The patient reports today for exacerbation of dizziness that he has had for over 15 years, that has been worsening the past 3 days, stating dizziness is constantly there and worsens with movement. He reports falling 2 weeks ago due to an episode. He reports Meclizine hasn't been providing any relief lately. He reports  needing to use his walker more frequently for support to get around. He is concerned about this safety since he resides alone. He denies any coughing or cold symptoms. No other questions or concerns at this time.   Guillermo Hogue is a 83 y.o. male presents with a report of recurrence of positional vertigo. The patient suffered from this problem on a recurrent basis for many years. He describes reproducible symptoms during movement of his head to the right while lying. There is been no new associated symptom concerns. Upon arrival he had normal vital signs and was also afebrile. However there was, a low-grade temperature to 99 degrees. Laboratory testing was performed and found to be negative for cell count abnormalities or significant metabolic derangement. His examination showed no focal neurologic abnormalities. I did not feel that any advanced imaging was required at this time. He was eventually considered to be safe and appropriate for attempted outpatient management. I did have case management see him to assist with an urgent follow-up with physical therapy for vestibular treatments. The patient will continue to use meclizine for medication management. I reminded him to return to the ED should he have any debilitating symptoms including repeat vomiting episodes. I also warned against other new symptoms such as high fever or headache. The patient left with complete understanding and agreement with this plan and no other complaints.     No chief complaint on file.        3/19/2024    12:26 PM   Additional Questions   Roomed by KEVIN Fraser CMA     [facility]       ED/ Followup:    Facility:  [facility] Emergency Room   Date of visit: 3/17/2024  Reason for visit: Benign paroxysmal positional vertigo of right ear    Current Status: Unchanged            Objective    There were no vitals taken for this visit.  There is no height or weight on file to calculate BMI.  /71   Pulse 77   Temp 97.9  F (36.6  C) (Oral)    "Resp 24   Ht 1.753 m (5' 9\")   Wt 98.9 kg (218 lb)   SpO2 96%   BMI 32.19 kg/m     Physical Exam   GENERAL: alert and no distress  EYES: Eyes grossly normal to inspection, PERRL and conjunctivae and sclerae normal  HENT: ear canals and TM's normal, nose and mouth without ulcers or lesions  NECK: no adenopathy, no asymmetry, masses, or scars  RESP: lungs clear to auscultation - no rales, rhonchi or wheezes  CV: regular rate and rhythm, normal S1 S2, no S3 or S4, no murmur, click or rub, no peripheral edema   ABDOMEN: soft, nontender, no hepatosplenomegaly, no masses and bowel sounds normal  MS: no gross musculoskeletal defects noted, no edema  NEURO: Normal strength and tone, mentation intact and speech normal  PSYCH: mentation appears normal, affect normal/bright            Signed Electronically by: Edi Doty MD    "

## 2024-04-01 DIAGNOSIS — E78.5 HYPERLIPIDEMIA LDL GOAL <130: ICD-10-CM

## 2024-04-01 RX ORDER — ATORVASTATIN CALCIUM 20 MG/1
10 TABLET, FILM COATED ORAL DAILY
Qty: 45 TABLET | Refills: 3 | Status: SHIPPED | OUTPATIENT
Start: 2024-04-01

## 2024-04-06 NOTE — NURSING NOTE
"Chief Complaint   Patient presents with     Hypertension     Lipids       Initial /67  Pulse 74  Temp 97.1  F (36.2  C) (Oral)  Ht 5' 9\" (1.753 m)  Wt 218 lb (98.9 kg)  SpO2 95%  BMI 32.19 kg/m2 Estimated body mass index is 32.19 kg/(m^2) as calculated from the following:    Height as of this encounter: 5' 9\" (1.753 m).    Weight as of this encounter: 218 lb (98.9 kg).  Medication Reconciliation: complete   Amanda Mead CMA    "
0

## 2024-04-21 ENCOUNTER — HEALTH MAINTENANCE LETTER (OUTPATIENT)
Age: 84
End: 2024-04-21

## 2024-05-02 ENCOUNTER — TRANSFERRED RECORDS (OUTPATIENT)
Dept: HEALTH INFORMATION MANAGEMENT | Facility: CLINIC | Age: 84
End: 2024-05-02

## 2024-05-17 DIAGNOSIS — J45.40 MODERATE PERSISTENT ASTHMA, UNCOMPLICATED: ICD-10-CM

## 2024-05-17 RX ORDER — DUPILUMAB 300 MG/2ML
300 INJECTION, SOLUTION SUBCUTANEOUS
Qty: 4 ML | Refills: 6 | Status: SHIPPED | OUTPATIENT
Start: 2024-05-17 | End: 2024-09-10

## 2024-05-17 NOTE — TELEPHONE ENCOUNTER
Pending Prescriptions:                       Disp   Refills    dupilumab (DUPIXENT) 300 MG/2ML prefilled*4 mL   6            Sig: Inject 2 mLs (300 mg) Subcutaneous every 14 days    Routing refill request to provider for review/approval because:  Drug not on the [facility] refill protocol     LOV Sept 2023, rec 1 yr follow up.    Enriqueta Wen MSN, RN   Specialty Clinic, 5/17/2024 11:44 AM

## 2024-05-17 NOTE — TELEPHONE ENCOUNTER
[facility] Call Center    Phone Message    May a detailed message be left on voicemail: yes     Reason for Call: Medication Refill Request    Has the patient contacted the pharmacy for the refill? Yes   Name of medication being requested: dupilumab (DUPIXENT) 300 MG/2ML prefilled syringe   Provider who prescribed the medication: Dr. Aragon  Pharmacy: [facility]  [address]  Date medication is needed: ASAP   Thanks     Action Taken: Message routed to:  Other: [place] - allergy     Travel Screening: Not Applicable

## 2024-06-23 ENCOUNTER — DOCUMENTATION ONLY (OUTPATIENT)
Dept: GERIATRICS | Facility: CLINIC | Age: 84
End: 2024-06-23
Payer: COMMERCIAL

## 2024-06-23 ENCOUNTER — LAB REQUISITION (OUTPATIENT)
Dept: LAB | Facility: CLINIC | Age: 84
End: 2024-06-23
Payer: COMMERCIAL

## 2024-06-23 VITALS
BODY MASS INDEX: 32.22 KG/M2 | RESPIRATION RATE: 16 BRPM | WEIGHT: 218.2 LBS | SYSTOLIC BLOOD PRESSURE: 134 MMHG | OXYGEN SATURATION: 92 % | TEMPERATURE: 98.2 F | DIASTOLIC BLOOD PRESSURE: 62 MMHG | HEART RATE: 72 BPM

## 2024-06-23 DIAGNOSIS — A15.0 TUBERCULOSIS OF LUNG: ICD-10-CM

## 2024-06-23 RX ORDER — SCOLOPAMINE TRANSDERMAL SYSTEM 1 MG/1
1 PATCH, EXTENDED RELEASE TRANSDERMAL
COMMUNITY

## 2024-06-24 ENCOUNTER — TRANSITIONAL CARE UNIT VISIT (OUTPATIENT)
Dept: GERIATRICS | Facility: CLINIC | Age: 84
End: 2024-06-24
Payer: COMMERCIAL

## 2024-06-24 DIAGNOSIS — R60.0 BILATERAL LEG EDEMA: ICD-10-CM

## 2024-06-24 DIAGNOSIS — I48.0 PAROXYSMAL ATRIAL FIBRILLATION (H): Primary | ICD-10-CM

## 2024-06-24 DIAGNOSIS — S41.112D SKIN TEAR OF LEFT UPPER ARM WITHOUT COMPLICATION, SUBSEQUENT ENCOUNTER: ICD-10-CM

## 2024-06-24 DIAGNOSIS — J32.9 CHRONIC SINUSITIS, UNSPECIFIED LOCATION: ICD-10-CM

## 2024-06-24 DIAGNOSIS — J30.1 SEASONAL ALLERGIC RHINITIS DUE TO POLLEN: ICD-10-CM

## 2024-06-24 DIAGNOSIS — M62.81 GENERALIZED MUSCLE WEAKNESS: ICD-10-CM

## 2024-06-24 DIAGNOSIS — R42 DIZZINESS: ICD-10-CM

## 2024-06-24 PROCEDURE — 86481 TB AG RESPONSE T-CELL SUSP: CPT | Mod: ORL | Performed by: FAMILY MEDICINE

## 2024-06-24 PROCEDURE — P9603 ONE-WAY ALLOW PRORATED MILES: HCPCS | Mod: ORL | Performed by: FAMILY MEDICINE

## 2024-06-24 PROCEDURE — 99309 SBSQ NF CARE MODERATE MDM 30: CPT | Performed by: NURSE PRACTITIONER

## 2024-06-24 PROCEDURE — 36415 COLL VENOUS BLD VENIPUNCTURE: CPT | Mod: ORL | Performed by: FAMILY MEDICINE

## 2024-06-24 NOTE — LETTER
6/24/2024      Guillermo Hogue  [address]  [place] [address]        [facility] GERIATRICS    PRIMARY CARE PROVIDER AND CLINIC:  Edi Doty MD, [address] [facility] [address]  Chief Complaint   Patient presents with     Hospital F/U      [place] Medical Record Number:  [id number]  Place of Service where encounter took place:  [facility] ([facility]) [8]    Guillermo Hogue  is a 84 year old  (1940), admitted to the above facility from  [facility] . Hospital stay 6/17/2024 through 6/22/2024..   HPI:    Hospital Course   84M w/ pmh fo asthma, morbid obesity, HTN, HLD, SDH, TBI, chronic vertigo presented w/ vertigo. MRI imaging was negative for cuases fo central vertigo. Pt unfortunately has had a long hx of vertigo and difficult course. Pt's vertigo would reverse to opposite side when eppley maneuver occurred and had severe vertigo w/ minimal eye movement. on day of discharge pt reproted 60% improvement of symptoms. d/w to snf w/ meclizine and scopalamine     Patient seen today for follow up, oriented, moderate historian, appears healthy, VS WNL, using walker and ambulatory, continues to have dizziness, declines wanting epley maneuver again.    CODE STATUS/ADVANCE DIRECTIVES DISCUSSION:  No Order  on file  ALLERGIES:   Allergies   Allergen Reactions     Hytrin [Terazosin Hcl]      Leg swelling and vertigo     Simvastatin Other (See Comments)     Body aches     Cats      Dogs      Dust Mites      Morphine And Codeine Nausea     [place] Trees      Seasonal Allergies       PAST MEDICAL HISTORY:   Past Medical History:   Diagnosis Date     Allergic rhinitis age 22     Asthma 2009     Chronic osteoarthritis      Nasal polyposis 2009     Osteoarthritis of left hip      Reactive airway disease 2009      PAST SURGICAL HISTORY:   has a past surgical history that includes colonoscopy; tonsillectomy (age 22); sinus surgery (2009); Colonoscopy with CO2 insufflation (N/A,  5/14/2015); Colonoscopy with CO2 insufflation (N/A, 5/10/2017); Colonoscopy (N/A, 5/10/2017); and joint replacement (Left, 06/2019).  FAMILY HISTORY: family history includes Arthritis in his father; Breast Cancer in his mother; C.A.D. in his brother, brother, and father; Cancer in his maternal grandfather; Cerebrovascular Disease in his father and maternal grandmother; Hypertension in his maternal grandmother; Prostate Cancer in his brother; Respiratory in his father; Rheumatoid Arthritis in his father.  SOCIAL HISTORY:   reports that he has never smoked. He has never used smokeless tobacco. He reports that he does not drink alcohol and does not use drugs.  Patient's living condition: lives alone    Post Discharge Medication Reconciliation Status:   MED REC REQUIRED  Post Medication Reconciliation Status: discharge medications reconciled, continue medications without change       Current Outpatient Medications   Medication Sig Dispense Refill     acetaminophen (TYLENOL) 500 MG tablet Take 1,000 mg by mouth 3 times daily       atorvastatin (LIPITOR) 20 MG tablet TAKE 1/2 TABLET BY MOUTH ONCE DAILY 45 tablet 3     budesonide-formoterol (SYMBICORT) 160-4.5 MCG/ACT Inhaler Inhale 2 puffs into the lungs 2 times daily 30.6 g 2     diltiazem ER COATED BEADS (CARTIA XT) 120 MG 24 hr capsule Take 1 capsule (120 mg) by mouth daily For blood pressure/lowers pulse. Pharmacy ok to hold prescription until due 90 capsule 3     dupilumab (DUPIXENT) 300 MG/2ML prefilled syringe Inject 2 mLs (300 mg) Subcutaneous every 14 days 4 mL 6     meclizine (ANTIVERT) 25 MG tablet Take 1 tablet (25 mg) by mouth 3 times daily as needed for dizziness 90 tablet 1     scopolamine (TRANSDERM) 1 MG/3DAYS 72 hr patch Place 1 patch onto the skin every 72 hours       triamcinolone (NASACORT) 55 MCG/ACT nasal aerosol Spray 2 sprays into both nostrils daily 10.8 mL 11     No current facility-administered medications for this visit.       ROS:  4 point  ROS including Respiratory, CV, GI and , other than that noted in the HPI,  is negative    Vitals:  /62   Pulse 72   Temp 98.2  F (36.8  C)   Resp 16   Wt 99 kg (218 lb 3.2 oz)   SpO2 92%   BMI 32.22 kg/m    Exam:  GENERAL APPEARANCE:  in no distress, appears healthy  ENT:  Mouth and posterior oropharynx normal, moist mucous membranes  RESP:  no respiratory distress, diminished breath sounds bases  CV:  peripheral edema 1+ in lower legs, rate-normal  ABDOMEN:  bowel sounds normal  M/S:   Gait and station abnormal unsteady  SKIN:  Inspection of skin and subcutaneous tissue baseline  NEURO:   Examination of sensation by touch normal  PSYCH:  affect and mood normal    Lab/Diagnostic data:  Recent labs in [facility] reviewed by me today.     ASSESSMENT/PLAN:    (I48.0) Paroxysmal atrial fibrillation (H)  (primary encounter diagnosis)  (R42) Dizziness  (R60.0) Bilateral leg edema  (M62.81) Generalized muscle weakness  Comment: chronic dizziness, fall risk, RRR, denies CP, edema 1+  Plan:   -PT/OT eval and treat  -L arm dressings MWF  -TEDs bilateral on am off pm  -continue scopolamine (new)  -continue statin, diltiazem, meclizine PRN  -no AC indicated  -daily vitals  -consider referral to [facility]    (J30.1) Seasonal allergic rhinitis due to pollen  (J32.9) Chronic sinusitis, unspecified location  Comment: nasal dry, no cough  Plan: continue symbicort BID, dupixent Q2w, nasacort    (S41.112D) Skin tear of left upper arm without complication, subsequent encounter  Comment: 2 open tears, no s/s infection  Plan: MWF cleanse, border dressing until healed      Electronically signed by:  NGUYỄN Aguilar CNP                    Sincerely,        NGUYỄN Aguilar CNP

## 2024-06-24 NOTE — PROGRESS NOTES
[facility] GERIATRICS    PRIMARY CARE PROVIDER AND CLINIC:  Edi Doty MD, [address] [facility] [address]  Chief Complaint   Patient presents with    Hospital F/U      [place] Medical Record Number:  [id number]  Place of Service where encounter took place:  [facility] ([facility]) [8]    Guillermo Hogue  is a 84 year old  (1940), admitted to the above facility from  [facility] . Hospital stay 6/17/2024 through 6/22/2024..   HPI:    Hospital Course   84M w/ pmh fo asthma, morbid obesity, HTN, HLD, SDH, TBI, chronic vertigo presented w/ vertigo. MRI imaging was negative for cuases fo central vertigo. Pt unfortunately has had a long hx of vertigo and difficult course. Pt's vertigo would reverse to opposite side when eppley maneuver occurred and had severe vertigo w/ minimal eye movement. on day of discharge pt reproted 60% improvement of symptoms. d/w to snf w/ meclizine and scopalamine     Patient seen today for follow up, oriented, moderate historian, appears healthy, VS WNL, using walker and ambulatory, continues to have dizziness, declines wanting epley maneuver again.    CODE STATUS/ADVANCE DIRECTIVES DISCUSSION:  No Order  on file  ALLERGIES:   Allergies   Allergen Reactions    Hytrin [Terazosin Hcl]      Leg swelling and vertigo    Simvastatin Other (See Comments)     Body aches    Cats     Dogs     Dust Mites     Morphine And Codeine Nausea    [place]rees     Seasonal Allergies       PAST MEDICAL HISTORY:   Past Medical History:   Diagnosis Date    Allergic rhinitis age 22    Asthma 2009    Chronic osteoarthritis     Nasal polyposis 2009    Osteoarthritis of left hip     Reactive airway disease 2009      PAST SURGICAL HISTORY:   has a past surgical history that includes colonoscopy; tonsillectomy (age 22); sinus surgery (2009); Colonoscopy with CO2 insufflation (N/A, 5/14/2015); Colonoscopy with CO2 insufflation (N/A, 5/10/2017); Colonoscopy (N/A, 5/10/2017); and joint  replacement (Left, 06/2019).  FAMILY HISTORY: family history includes Arthritis in his father; Breast Cancer in his mother; C.A.D. in his brother, brother, and father; Cancer in his maternal grandfather; Cerebrovascular Disease in his father and maternal grandmother; Hypertension in his maternal grandmother; Prostate Cancer in his brother; Respiratory in his father; Rheumatoid Arthritis in his father.  SOCIAL HISTORY:   reports that he has never smoked. He has never used smokeless tobacco. He reports that he does not drink alcohol and does not use drugs.  Patient's living condition: lives alone    Post Discharge Medication Reconciliation Status:   MED REC REQUIRED  Post Medication Reconciliation Status: discharge medications reconciled, continue medications without change       Current Outpatient Medications   Medication Sig Dispense Refill    acetaminophen (TYLENOL) 500 MG tablet Take 1,000 mg by mouth 3 times daily      atorvastatin (LIPITOR) 20 MG tablet TAKE 1/2 TABLET BY MOUTH ONCE DAILY 45 tablet 3    budesonide-formoterol (SYMBICORT) 160-4.5 MCG/ACT Inhaler Inhale 2 puffs into the lungs 2 times daily 30.6 g 2    diltiazem ER COATED BEADS (CARTIA XT) 120 MG 24 hr capsule Take 1 capsule (120 mg) by mouth daily For blood pressure/lowers pulse. Pharmacy ok to hold prescription until due 90 capsule 3    dupilumab (DUPIXENT) 300 MG/2ML prefilled syringe Inject 2 mLs (300 mg) Subcutaneous every 14 days 4 mL 6    meclizine (ANTIVERT) 25 MG tablet Take 1 tablet (25 mg) by mouth 3 times daily as needed for dizziness 90 tablet 1    scopolamine (TRANSDERM) 1 MG/3DAYS 72 hr patch Place 1 patch onto the skin every 72 hours      triamcinolone (NASACORT) 55 MCG/ACT nasal aerosol Spray 2 sprays into both nostrils daily 10.8 mL 11     No current facility-administered medications for this visit.       ROS:  4 point ROS including Respiratory, CV, GI and , other than that noted in the HPI,  is negative    Vitals:  /62    Pulse 72   Temp 98.2  F (36.8  C)   Resp 16   Wt 99 kg (218 lb 3.2 oz)   SpO2 92%   BMI 32.22 kg/m    Exam:  GENERAL APPEARANCE:  in no distress, appears healthy  ENT:  Mouth and posterior oropharynx normal, moist mucous membranes  RESP:  no respiratory distress, diminished breath sounds bases  CV:  peripheral edema 1+ in lower legs, rate-normal  ABDOMEN:  bowel sounds normal  M/S:   Gait and station abnormal unsteady  SKIN:  Inspection of skin and subcutaneous tissue baseline  NEURO:   Examination of sensation by touch normal  PSYCH:  affect and mood normal    Lab/Diagnostic data:  Recent labs in [facility] reviewed by me today.     ASSESSMENT/PLAN:    (I48.0) Paroxysmal atrial fibrillation (H)  (primary encounter diagnosis)  (R42) Dizziness  (R60.0) Bilateral leg edema  (M62.81) Generalized muscle weakness  Comment: chronic dizziness, fall risk, RRR, denies CP, edema 1+  Plan:   -PT/OT eval and treat  -L arm dressings MWF  -TEDs bilateral on am off pm  -continue scopolamine (new)  -continue statin, diltiazem, meclizine PRN  -no AC indicated  -daily vitals  -consider referral to [facility]    (J30.1) Seasonal allergic rhinitis due to pollen  (J32.9) Chronic sinusitis, unspecified location  Comment: nasal dry, no cough  Plan: continue symbicort BID, dupixent Q2w, nasacort    (S41.112D) Skin tear of left upper arm without complication, subsequent encounter  Comment: 2 open tears, no s/s infection  Plan: MWF cleanse, border dressing until healed      Electronically signed by:  NGUYỄN Aguilar CNP

## 2024-06-25 LAB
QUANTIFERON MITOGEN: 10 IU/ML
QUANTIFERON NIL TUBE: 0.19 IU/ML
QUANTIFERON TB1 TUBE: 0.18 IU/ML
QUANTIFERON TB2 TUBE: 0.32

## 2024-06-26 ENCOUNTER — TELEPHONE (OUTPATIENT)
Dept: GERIATRICS | Facility: CLINIC | Age: 84
End: 2024-06-26
Payer: COMMERCIAL

## 2024-06-26 LAB
GAMMA INTERFERON BACKGROUND BLD IA-ACNC: 0.19 IU/ML
M TB IFN-G BLD-IMP: NEGATIVE
M TB IFN-G CD4+ BCKGRND COR BLD-ACNC: 9.81 IU/ML
MITOGEN IGNF BCKGRD COR BLD-ACNC: -0.01 IU/ML
MITOGEN IGNF BCKGRD COR BLD-ACNC: 0.13 IU/ML

## 2024-06-26 NOTE — TELEPHONE ENCOUNTER
[facility] Geriatrics Triage Nurse Telephone Encounter    Provider: NGUYỄN Bowser CNP   Facility: [facility]  Facility Type:  TCU    Caller: Paz  Call Back Number: [phone]    Allergies:    Allergies   Allergen Reactions    Hytrin [Terazosin Hcl]      Leg swelling and vertigo    Simvastatin Other (See Comments)     Body aches    Cats     Dogs     Dust Mites     Morphine And Codeine Nausea    [place] Trees     Seasonal Allergies         Reason for call: The patient has and order for dupixent subcutaneous every 14 days. The patient is due today to have the injection but the facility is requesting to hold due to the cost.   Per the patient he is okay with the plan for resumption on discharge.     Verbal Order/Direction given by Provider: Okay to hold Dupixent and to resume upon discharge.     Provider giving Order:  NGUYỄN Bowser CNP     Verbal Order given to: Paz Keane RN

## 2024-06-27 ENCOUNTER — TELEPHONE (OUTPATIENT)
Dept: GERIATRICS | Facility: CLINIC | Age: 84
End: 2024-06-27

## 2024-06-27 ENCOUNTER — DISCHARGE SUMMARY NURSING HOME (OUTPATIENT)
Dept: GERIATRICS | Facility: CLINIC | Age: 84
End: 2024-06-27
Payer: COMMERCIAL

## 2024-06-27 ENCOUNTER — LAB REQUISITION (OUTPATIENT)
Dept: LAB | Facility: CLINIC | Age: 84
End: 2024-06-27
Payer: COMMERCIAL

## 2024-06-27 VITALS
RESPIRATION RATE: 18 BRPM | TEMPERATURE: 97.9 F | BODY MASS INDEX: 32.19 KG/M2 | DIASTOLIC BLOOD PRESSURE: 71 MMHG | SYSTOLIC BLOOD PRESSURE: 132 MMHG | WEIGHT: 218 LBS | OXYGEN SATURATION: 95 % | HEART RATE: 75 BPM

## 2024-06-27 DIAGNOSIS — M62.81 GENERALIZED MUSCLE WEAKNESS: ICD-10-CM

## 2024-06-27 DIAGNOSIS — J30.1 SEASONAL ALLERGIC RHINITIS DUE TO POLLEN: ICD-10-CM

## 2024-06-27 DIAGNOSIS — I48.0 PAROXYSMAL ATRIAL FIBRILLATION (H): Primary | ICD-10-CM

## 2024-06-27 DIAGNOSIS — R60.0 BILATERAL LEG EDEMA: ICD-10-CM

## 2024-06-27 DIAGNOSIS — J32.9 CHRONIC SINUSITIS, UNSPECIFIED LOCATION: ICD-10-CM

## 2024-06-27 DIAGNOSIS — R05.9 COUGH, UNSPECIFIED: ICD-10-CM

## 2024-06-27 DIAGNOSIS — S41.112D SKIN TEAR OF LEFT UPPER ARM WITHOUT COMPLICATION, SUBSEQUENT ENCOUNTER: ICD-10-CM

## 2024-06-27 DIAGNOSIS — R42 DIZZINESS: ICD-10-CM

## 2024-06-27 PROCEDURE — 87635 SARS-COV-2 COVID-19 AMP PRB: CPT | Mod: ORL | Performed by: NURSE PRACTITIONER

## 2024-06-27 PROCEDURE — 99316 NF DSCHRG MGMT 30 MIN+: CPT | Performed by: NURSE PRACTITIONER

## 2024-06-27 NOTE — PROGRESS NOTES
[facility] GERIATRICS DISCHARGE SUMMARY  PATIENT'S NAME: Guillermo Hogue  YOB: 1940  MEDICAL RECORD NUMBER:  [id number]  Place of Service where encounter took place:  [facility] ([facility]) [8]    PRIMARY CARE PROVIDER AND CLINIC RESPONSIBLE AFTER TRANSFER:   Edi Doty MD, [address] [facility] [place] [address]    [facility] Provider     Transferring providers: NGUYỄN Bowser CNP; Cesilia Neumann MD  Recent Hospitalization/ED:  [facility]  stay 6/17/2024 to 6/22/2024.  Date of SNF Admission:  6/22/2024  Date of SNF (anticipated) Discharge:  6/29/2024  Discharged to: previous independent home  Cognitive Scores:  NA  Physical Function: Pivot transfers  DME: Walker    CODE STATUS/ADVANCE DIRECTIVES DISCUSSION:  No Order   ALLERGIES: Hytrin [terazosin hcl], Simvastatin, Cats, Dogs, Dust mites, Morphine and codeine, Oak trees, and Seasonal allergies    NURSING FACILITY COURSE   Medication Changes/Rationale:   See notes    Summary of nursing facility stay:      Paroxysmal atrial fibrillation (H)  Bilateral leg edema  Dizziness  Generalized muscle weakness  Seasonal allergic rhinitis due to pollen  Chronic sinusitis, unspecified location  Skin tear of left upper arm without complication, subsequent encounter    (I48.0) Paroxysmal atrial fibrillation (H)  (primary encounter diagnosis)  (R42) Dizziness  (R60.0) Bilateral leg edema  (M62.81) Generalized muscle weakness  Comment: chronic dizziness, fall risk, RRR, denies CP, edema 1+  Plan:   -PT/OT eval and treat  -L arm dressings MWF  -TEDs bilateral on am off pm  -continue scopolamine (new)  -continue statin, diltiazem, meclizine PRN  -no AC indicated  -daily vitals  -consider referral to [facility]  -of note, covid+ on 6/27, pending PCR test to confirm     (J30.1) Seasonal allergic rhinitis due to pollen  (J32.9) Chronic sinusitis, unspecified location  Comment: nasal dry, no cough  Plan:  continue symbicort BID, dupixent Q2w, nasacort     (S41.112D) Skin tear of left upper arm without complication, subsequent encounter  Comment: 2 open tears, no s/s infection  Plan: MWF cleanse, border dressing until healed       Discharge Medications:  MED REC REQUIRED  Post Medication Reconciliation Status: medication reconcilation previously completed during another office visit       Current Outpatient Medications   Medication Sig Dispense Refill    acetaminophen (TYLENOL) 500 MG tablet Take 1,000 mg by mouth 3 times daily      atorvastatin (LIPITOR) 20 MG tablet TAKE 1/2 TABLET BY MOUTH ONCE DAILY 45 tablet 3    budesonide-formoterol (SYMBICORT) 160-4.5 MCG/ACT Inhaler Inhale 2 puffs into the lungs 2 times daily 30.6 g 2    diltiazem ER COATED BEADS (CARTIA XT) 120 MG 24 hr capsule Take 1 capsule (120 mg) by mouth daily For blood pressure/lowers pulse. Pharmacy ok to hold prescription until due 90 capsule 3    dupilumab (DUPIXENT) 300 MG/2ML prefilled syringe Inject 2 mLs (300 mg) Subcutaneous every 14 days 4 mL 6    meclizine (ANTIVERT) 25 MG tablet Take 1 tablet (25 mg) by mouth 3 times daily as needed for dizziness 90 tablet 1    scopolamine (TRANSDERM) 1 MG/3DAYS 72 hr patch Place 1 patch onto the skin every 72 hours      triamcinolone (NASACORT) 55 MCG/ACT nasal aerosol Spray 2 sprays into both nostrils daily 10.8 mL 11          Controlled medications:   not applicable/none     Past Medical History:   Past Medical History:   Diagnosis Date    Allergic rhinitis age 22    Asthma 2009    Chronic osteoarthritis     Nasal polyposis 2009    Osteoarthritis of left hip     Reactive airway disease 2009     Physical Exam:   Vitals: /71   Pulse 75   Temp 97.9  F (36.6  C)   Resp 18   Wt 98.9 kg (218 lb)   SpO2 95%   BMI 32.19 kg/m    BMI: Body mass index is 32.19 kg/m .  GENERAL APPEARANCE:  in no distress, appears healthy  ENT:  Mouth and posterior oropharynx normal, moist mucous membranes  RESP:  lungs  clear to auscultation , no respiratory distress  CV:  peripheral edema moderate+ in lower legs, rate-normal  ABDOMEN:  bowel sounds normal  M/S:   Gait and station abnormal unsteady  SKIN:  Inspection of skin and subcutaneous tissue baseline  NEURO:   Examination of sensation by touch normal  PSYCH:  affect and mood normal     SNF labs: Recent labs in [facility] reviewed by me today.     DISCHARGE PLAN:  Follow up labs: No labs orders/due  Medical Follow Up:      Follow up with primary care provider in 1 weeks  [facility] scheduled appointments:  Appointments in Next Year      Jun 27, 2024 8:00 AM  Discharge Summary with NGUYỄN Aguilar CNP  [facility] ([facility] ) [phone]     Sep 10, 2024 11:30 AM  Return Allergy with Mohinder Aragon MD  [facility] ([facility] ) [phone]           Discharge Services: Home Care:  Occupational Therapy, Physical Therapy, Registered Nurse, and Home Health Aide  Discharge Instructions Verbalized to Patient at Discharge:   None    TOTAL DISCHARGE TIME:   Greater than 30 minutes  Electronically signed by:  NGUYỄN Aguilar CNP           Documentation of Face-to-Face and Certification for Home Health Services     Patient: Guillermo Hogue   YOB: 1940  MR Number: [id number]  Today's Date: 6/27/2024    I certify that patient: Guillermo Hogue is under my care and that I, or a nurse practitioner or physician's assistant working with me, had a face-to-face encounter that meets the physician face-to-face encounter requirements with this patient on: 6/27/2024.    This encounter with the patient was in whole, or in part, for the following medical condition, which is the primary reason for home health care:   1. Paroxysmal atrial fibrillation (H)    2. Bilateral leg edema    3. Dizziness    4. Generalized muscle weakness    5. Seasonal allergic  rhinitis due to pollen    6. Chronic sinusitis, unspecified location    7. Skin tear of left upper arm without complication, subsequent encounter          I certify that, based on my findings, the following services are medically necessary home health services: Nursing, Occupational Therapy, Physical Therapy, and HHA .    My clinical findings support the need for the above services because: Nurse is needed: To provide caregiver training to assist with: med teaching.., Occupational Therapy Services are needed to assess and treat cognitive ability and address ADL safety due to impairment in transfers., Physical Therapy Services are needed to assess and treat the following functional impairments: gait., and HHA for bath.    Further, I certify that my clinical findings support that this patient is homebound (i.e. absences from home require considerable and taxing effort and are for medical reasons or Religion services or infrequently or of short duration when for other reasons) because: Requires assistance of another person or specialized equipment to access medical services because patient: Is unable to operate assistive equipment on their own...    Based on the above findings. I certify that this patient is confined to the home and needs intermittent skilled nursing care, physical therapy and/or speech therapy.  The patient is under my care, and I have initiated the establishment of the plan of care.  This patient will be followed by a physician who will periodically review the plan of care.  Physician/Provider to provide follow up care: Edi Doty    Responsible Medicare certified [place] Physician: Chester Youssef NP  Electronic Physician Signature  Date: 6/27/2024

## 2024-06-27 NOTE — LETTER
6/27/2024      Guillermo Hogue  [address]  [place] [address]        [facility] GERIATRICS DISCHARGE SUMMARY  PATIENT'S NAME: Guillermo Hogue  YOB: 1940  MEDICAL RECORD NUMBER:  [id number]  Place of Service where encounter took place:  [facility] ([facility]) [8]    PRIMARY CARE PROVIDER AND CLINIC RESPONSIBLE AFTER TRANSFER:   Edi Doty MD, [address] [place] [address]    [facility] Provider     Transferring providers: NGUYỄN Bowser CNP; Cesilia Neumann MD  Recent Hospitalization/ED:  [facility]  stay 6/17/2024 to 6/22/2024.  Date of SNF Admission:  6/22/2024  Date of SNF (anticipated) Discharge:  6/29/2024  Discharged to: previous independent home  Cognitive Scores:  NA  Physical Function: Pivot transfers  DME: Walker    CODE STATUS/ADVANCE DIRECTIVES DISCUSSION:  No Order   ALLERGIES: Hytrin [terazosin hcl], Simvastatin, Cats, Dogs, Dust mites, Morphine and codeine, Oak trees, and Seasonal allergies    NURSING FACILITY COURSE   Medication Changes/Rationale:   See notes    Summary of nursing facility stay:      Paroxysmal atrial fibrillation (H)  Bilateral leg edema  Dizziness  Generalized muscle weakness  Seasonal allergic rhinitis due to pollen  Chronic sinusitis, unspecified location  Skin tear of left upper arm without complication, subsequent encounter    (I48.0) Paroxysmal atrial fibrillation (H)  (primary encounter diagnosis)  (R42) Dizziness  (R60.0) Bilateral leg edema  (M62.81) Generalized muscle weakness  Comment: chronic dizziness, fall risk, RRR, denies CP, edema 1+  Plan:   -PT/OT eval and treat  -L arm dressings MWF  -TEDs bilateral on am off pm  -continue scopolamine (new)  -continue statin, diltiazem, meclizine PRN  -no AC indicated  -daily vitals  -consider referral to [facility]  -of note, covid+ on 6/27, pending PCR test to confirm     (J30.1) Seasonal allergic rhinitis due to  pollen  (J32.9) Chronic sinusitis, unspecified location  Comment: nasal dry, no cough  Plan: continue symbicort BID, dupixent Q2w, nasacort     (S41.112D) Skin tear of left upper arm without complication, subsequent encounter  Comment: 2 open tears, no s/s infection  Plan: MWF cleanse, border dressing until healed       Discharge Medications:  MED REC REQUIRED  Post Medication Reconciliation Status: medication reconcilation previously completed during another office visit       Current Outpatient Medications   Medication Sig Dispense Refill     acetaminophen (TYLENOL) 500 MG tablet Take 1,000 mg by mouth 3 times daily       atorvastatin (LIPITOR) 20 MG tablet TAKE 1/2 TABLET BY MOUTH ONCE DAILY 45 tablet 3     budesonide-formoterol (SYMBICORT) 160-4.5 MCG/ACT Inhaler Inhale 2 puffs into the lungs 2 times daily 30.6 g 2     diltiazem ER COATED BEADS (CARTIA XT) 120 MG 24 hr capsule Take 1 capsule (120 mg) by mouth daily For blood pressure/lowers pulse. Pharmacy ok to hold prescription until due 90 capsule 3     dupilumab (DUPIXENT) 300 MG/2ML prefilled syringe Inject 2 mLs (300 mg) Subcutaneous every 14 days 4 mL 6     meclizine (ANTIVERT) 25 MG tablet Take 1 tablet (25 mg) by mouth 3 times daily as needed for dizziness 90 tablet 1     scopolamine (TRANSDERM) 1 MG/3DAYS 72 hr patch Place 1 patch onto the skin every 72 hours       triamcinolone (NASACORT) 55 MCG/ACT nasal aerosol Spray 2 sprays into both nostrils daily 10.8 mL 11          Controlled medications:   not applicable/none     Past Medical History:   Past Medical History:   Diagnosis Date     Allergic rhinitis age 22     Asthma 2009     Chronic osteoarthritis      Nasal polyposis 2009     Osteoarthritis of left hip      Reactive airway disease 2009     Physical Exam:   Vitals: /71   Pulse 75   Temp 97.9  F (36.6  C)   Resp 18   Wt 98.9 kg (218 lb)   SpO2 95%   BMI 32.19 kg/m    BMI: Body mass index is 32.19 kg/m .  GENERAL APPEARANCE:  in no  distress, appears healthy  ENT:  Mouth and posterior oropharynx normal, moist mucous membranes  RESP:  lungs clear to auscultation , no respiratory distress  CV:  peripheral edema moderate+ in lower legs, rate-normal  ABDOMEN:  bowel sounds normal  M/S:   Gait and station abnormal unsteady  SKIN:  Inspection of skin and subcutaneous tissue baseline  NEURO:   Examination of sensation by touch normal  PSYCH:  affect and mood normal     SNF labs: Recent labs in [facility] reviewed by me today.     DISCHARGE PLAN:  Follow up labs: No labs orders/due  Medical Follow Up:      Follow up with primary care provider in 1 weeks  [facility] scheduled appointments:  Appointments in Next Year      Jun 27, 2024 8:00 AM  Discharge Summary with NGUYỄN Aguilar CNP  [facility] ([facility] ) [phone]     Sep 10, 2024 11:30 AM  Return Allergy with Mohinder Aragon MD  [facility] ([facility] ) [phone]           Discharge Services: Home Care:  Occupational Therapy, Physical Therapy, Registered Nurse, and Home Health Aide  Discharge Instructions Verbalized to Patient at Discharge:   None    TOTAL DISCHARGE TIME:   Greater than 30 minutes  Electronically signed by:  NGUYỄN Aguilar CNP           Documentation of Face-to-Face and Certification for Home Health Services     Patient: Guillermo Hogue   YOB: 1940  MR Number: [id number]  Today's Date: 6/27/2024    I certify that patient: Guillermo Hogue is under my care and that I, or a nurse practitioner or physician's assistant working with me, had a face-to-face encounter that meets the physician face-to-face encounter requirements with this patient on: 6/27/2024.    This encounter with the patient was in whole, or in part, for the following medical condition, which is the primary reason for home health care:   1. Paroxysmal atrial fibrillation  (H)    2. Bilateral leg edema    3. Dizziness    4. Generalized muscle weakness    5. Seasonal allergic rhinitis due to pollen    6. Chronic sinusitis, unspecified location    7. Skin tear of left upper arm without complication, subsequent encounter          I certify that, based on my findings, the following services are medically necessary home health services: Nursing, Occupational Therapy, Physical Therapy, and HHA .    My clinical findings support the need for the above services because: Nurse is needed: To provide caregiver training to assist with: med teaching.., Occupational Therapy Services are needed to assess and treat cognitive ability and address ADL safety due to impairment in transfers., Physical Therapy Services are needed to assess and treat the following functional impairments: gait., and HHA for bath.    Further, I certify that my clinical findings support that this patient is homebound (i.e. absences from home require considerable and taxing effort and are for medical reasons or Jehovah's witness services or infrequently or of short duration when for other reasons) because: Requires assistance of another person or specialized equipment to access medical services because patient: Is unable to operate assistive equipment on their own...    Based on the above findings. I certify that this patient is confined to the home and needs intermittent skilled nursing care, physical therapy and/or speech therapy.  The patient is under my care, and I have initiated the establishment of the plan of care.  This patient will be followed by a physician who will periodically review the plan of care.  Physician/Provider to provide follow up care: Edi Doty    Responsible Medicare certified [place] Physician: Chester Youssef NP  Electronic Physician Signature  Date: 6/27/2024                 Sincerely,        NGUYỄN Aguilar CNP

## 2024-06-27 NOTE — TELEPHONE ENCOUNTER
[facility] Geriatrics Triage Nurse Telephone Encounter    Provider: NGUYỄN Bowser CNP   Facility: [facility]  Facility Type:  TCU    Caller: Liliana  Call Back Number: [phone]    Allergies:    Allergies   Allergen Reactions    Hytrin [Terazosin Hcl]      Leg swelling and vertigo    Simvastatin Other (See Comments)     Body aches    Cats     Dogs     Dust Mites     Morphine And Codeine Nausea    [place] Trees     Seasonal Allergies         Reason for call: Nurse called to report that patient tested positive for COVID-19 today.  Patient currently only has a cough.  Nurse looking for an order for cough syrup.  Facility only has FLOYD for cough drops.          Verbal Order/Direction given by Provider: Guaifenesin (plain) 400 mg PO q 4 hours prn (expectorant) per FLYOD.      Provider giving Order:  NGUYỄN Bowser CNP     Verbal Order given to: Lilaina Carter RN

## 2024-06-28 ENCOUNTER — TELEPHONE (OUTPATIENT)
Dept: GERIATRICS | Facility: CLINIC | Age: 84
End: 2024-06-28
Payer: COMMERCIAL

## 2024-06-28 ENCOUNTER — TELEPHONE (OUTPATIENT)
Dept: FAMILY MEDICINE | Facility: CLINIC | Age: 84
End: 2024-06-28
Payer: COMMERCIAL

## 2024-06-28 DIAGNOSIS — R42 VERTIGO: Primary | ICD-10-CM

## 2024-06-28 LAB — SARS-COV-2 RNA RESP QL NAA+PROBE: POSITIVE

## 2024-06-28 NOTE — TELEPHONE ENCOUNTER
[facility] Geriatrics Triage Nurse Telephone Encounter    Provider: NGUYỄN Bowser CNP   Facility: [facility]  Facility Type:  TCU    Caller: Anival  Call Back Number: [phone]    Allergies:    Allergies   Allergen Reactions    Hytrin [Terazosin Hcl]      Leg swelling and vertigo    Simvastatin Other (See Comments)     Body aches    Cats     Dogs     Dust Mites     Morphine And Codeine Nausea    [place] Trees     Seasonal Allergies         Reason for call: Pt tested positive for COVID yesterday. Today he is experiencing SOB when ambulating only 10 ft. Expiratory wheezes heard bilaterally. Vitals: 115/71, 83, 98.7, 93% RA, 22. Family does not want tx with Paxlovid. Pt is not discharging tomorrow as planned.    Verbal Order/Direction given by Provider:   1) Dexamethasone 6 mg PO daily x 10 days  2) Mucinex 600 mg PO BID x 10 days  3) DuoNeb QID x 10 days    Provider giving Order:  NGUYỄN Bowser CNP     Verbal Order given to: Anival Lopez RN

## 2024-06-28 NOTE — TELEPHONE ENCOUNTER
Aiden, can you change the referral for Jaye Shah at [facility]. Patient's daughter stated he has been there before.Anny Gaytan [facility]

## 2024-06-28 NOTE — TELEPHONE ENCOUNTER
Faxed referral to Jaye Townsend at [facility] ! [phone].Anny Gaytan [facility]

## 2024-06-28 NOTE — TELEPHONE ENCOUNTER
Order/Referral Request    Who is requesting: Kelley (daughter)    Orders being requested: out patient phyiscal therapy for vertiago    Reason service is needed/diagnosis: leaving transitional care on 6/29/2024   Past history of vertiago    When are orders needed by: 6/29/2024    Has this been discussed with Provider: No but is getting therapy at transitional care and will not be discharged if no therapy is scheduled    Does patient have a preference on a Group/Provider/Facility? [facility]    Does patient have an appointment scheduled?: No    Where to send orders: Fax    Could we send this information to you in [place] or would you prefer to receive a phone call?:   Patient would prefer a phone call   Okay to leave a detailed message?: Yes at Home number on file [phone] Kelley

## 2024-07-01 ENCOUNTER — TRANSITIONAL CARE UNIT VISIT (OUTPATIENT)
Dept: GERIATRICS | Facility: CLINIC | Age: 84
End: 2024-07-01
Payer: COMMERCIAL

## 2024-07-01 VITALS
SYSTOLIC BLOOD PRESSURE: 132 MMHG | OXYGEN SATURATION: 93 % | TEMPERATURE: 97.6 F | HEART RATE: 66 BPM | RESPIRATION RATE: 20 BRPM | WEIGHT: 220 LBS | DIASTOLIC BLOOD PRESSURE: 59 MMHG | BODY MASS INDEX: 32.49 KG/M2

## 2024-07-01 DIAGNOSIS — U07.1 INFECTION DUE TO 2019 NOVEL CORONAVIRUS: ICD-10-CM

## 2024-07-01 DIAGNOSIS — I48.0 PAROXYSMAL ATRIAL FIBRILLATION (H): Primary | ICD-10-CM

## 2024-07-01 DIAGNOSIS — J45.20 MILD INTERMITTENT REACTIVE AIRWAY DISEASE WITHOUT COMPLICATION: ICD-10-CM

## 2024-07-01 PROCEDURE — 99309 SBSQ NF CARE MODERATE MDM 30: CPT | Performed by: NURSE PRACTITIONER

## 2024-07-01 NOTE — PROGRESS NOTES
[facility] GERIATRICS    Chief Complaint   Patient presents with    RECHECK     HPI:  Guillermo Hogue is a 84 year old  (1940), who is being seen today for an episodic care visit at: [facility] ([facility]) [8]. Today's concern is:   1. Paroxysmal atrial fibrillation (H)    2. Infection due to 2019 novel coronavirus    3. Mild intermittent reactive airway disease without complication      Patient seen for follow up, was to discharge home last week but on 6/27 tested + covid both rapid and PCR, today RRR, denies CP, VS WNL, chronic dry cough, afebrile, started on covid comfort meds, declined wanting paxlovid, appears healthy.        Allergies, and PMH/PSH reviewed in EPIC today.  REVIEW OF SYSTEMS:  4 point ROS including Respiratory, CV, GI and , other than that noted in the HPI,  is negative    Objective:   /59   Pulse 66   Temp 97.6  F (36.4  C)   Resp 20   Wt 99.8 kg (220 lb)   SpO2 93%   BMI 32.49 kg/m    GENERAL APPEARANCE:  in no distress, appears healthy  ENT:  Mouth and posterior oropharynx normal, moist mucous membranes  RESP:  lungs clear to auscultation , no respiratory distress  CV:  peripheral edema 1+ in lower legs, rate-normal  ABDOMEN:  bowel sounds normal  M/S:   Gait and station abnormal unsteady  SKIN:  Inspection of skin and subcutaneous tissue baseline  NEURO:   Examination of sensation by touch normal  PSYCH:  affect and mood normal    Labs done in SNF are in [facility]. Please refer to them using [facility]/Care Everywhere.    Assessment/Plan:  (I48.0) Paroxysmal atrial fibrillation (H)  (primary encounter diagnosis)  Comment: RRR, denies CP  Plan: continue statin, diltiazem  -no plans to start AC, fall risk  -daily vitals    (U07.1) Infection due to 2019 novel coronavirus  (J45.20) Mild intermittent reactive airway disease without complication  Comment: airway chronic cough, covid+ 6/27, mild symptoms  Plan:   -discontinue duoneb, dislikes  -continue mucinex  BID, dexamethasone 6mg daily, both x10 total days  -continue symbicort  -oxygen PRN if sats <90%    MED REC REQUIRED  Post Medication Reconciliation Status: medication reconcilation previously completed during another office visit      Electronically signed by: NGUYỄN Aguilar CNP

## 2024-07-01 NOTE — LETTER
7/1/2024      Guillermo Hogue  [address]  [place] [address]        [facility] GERIATRICS    Chief Complaint   Patient presents with     RECHECK     HPI:  Guillermo Hogue is a 84 year old  (1940), who is being seen today for an episodic care visit at: [facility] ([facility]) [8]. Today's concern is:   1. Paroxysmal atrial fibrillation (H)    2. Infection due to 2019 novel coronavirus    3. Mild intermittent reactive airway disease without complication      Patient seen for follow up, was to discharge home last week but on 6/27 tested + covid both rapid and PCR, today RRR, denies CP, VS WNL, chronic dry cough, afebrile, started on covid comfort meds, declined wanting paxlovid, appears healthy.        Allergies, and PMH/PSH reviewed in EPIC today.  REVIEW OF SYSTEMS:  4 point ROS including Respiratory, CV, GI and , other than that noted in the HPI,  is negative    Objective:   /59   Pulse 66   Temp 97.6  F (36.4  C)   Resp 20   Wt 99.8 kg (220 lb)   SpO2 93%   BMI 32.49 kg/m    GENERAL APPEARANCE:  in no distress, appears healthy  ENT:  Mouth and posterior oropharynx normal, moist mucous membranes  RESP:  lungs clear to auscultation , no respiratory distress  CV:  peripheral edema 1+ in lower legs, rate-normal  ABDOMEN:  bowel sounds normal  M/S:   Gait and station abnormal unsteady  SKIN:  Inspection of skin and subcutaneous tissue baseline  NEURO:   Examination of sensation by touch normal  PSYCH:  affect and mood normal    Labs done in SNF are in [facility]. Please refer to them using [facility]/Care Everywhere.    Assessment/Plan:  (I48.0) Paroxysmal atrial fibrillation (H)  (primary encounter diagnosis)  Comment: RRR, denies CP  Plan: continue statin, diltiazem  -no plans to start AC, fall risk  -daily vitals    (U07.1) Infection due to 2019 novel coronavirus  (J45.20) Mild intermittent reactive airway disease without complication  Comment: airway chronic cough,  covid+ 6/27, mild symptoms  Plan:   -discontinue duoneb, dislikes  -continue mucinex BID, dexamethasone 6mg daily, both x10 total days  -continue symbicort  -oxygen PRN if sats <90%    MED REC REQUIRED  Post Medication Reconciliation Status: medication reconcilation previously completed during another office visit      Electronically signed by: NGUYỄN Aguilar CNP          Sincerely,        NGUYỄN Aguilar CNP

## 2024-07-08 ENCOUNTER — TRANSFERRED RECORDS (OUTPATIENT)
Dept: HEALTH INFORMATION MANAGEMENT | Facility: CLINIC | Age: 84
End: 2024-07-08
Payer: COMMERCIAL

## 2024-07-11 ENCOUNTER — TELEPHONE (OUTPATIENT)
Dept: FAMILY MEDICINE | Facility: CLINIC | Age: 84
End: 2024-07-11
Payer: COMMERCIAL

## 2024-07-11 NOTE — TELEPHONE ENCOUNTER
Patient's daughter Kelley link (consent to communicate on file). She states patient was hospitalized for vertigo and then went to transitional care. Pt has been doing physical therapy for the vertigo. Daughter states pt continues to have leg swelling and thinks pt would benefit from compression stockings. Daughter also thinks pt would benefit from home care services. Writer advised a hospital follow up appointment with primary care provider to discuss concerns further. Scheduled pt for tomorrow at 11:30am.    Melonie Cortes RN    [facility]- Primary Care

## 2024-07-12 ENCOUNTER — MEDICAL CORRESPONDENCE (OUTPATIENT)
Dept: HEALTH INFORMATION MANAGEMENT | Facility: CLINIC | Age: 84
End: 2024-07-12

## 2024-07-12 ENCOUNTER — TELEPHONE (OUTPATIENT)
Dept: FAMILY MEDICINE | Facility: CLINIC | Age: 84
End: 2024-07-12

## 2024-07-12 ENCOUNTER — OFFICE VISIT (OUTPATIENT)
Dept: FAMILY MEDICINE | Facility: CLINIC | Age: 84
End: 2024-07-12
Payer: COMMERCIAL

## 2024-07-12 VITALS
BODY MASS INDEX: 33.09 KG/M2 | DIASTOLIC BLOOD PRESSURE: 92 MMHG | RESPIRATION RATE: 24 BRPM | HEIGHT: 69 IN | OXYGEN SATURATION: 94 % | SYSTOLIC BLOOD PRESSURE: 136 MMHG | HEART RATE: 92 BPM | TEMPERATURE: 98.1 F | WEIGHT: 223.4 LBS

## 2024-07-12 DIAGNOSIS — S81.002S OPEN WOUND OF LEFT KNEE, LEG, AND ANKLE, SEQUELA: ICD-10-CM

## 2024-07-12 DIAGNOSIS — Z91.81 AT HIGH RISK FOR FALLS: ICD-10-CM

## 2024-07-12 DIAGNOSIS — S91.002S OPEN WOUND OF LEFT KNEE, LEG, AND ANKLE, SEQUELA: ICD-10-CM

## 2024-07-12 DIAGNOSIS — S81.802S OPEN WOUND OF LEFT KNEE, LEG, AND ANKLE, SEQUELA: ICD-10-CM

## 2024-07-12 DIAGNOSIS — S06.9X9S TRAUMATIC BRAIN INJURY WITH LOSS OF CONSCIOUSNESS, SEQUELA (H): ICD-10-CM

## 2024-07-12 DIAGNOSIS — I48.0 PAROXYSMAL ATRIAL FIBRILLATION (H): ICD-10-CM

## 2024-07-12 DIAGNOSIS — I89.0 LYMPHEDEMA: ICD-10-CM

## 2024-07-12 DIAGNOSIS — R42 CHRONIC VERTIGO: Primary | ICD-10-CM

## 2024-07-12 DIAGNOSIS — J45.40 MODERATE PERSISTENT ASTHMA, UNCOMPLICATED: ICD-10-CM

## 2024-07-12 DIAGNOSIS — R26.89 POOR BALANCE: ICD-10-CM

## 2024-07-12 PROCEDURE — 99213 OFFICE O/P EST LOW 20 MIN: CPT | Performed by: FAMILY MEDICINE

## 2024-07-12 ASSESSMENT — PAIN SCALES - GENERAL: PAINLEVEL: NO PAIN (0)

## 2024-07-12 NOTE — TELEPHONE ENCOUNTER
Home care call:      Reason for Call:  [facility] has to decline patient due to at capacity for staff for patients diagnosis.      Please send home [facility] as they can possibly take patient on - intake fax is [phone].    Vilma COOPER,    [facility]

## 2024-07-12 NOTE — PROGRESS NOTES
ASSESSMENT / PLAN:  (S06.9X9S) Traumatic brain injury with loss of consciousness, sequela (H24)  (primary encounter diagnosis)    Plan: stable    (R42) Chronic vertigo  Comment: very difficult to take care of self and high fall risk  Plan: needs regular p.t. and daughter found assisted living with care. Forms done. Daughter worried about short term safety  Until can get in.   Will do home health safety eval    (J45.40) Moderate persistent asthma, uncomplicated  Comment: currently stable  Plan: continue mdi's    (I48.0) Paroxysmal atrial fibrillation (H)  Comment: stable  Plan: continue meds    (R26.89) Poor balance  Comment: using walker  Plan: needs regular p.t..    (Z91.81) At high risk for falls  Plan: see above    (I89.0) Lymphedema  Comment: needs help  Plan: ACE/[facility]an and will ask for RN help. [employer]. .Recheck in 3 months      (S81.002S,  S81.802S,  S91.002S) Open wound of left knee, leg, and ankle, sequela  Comment: slow to heal since fall especially with edema issues  Plan: new bandages given.       Debra Li is a 84 year old, presenting for the following health issues:  Follow-up veritgo.  History PAF, htn, high cholesterol, chronic vertigo, TBI and asthma. Seen in ER and MRI negative for central causes.  Seen vertigo clinic.  Overall improving but feeling fogginess. Meclizine about once day.   No neurology in past.   Here with daughter today.   Seen p.t.helpful. left ear clear but right ear.  Seeing p.t..  Lives alone. 2 kids in [place]. 2 grandkids.   Walker needing.   Home health - doesn't qualify for home health because of p.t.  Sleep hit/miss. Difficult to lay down and can sleep in chair.   No emesis or nauseated. Asthma ok - symbicort daily.   Had covid 2 weeks - no anti-viral but given prednisone.   Was in rehab facility for one.   Emotionally and some anxious issues.   Able to cook and go to by self but needs daily p.t.   Lives in [place] [address] - people there.   Going to [facility] - does vertigo at living.  Own apartment and meals and nursing at site and nursing assessment. .    No chief complaint on file.        7/12/2024    11:18 AM   Additional Questions   Roomed by KEVIN Fraser CMA   Accompanied by Daughter         7/12/2024   Forms   Any forms needing to be completed Yes      HPI         Hospital Follow-up Visit:    Hospital/Nursing Home/IP Rehab Facility:  [facility]  Date of Admission: 6/17/2024-  Date of Discharge: 6/27/2024  Reason(s) for Admission: Vertigo  Was the patient in the ICU or did the patient experience delirium during hospitalization?  No  Do you have any other stressors you would like to discuss with your provider? OTHER: dizziness    Problems taking medications regularly:  None  Medication changes since discharge: None  Problems adhering to non-medication therapy:  None    Summary of hospitalization:    Diagnostic Tests/Treatments reviewed.  Follow up needed: see above  Other Healthcare Providers Involved in Patient s Care:         Homecare and Physical Therapy  Update since discharge: stable.         Plan of care communicated with patient and family           Objective    There were no vitals taken for this visit.  There is no height or weight on file to calculate BMI.  BP (!) 136/92   Pulse 92   Temp 98.1  F (36.7  C) (Oral)   Resp 24   Ht 1.753 m (5' 9\")   Wt 101.3 kg (223 lb 6.4 oz)   SpO2 94%   BMI 32.99 kg/m      Physical Exam   GENERAL: alert and no distress  EYES: Eyes grossly normal to inspection, PERRL and conjunctivae and sclerae normal  HENT: ear canals and TM's normal, nose and mouth without ulcers or lesions  NECK: no adenopathy, no asymmetry, masses, or scars  RESP: lungs clear to auscultation - no rales, rhonchi or wheezes  CV: regular rate and rhythm, normal S1 S2, no S3 or S4, no murmur, click or rub, no peripheral edema   ABDOMEN: soft, nontender, no hepatosplenomegaly, no masses and bowel sounds normal  MS: bilateral LOWER " EXTREMETIES edema +2  SKIN: open wound left lateral calf. No pus/bleeding.  PSYCH: mentation appears normal, affect normal/bright            Signed Electronically by: Eid Doty MD

## 2024-07-21 ENCOUNTER — PATIENT OUTREACH (OUTPATIENT)
Dept: CARE COORDINATION | Facility: CLINIC | Age: 84
End: 2024-07-21
Payer: COMMERCIAL

## 2024-07-30 ENCOUNTER — TELEPHONE (OUTPATIENT)
Dept: FAMILY MEDICINE | Facility: CLINIC | Age: 84
End: 2024-07-30
Payer: COMMERCIAL

## 2024-07-30 NOTE — TELEPHONE ENCOUNTER
Reason for Call:  Appointment Request    Patient requesting this type of appt:  incontinence     Requested provider: Edi Doty    Reason patient unable to be scheduled: Not within requested timeframe    When does patient want to be seen/preferred time:  Soon     Comments: Daughter is requesting an appointment for her Dad for incontinence. States it started a week ago. First available is August 20 and Kelley states that is too far out. Can he be worked in     Could we send this information to you in [employer] or would you prefer to receive a phone call?:   Patient would prefer a phone call   Okay to leave a detailed message?: Yes at Other phone number:  [phone]    Call taken on 7/30/2024 at 9:05 AM by Madison Hogue

## 2024-07-30 NOTE — TELEPHONE ENCOUNTER
Called and spoke to patient's daughter. I informed her of Dr Doty's message and appointment made for 8/5/24.Anny Gaytan [facility]

## 2024-07-30 NOTE — TELEPHONE ENCOUNTER
Ok same day. Should be prepared to leave a urine sample. If having fevers or chills, confusion or blood in urine/hurts to urinate - to er or urgent care. Edi Doty MD

## 2024-08-05 ENCOUNTER — OFFICE VISIT (OUTPATIENT)
Dept: FAMILY MEDICINE | Facility: CLINIC | Age: 84
End: 2024-08-05
Payer: COMMERCIAL

## 2024-08-05 VITALS
HEIGHT: 69 IN | TEMPERATURE: 99.2 F | WEIGHT: 220 LBS | RESPIRATION RATE: 21 BRPM | OXYGEN SATURATION: 94 % | DIASTOLIC BLOOD PRESSURE: 74 MMHG | BODY MASS INDEX: 32.58 KG/M2 | HEART RATE: 90 BPM | SYSTOLIC BLOOD PRESSURE: 128 MMHG

## 2024-08-05 DIAGNOSIS — R32 URINARY INCONTINENCE, UNSPECIFIED TYPE: Primary | ICD-10-CM

## 2024-08-05 DIAGNOSIS — I10 HYPERTENSION GOAL BP (BLOOD PRESSURE) < 140/90: ICD-10-CM

## 2024-08-05 LAB
ALBUMIN UR-MCNC: NEGATIVE MG/DL
APPEARANCE UR: CLEAR
BILIRUB UR QL STRIP: NEGATIVE
COLOR UR AUTO: YELLOW
GLUCOSE UR STRIP-MCNC: NEGATIVE MG/DL
HGB UR QL STRIP: NEGATIVE
KETONES UR STRIP-MCNC: NEGATIVE MG/DL
LEUKOCYTE ESTERASE UR QL STRIP: NEGATIVE
NITRATE UR QL: NEGATIVE
PH UR STRIP: 7 [PH] (ref 5–7)
RBC #/AREA URNS AUTO: ABNORMAL /HPF
SP GR UR STRIP: 1.01 (ref 1–1.03)
SQUAMOUS #/AREA URNS AUTO: ABNORMAL /LPF
UROBILINOGEN UR STRIP-ACNC: 1 E.U./DL
WBC #/AREA URNS AUTO: ABNORMAL /HPF

## 2024-08-05 PROCEDURE — 81001 URINALYSIS AUTO W/SCOPE: CPT | Performed by: FAMILY MEDICINE

## 2024-08-05 PROCEDURE — 99214 OFFICE O/P EST MOD 30 MIN: CPT | Performed by: FAMILY MEDICINE

## 2024-08-05 RX ORDER — TOLTERODINE 2 MG/1
2 CAPSULE, EXTENDED RELEASE ORAL DAILY
Qty: 30 CAPSULE | Refills: 1 | Status: SHIPPED | OUTPATIENT
Start: 2024-08-05

## 2024-08-05 RX ORDER — CIPROFLOXACIN 250 MG/1
250 TABLET, FILM COATED ORAL 2 TIMES DAILY
Qty: 14 TABLET | Refills: 0 | Status: SHIPPED | OUTPATIENT
Start: 2024-08-05 | End: 2024-08-12

## 2024-08-05 ASSESSMENT — PAIN SCALES - GENERAL: PAINLEVEL: NO PAIN (0)

## 2024-08-05 NOTE — PROGRESS NOTES
ASSESSMENT / PLAN:  (R32) Urinary incontinence, unspecified type  (primary encounter diagnosis)  Comment: low grade uti/prostatisis. Patient feeling a little fatigued too vs bph vs ?  Plan: UA with Microscopic reflex to Culture, UA         Microscopic with Reflex to Culture,         ciprofloxacin (CIPRO) 250 MG tablet,         tolterodine ER (DETROL LA) 2 MG 24 hr capsule,         Adult Urology  Referral        Start with cipro and if ongoing symptoms after 5 days can to detrol. Reveiwed risks and side effects of medication  Continue monitor urine and follow-up urology. If hematuria/ fevers or chills/worsening fatigue - to ER. Expected course and warning signs reviewed. Call/email with questions/concerns      (I10) Hypertension goal BP (blood pressure) < 140/90  Comment: stable  Plan: continue meds. Didn't do well with hytrin in past.        Debra Li is a 84 year old, presenting for the following health issues:  Urinary Problem  Urinary issues -history incontinence. Ongoing past week.  No dysuria/hematuria or blood in urine. No history kidney stones. No flank pain. No fevers or chills. No nausea, vomiting or diarrhea or constipation. Normal BM's. No changes in fluids.   Water intake 6-8 glasses - regularly. 1-2 cups/coffee/day.   Ongoing - doesn't matter. No urology in past.   History PAF, htn, high cholesterol, chronic vertigo, TBI and asthma.         8/5/2024     8:35 AM   Additional Questions   Roomed by Madhu MARCANO LPN   Accompanied by self         8/5/2024     8:35 AM   Patient Reported Additional Medications   Patient reports taking the following new medications None     History of Present Illness       Reason for visit:  Urine leaking  Symptom onset:  3-7 days ago  Symptoms include:  Urine leaking, incontinence  Symptom intensity:  Mild  Symptom progression:  Staying the same  Had these symptoms before:  No    He eats 2-3 servings of fruits and vegetables daily.He consumes 0 sweetened  "beverage(s) daily.He exercises with enough effort to increase his heart rate 9 or less minutes per day.  He exercises with enough effort to increase his heart rate 3 or less days per week.   He is taking medications regularly.                     Objective    /74   Pulse 90   Temp 99.2  F (37.3  C) (Tympanic)   Resp 21   Ht 1.753 m (5' 9\")   Wt 99.8 kg (220 lb)   SpO2 94%   BMI 32.49 kg/m    Body mass index is 32.49 kg/m .  Physical Exam   GENERAL: alert and no distress  EYES: Eyes grossly normal to inspection, PERRL and conjunctivae and sclerae normal  NECK: no adenopathy, no asymmetry, masses, or scars  RESP: lungs clear to auscultation - no rales, rhonchi or wheezes  CV: regular rate and rhythm, normal S1 S2, no S3 or S4, no murmur, click or rub, no peripheral edema   ABDOMEN: soft, nontender, no hepatosplenomegaly, no masses and bowel sounds normal  MS: no gross musculoskeletal defects noted, no edema  PSYCH: mentation appears normal, affect normal/bright            Signed Electronically by: Edi Doty MD    "

## 2024-08-06 ENCOUNTER — TELEPHONE (OUTPATIENT)
Dept: UROLOGY | Facility: CLINIC | Age: 84
End: 2024-08-06
Payer: COMMERCIAL

## 2024-08-06 NOTE — TELEPHONE ENCOUNTER
This encounter is being sent to inform the clinic that this patient has a referral from Edi Doty for the diagnoses of Incontinence and has requested that this patient be seen within 1-2 weeks. Based on the availability of our provider(s), we are unable to accommodate this request.    Were all sites offered this patient?  Yes    Does scheduling algorithm request to schedule next available?  No - Pt declined to schedule first available on 9/27. Sending message for clinic review and follow-up with Pt to discuss. Thank you!

## 2024-08-07 NOTE — TELEPHONE ENCOUNTER
"No sooner appt in [place] than 9/27. Pt states, \"Thank you for checking, but I have another appointment sooner.\"    Tricia MUNSON RN   [facility], Urology   8/7/2024 11:05 AM      "

## 2024-08-16 ENCOUNTER — DOCUMENTATION ONLY (OUTPATIENT)
Dept: OTHER | Facility: CLINIC | Age: 84
End: 2024-08-16
Payer: COMMERCIAL

## 2024-08-22 ENCOUNTER — MEDICAL CORRESPONDENCE (OUTPATIENT)
Dept: HEALTH INFORMATION MANAGEMENT | Facility: CLINIC | Age: 84
End: 2024-08-22
Payer: COMMERCIAL

## 2024-08-26 ENCOUNTER — TRANSFERRED RECORDS (OUTPATIENT)
Dept: HEALTH INFORMATION MANAGEMENT | Facility: CLINIC | Age: 84
End: 2024-08-26
Payer: COMMERCIAL

## 2024-08-29 ENCOUNTER — MEDICAL CORRESPONDENCE (OUTPATIENT)
Dept: HEALTH INFORMATION MANAGEMENT | Facility: CLINIC | Age: 84
End: 2024-08-29
Payer: COMMERCIAL

## 2024-09-10 ENCOUNTER — OFFICE VISIT (OUTPATIENT)
Dept: ALLERGY | Facility: OTHER | Age: 84
End: 2024-09-10
Attending: ALLERGY & IMMUNOLOGY
Payer: COMMERCIAL

## 2024-09-10 VITALS — SYSTOLIC BLOOD PRESSURE: 121 MMHG | DIASTOLIC BLOOD PRESSURE: 82 MMHG | OXYGEN SATURATION: 95 % | HEART RATE: 72 BPM

## 2024-09-10 DIAGNOSIS — J33.9 NASAL POLYPOSIS: ICD-10-CM

## 2024-09-10 DIAGNOSIS — R06.09 DYSPNEA ON EXERTION: ICD-10-CM

## 2024-09-10 DIAGNOSIS — J45.40 MODERATE PERSISTENT ASTHMA, UNCOMPLICATED: Primary | ICD-10-CM

## 2024-09-10 DIAGNOSIS — J32.4 CHRONIC PANSINUSITIS: ICD-10-CM

## 2024-09-10 PROCEDURE — 99214 OFFICE O/P EST MOD 30 MIN: CPT | Performed by: ALLERGY & IMMUNOLOGY

## 2024-09-10 RX ORDER — TRIAMCINOLONE ACETONIDE 55 UG/1
2 SPRAY, METERED NASAL DAILY
Qty: 10.8 ML | Refills: 11 | Status: SHIPPED | OUTPATIENT
Start: 2024-09-10

## 2024-09-10 RX ORDER — DUPILUMAB 300 MG/2ML
300 INJECTION, SOLUTION SUBCUTANEOUS
Qty: 4 ML | Refills: 11 | Status: SHIPPED | OUTPATIENT
Start: 2024-09-10

## 2024-09-10 RX ORDER — BUDESONIDE AND FORMOTEROL FUMARATE DIHYDRATE 160; 4.5 UG/1; UG/1
2 AEROSOL RESPIRATORY (INHALATION) 2 TIMES DAILY
Qty: 30.6 G | Refills: 2 | Status: SHIPPED | OUTPATIENT
Start: 2024-09-10

## 2024-09-10 RX ORDER — ALBUTEROL SULFATE 90 UG/1
2-4 AEROSOL, METERED RESPIRATORY (INHALATION) EVERY 4 HOURS PRN
Qty: 18 G | Refills: 3 | Status: SHIPPED | OUTPATIENT
Start: 2024-09-10

## 2024-09-10 ASSESSMENT — ASTHMA QUESTIONNAIRES
QUESTION_1 LAST FOUR WEEKS HOW MUCH OF THE TIME DID YOUR ASTHMA KEEP YOU FROM GETTING AS MUCH DONE AT WORK, SCHOOL OR AT HOME: NONE OF THE TIME
QUESTION_3 LAST FOUR WEEKS HOW OFTEN DID YOUR ASTHMA SYMPTOMS (WHEEZING, COUGHING, SHORTNESS OF BREATH, CHEST TIGHTNESS OR PAIN) WAKE YOU UP AT NIGHT OR EARLIER THAN USUAL IN THE MORNING: NOT AT ALL
QUESTION_2 LAST FOUR WEEKS HOW OFTEN HAVE YOU HAD SHORTNESS OF BREATH: ONCE OR TWICE A WEEK
QUESTION_5 LAST FOUR WEEKS HOW WOULD YOU RATE YOUR ASTHMA CONTROL: WELL CONTROLLED
ACUTE_EXACERBATION_TODAY: NO
ACT_TOTALSCORE: 23
QUESTION_4 LAST FOUR WEEKS HOW OFTEN HAVE YOU USED YOUR RESCUE INHALER OR NEBULIZER MEDICATION (SUCH AS ALBUTEROL): NOT AT ALL
ACT_TOTALSCORE: 23

## 2024-09-10 NOTE — LETTER
9/10/2024      Guillermo Houge  [address]  [address]  [place] [address]      Dear Colleague,    Thank you for referring your patient, Guillermo Hogue, to the [facility]. Please see a copy of my visit note below.    SUBJECTIVE:                                                                   Guillermo Hogue presents today to our Allergy Clinic at [facility] for a follow up visit. He is a 84 year old male with asthma, chronic sinusitis with nasal polyposis, and allergic rhinitis.  Accompanied by daughter who helps to provide history.  History of chronic chest symptoms for multiple years. Diagnosed with asthma at [facility] in 2010.   Allergic rhinoconjunctivitis: Perennial with spring and summer nasal and ocular symptoms. SPT for aeroallergens performed in August 2019 showed sensitivity to cat, dog, dust mites, tree pollen, and weed pollen. History of nasal polyps.    Follows with Dr. Valadez of ENT.   Currently on Dupixent 300 mg every other week. The reason for Dupixent was not nasal polyposis, but not well controlled asthma. Developed persistent tingling sensation in both his legs at the end of December 2021-beginning of January 2022.. The right leg seemed to be affected more than the left. Stopped Dupixent due to that, but then restarted it again.  He stated that he had an MRI of the back done and was told that he had some issues with vertebrae.  It did not make symptoms worse.  He discontinued dupilumab again in February 2024 after experiencing numbness and tingling sensation in his left foot.    After discontinuing dupilumab, the patient did not notice any improvement in his feet. He reports experiencing tingling sensations in both feet, which are worse on the left side, and swelling in both legs. The cause of the swelling is unknown, as serious causes were ruled out.    He has been struggling with vertigo and is undergoing physical therapy, where he feels  he is making progress. However, he recently noticed exertional dyspnea--walking a couple of blocks or going up and down stairs causes shortness of breath that resolves within 30 seconds. He uses Symbicort 160/4.5 mcg (2 puffs twice daily), dupilumab 300 mg every 14 days, and albuterol as needed.    He denies current chest tightness, cough, or wheezing, and his dyspnea is only exertional. He uses albuterol less than twice weekly for rescue and wakes up less than twice per month with chest symptoms. He has not needed oral steroids since the last visit.    Regarding his chronic sinusitis with nasal polyposis, he reports doing well. He uses Nasacort (2 sprays in each nostril daily) and dupilumab 300 mg every 14 days. He denies any uncontrolled nasal congestion, sinus pressure, or postnasal drainage.    Patient Active Problem List   Diagnosis     Reactive airway disease     Chronic sinusitis, unspecified location     AR (allergic rhinitis)     HYPERLIPIDEMIA LDL GOAL <130     BPH (benign prostatic hyperplasia)     Palpitations     Hypertension goal BP (blood pressure) < 140/90     Fatty liver     Cataracts, bilateral     Moderate persistent asthma, uncomplicated     Seasonal allergic rhinitis due to pollen     Allergic rhinitis due to animal dander     Allergic rhinitis due to dust mite     Paroxysmal atrial fibrillation (H)     Dizziness     Nasal polyposis     Benign paroxysmal positional vertigo of left ear     Bilateral leg edema     Generalized muscle weakness     Skin tear of left upper arm without complication, subsequent encounter     Infection due to 2019 novel coronavirus     Traumatic brain injury with loss of consciousness, sequela (H24)       Past Medical History:   Diagnosis Date     Allergic rhinitis age 22     Asthma 2009     Chronic osteoarthritis      Nasal polyposis 2009     Osteoarthritis of left hip      Reactive airway disease 2009      Problem (# of Occurrences) Relation (Name,Age of Onset)     Arthritis (1) Father    Cancer (1) Maternal Grandfather    Hypertension (1) Maternal Grandmother    Respiratory (1) Father: TB history    Cerebrovascular Disease (2) Father, Maternal Grandmother    Breast Cancer (1) Mother    Prostate Cancer (1) Brother    C.A.D. (3) Father, Brother, Brother    Rheumatoid Arthritis (1) Father          Past Surgical History:   Procedure Laterality Date     COLONOSCOPY       COLONOSCOPY N/A 5/10/2017    Procedure: COMBINED COLONOSCOPY, SINGLE OR MULTIPLE BIOPSY/POLYPECTOMY BY BIOPSY;;  Surgeon: Abisai Duarte DO;  Location: [facility] OR     COLONOSCOPY WITH CO2 INSUFFLATION N/A 2015    Procedure: COLONOSCOPY WITH CO2 INSUFFLATION;  Surgeon: Jose R Richmond MD;  Location: [facility] OR     COLONOSCOPY WITH CO2 INSUFFLATION N/A 5/10/2017    Procedure: COLONOSCOPY WITH CO2 INSUFFLATION;  COLON SCREEN/ UNDERWOOD;  Surgeon: Abisai Duarte DO;  Location: [facility] OR     JOINT REPLACEMENT Left 2019    hip     SINUS SURGERY  2009     TONSILLECTOMY  age 22     Social History     Socioeconomic History     Marital status:      Spouse name: Sanam palafox      Number of children: 2     Years of education: 14     Highest education level: None   Occupational History     Occupation: Middle Management     Employer: RETIRED     Comment:    Tobacco Use     Smoking status: Never     Smokeless tobacco: Never   Vaping Use     Vaping status: Never Used   Substance and Sexual Activity     Alcohol use: No     Drug use: No     Sexual activity: Not Currently   Other Topics Concern     Parent/sibling w/ CABG, MI or angioplasty before 65F 55M? Yes     Comment: 2 Brothers and father      Service No     Blood Transfusions No     Caffeine Concern No     Occupational Exposure Yes     Comment: he was in [employer]     Hobby Hazards No     Sleep Concern No     Stress Concern No     Weight Concern Yes     Special Diet No     Back Care No     Exercise No     Bike Helmet No     Seat Belt Yes      Self-Exams No   Social History Narrative    September 10, 2024            ENVIRONMENTAL HISTORY: [place] The home is heated with a forced air. They do have central air conditioning. The patient's bedroom is furnished with carpeting in bedroom.  Pets inside the house include 0 animals. There is no history of cockroach or mice infestation. There is/are 0 smokers in the house.  The house does not have a damp basement.      Social Determinants of Health     Financial Resource Strain: Low Risk  (6/17/2024)    [facility]    Financial Resource Strain      Difficulty of Paying Living Expenses: 3   Food Insecurity: No Food Insecurity (6/17/2024)    Received from [facility]    Food Insecurity      Worried About Running Out of Food in the Last Year: 1   Transportation Needs: No Transportation Needs (6/17/2024)    Received from [facility]    Transportation Needs      Lack of Transportation (Medical): 1   Social Connections: Socially Integrated (6/17/2024)    Received from [facility]    Social Connections      Frequency of Communication with Friends and Family: 0   Interpersonal Safety: Low Risk  (8/5/2024)    Interpersonal Safety      Do you feel physically and emotionally safe where you currently live?: Yes      Within the past 12 months, have you been hit, slapped, kicked or otherwise physically hurt by someone?: No      Within the past 12 months, have you been humiliated or emotionally abused in other ways by your partner or ex-partner?: No   Housing Stability: Low Risk  (6/17/2024)    Received from [facility]    Housing Stability      Unable to Pay for Housing in the Last Year: 1           Current Outpatient Medications:      albuterol (PROAIR HFA/PROVENTIL HFA/VENTOLIN HFA) 108 (90 Base) MCG/ACT  inhaler, Inhale 2-4 puffs into the lungs every 4 hours as needed for shortness of breath or wheezing., Disp: 18 g, Rfl: 3     atorvastatin (LIPITOR) 20 MG tablet, TAKE 1/2 TABLET BY MOUTH ONCE DAILY, Disp: 45 tablet, Rfl: 3     budesonide-formoterol (SYMBICORT) 160-4.5 MCG/ACT Inhaler, Inhale 2 puffs into the lungs 2 times daily., Disp: 30.6 g, Rfl: 2     diltiazem ER COATED BEADS (CARTIA XT) 120 MG 24 hr capsule, Take 1 capsule (120 mg) by mouth daily For blood pressure/lowers pulse. Pharmacy ok to hold prescription until due, Disp: 90 capsule, Rfl: 3     dupilumab (DUPIXENT) 300 MG/2ML prefilled syringe, Inject 2 mLs (300 mg) subcutaneously every 14 days., Disp: 4 mL, Rfl: 11     meclizine (ANTIVERT) 25 MG tablet, Take 1 tablet (25 mg) by mouth 3 times daily as needed for dizziness, Disp: 90 tablet, Rfl: 1     tolterodine ER (DETROL LA) 2 MG 24 hr capsule, Take 1 capsule (2 mg) by mouth daily Start in 5 days. For bladder spasms/incontinence, Disp: 30 capsule, Rfl: 1     triamcinolone (NASACORT) 55 MCG/ACT nasal aerosol, Spray 2 sprays into both nostrils daily., Disp: 10.8 mL, Rfl: 11     scopolamine (TRANSDERM) 1 MG/3DAYS 72 hr patch, Place 1 patch onto the skin every 72 hours (Patient not taking: Reported on 8/5/2024), Disp: , Rfl:   Immunization History   Administered Date(s) Administered     COVID-19 12+ (Pfizer) 10/10/2023     COVID-19 Bivalent 12+ (Pfizer) 09/26/2022     COVID-19 MONOVALENT 12+ (Pfizer) 02/04/2021, 02/25/2021, 10/04/2021     COVID-19 Monovalent 12+ (Pfizer 2022) 04/11/2022     Influenza (H1N1) 12/22/2009     Influenza (High Dose) Trivalent,PF (Fluzone) 10/27/2010, 10/22/2011, 09/30/2014, 09/08/2015, 09/29/2016, 10/04/2017, 09/13/2018, 09/09/2019     Influenza (IIV3) PF 10/19/2002, 10/13/2003, 10/16/2004, 10/20/2005, 10/20/2006, 10/27/2007, 10/27/2008, 09/18/2009, 10/13/2012     Influenza Vaccine 65+ (Fluzone HD) 09/14/2020, 09/23/2021, 09/26/2022, 09/19/2023     Influenza Vaccine >6  months,quad, PF 10/17/2013     Mantoux Tuberculin Skin Test 10/09/2009     Pneumo Conj 13-V (2010&after) 09/08/2015     Pneumococcal 23 valent 10/20/2006     RSV Vaccine (Abrysvo) 12/07/2023     TD,PF 7+ (Tenivac) 10/27/2010     TDAP (Adacel,Boostrix) 01/23/2023     TDAP Vaccine (Adacel) 08/14/2012     Zoster recombinant adjuvanted (SHINGRIX) 09/13/2018, 01/03/2019     Zoster vaccine, live 11/12/2014     Allergies   Allergen Reactions     Hytrin [Terazosin Hcl]      Leg swelling and vertigo     Simvastatin Other (See Comments)     Body aches     Cats      Dogs      Dust Mites      Morphine And Codeine Nausea     [place]      Seasonal Allergies      OBJECTIVE:                                                                 /82   Pulse 72   SpO2 95%         Physical Exam  Vitals and nursing note reviewed.   Constitutional:       General: He is not in acute distress.     Appearance: He is not diaphoretic.   HENT:      Head: Normocephalic and atraumatic.      Right Ear: Tympanic membrane, ear canal and external ear normal.      Left Ear: Tympanic membrane, ear canal and external ear normal.      Nose: No mucosal edema or rhinorrhea.      Right Turbinates: Not enlarged, swollen or pale.      Left Turbinates: Not enlarged, swollen or pale.      Mouth/Throat:      Lips: Pink.      Mouth: Mucous membranes are moist.      Pharynx: Oropharynx is clear. No pharyngeal swelling, oropharyngeal exudate or posterior oropharyngeal erythema.   Eyes:      General:         Right eye: No discharge.         Left eye: No discharge.      Conjunctiva/sclera: Conjunctivae normal.   Cardiovascular:      Rate and Rhythm: Normal rate and regular rhythm.      Heart sounds: Murmur (mild) heard.   Pulmonary:      Effort: No respiratory distress.      Breath sounds: Normal breath sounds and air entry. No stridor, decreased air movement or transmitted upper airway sounds. No decreased breath sounds, wheezing, rhonchi or rales.    Musculoskeletal:      Right lower leg: Edema present.      Left lower leg: Edema present.   Neurological:      Mental Status: He is alert and oriented to person, place, and time.   Psychiatric:         Mood and Affect: Mood normal.         Behavior: Behavior normal.         WORKUP: ACT 23  ASSESSMENT/PLAN:    Moderate persistent asthma, uncomplicated  Dyspnea on exertion    The patient s asthma symptoms are well-controlled with dupilumab 300 mg every 14 days, Symbicort 160/4.5 mcg 2 puffs twice daily, and albuterol as needed. He has not needed albuterol for more than a year.    Dyspnea on exertion is the main concern, and we discussed potential causes, including deconditioning, a cardiac origin, and asthma. Given that the dyspnea occurs only with exertion and resolves within 30 seconds, I doubt it is asthma-related.    We will continue the current asthma regimen.  The patient will try albuterol 2 puffs 15 to 20 minutes before strenuous activity (e.g., physical therapy or walking) and report whether this helps alleviate the exertional symptoms.  If similar symptoms persist despite using albuterol pre-exertionally, I do not believe the dyspnea is asthma-related, and further evaluation for other causes will be necessary.  He will send a Emme E2MS message in 6 to 8 weeks to update us on how he feels using albuterol before exertion.    - Adult Allergy Clinic Follow-Up Order  - budesonide-formoterol (SYMBICORT) 160-4.5 MCG/ACT Inhaler  Dispense: 30.6 g; Refill: 2  - albuterol (PROAIR HFA/PROVENTIL HFA/VENTOLIN HFA) 108 (90 Base) MCG/ACT inhaler  Dispense: 18 g; Refill: 3  - dupilumab (DUPIXENT) 300 MG/2ML prefilled syringe  Dispense: 4 mL; Refill: 11          Nasal polyposis  Chronic pansinusitis    Most of the time, well-controlled with Nasacort 2 sprays in each nostril once daily and dupilumab 300 mg every 14 days.  - He will continue as he is.    - triamcinolone (NASACORT) 55 MCG/ACT nasal aerosol  Dispense: 10.8 mL;  Refill: 11     Follow-up in 1 year or sooner if needed.      Thank you for allowing us to participate in the care of this patient. Please feel free to contact us if there are any questions or concerns about the patient.    Disclaimer: This note consists of symbols derived from keyboarding, dictation and/or voice recognition software. As a result, there may be errors in the script that have gone undetected. Please consider this when interpreting information found in this chart.    Mohinder Aragon MD, FAAAAI, FACAAI  Allergy, Asthma and Immunology     [facility]        Again, thank you for allowing me to participate in the care of your patient.        Sincerely,        Mohinder Aragon MD

## 2024-09-10 NOTE — PROGRESS NOTES
SUBJECTIVE:                                                                   Guillermo Hogue presents today to our Allergy Clinic at [facility] for a follow up visit. He is a 84 year old male with asthma, chronic sinusitis with nasal polyposis, and allergic rhinitis.  Accompanied by daughter who helps to provide history.  History of chronic chest symptoms for multiple years. Diagnosed with asthma at [facility] in 2010.   Allergic rhinoconjunctivitis: Perennial with spring and summer nasal and ocular symptoms. SPT for aeroallergens performed in August 2019 showed sensitivity to cat, dog, dust mites, tree pollen, and weed pollen. History of nasal polyps.    Follows with Dr. Valadez of ENT.   Currently on Dupixent 300 mg every other week. The reason for Dupixent was not nasal polyposis, but not well controlled asthma. Developed persistent tingling sensation in both his legs at the end of December 2021-beginning of January 2022.. The right leg seemed to be affected more than the left. Stopped Dupixent due to that, but then restarted it again.  He stated that he had an MRI of the back done and was told that he had some issues with vertebrae.  It did not make symptoms worse.  He discontinued dupilumab again in February 2024 after experiencing numbness and tingling sensation in his left foot.    After discontinuing dupilumab, the patient did not notice any improvement in his feet. He reports experiencing tingling sensations in both feet, which are worse on the left side, and swelling in both legs. The cause of the swelling is unknown, as serious causes were ruled out.    He has been struggling with vertigo and is undergoing physical therapy, where he feels he is making progress. However, he recently noticed exertional dyspnea--walking a couple of blocks or going up and down stairs causes shortness of breath that resolves within 30 seconds. He uses Symbicort 160/4.5 mcg (2 puffs twice daily),  dupilumab 300 mg every 14 days, and albuterol as needed.    He denies current chest tightness, cough, or wheezing, and his dyspnea is only exertional. He uses albuterol less than twice weekly for rescue and wakes up less than twice per month with chest symptoms. He has not needed oral steroids since the last visit.    Regarding his chronic sinusitis with nasal polyposis, he reports doing well. He uses Nasacort (2 sprays in each nostril daily) and dupilumab 300 mg every 14 days. He denies any uncontrolled nasal congestion, sinus pressure, or postnasal drainage.    Patient Active Problem List   Diagnosis    Reactive airway disease    Chronic sinusitis, unspecified location    AR (allergic rhinitis)    HYPERLIPIDEMIA LDL GOAL <130    BPH (benign prostatic hyperplasia)    Palpitations    Hypertension goal BP (blood pressure) < 140/90    Fatty liver    Cataracts, bilateral    Moderate persistent asthma, uncomplicated    Seasonal allergic rhinitis due to pollen    Allergic rhinitis due to animal dander    Allergic rhinitis due to dust mite    Paroxysmal atrial fibrillation (H)    Dizziness    Nasal polyposis    Benign paroxysmal positional vertigo of left ear    Bilateral leg edema    Generalized muscle weakness    Skin tear of left upper arm without complication, subsequent encounter    Infection due to 2019 novel coronavirus    Traumatic brain injury with loss of consciousness, sequela (H24)       Past Medical History:   Diagnosis Date    Allergic rhinitis age 22    Asthma 2009    Chronic osteoarthritis     Nasal polyposis 2009    Osteoarthritis of left hip     Reactive airway disease 2009      Problem (# of Occurrences) Relation (Name,Age of Onset)    Arthritis (1) Father    Cancer (1) Maternal Grandfather    Hypertension (1) Maternal Grandmother    Respiratory (1) Father: TB history    Cerebrovascular Disease (2) Father, Maternal Grandmother    Breast Cancer (1) Mother    Prostate Cancer (1) Brother    C.A.D. (3)  Father, Brother, Brother    Rheumatoid Arthritis (1) Father          Past Surgical History:   Procedure Laterality Date    COLONOSCOPY      COLONOSCOPY N/A 5/10/2017    Procedure: COMBINED COLONOSCOPY, SINGLE OR MULTIPLE BIOPSY/POLYPECTOMY BY BIOPSY;;  Surgeon: Abisai Duarte DO;  Location: [facility] OR    COLONOSCOPY WITH CO2 INSUFFLATION N/A 2015    Procedure: COLONOSCOPY WITH CO2 INSUFFLATION;  Surgeon: Jose R Richmond MD;  Location: [facility] OR    COLONOSCOPY WITH CO2 INSUFFLATION N/A 5/10/2017    Procedure: COLONOSCOPY WITH CO2 INSUFFLATION;  COLON SCREEN/ UNDERWOOD;  Surgeon: Abisai Duarte DO;  Location: [facility] OR    JOINT REPLACEMENT Left 2019    hip    SINUS SURGERY  2009    TONSILLECTOMY  age 22     Social History     Socioeconomic History    Marital status:      Spouse name: Sanam palafox     Number of children: 2    Years of education: 14    Highest education level: None   Occupational History    Occupation: Middle Management     Employer: RETIRED     Comment:    Tobacco Use    Smoking status: Never    Smokeless tobacco: Never   Vaping Use    Vaping status: Never Used   Substance and Sexual Activity    Alcohol use: No    Drug use: No    Sexual activity: Not Currently   Other Topics Concern    Parent/sibling w/ CABG, MI or angioplasty before 65F 55M? Yes     Comment: 2 Brothers and father     Service No    Blood Transfusions No    Caffeine Concern No    Occupational Exposure Yes     Comment: he was in [employer]    Hobby Hazards No    Sleep Concern No    Stress Concern No    Weight Concern Yes    Special Diet No    Back Care No    Exercise No    Bike Helmet No    Seat Belt Yes    Self-Exams No   Social History Narrative    September 10, 2024            ENVIRONMENTAL HISTORY: [place] The home is heated with a forced air. They do have central air conditioning. The patient's bedroom is furnished with carpeting in bedroom.  Pets inside the  house include 0 animals. There is no history of cockroach or mice infestation. There is/are 0 smokers in the house.  The house does not have a damp basement.      Social Determinants of Health     Financial Resource Strain: Low Risk  (6/17/2024)    [facility]    Financial Resource Strain     Difficulty of Paying Living Expenses: 3   Food Insecurity: No Food Insecurity (6/17/2024)    [facility]    Food Insecurity     Worried About Running Out of Food in the Last Year: 1   Transportation Needs: No Transportation Needs (6/17/2024)    Received from [facility]    Transportation Needs     Lack of Transportation (Medical): 1   Social Connections: Socially Integrated (6/17/2024)    [facility]    Social Connections     Frequency of Communication with Friends and Family: 0   Interpersonal Safety: Low Risk  (8/5/2024)    Interpersonal Safety     Do you feel physically and emotionally safe where you currently live?: Yes     Within the past 12 months, have you been hit, slapped, kicked or otherwise physically hurt by someone?: No     Within the past 12 months, have you been humiliated or emotionally abused in other ways by your partner or ex-partner?: No   Housing Stability: Low Risk  (6/17/2024)    Received from [facility]    Housing Stability     Unable to Pay for Housing in the Last Year: 1           Current Outpatient Medications:     albuterol (PROAIR HFA/PROVENTIL HFA/VENTOLIN HFA) 108 (90 Base) MCG/ACT inhaler, Inhale 2-4 puffs into the lungs every 4 hours as needed for shortness of breath or wheezing., Disp: 18 g, Rfl: 3    atorvastatin (LIPITOR) 20 MG tablet, TAKE 1/2 TABLET BY MOUTH ONCE DAILY, Disp: 45 tablet, Rfl: 3    budesonide-formoterol (SYMBICORT) 160-4.5 MCG/ACT Inhaler, Inhale 2 puffs into the lungs 2  times daily., Disp: 30.6 g, Rfl: 2    diltiazem ER COATED BEADS (CARTIA XT) 120 MG 24 hr capsule, Take 1 capsule (120 mg) by mouth daily For blood pressure/lowers pulse. Pharmacy ok to hold prescription until due, Disp: 90 capsule, Rfl: 3    dupilumab (DUPIXENT) 300 MG/2ML prefilled syringe, Inject 2 mLs (300 mg) subcutaneously every 14 days., Disp: 4 mL, Rfl: 11    meclizine (ANTIVERT) 25 MG tablet, Take 1 tablet (25 mg) by mouth 3 times daily as needed for dizziness, Disp: 90 tablet, Rfl: 1    tolterodine ER (DETROL LA) 2 MG 24 hr capsule, Take 1 capsule (2 mg) by mouth daily Start in 5 days. For bladder spasms/incontinence, Disp: 30 capsule, Rfl: 1    triamcinolone (NASACORT) 55 MCG/ACT nasal aerosol, Spray 2 sprays into both nostrils daily., Disp: 10.8 mL, Rfl: 11    scopolamine (TRANSDERM) 1 MG/3DAYS 72 hr patch, Place 1 patch onto the skin every 72 hours (Patient not taking: Reported on 8/5/2024), Disp: , Rfl:   Immunization History   Administered Date(s) Administered    COVID-19 12+ (Pfizer) 10/10/2023    COVID-19 Bivalent 12+ (Pfizer) 09/26/2022    COVID-19 MONOVALENT 12+ (Pfizer) 02/04/2021, 02/25/2021, 10/04/2021    COVID-19 Monovalent 12+ (Pfizer 2022) 04/11/2022    Influenza (H1N1) 12/22/2009    Influenza (High Dose) Trivalent,PF (Fluzone) 10/27/2010, 10/22/2011, 09/30/2014, 09/08/2015, 09/29/2016, 10/04/2017, 09/13/2018, 09/09/2019    Influenza (IIV3) PF 10/19/2002, 10/13/2003, 10/16/2004, 10/20/2005, 10/20/2006, 10/27/2007, 10/27/2008, 09/18/2009, 10/13/2012    Influenza Vaccine 65+ (Fluzone HD) 09/14/2020, 09/23/2021, 09/26/2022, 09/19/2023    Influenza Vaccine >6 months,quad, PF 10/17/2013    Mantoux Tuberculin Skin Test 10/09/2009    Pneumo Conj 13-V (2010&after) 09/08/2015    Pneumococcal 23 valent 10/20/2006    RSV Vaccine (Abrysvo) 12/07/2023    TD,PF 7+ (Tenivac) 10/27/2010    TDAP (Adacel,Boostrix) 01/23/2023    TDAP Vaccine (Adacel) 08/14/2012    Zoster recombinant adjuvanted (SHINGRIX)  09/13/2018, 01/03/2019    Zoster vaccine, live 11/12/2014     Allergies   Allergen Reactions    Hytrin [Terazosin Hcl]      Leg swelling and vertigo    Simvastatin Other (See Comments)     Body aches    Cats     Dogs     Dust Mites     Morphine And Codeine Nausea    [place] Trees     Seasonal Allergies      OBJECTIVE:                                                                 /82   Pulse 72   SpO2 95%         Physical Exam  Vitals and nursing note reviewed.   Constitutional:       General: He is not in acute distress.     Appearance: He is not diaphoretic.   HENT:      Head: Normocephalic and atraumatic.      Right Ear: Tympanic membrane, ear canal and external ear normal.      Left Ear: Tympanic membrane, ear canal and external ear normal.      Nose: No mucosal edema or rhinorrhea.      Right Turbinates: Not enlarged, swollen or pale.      Left Turbinates: Not enlarged, swollen or pale.      Mouth/Throat:      Lips: Pink.      Mouth: Mucous membranes are moist.      Pharynx: Oropharynx is clear. No pharyngeal swelling, oropharyngeal exudate or posterior oropharyngeal erythema.   Eyes:      General:         Right eye: No discharge.         Left eye: No discharge.      Conjunctiva/sclera: Conjunctivae normal.   Cardiovascular:      Rate and Rhythm: Normal rate and regular rhythm.      Heart sounds: Murmur (mild) heard.   Pulmonary:      Effort: No respiratory distress.      Breath sounds: Normal breath sounds and air entry. No stridor, decreased air movement or transmitted upper airway sounds. No decreased breath sounds, wheezing, rhonchi or rales.   Musculoskeletal:      Right lower leg: Edema present.      Left lower leg: Edema present.   Neurological:      Mental Status: He is alert and oriented to person, place, and time.   Psychiatric:         Mood and Affect: Mood normal.         Behavior: Behavior normal.         WORKUP: ACT 23  ASSESSMENT/PLAN:    Moderate persistent asthma, uncomplicated  Dyspnea on  exertion    The patient s asthma symptoms are well-controlled with dupilumab 300 mg every 14 days, Symbicort 160/4.5 mcg 2 puffs twice daily, and albuterol as needed. He has not needed albuterol for more than a year.    Dyspnea on exertion is the main concern, and we discussed potential causes, including deconditioning, a cardiac origin, and asthma. Given that the dyspnea occurs only with exertion and resolves within 30 seconds, I doubt it is asthma-related.    We will continue the current asthma regimen.  The patient will try albuterol 2 puffs 15 to 20 minutes before strenuous activity (e.g., physical therapy or walking) and report whether this helps alleviate the exertional symptoms.  If similar symptoms persist despite using albuterol pre-exertionally, I do not believe the dyspnea is asthma-related, and further evaluation for other causes will be necessary.  He will send a [employer] message in 6 to 8 weeks to update us on how he feels using albuterol before exertion.    - Adult Allergy Clinic Follow-Up Order  - budesonide-formoterol (SYMBICORT) 160-4.5 MCG/ACT Inhaler  Dispense: 30.6 g; Refill: 2  - albuterol (PROAIR HFA/PROVENTIL HFA/VENTOLIN HFA) 108 (90 Base) MCG/ACT inhaler  Dispense: 18 g; Refill: 3  - dupilumab (DUPIXENT) 300 MG/2ML prefilled syringe  Dispense: 4 mL; Refill: 11          Nasal polyposis  Chronic pansinusitis    Most of the time, well-controlled with Nasacort 2 sprays in each nostril once daily and dupilumab 300 mg every 14 days.  - He will continue as he is.    - triamcinolone (NASACORT) 55 MCG/ACT nasal aerosol  Dispense: 10.8 mL; Refill: 11     Follow-up in 1 year or sooner if needed.      Thank you for allowing us to participate in the care of this patient. Please feel free to contact us if there are any questions or concerns about the patient.    Disclaimer: This note consists of symbols derived from keyboarding, dictation and/or voice recognition software. As a result, there may be errors  in the script that have gone undetected. Please consider this when interpreting information found in this chart.    Mohinder Aragon MD, FAAAAI, FACAAI  Allergy, Asthma and Immunology     [facility]

## 2024-09-15 ENCOUNTER — TRANSFERRED RECORDS (OUTPATIENT)
Dept: HEALTH INFORMATION MANAGEMENT | Facility: CLINIC | Age: 84
End: 2024-09-15
Payer: COMMERCIAL

## 2024-10-24 ENCOUNTER — MEDICAL CORRESPONDENCE (OUTPATIENT)
Dept: HEALTH INFORMATION MANAGEMENT | Facility: CLINIC | Age: 84
End: 2024-10-24
Payer: COMMERCIAL

## 2024-10-31 ENCOUNTER — TELEPHONE (OUTPATIENT)
Dept: FAMILY MEDICINE | Facility: CLINIC | Age: 84
End: 2024-10-31
Payer: COMMERCIAL

## 2024-10-31 DIAGNOSIS — R42 VERTIGO: Primary | ICD-10-CM

## 2024-10-31 NOTE — TELEPHONE ENCOUNTER
Reason for Call: Request for an order or referral:    Order or referral being requested: Referral for PT, Dx Vertigo    Date needed: as soon as possible    Has the patient been seen by the PCP for this problem?  Unknown    Additional comments: Requesting Referral sent to Ins company for coverage.     Appt is 11/01/24 at 1:00pm, Please submit to insurance before appt.     Phone number Patient can be reached at:  Other phone number:  [phone]    Best Time:  any no need for call back     Can we leave a detailed message on this number?  YES    Call taken on 10/31/2024 at 3:19 PM by Mirta Olguin

## 2024-10-31 NOTE — TELEPHONE ENCOUNTER
RN returned call to [facility] insurance review team.  [facility] staff explained pt is scheduled for PT tomorrow (11/1/24) at 1pm, for tx of vertigo, and insurance requires a referral for payment.    Please review pending referral and sign if appropriate.    Please route to [facility], to have the referral faxed to [phone].     HAKEEM White, RN

## 2024-11-01 ENCOUNTER — MEDICAL CORRESPONDENCE (OUTPATIENT)
Dept: HEALTH INFORMATION MANAGEMENT | Facility: CLINIC | Age: 84
End: 2024-11-01
Payer: COMMERCIAL

## 2024-11-27 ENCOUNTER — TELEPHONE (OUTPATIENT)
Dept: ALLERGY | Facility: CLINIC | Age: 84
End: 2024-11-27
Payer: COMMERCIAL

## 2024-11-27 NOTE — TELEPHONE ENCOUNTER
PA Initiation    Medication: DUPIXENT 300 MG/2ML SC SOAJ  Insurance Company: [employer] - Phone [phone] Fax [phone]  Pharmacy Filling the Rx: [facility] [address]  Filling Pharmacy Phone:    Filling Pharmacy Fax:    Start Date: 11/27/2024    [id number]

## 2024-11-29 NOTE — TELEPHONE ENCOUNTER
Prior Authorization Approval    Medication: DUPIXENT 300 MG/2ML SC SOAJ  Authorization Effective Date: 11/28/2024  Authorization Expiration Date: 11/7/2025  Approved Dose/Quantity: 4ml per 28 days  Reference #: [id number]   Insurance Company: [employer] Phone [phone] Fax [phone]  Expected CoPay: $    CoPay Card Available: No    Financial Assistance Needed: Enrolled in PAP  Which Pharmacy is filling the prescription: [facility] [address]  Pharmacy Notified: Not needed  Patient Notified: Not needed    Sending message to patient regarding Dupixent PAP renewal and offer FPAP.

## 2024-12-05 ENCOUNTER — APPOINTMENT (OUTPATIENT)
Dept: URBAN - METROPOLITAN AREA CLINIC 259 | Age: 84
Setting detail: DERMATOLOGY
End: 2024-12-05

## 2024-12-05 DIAGNOSIS — D49.2 NEOPLASM OF UNSPECIFIED BEHAVIOR OF BONE, SOFT TISSUE, AND SKIN: ICD-10-CM

## 2024-12-05 DIAGNOSIS — L57.0 ACTINIC KERATOSIS: ICD-10-CM

## 2024-12-05 PROCEDURE — OTHER LIQUID NITROGEN: OTHER

## 2024-12-05 PROCEDURE — OTHER MIPS QUALITY: OTHER

## 2024-12-05 PROCEDURE — OTHER BIOPSY BY SHAVE METHOD: OTHER

## 2024-12-05 PROCEDURE — 11102 TANGNTL BX SKIN SINGLE LES: CPT

## 2024-12-05 PROCEDURE — 17003 DESTRUCT PREMALG LES 2-14: CPT

## 2024-12-05 PROCEDURE — 17000 DESTRUCT PREMALG LESION: CPT | Mod: 59

## 2024-12-05 PROCEDURE — OTHER COUNSELING: OTHER

## 2024-12-05 ASSESSMENT — LOCATION SIMPLE DESCRIPTION DERM
LOCATION SIMPLE: RIGHT FOREARM
LOCATION SIMPLE: RIGHT FOREHEAD
LOCATION SIMPLE: RIGHT EAR
LOCATION SIMPLE: NOSE

## 2024-12-05 ASSESSMENT — LOCATION DETAILED DESCRIPTION DERM
LOCATION DETAILED: NASAL SUPRATIP
LOCATION DETAILED: RIGHT LATERAL FOREHEAD
LOCATION DETAILED: RIGHT SUPERIOR FOREHEAD
LOCATION DETAILED: RIGHT TRAGUS
LOCATION DETAILED: RIGHT DISTAL DORSAL FOREARM
LOCATION DETAILED: RIGHT MEDIAL FOREHEAD

## 2024-12-05 ASSESSMENT — LOCATION ZONE DERM
LOCATION ZONE: NOSE
LOCATION ZONE: EAR
LOCATION ZONE: FACE
LOCATION ZONE: ARM

## 2024-12-05 NOTE — PROCEDURE: BIOPSY BY SHAVE METHOD
Detail Level: Detailed
Depth Of Biopsy: dermis
Was A Bandage Applied: Yes
Size Of Lesion In Cm: 0.6
X Size Of Lesion In Cm: 0
Biopsy Type: H and E
Biopsy Method: double edge Personna blade
Anesthesia Type: 1% lidocaine with epinephrine and a 1:10 solution of 8.4% sodium bicarbonate
Anesthesia Volume In Cc: 0.3
Hemostasis: Drysol
Wound Care: Vaseline
Dressing: bandage
Destruction After The Procedure: No
Type Of Destruction Used: Curettage
Curettage Text: The wound bed was treated with curettage after the biopsy was performed.
Cryotherapy Text: The wound bed was treated with cryotherapy after the biopsy was performed.
Electrodesiccation Text: The wound bed was treated with electrodesiccation after the biopsy was performed.
Electrodesiccation And Curettage Text: The wound bed was treated with electrodesiccation and curettage after the biopsy was performed.
Silver Nitrate Text: The wound bed was treated with silver nitrate after the biopsy was performed.
Lab: -[id number]
Consent: Written consent was obtained and risks were reviewed including but not limited to scarring, infection, bleeding, scabbing, incomplete removal, nerve damage and allergy to anesthesia.
Post-Care Instructions: I reviewed with the patient in detail post-care instructions. Patient is to keep the biopsy site dry overnight, and then apply bacitracin twice daily until healed. Patient may apply hydrogen peroxide soaks to remove any crusting.
Notification Instructions: Patient will be notified of biopsy results. However, patient instructed to call the office if not contacted within 2 weeks.
Billing Type: Third-Party Bill
Information: Selecting Yes will display possible errors in your note based on the variables you have selected. This validation is only offered as a suggestion for you. PLEASE NOTE THAT THE VALIDATION TEXT WILL BE REMOVED WHEN YOU FINALIZE YOUR NOTE. IF YOU WANT TO FAX A PRELIMINARY NOTE YOU WILL NEED TO TOGGLE THIS TO 'NO' IF YOU DO NOT WANT IT IN YOUR FAXED NOTE.

## 2025-01-23 ENCOUNTER — TELEPHONE (OUTPATIENT)
Dept: ALLERGY | Facility: CLINIC | Age: 85
End: 2025-01-23

## 2025-01-23 NOTE — TELEPHONE ENCOUNTER
Tried to call patient and no answer.Also left voicemail with my contact information. Will try again next week

## 2025-01-23 NOTE — TELEPHONE ENCOUNTER
Attempted to call patient.  No answer.  VM left that I will reach out to PA team.    Forwarding to PA team to please assist patient in Dupixent Patient Assistance Program.  I see a [employer] message was sent on 11/29/24, but I do not believe patient uses Mychart (even though he has it).  Thank you!    Enriqueta Wen MSN, RN   [facility], 1/23/2025 11:47 AM

## 2025-01-23 NOTE — TELEPHONE ENCOUNTER
[facility] Call Center    Phone Message    May a detailed message be left on voicemail: yes     Reason for Call: Other: Pt is needing to re enrolled for the dupixient program. Pt is requesting Enriqueta to call back. Thank you     Action Taken: TE SENT    Travel Screening: Not Applicable     Date of Service:                   Thank you!  [facility]

## 2025-01-29 NOTE — TELEPHONE ENCOUNTER
Tried to call patient again to discuss free drug. Phone rang a few time and then nothing. Will try again later is week. Dupixent myway is needed a re-enrollment from complete by patient.

## 2025-01-31 ENCOUNTER — MEDICAL CORRESPONDENCE (OUTPATIENT)
Dept: HEALTH INFORMATION MANAGEMENT | Facility: CLINIC | Age: 85
End: 2025-01-31

## 2025-02-10 NOTE — TELEPHONE ENCOUNTER
Spoke with patient and he needs re-enrollment form completed. Spoke with dupixent myway and they are mailing out re-enrollment form to patient. Will check back next week to see if patient received

## 2025-02-13 ENCOUNTER — APPOINTMENT (OUTPATIENT)
Dept: URBAN - METROPOLITAN AREA CLINIC 259 | Age: 85
Setting detail: DERMATOLOGY
End: 2025-02-13

## 2025-02-13 VITALS — WEIGHT: 220 LBS | HEIGHT: 69 IN

## 2025-02-13 VITALS — HEIGHT: 69 IN | WEIGHT: 220 LBS

## 2025-02-13 DIAGNOSIS — H01.00 UNSPECIFIED BLEPHARITIS: ICD-10-CM

## 2025-02-13 DIAGNOSIS — L71.8 OTHER ROSACEA: ICD-10-CM

## 2025-02-13 PROBLEM — H01.004 UNSPECIFIED BLEPHARITIS LEFT UPPER EYELID: Status: ACTIVE | Noted: 2025-02-13

## 2025-02-13 PROCEDURE — OTHER PRESCRIPTION MEDICATION MANAGEMENT: OTHER

## 2025-02-13 PROCEDURE — OTHER ADDITIONAL NOTES: OTHER

## 2025-02-13 PROCEDURE — 99214 OFFICE O/P EST MOD 30 MIN: CPT

## 2025-02-13 PROCEDURE — OTHER COUNSELING: OTHER

## 2025-02-13 PROCEDURE — OTHER MIPS QUALITY: OTHER

## 2025-02-13 ASSESSMENT — LOCATION SIMPLE DESCRIPTION DERM
LOCATION SIMPLE: RIGHT CHEEK
LOCATION SIMPLE: LEFT CHEEK
LOCATION SIMPLE: LEFT SUPERIOR EYELID
LOCATION SIMPLE: NOSE

## 2025-02-13 ASSESSMENT — LOCATION DETAILED DESCRIPTION DERM
LOCATION DETAILED: LEFT MEDIAL SUPERIOR EYELID
LOCATION DETAILED: NASAL DORSUM
LOCATION DETAILED: LEFT CENTRAL MALAR CHEEK
LOCATION DETAILED: RIGHT CENTRAL MALAR CHEEK

## 2025-02-13 ASSESSMENT — LOCATION ZONE DERM
LOCATION ZONE: NOSE
LOCATION ZONE: EYELID
LOCATION ZONE: FACE

## 2025-02-13 NOTE — PROCEDURE: PRESCRIPTION MEDICATION MANAGEMENT
Detail Level: Zone
Render In Strict Bullet Format?: No
Plan: - Pt advised to continue eye drops previously prescribed, ok to lower dosage from [id number] to TID\\nF/u if not resolving
Continue Regimen: Polymexin eye drops 1mg/mL(previously prescribed by a different provider) TID for an additional 5-7 days

## 2025-02-13 NOTE — PROCEDURE: ADDITIONAL NOTES
Render Risk Assessment In Note?: no
Additional Notes: Discussed metronidazole cream for tx, pt deferred needing any treatment. Ok to call for prescription if needed
Detail Level: Simple

## 2025-02-13 NOTE — HPI: BODY LOCATION - EYELID
Additional History: Pt reports he would like the provider to evaluate his right eye. He states [facility] and [facility] with pink eye and was prescribed eye drop medication. He notes he has been taking those eye drops every 4 hours for a week and has not seen improvement yet. His wife notes that the eye and eyelid were much more red and crusty prior to the treatment.  Patient states that something \"ruptured\" yesterday and \"came off\" and since then it feels better.  Pt presents for further evaluation.
Year Removed: 1900

## 2025-02-19 ENCOUNTER — MEDICAL CORRESPONDENCE (OUTPATIENT)
Dept: HEALTH INFORMATION MANAGEMENT | Facility: CLINIC | Age: 85
End: 2025-02-19
Payer: COMMERCIAL

## 2025-02-24 NOTE — TELEPHONE ENCOUNTER
Spoke with dupixent and they didn't mail out re-enrollment form to patient. Rep could not provider a reason why. I was able to obtain form. Emailed [employer] to help in mailing out to patient.

## 2025-03-03 ENCOUNTER — OFFICE VISIT (OUTPATIENT)
Dept: FAMILY MEDICINE | Facility: CLINIC | Age: 85
End: 2025-03-03
Payer: COMMERCIAL

## 2025-03-03 VITALS
RESPIRATION RATE: 20 BRPM | DIASTOLIC BLOOD PRESSURE: 68 MMHG | TEMPERATURE: 97.7 F | HEIGHT: 69 IN | WEIGHT: 223.7 LBS | HEART RATE: 65 BPM | BODY MASS INDEX: 33.13 KG/M2 | OXYGEN SATURATION: 96 % | SYSTOLIC BLOOD PRESSURE: 124 MMHG

## 2025-03-03 DIAGNOSIS — L03.213 PERIORBITAL CELLULITIS OF RIGHT EYE: Primary | ICD-10-CM

## 2025-03-03 DIAGNOSIS — E78.5 HYPERLIPIDEMIA LDL GOAL <130: ICD-10-CM

## 2025-03-03 DIAGNOSIS — I10 HYPERTENSION GOAL BP (BLOOD PRESSURE) < 140/90: ICD-10-CM

## 2025-03-03 DIAGNOSIS — J30.1 SEASONAL ALLERGIC RHINITIS DUE TO POLLEN: ICD-10-CM

## 2025-03-03 DIAGNOSIS — J32.9 CHRONIC SINUSITIS, UNSPECIFIED LOCATION: ICD-10-CM

## 2025-03-03 PROCEDURE — 1126F AMNT PAIN NOTED NONE PRSNT: CPT | Performed by: FAMILY MEDICINE

## 2025-03-03 PROCEDURE — 99214 OFFICE O/P EST MOD 30 MIN: CPT | Performed by: FAMILY MEDICINE

## 2025-03-03 PROCEDURE — 3074F SYST BP LT 130 MM HG: CPT | Performed by: FAMILY MEDICINE

## 2025-03-03 PROCEDURE — 3078F DIAST BP <80 MM HG: CPT | Performed by: FAMILY MEDICINE

## 2025-03-03 ASSESSMENT — ASTHMA QUESTIONNAIRES
ACT_TOTALSCORE: 23
QUESTION_5 LAST FOUR WEEKS HOW WOULD YOU RATE YOUR ASTHMA CONTROL: COMPLETELY CONTROLLED
QUESTION_3 LAST FOUR WEEKS HOW OFTEN DID YOUR ASTHMA SYMPTOMS (WHEEZING, COUGHING, SHORTNESS OF BREATH, CHEST TIGHTNESS OR PAIN) WAKE YOU UP AT NIGHT OR EARLIER THAN USUAL IN THE MORNING: NOT AT ALL
ACT_TOTALSCORE: 23
QUESTION_2 LAST FOUR WEEKS HOW OFTEN HAVE YOU HAD SHORTNESS OF BREATH: ONCE OR TWICE A WEEK
QUESTION_4 LAST FOUR WEEKS HOW OFTEN HAVE YOU USED YOUR RESCUE INHALER OR NEBULIZER MEDICATION (SUCH AS ALBUTEROL): ONCE A WEEK OR LESS
QUESTION_1 LAST FOUR WEEKS HOW MUCH OF THE TIME DID YOUR ASTHMA KEEP YOU FROM GETTING AS MUCH DONE AT WORK, SCHOOL OR AT HOME: NONE OF THE TIME

## 2025-03-03 ASSESSMENT — ENCOUNTER SYMPTOMS: EYE PAIN: 1

## 2025-03-03 ASSESSMENT — PAIN SCALES - GENERAL: PAINLEVEL_OUTOF10: NO PAIN (0)

## 2025-03-03 NOTE — PROGRESS NOTES
"  Assessment & Plan     Periorbital cellulitis of right eye  My first visit and previous history reviewed with him and in his chart.  He previously seen Dr. Doty.  Has been having some eye issues over the past month.  Initially he had a stye on the right side and then once that cleared he had some irritation on the left side and [facility] with \"pinkeye\" and given some eyedrops.  He not sure if that really helped much but the left eye improved and now he has some irritation and redness to his right eye.  It is not so much on the conjunctiva itself but generalized redness and mucousy discharge with some surrounding erythema.  Not particularly swollen and there is no pain in his eye or change in vision but this appears to be more than just a simple conjunctivitis.  He carries a history of allergies and recurrent sinus infections and I suspect that is underlying it.  He does endorse some ongoing cold symptoms for the past couple of weeks.  So we will treat with a course of Augmentin I suspect this will clear this all up.  - amoxicillin-clavulanate (AUGMENTIN) 875-125 MG tablet; Take 1 tablet by mouth 2 times daily.    Chronic sinusitis, unspecified location  As above  - amoxicillin-clavulanate (AUGMENTIN) 875-125 MG tablet; Take 1 tablet by mouth 2 times daily.    Seasonal allergic rhinitis due to pollen  As above    Hypertension goal BP (blood pressure) < 140/90  Seems to be doing well on current regimen.  I am okay with him changing to me as a primary provider    Hyperlipidemia LDL goal <130  As above          BMI  Estimated body mass index is 33.03 kg/m  as calculated from the following:    Height as of this encounter: 1.753 m (5' 9\").    Weight as of this encounter: 101.5 kg (223 lb 11.2 oz).         See Patient Instructions    Subjective   Guillermo is a 84 year old, presenting for the following health issues:  Eye Problem (3rd eye infection,  Pink eye, rash around eye   URI " "began last weekend eyes began Tuesday before.)        3/3/2025     3:33 PM   Additional Questions   Roomed by Kelley MARTINEZ   Accompanied by Daughter  Kelley     History of Present Illness       Reason for visit:  Sore eye and er follow up and cold  Symptom onset:  3-7 days ago  Symptoms include:  Tired and swollen eye and cough and runny nose  Symptom intensity:  Moderate  Symptom progression:  Staying the same  Had these symptoms before:  No   He is taking medications regularly.            Here today with daughter for issues as above      Review of Systems  Constitutional, HEENT, cardiovascular, pulmonary, gi and gu systems are negative, except as otherwise noted.      Objective    /68 (BP Location: Right arm, Patient Position: Sitting, Cuff Size: Adult Large)   Pulse 65   Temp 97.7  F (36.5  C) (Oral)   Resp 20   Ht 1.753 m (5' 9\")   Wt 101.5 kg (223 lb 11.2 oz)   SpO2 96%   BMI 33.03 kg/m    Body mass index is 33.03 kg/m .  Physical Exam   Alert, pleasant, upbeat, and in no apparent discomfort.  Right eye with some nonspecific erythema along the lids and a little bit of surrounding tissues.  Normal visual screen and no pain with movement  Ears normal. Throat and pharynx normal. Neck supple. No adenopathy or masses in the neck or supraclavicular regions. Sinuses non tender.  S1 and S2 normal, no murmurs, clicks, gallops or rubs. Regular rate and rhythm. Chest is clear; no wheezes or rales. No edema or JVD.  Past labs reviewed with the patient.             Signed Electronically by: Chica Barboza MD    "

## 2025-03-05 NOTE — TELEPHONE ENCOUNTER
Spoke with patient and he received re-enrollment form. He has completed and sent back. Will check back in a few days for an update on  status

## 2025-03-07 ENCOUNTER — TELEPHONE (OUTPATIENT)
Dept: ALLERGY | Facility: CLINIC | Age: 85
End: 2025-03-07
Payer: COMMERCIAL

## 2025-03-07 NOTE — TELEPHONE ENCOUNTER
[facility] Call Center    Phone Message    May a detailed message be left on voicemail: yes     Reason for Call: Medication Question or concern regarding medication   Prescription Clarification  Name of Medication: dupilumab (DUPIXENT) 300 MG/2ML prefilled syringe [[id number]] (Order [id number])  Prescribing Provider: Dr. Aragon   [facility]    What on the order needs clarification? [facility] is calling to say that they have received the form sent by the clinic but they are noting it looks like there is a change of provider from previously (was formerly Linda Farooq) so they will need a new form noting the change of provider & their NPI #     [phone]  ext for  Steve [id number] m-f 8-9 pm ET      Action Taken: Other: TE to [facility] allergy    Travel Screening: Not Applicable     Date of Service:

## 2025-03-07 NOTE — TELEPHONE ENCOUNTER
Spoke with [facility] and they have received patients re-enrollment form, they are currently reviewing. Left message for patient to inform. Will check back again I a few days for an update

## 2025-03-10 NOTE — TELEPHONE ENCOUNTER
SHOULDER VISIT      HISTORY OF PRESENT ILLNESS    Leroy Miller is a 60 y.o. female who presents for evaluation of injury she sustained to her right shoulder a week or so ago. She says she was getting up out of a chair. She has had severe pain in the right shoulder since and has difficulty getting around raising her arm. She has also had a longstanding problem with range of motion to her shoulders that she feels is due to arthritis but is not really had a work-up in the past.  She was told some time ago that she needs a left shoulder replacement performed but did not want one done at that time but now her right shoulder is becoming more problematic. She grades her pain 8/10. ROS    Well-documented patient history form dated 3/23/2021  All other ROS negative except for above. Past Surgical history    Past Surgical History:   Procedure Laterality Date    BACK SURGERY      CARPAL TUNNEL RELEASE Left 12/12/14    HAND SURGERY Right 9/26/14    SKIN GRAFT Right        PAST MEDICAL    Past Medical History:   Diagnosis Date    Abnormal CT scan, chest     COPD (chronic obstructive pulmonary disease) (HCC)     Decreased diffusion capacity of lung     Depression     Dyspnea     Hypertension     Lumbosacral spondylosis without myelopathy 5/9/2016    Osteopenia 12/31/2020    Pneumonia     Restrictive lung disease     Tobacco abuse     Well controlled type 2 diabetes mellitus ([facility] [address] 10/26/2018       Allergies    Allergies   Allergen Reactions    Ciprofloxacin Nausea And Vomiting       Meds    Current Outpatient Medications   Medication Sig Dispense Refill    HYDROcodone-acetaminophen (NORCO) 5-325 MG per tablet Take 1 tablet by mouth every 6 hours as needed for Pain for up to 7 days. Intended supply: 7 days.  Take lowest dose possible to manage pain 28 tablet 0    tiZANidine (ZANAFLEX) 2 MG tablet TAKE ONE TABLET BY MOUTH EVERY DAY 30 tablet 0    torsemide (DEMADEX) 20 MG tablet Take 1 tablet by There should be no change in provider.  Dr Aragon has been prescribing this since 2022 for the patient.  Prior to that it was his previous allergist, Dr Del Rio.    Forwarding to PA team - are you able to look into why this is being held, and provide what they need?    Thank you!!    Enriqueta Wen MSN, RN   [facility], 3/10/2025 8:06 AM     mouth daily 90 tablet 0    pregabalin (LYRICA) 100 MG capsule TAKE ONE CAPSULE BY MOUTH 2 TIMES A  capsule 0    calcium-vitamin D (OSCAL) 250-125 MG-UNIT per tablet Take 1 tablet by mouth 2 times daily 60 tablet 2    denosumab (PROLIA) 60 MG/ML SOSY SC injection Inject 1 mL into the skin once for 1 dose 1 mL 1    metoprolol succinate (TOPROL XL) 50 MG extended release tablet Take 1 tablet by mouth daily 90 tablet 1    lisinopril-hydroCHLOROthiazide (PRINZIDE;ZESTORETIC) 20-25 MG per tablet Take 1 tablet by mouth daily 90 tablet 1    DULoxetine (CYMBALTA) 30 MG extended release capsule Take 1 capsule by mouth daily 90 capsule 0    metFORMIN (GLUCOPHAGE) 500 MG tablet Take 1 tablet by mouth daily (with breakfast) 90 tablet 0    dilTIAZem (CARDIZEM CD) 120 MG extended release capsule Take 1 capsule by mouth daily 90 capsule 1    buPROPion (WELLBUTRIN SR) 100 MG extended release tablet Take 1 tablet by mouth daily 90 tablet 1    ipratropium-albuterol (DUONEB) 0.5-2.5 (3) MG/3ML SOLN nebulizer solution Inhale 3 mLs into the lungs every 4 hours 360 mL 0    CHANTIX CONTINUING MONTH RICHARD 1 MG tablet TAKE ONE TABLET BY MOUTH 2 TIMES A  tablet 0    VENTOLIN  (90 Base) MCG/ACT inhaler Inhale 2 puffs into the lungs every 6 hours as needed for Wheezing 54 g 0    INCRUSE ELLIPTA 62.5 MCG/INH AEPB INHALE 1 PUFF INTO THE LUNGS DAILY 1 each 5    blood glucose test strips (CONTOUR NEXT TEST) strip Test Daily. DX: E11.9 100 each 3    Lancets MISC Test once daily. DX: E11.9 100 each 3     No current facility-administered medications for this visit.         Social    Social History     Socioeconomic History    Marital status:      Spouse name: Not on file    Number of children: Not on file    Years of education: Not on file    Highest education level: Not on file   Occupational History    Not on file   Social Needs    Financial resource strain: Not on file    Food insecurity     Worry:

## 2025-03-10 NOTE — TELEPHONE ENCOUNTER
Program is requesting provider complete enrollment form. Uploading into chart and routed to clinic to have provider complete

## 2025-03-11 NOTE — TELEPHONE ENCOUNTER
FREE DRUG APPLICATION INITIATED    Medication: DUPIXENT 300 MG/2ML SC SOAJ  Free Drug Program Name:  [facility]  Date Submitted: 1/23/2025 12:14 PM  Phone #: [phone]  Fax #: [phone]  Additional Information: faxed provider portion to program

## 2025-03-20 ENCOUNTER — TELEPHONE (OUTPATIENT)
Dept: ALLERGY | Facility: CLINIC | Age: 85
End: 2025-03-20
Payer: COMMERCIAL

## 2025-03-20 NOTE — TELEPHONE ENCOUNTER
Spoke with [facility] and they have received documentation. Application is under review. Will check back in a few days for another update

## 2025-03-26 ENCOUNTER — APPOINTMENT (OUTPATIENT)
Dept: URBAN - METROPOLITAN AREA CLINIC 259 | Age: 85
Setting detail: DERMATOLOGY
End: 2025-03-26

## 2025-03-26 DIAGNOSIS — L73.9 FOLLICULAR DISORDER, UNSPECIFIED: ICD-10-CM

## 2025-03-26 PROCEDURE — OTHER MEDICATION COUNSELING: OTHER

## 2025-03-26 PROCEDURE — OTHER COUNSELING: OTHER

## 2025-03-26 PROCEDURE — OTHER MIPS QUALITY: OTHER

## 2025-03-26 PROCEDURE — OTHER PRESCRIPTION MEDICATION MANAGEMENT: OTHER

## 2025-03-26 PROCEDURE — OTHER PRESCRIPTION: OTHER

## 2025-03-26 PROCEDURE — 99213 OFFICE O/P EST LOW 20 MIN: CPT

## 2025-03-26 RX ORDER — CEPHALEXIN 500 MG/1
TABLET ORAL BID
Qty: 14 | Refills: 0 | Status: ERX | COMMUNITY
Start: 2025-03-26

## 2025-03-26 RX ORDER — HYDROCORTISONE 25 MG/G
OINTMENT TOPICAL
Qty: 28.35 | Refills: 0 | Status: ERX | COMMUNITY
Start: 2025-03-26

## 2025-03-26 ASSESSMENT — LOCATION ZONE DERM: LOCATION ZONE: FACE

## 2025-03-26 ASSESSMENT — LOCATION DETAILED DESCRIPTION DERM
LOCATION DETAILED: RIGHT SUPERIOR CENTRAL MALAR CHEEK
LOCATION DETAILED: RIGHT CENTRAL BUCCAL CHEEK
LOCATION DETAILED: RIGHT MEDIAL FOREHEAD

## 2025-03-26 ASSESSMENT — LOCATION SIMPLE DESCRIPTION DERM
LOCATION SIMPLE: RIGHT CHEEK
LOCATION SIMPLE: RIGHT FOREHEAD

## 2025-03-26 NOTE — TELEPHONE ENCOUNTER
Spoke with patient obtained verbal consent to submit consent form over to [employer] [place] [facility]. Completed online and emailed to patient. Will check back in a few days for an update with [facility]

## 2025-03-26 NOTE — HPI: RASH
What Type Of Note Output Would You Prefer (Optional)?: Standard Output
Is This A New Presentation, Or A Follow-Up?: Rash
Additional History: The patient reports the red bumps on the face started around two weeks ago. He states that it started off as a few spots on his right cheek by the temple area and then progressively started spreading downward the cheek and onto the forehead. The patient’s daughter reports that at first it had a veiny appearance to it. He notes the rash can get itchy with it getting up to a 2/10. He notes there is itching and burning sensations mostly on his right lower part of the cheek. He feels like this is different to his rosacea. He notes that rash does tend to become aggravated after shaving. He also points out he started a new box of tide pod detergents and wonders if this may be related as he tends to sleep on his right side of his face.

## 2025-03-26 NOTE — PROCEDURE: PRESCRIPTION MEDICATION MANAGEMENT
Initiate Treatment: Cephalexin 500mg bid for 7 days \\nHydrocortisone 2.5% ointment BID for up to one week
Render In Strict Bullet Format?: No
Detail Level: Zone
Plan: RTC if not improving in the next 1-2 weeks.
Discontinue Regimen: Tide detergent, switch to fragrance free detergents

## 2025-03-26 NOTE — PROCEDURE: MEDICATION COUNSELING
Calcipotriene Pregnancy And Lactation Text: The use of this medication during pregnancy or lactation is not recommended as there is insufficient data.
Wartpeel Counseling:  I discussed with the patient the risks of Wartpeel including but not limited to erythema, scaling, itching, weeping, crusting, and pain.
Hydroxychloroquine Counseling:  I discussed with the patient that a baseline ophthalmologic exam is needed at the start of therapy and every year thereafter while on therapy. A CBC may also be warranted for monitoring.  The side effects of this medication were discussed with the patient, including but not limited to agranulocytosis, aplastic anemia, seizures, rashes, retinopathy, and liver toxicity. Patient instructed to call the office should any adverse effect occur.  The patient verbalized understanding of the proper use and possible adverse effects of Plaquenil.  All the patient's questions and concerns were addressed.
Ivermectin Pregnancy And Lactation Text: This medication is Pregnancy Category C and it isn't known if it is safe during pregnancy. It is also excreted in breast milk.
Siliq Counseling:  I discussed with the patient the risks of Siliq including but not limited to new or worsening depression, suicidal thoughts and behavior, immunosuppression, malignancy, posterior leukoencephalopathy syndrome, and serious infections.  The patient understands that monitoring is required including a PPD at baseline and must alert us or the primary physician if symptoms of infection or other concerning signs are noted. There is also a special program designed to monitor depression which is required with Siliq.
Solaraze Counseling:  I discussed with the patient the risks of Solaraze including but not limited to erythema, scaling, itching, weeping, crusting, and pain.
Clindamycin Counseling: I counseled the patient regarding use of clindamycin as an antibiotic for prophylactic and/or therapeutic purposes. Clindamycin is active against numerous classes of bacteria, including skin bacteria. Side effects may include nausea, diarrhea, gastrointestinal upset, rash, hives, yeast infections, and in rare cases, colitis.
Acitretin Pregnancy And Lactation Text: This medication is Pregnancy Category X and should not be given to women who are pregnant or may become pregnant in the future. This medication is excreted in breast milk.
Terbinafine Pregnancy And Lactation Text: This medication is Pregnancy Category B and is considered safe during pregnancy. It is also excreted in breast milk and breast feeding isn't recommended.
Dupixent Counseling: I discussed with the patient the risks of dupilumab including but not limited to eye inflammation and irritation, cold sores, injection site reactions, allergic reactions and increased risk of parasitic infection. The patient understands that monitoring is required and they must alert us or the primary physician if symptoms of infection or other concerning signs are noted.
Saxenda Counseling: I reviewed the possible side effects including: thyroid tumors, kidney disease, gallbladder disease, abdominal pain, constipation, diarrhea, nausea, vomiting and pancreatitis. Do not take this medication if you have a history or family history of multiple endocrine neoplasia syndrome type 2. Side effects reviewed, pt to contact office should one occur.
Hydroxychloroquine Pregnancy And Lactation Text: This medication has been shown to cause fetal harm but it isn't assigned a Pregnancy Risk Category. There are small amounts excreted in breast milk.
Dapsone Counseling: I discussed with the patient the risks of dapsone including but not limited to hemolytic anemia, agranulocytosis, rashes, methemoglobinemia, kidney failure, peripheral neuropathy, headaches, GI upset, and liver toxicity.  Patients who start dapsone require monitoring including baseline LFTs and weekly CBCs for the first month, then every month thereafter.  The patient verbalized understanding of the proper use and possible adverse effects of dapsone.  All of the patient's questions and concerns were addressed.
Imiquimod Counseling:  I discussed with the patient the risks of imiquimod including but not limited to erythema, scaling, itching, weeping, crusting, and pain.  Patient understands that the inflammatory response to imiquimod is variable from person to person and was educated regarded proper titration schedule.  If flu-like symptoms develop, patient knows to discontinue the medication and contact us.
Sski Pregnancy And Lactation Text: This medication is Pregnancy Category D and isn't considered safe during pregnancy. It is excreted in breast milk.
Clindamycin Pregnancy And Lactation Text: This medication can be used in pregnancy if certain situations. Clindamycin is also present in breast milk.
Azathioprine Counseling:  I discussed with the patient the risks of azathioprine including but not limited to myelosuppression, immunosuppression, hepatotoxicity, lymphoma, and infections.  The patient understands that monitoring is required including baseline LFTs, Creatinine, possible TPMP genotyping and weekly CBCs for the first month and then every 2 weeks thereafter.  The patient verbalized understanding of the proper use and possible adverse effects of azathioprine.  All of the patient's questions and concerns were addressed.
Wartpeel Pregnancy And Lactation Text: This medication is Pregnancy Category X and contraindicated in pregnancy and in women who may become pregnant. It is unknown if this medication is excreted in breast milk.
Bexarotene Counseling:  I discussed with the patient the risks of bexarotene including but not limited to hair loss, dry lips/skin/eyes, liver abnormalities, hyperlipidemia, pancreatitis, depression/suicidal ideation, photosensitivity, drug rash/allergic reactions, hypothyroidism, anemia, leukopenia, infection, cataracts, and teratogenicity.  Patient understands that they will need regular blood tests to check lipid profile, liver function tests, white blood cell count, thyroid function tests and pregnancy test if applicable.
Siliq Pregnancy And Lactation Text: The risk during pregnancy and breastfeeding is uncertain with this medication.
Opioid Counseling: I discussed with the patient the potential side effects of opioids including but not limited to addiction, altered mental status, and depression. I stressed avoiding alcohol, benzodiazepines, muscle relaxants and sleep aids unless specifically okayed by a physician. The patient verbalized understanding of the proper use and possible adverse effects of opioids. All of the patient's questions and concerns were addressed. They were instructed to flush the remaining pills down the toilet if they did not need them for pain.
Saxenda Pregnancy And Lactation Text: The fetal risk of this medication is unknown and taking while pregnant is not recommended. It is unknown if this medication is present in breast milk.
Cantharidin Counseling:  I discussed with the patient the risks of Cantharidin including but not limited to pain, redness, burning, itching, and blistering.
Imiquimod Pregnancy And Lactation Text: This medication is Pregnancy Category C. It is unknown if this medication is excreted in breast milk.
Thalidomide Counseling: I discussed with the patient the risks of thalidomide including but not limited to birth defects, anxiety, weakness, chest pain, dizziness, cough and severe allergy.
Dupixent Pregnancy And Lactation Text: This medication likely crosses the placenta but the risk for the fetus is uncertain. This medication is excreted in breast milk.
Solaraze Pregnancy And Lactation Text: This medication is Pregnancy Category B and is considered safe. There is some data to suggest avoiding during the third trimester. It is unknown if this medication is excreted in breast milk.
Low Dose Naltrexone Counseling- I discussed with the patient the potential risks and side effects of low dose naltrexone including but not limited to: more vivid dreams, headaches, nausea, vomiting, abdominal pain, fatigue, dizziness, and anxiety.
Doxepin Counseling:  Patient advised that the medication is sedating and not to drive a car after taking this medication. Patient informed of potential adverse effects including but not limited to dry mouth, urinary retention, and blurry vision.  The patient verbalized understanding of the proper use and possible adverse effects of doxepin.  All of the patient's questions and concerns were addressed.
Azathioprine Pregnancy And Lactation Text: This medication is Pregnancy Category D and isn't considered safe during pregnancy. It is unknown if this medication is excreted in breast milk.
Bexarotene Pregnancy And Lactation Text: This medication is Pregnancy Category X and should not be given to women who are pregnant or may become pregnant. This medication should not be used if you are breast feeding.
Simlandi Counseling:  I discussed with the patient the risks of adalimumab including but not limited to myelosuppression, immunosuppression, autoimmune hepatitis, demyelinating diseases, lymphoma, and serious infections.  The patient understands that monitoring is required including a PPD at baseline and must alert us or the primary physician if symptoms of infection or other concerning signs are noted.
Winlevi Counseling:  I discussed with the patient the risks of topical clascoterone including but not limited to erythema, scaling, itching, and stinging. Patient voiced their understanding.
Thalidomide Pregnancy And Lactation Text: This medication is Pregnancy Category X and is absolutely contraindicated during pregnancy. It is unknown if it is excreted in breast milk.
Semaglutide Counseling: I reviewed the possible side effects including: thyroid tumors, kidney disease, gallbladder disease, abdominal pain, constipation, diarrhea, nausea, vomiting and pancreatitis. Do not take this medication if you have a history or family history of multiple endocrine neoplasia syndrome type 2. Side effects reviewed, pt to contact office should one occur.
Klisyri Counseling:  I discussed with the patient the risks of Klisyri including but not limited to erythema, scaling, itching, weeping, crusting, and pain.
5-Fu Counseling: 5-Fluorouracil Counseling:  I discussed with the patient the risks of 5-fluorouracil including but not limited to erythema, scaling, itching, weeping, crusting, and pain.
Ebglyss Counseling: I discussed with the patient the risks of lebrikizumab including but not limited to eye inflammation and irritation, cold sores, injection site reactions, allergic reactions and increased risk of parasitic infection. The patient understands that monitoring is required and they must alert us or the primary physician if symptoms of infection or other concerning signs are noted.
Doxycycline Counseling:  Patient counseled regarding possible photosensitivity and increased risk for sunburn.  Patient instructed to avoid sunlight, if possible.  When exposed to sunlight, patients should wear protective clothing, sunglasses, and sunscreen.  The patient was instructed to call the office immediately if the following severe adverse effects occur:  hearing changes, easy bruising/bleeding, severe headache, or vision changes.  The patient verbalized understanding of the proper use and possible adverse effects of doxycycline.  All of the patient's questions and concerns were addressed.
Soolantra Counseling: I discussed with the patients the risks of topial Soolantra. This is a medicine which decreases the number of mites and inflammation in the skin. You experience burning, stinging, eye irritation or allergic reactions.  Please call our office if you develop any problems from using this medication.
Opioid Pregnancy And Lactation Text: These medications can lead to premature delivery and should be avoided during pregnancy. These medications are also present in breast milk in small amounts.
Doxepin Pregnancy And Lactation Text: This medication is Pregnancy Category C and it isn't known if it is safe during pregnancy. It is also excreted in breast milk and breast feeding isn't recommended.
Winlevi Pregnancy And Lactation Text: This medication is considered safe during pregnancy and breastfeeding.
Simlandi Pregnancy And Lactation Text: This medication is Pregnancy Category B and is considered safe during pregnancy. It is unknown if this medication is excreted in breast milk.
Low Dose Naltrexone Pregnancy And Lactation Text: Naltrexone is pregnancy category C.  There have been no adequate and well-controlled studies in pregnant women.  It should be used in pregnancy only if the potential benefit justifies the potential risk to the fetus.   Limited data indicates that naltrexone is minimally excreted into breastmilk.
Soolantra Pregnancy And Lactation Text: This medication is Pregnancy Category C. This medication is considered safe during breast feeding.
Cellcept Counseling:  I discussed with the patient the risks of mycophenolate mofetil including but not limited to infection/immunosuppression, GI upset, hypokalemia, hypercholesterolemia, bone marrow suppression, lymphoproliferative disorders, malignancy, GI ulceration/bleed/perforation, colitis, interstitial lung disease, kidney failure, progressive multifocal leukoencephalopathy, and birth defects.  The patient understands that monitoring is required including a baseline creatinine and regular CBC testing. In addition, patient must alert us immediately if symptoms of infection or other concerning signs are noted.
Doxycycline Pregnancy And Lactation Text: This medication is Pregnancy Category D and not consider safe during pregnancy. It is also excreted in breast milk but is considered safe for shorter treatment courses.
Isotretinoin Counseling: Patient should get monthly blood tests, not donate blood, not drive at night if vision affected, not share medication, and not undergo elective surgery for 6 months after tx completed. Side effects reviewed, pt to contact office should one occur.
Klisyri Pregnancy And Lactation Text: It is unknown if this medication can harm a developing fetus or if it is excreted in breast milk.
Tranexamic Acid Counseling:  Patient advised of the small risk of bleeding problems with tranexamic acid. They were also instructed to call if they developed any nausea, vomiting or diarrhea. All of the patient's questions and concerns were addressed.
Ebglyss Pregnancy And Lactation Text: This medication likely crosses the placenta but the risk for the fetus is uncertain. It is unknown if this medication is excreted in breast milk.
Hydroxyzine Counseling: Patient advised that the medication is sedating and not to drive a car after taking this medication.  Patient informed of potential adverse effects including but not limited to dry mouth, urinary retention, and blurry vision.  The patient verbalized understanding of the proper use and possible adverse effects of hydroxyzine.  All of the patient's questions and concerns were addressed.
Finasteride Female Counseling: Finasteride Counseling:  I discussed with the patient the risks of use of finasteride including but not limited to decreased libido and sexual dysfunction. Explained the teratogenic nature of the medication and stressed the importance of not getting pregnant during treatment. All of the patient's questions and concerns were addressed.
Niacinamide Counseling: I recommended taking niacin or niacinamide, also know as vitamin B3, twice daily. Recent evidence suggests that taking vitamin B3 (500 mg twice daily) can reduce the risk of actinic keratoses and non-melanoma skin cancers. Side effects of vitamin B3 include flushing and headache.
VTAMA Counseling: I discussed with the patient that VTAMA is not for use in the eyes, mouth or mouth. They should call the office if they develop any signs of allergic reactions to VTAMA. The patient verbalized understanding of the proper use and possible adverse effects of VTAMA.  All of the patient's questions and concerns were addressed.
Simponi Counseling:  I discussed with the patient the risks of golimumab including but not limited to myelosuppression, immunosuppression, autoimmune hepatitis, demyelinating diseases, lymphoma, and serious infections.  The patient understands that monitoring is required including a PPD at baseline and must alert us or the primary physician if symptoms of infection or other concerning signs are noted.
Topical Retinoid counseling:  Patient advised to apply a pea-sized amount only at bedtime and wait 30 minutes after washing their face before applying.  If too drying, patient may add a non-comedogenic moisturizer. The patient verbalized understanding of the proper use and possible adverse effects of retinoids.  All of the patient's questions and concerns were addressed.
Isotretinoin Pregnancy And Lactation Text: This medication is Pregnancy Category X and is considered extremely dangerous during pregnancy. It is unknown if it is excreted in breast milk.
Protopic Pregnancy And Lactation Text: This medication is Pregnancy Category C. It is unknown if this medication is excreted in breast milk when applied topically.
Rifampin Pregnancy And Lactation Text: This medication is Pregnancy Category C and it isn't know if it is safe during pregnancy. It is also excreted in breast milk and should not be used if you are breast feeding.
Oxybutynin Counseling:  I discussed with the patient the risks of oxybutynin including but not limited to skin rash, drowsiness, dry mouth, difficulty urinating, and blurred vision.
Itraconazole Counseling:  I discussed with the patient the risks of itraconazole including but not limited to liver damage, nausea/vomiting, neuropathy, and severe allergy.  The patient understands that this medication is best absorbed when taken with acidic beverages such as non-diet cola or ginger ale.  The patient understands that monitoring is required including baseline LFTs and repeat LFTs at intervals.  The patient understands that they are to contact us or the primary physician if concerning signs are noted.
Bimzelx Pregnancy And Lactation Text: This medication crosses the placenta and the safety is uncertain during pregnancy. It is unknown if this medication is present in breast milk.
Topical Steroids Counseling: I discussed with the patient that prolonged use of topical steroids can result in the increased appearance of superficial blood vessels (telangiectasias), lightening (hypopigmentation) and thinning of the skin (atrophy).  Patient understands to avoid using high potency steroids in skin folds, the groin or the face.  The patient verbalized understanding of the proper use and possible adverse effects of topical steroids.  All of the patient's questions and concerns were addressed.
Gabapentin Counseling: I discussed with the patient the risks of gabapentin including but not limited to dizziness, somnolence, fatigue and ataxia.
Tremfya Counseling: I discussed with the patient the risks of guselkumab including but not limited to immunosuppression, serious infections, and drug reactions.  The patient understands that monitoring is required including a PPD at baseline and must alert us or the primary physician if symptoms of infection or other concerning signs are noted.
Sarecycline Counseling: Patient advised regarding possible photosensitivity and discoloration of the teeth, skin, lips, tongue and gums.  Patient instructed to avoid sunlight, if possible.  When exposed to sunlight, patients should wear protective clothing, sunglasses, and sunscreen.  The patient was instructed to call the office immediately if the following severe adverse effects occur:  hearing changes, easy bruising/bleeding, severe headache, or vision changes.  The patient verbalized understanding of the proper use and possible adverse effects of sarecycline.  All of the patient's questions and concerns were addressed.
Qbrexza Counseling:  I discussed with the patient the risks of Qbrexza including but not limited to headache, mydriasis, blurred vision, dry eyes, nasal dryness, dry mouth, dry throat, dry skin, urinary hesitation, and constipation.  Local skin reactions including erythema, burning, stinging, and itching can also occur.
Bactrim Counseling:  I discussed with the patient the risks of sulfa antibiotics including but not limited to GI upset, allergic reaction, drug rash, diarrhea, dizziness, photosensitivity, and yeast infections.  Rarely, more serious reactions can occur including but not limited to aplastic anemia, agranulocytosis, methemoglobinemia, blood dyscrasias, liver or kidney failure, lung infiltrates or desquamative/blistering drug rashes.
Nemluvio Counseling: I discussed with the patient the risks of nemolizumab including but not limited to headache, gastrointestinal complaints, nasopharyngitis, musculoskeletal complaints, injection site reactions, and allergic reactions. The patient understands that monitoring is required and they must alert us or the primary physician if any side effects are noted.
Benzoyl Peroxide Pregnancy And Lactation Text: This medication is Pregnancy Category C. It is unknown if benzoyl peroxide is excreted in breast milk.
Cimzia Counseling:  I discussed with the patient the risks of Cimzia including but not limited to immunosuppression, allergic reactions and infections.  The patient understands that monitoring is required including a PPD at baseline and must alert us or the primary physician if symptoms of infection or other concerning signs are noted.
Gabapentin Pregnancy And Lactation Text: This medication is Pregnancy Category C and isn't considered safe during pregnancy. It is excreted in breast milk.
Itraconazole Pregnancy And Lactation Text: This medication is Pregnancy Category C and it isn't know if it is safe during pregnancy. It is also excreted in breast milk.
Clofazimine Counseling:  I discussed with the patient the risks of clofazimine including but not limited to skin and eye pigmentation, liver damage, nausea/vomiting, gastrointestinal bleeding and allergy.
Eucrisa Counseling: Patient may experience a mild burning sensation during topical application. Eucrisa is not approved in children less than 3 months of age.
Oxybutynin Pregnancy And Lactation Text: This medication is Pregnancy Category B and is considered safe during pregnancy. It is unknown if it is excreted in breast milk.
Bactrim Pregnancy And Lactation Text: This medication is Pregnancy Category D and is known to cause fetal risk.  It is also excreted in breast milk.
Topical Steroids Applications Pregnancy And Lactation Text: Most topical steroids are considered safe to use during pregnancy and lactation.  Any topical steroid applied to the breast or nipple should be washed off before breastfeeding.
Nemluvio Pregnancy And Lactation Text: It is not known if Nemluvio causes fetal harm or is present in breast milk. Please proceed with caution if patients who are pregnant or breastfeeding.
Sarecycline Pregnancy And Lactation Text: This medication is Pregnancy Category D and not consider safe during pregnancy. It is also excreted in breast milk.
Albendazole Counseling:  I discussed with the patient the risks of albendazole including but not limited to cytopenia, kidney damage, nausea/vomiting and severe allergy.  The patient understands that this medication is being used in an off-label manner.
Carac Counseling:  I discussed with the patient the risks of Carac including but not limited to erythema, scaling, itching, weeping, crusting, and pain.
Detail Level: Simple
Xolair Counseling:  Patient informed of potential adverse effects including but not limited to fever, muscle aches, rash and allergic reactions.  The patient verbalized understanding of the proper use and possible adverse effects of Xolair.  All of the patient's questions and concerns were addressed.
Propranolol Counseling:  I discussed with the patient the risks of propranolol including but not limited to low heart rate, low blood pressure, low blood sugar, restlessness and increased cold sensitivity. They should call the office if they experience any of these side effects.
Cimzia Pregnancy And Lactation Text: This medication crosses the placenta but can be considered safe in certain situations. Cimzia may be excreted in breast milk.
Qbrexza Pregnancy And Lactation Text: There is no available data on Qbrexza use in pregnant women.  There is no available data on Qbrexza use in lactation.
Eucrisa Pregnancy And Lactation Text: This medication has not been assigned a Pregnancy Risk Category but animal studies failed to show danger with the topical medication. It is unknown if the medication is excreted in breast milk.
Glycopyrrolate Counseling:  I discussed with the patient the risks of glycopyrrolate including but not limited to skin rash, drowsiness, dry mouth, difficulty urinating, and blurred vision.
Ketoconazole Counseling:   Patient counseled regarding improving absorption with orange juice.  Adverse effects include but are not limited to breast enlargement, headache, diarrhea, nausea, upset stomach, liver function test abnormalities, taste disturbance, and stomach pain.  There is a rare possibility of liver failure that can occur when taking ketoconazole. The patient understands that monitoring of LFTs may be required, especially at baseline. The patient verbalized understanding of the proper use and possible adverse effects of ketoconazole.  All of the patient's questions and concerns were addressed.
Rituxan Counseling:  I discussed with the patient the risks of Rituxan infusions. Side effects can include infusion reactions, severe drug rashes including mucocutaneous reactions, reactivation of latent hepatitis and other infections and rarely progressive multifocal leukoencephalopathy.  All of the patient's questions and concerns were addressed.
Cephalexin Counseling: I counseled the patient regarding use of cephalexin as an antibiotic for prophylactic and/or therapeutic purposes. Cephalexin (commonly prescribed under brand name Keflex) is a cephalosporin antibiotic which is active against numerous classes of bacteria, including most skin bacteria. Side effects may include nausea, diarrhea, gastrointestinal upset, rash, hives, yeast infections, and in rare cases, hepatitis, kidney disease, seizures, fever, confusion, neurologic symptoms, and others. Patients with severe allergies to penicillin medications are cautioned that there is about a 10% incidence of cross-reactivity with cephalosporins. When possible, patients with penicillin allergies should use alternatives to cephalosporins for antibiotic therapy.
Topical Sulfur Applications Counseling: Topical Sulfur Counseling: Patient counseled that this medication may cause skin irritation or allergic reactions.  In the event of skin irritation, the patient was advised to reduce the amount of the drug applied or use it less frequently.   The patient verbalized understanding of the proper use and possible adverse effects of topical sulfur application.  All of the patient's questions and concerns were addressed.
Propranolol Pregnancy And Lactation Text: This medication is Pregnancy Category C and it isn't known if it is safe during pregnancy. It is excreted in breast milk.
Ozempic Counseling: I reviewed the possible side effects including: thyroid tumors, kidney disease, gallbladder disease, abdominal pain, constipation, diarrhea, nausea, vomiting and pancreatitis. Do not take this medication if you have a history or family history of multiple endocrine neoplasia syndrome type 2. Side effects reviewed, pt to contact office should one occur.
Tetracycline Counseling: Patient counseled regarding possible photosensitivity and increased risk for sunburn.  Patient instructed to avoid sunlight, if possible.  When exposed to sunlight, patients should wear protective clothing, sunglasses, and sunscreen.  The patient was instructed to call the office immediately if the following severe adverse effects occur:  hearing changes, easy bruising/bleeding, severe headache, or vision changes.  The patient verbalized understanding of the proper use and possible adverse effects of tetracycline.  All of the patient's questions and concerns were addressed. Patient understands to avoid pregnancy while on therapy due to potential birth defects.
Rhofade Counseling: Rhofade is a topical medication which can decrease superficial blood flow where applied. Side effects are uncommon and include stinging, redness and allergic reactions.
Include Pregnancy/Lactation Warning?: No
Hydroquinone Counseling:  Patient advised that medication may result in skin irritation, lightening (hypopigmentation), dryness, and burning.  In the event of skin irritation, the patient was advised to reduce the amount of the drug applied or use it less frequently.  Rarely, spots that are treated with hydroquinone can become darker (pseudoochronosis).  Should this occur, patient instructed to stop medication and call the office. The patient verbalized understanding of the proper use and possible adverse effects of hydroquinone.  All of the patient's questions and concerns were addressed.
Cosentyx Counseling:  I discussed with the patient the risks of Cosentyx including but not limited to worsening of Crohn's disease, immunosuppression, allergic reactions and infections.  The patient understands that monitoring is required including a PPD at baseline and must alert us or the primary physician if symptoms of infection or other concerning signs are noted.
Ketoconazole Pregnancy And Lactation Text: This medication is Pregnancy Category C and it isn't know if it is safe during pregnancy. It is also excreted in breast milk and breast feeding isn't recommended.
Xolair Pregnancy And Lactation Text: This medication is Pregnancy Category B and is considered safe during pregnancy. This medication is excreted in breast milk.
Colchicine Counseling:  Patient counseled regarding adverse effects including but not limited to stomach upset (nausea, vomiting, stomach pain, or diarrhea).  Patient instructed to limit alcohol consumption while taking this medication.  Colchicine may reduce blood counts especially with prolonged use.  The patient understands that monitoring of kidney function and blood counts may be required, especially at baseline. The patient verbalized understanding of the proper use and possible adverse effects of colchicine.  All of the patient's questions and concerns were addressed.
Calcipotriene Counseling:  I discussed with the patient the risks of calcipotriene including but not limited to erythema, scaling, itching, and irritation.
Topical Sulfur Applications Pregnancy And Lactation Text: This medication is considered safe during pregnancy and breast feeding secondary to limited systemic absorption.
Glycopyrrolate Pregnancy And Lactation Text: This medication is Pregnancy Category B and is considered safe during pregnancy. It is unknown if it is excreted breast milk.
Rhofade Pregnancy And Lactation Text: This medication has not been assigned a Pregnancy Risk Category. It is unknown if the medication is excreted in breast milk.
Rituxan Pregnancy And Lactation Text: This medication is Pregnancy Category C and it isn't know if it is safe during pregnancy. It is unknown if this medication is excreted in breast milk but similar antibodies are known to be excreted.
Cephalexin Pregnancy And Lactation Text: This medication is Pregnancy Category B and considered safe during pregnancy.  It is also excreted in breast milk but can be used safely for shorter doses.
Ivermectin Counseling:  Patient instructed to take medication on an empty stomach with a full glass of water.  Patient informed of potential adverse effects including but not limited to nausea, diarrhea, dizziness, itching, and swelling of the extremities or lymph nodes.  The patient verbalized understanding of the proper use and possible adverse effects of ivermectin.  All of the patient's questions and concerns were addressed.
Acitretin Counseling:  I discussed with the patient the risks of acitretin including but not limited to hair loss, dry lips/skin/eyes, liver damage, hyperlipidemia, depression/suicidal ideation, photosensitivity.  Serious rare side effects can include but are not limited to pancreatitis, pseudotumor cerebri, bony changes, clot formation/stroke/heart attack.  Patient understands that alcohol is contraindicated since it can result in liver toxicity and significantly prolong the elimination of the drug by many years.
Terbinafine Counseling: Patient counseling regarding adverse effects of terbinafine including but not limited to headache, diarrhea, rash, upset stomach, liver function test abnormalities, itching, taste/smell disturbance, nausea, abdominal pain, and flatulence.  There is a rare possibility of liver failure that can occur when taking terbinafine.  The patient understands that a baseline LFT and kidney function test may be required. The patient verbalized understanding of the proper use and possible adverse effects of terbinafine.  All of the patient's questions and concerns were addressed.
SSKI Counseling:  I discussed with the patient the risks of SSKI including but not limited to thyroid abnormalities, metallic taste, GI upset, fever, headache, acne, arthralgias, paraesthesias, lymphadenopathy, easy bleeding, arrhythmias, and allergic reaction.
Spevigo Pregnancy And Lactation Text: The risk during pregnancy and breastfeeding is uncertain with this medication. This medication does cross the placenta. It is unknown if this medication is found in breast milk.
Methotrexate Counseling:  Patient counseled regarding adverse effects of methotrexate including but not limited to nausea, vomiting, abnormalities in liver function tests. Patients may develop mouth sores, rash, diarrhea, and abnormalities in blood counts. The patient understands that monitoring is required including LFT's and blood counts.  There is a rare possibility of scarring of the liver and lung problems that can occur when taking methotrexate. Persistent nausea, loss of appetite, pale stools, dark urine, cough, and shortness of breath should be reported immediately. Patient advised to discontinue methotrexate treatment at least three months before attempting to become pregnant.  I discussed the need for folate supplements while taking methotrexate.  These supplements can decrease side effects during methotrexate treatment. The patient verbalized understanding of the proper use and possible adverse effects of methotrexate.  All of the patient's questions and concerns were addressed.
Olumiant Pregnancy And Lactation Text: Based on animal studies, Olumiant may cause embryo-fetal harm when administered to pregnant women.  The medication should not be used in pregnancy.  Breastfeeding is not recommended during treatment.
Erivedge Counseling- I discussed with the patient the risks of Erivedge including but not limited to nausea, vomiting, diarrhea, constipation, weight loss, changes in the sense of taste, decreased appetite, muscle spasms, and hair loss.  The patient verbalized understanding of the proper use and possible adverse effects of Erivedge.  All of the patient's questions and concerns were addressed.
Aklief counseling:  Patient advised to apply a pea-sized amount only at bedtime and wait 30 minutes after washing their face before applying.  If too drying, patient may add a non-comedogenic moisturizer.  The most commonly reported side effects including irritation, redness, scaling, dryness, stinging, burning, itching, and increased risk of sunburn.  The patient verbalized understanding of the proper use and possible adverse effects of retinoids.  All of the patient's questions and concerns were addressed.
Finasteride Male Counseling: Finasteride Counseling:  I discussed with the patient the risks of use of finasteride including but not limited to decreased libido, decreased ejaculate volume, gynecomastia, and depression. Women should not handle medication.  All of the patient's questions and concerns were addressed.
Opzelura Pregnancy And Lactation Text: There is insufficient data to evaluate drug-associated risk for major birth defects, miscarriage, or other adverse maternal or fetal outcomes.  There is a pregnancy registry that monitors pregnancy outcomes in pregnant persons exposed to the medication during pregnancy.  It is unknown if this medication is excreted in breast milk.  Do not breastfeed during treatment and for about 4 weeks after the last dose.
Oral Minoxidil Counseling- I discussed with the patient the risks of oral minoxidil including but not limited to shortness of breath, swelling of the feet or ankles, dizziness, lightheadedness, unwanted hair growth and allergic reaction.  The patient verbalized understanding of the proper use and possible adverse effects of oral minoxidil.  All of the patient's questions and concerns were addressed.
Fluconazole Counseling:  Patient counseled regarding adverse effects of fluconazole including but not limited to headache, diarrhea, nausea, upset stomach, liver function test abnormalities, taste disturbance, and stomach pain.  There is a rare possibility of liver failure that can occur when taking fluconazole.  The patient understands that monitoring of LFTs and kidney function test may be required, especially at baseline. The patient verbalized understanding of the proper use and possible adverse effects of fluconazole.  All of the patient's questions and concerns were addressed.
Methotrexate Pregnancy And Lactation Text: This medication is Pregnancy Category X and is known to cause fetal harm. This medication is excreted in breast milk.
Stelara Counseling:  I discussed with the patient the risks of ustekinumab including but not limited to immunosuppression, malignancy, posterior leukoencephalopathy syndrome, and serious infections.  The patient understands that monitoring is required including a PPD at baseline and must alert us or the primary physician if symptoms of infection or other concerning signs are noted.
Quinolones Counseling:  I discussed with the patient the risks of fluoroquinolones including but not limited to GI upset, allergic reaction, drug rash, diarrhea, dizziness, photosensitivity, yeast infections, liver function test abnormalities, tendonitis/tendon rupture.
Aklief Pregnancy And Lactation Text: It is unknown if this medication is safe to use during pregnancy.  It is unknown if this medication is excreted in breast milk.  Breastfeeding women should use the topical cream on the smallest area of the skin for the shortest time needed while breastfeeding.  Do not apply to nipple and areola.
Picato Counseling:  I discussed with the patient the risks of Picato including but not limited to erythema, scaling, itching, weeping, crusting, and pain.
Rinvoq Counseling: I discussed with the patient the risks of Rinvoq therapy including but not limited to upper respiratory tract infections, shingles, cold sores, bronchitis, nausea, cough, fever, acne, and headache. Live vaccines should be avoided.  This medication has been linked to serious infections; higher rate of mortality; malignancy and lymphoproliferative disorders; major adverse cardiovascular events; thrombosis; thrombocytopenia, anemia, and neutropenia; lipid elevations; liver enzyme elevations; and gastrointestinal perforations.
Ilumya Counseling: I discussed with the patient the risks of tildrakizumab including but not limited to immunosuppression, malignancy, posterior leukoencephalopathy syndrome, and serious infections.  The patient understands that monitoring is required including a PPD at baseline and must alert us or the primary physician if symptoms of infection or other concerning signs are noted.
Topical Ketoconazole Counseling: Patient counseled that this medication may cause skin irritation or allergic reactions.  In the event of skin irritation, the patient was advised to reduce the amount of the drug applied or use it less frequently.   The patient verbalized understanding of the proper use and possible adverse effects of ketoconazole.  All of the patient's questions and concerns were addressed.
Adbry Counseling: I discussed with the patient the risks of tralokinumab including but not limited to eye infection and irritation, cold sores, injection site reactions, worsening of asthma, allergic reactions and increased risk of parasitic infection.  Live vaccines should be avoided while taking tralokinumab. The patient understands that monitoring is required and they must alert us or the primary physician if symptoms of infection or other concerning signs are noted.
Drysol Counseling:  I discussed with the patient the risks of drysol/aluminum chloride including but not limited to skin rash, itching, irritation, burning.
Oral Minoxidil Pregnancy And Lactation Text: This medication should only be used when clearly needed if you are pregnant, attempting to become pregnant or breast feeding.
Cantharidin Pregnancy And Lactation Text: This medication has not been proven safe during pregnancy. It is unknown if this medication is excreted in breast milk.
Libtayo Counseling- I discussed with the patient the risks of Libtayo including but not limited to nausea, vomiting, diarrhea, and bone or muscle pain.  The patient verbalized understanding of the proper use and possible adverse effects of Libtayo.  All of the patient's questions and concerns were addressed.
Cimetidine Counseling:  I discussed with the patient the risks of Cimetidine including but not limited to gynecomastia, headache, diarrhea, nausea, drowsiness, arrhythmias, pancreatitis, skin rashes, psychosis, bone marrow suppression and kidney toxicity.
Prednisone Counseling:  I discussed with the patient the risks of prolonged use of prednisone including but not limited to weight gain, insomnia, osteoporosis, mood changes, diabetes, susceptibility to infection, glaucoma and high blood pressure.  In cases where prednisone use is prolonged, patients should be monitored with blood pressure checks, serum glucose levels and an eye exam.  Additionally, the patient may need to be placed on GI prophylaxis, PCP prophylaxis, and calcium and vitamin D supplementation and/or a bisphosphonate.  The patient verbalized understanding of the proper use and the possible adverse effects of prednisone.  All of the patient's questions and concerns were addressed.
Rinvoq Pregnancy And Lactation Text: Based on animal studies, Rinvoq may cause embryo-fetal harm when administered to pregnant women.  The medication should not be used in pregnancy.  Breastfeeding is not recommended during treatment and for 6 days after the last dose.
Azelaic Acid Counseling: Patient counseled that medicine may cause skin irritation and to avoid applying near the eyes.  In the event of skin irritation, the patient was advised to reduce the amount of the drug applied or use it less frequently.   The patient verbalized understanding of the proper use and possible adverse effects of azelaic acid.  All of the patient's questions and concerns were addressed.
Griseofulvin Counseling:  I discussed with the patient the risks of griseofulvin including but not limited to photosensitivity, cytopenia, liver damage, nausea/vomiting and severe allergy.  The patient understands that this medication is best absorbed when taken with a fatty meal (e.g., ice cream or french fries).
Taltz Counseling: I discussed with the patient the risks of ixekizumab including but not limited to immunosuppression, serious infections, worsening of inflammatory bowel disease and drug reactions.  The patient understands that monitoring is required including a PPD at baseline and must alert us or the primary physician if symptoms of infection or other concerning signs are noted.
Otezla Counseling: The side effects of Otezla were discussed with the patient, including but not limited to worsening or new depression, weight loss, diarrhea, nausea, upper respiratory tract infection, and headache. Patient instructed to call the office should any adverse effect occur.  The patient verbalized understanding of the proper use and possible adverse effects of Otezla.  All the patient's questions and concerns were addressed.
Adbry Pregnancy And Lactation Text: It is unknown if this medication will adversely affect pregnancy or breast feeding.
Libtayo Pregnancy And Lactation Text: This medication is contraindicated in pregnancy and when breast feeding.
Prednisone Pregnancy And Lactation Text: This medication is Pregnancy Category C and it isn't know if it is safe during pregnancy. This medication is excreted in breast milk.
Drysol Pregnancy And Lactation Text: This medication is considered safe during pregnancy and breast feeding.
Xeljanz Counseling: I discussed with the patient the risks of Xeljanz therapy including increased risk of infection, liver issues, headache, diarrhea, or cold symptoms. Live vaccines should be avoided. They were instructed to call if they have any problems.
Infliximab Counseling:  I discussed with the patient the risks of infliximab including but not limited to myelosuppression, immunosuppression, autoimmune hepatitis, demyelinating diseases, lymphoma, and serious infections.  The patient understands that monitoring is required including a PPD at baseline and must alert us or the primary physician if symptoms of infection or other concerning signs are noted.
Topical Metronidazole Counseling: Metronidazole is a topical antibiotic medication. You may experience burning, stinging, redness, or allergic reactions.  Please call our office if you develop any problems from using this medication.
Azithromycin Counseling:  I discussed with the patient the risks of azithromycin including but not limited to GI upset, allergic reaction, drug rash, diarrhea, and yeast infections.
Otezla Pregnancy And Lactation Text: This medication is Pregnancy Category C and it isn't known if it is safe during pregnancy. It is unknown if it is excreted in breast milk.
Rifampin Counseling: I discussed with the patient the risks of rifampin including but not limited to liver damage, kidney damage, red-orange body fluids, nausea/vomiting and severe allergy.
Protopic Counseling: Patient may experience a mild burning sensation during topical application. Protopic is not approved in children less than 2 years of age. There have been case reports of hematologic and skin malignancies in patients using topical calcineurin inhibitors although causality is questionable.
Elidel Counseling: Patient may experience a mild burning sensation during topical application. Elidel is not approved in children less than 2 years of age. There have been case reports of hematologic and skin malignancies in patients using topical calcineurin inhibitors although causality is questionable.
Griseofulvin Pregnancy And Lactation Text: This medication is Pregnancy Category X and is known to cause serious birth defects. It is unknown if this medication is excreted in breast milk but breast feeding should be avoided.
Bimzelx Counseling:  I discussed with the patient the risks of Bimzelx including but not limited to depression, immunosuppression, allergic reactions and infections.  The patient understands that monitoring is required including a PPD at baseline and must alert us or the primary physician if symptoms of infection or other concerning signs are noted.
Arava Counseling:  Patient counseled regarding adverse effects of Arava including but not limited to nausea, vomiting, abnormalities in liver function tests. Patients may develop mouth sores, rash, diarrhea, and abnormalities in blood counts. The patient understands that monitoring is required including LFTs and blood counts.  There is a rare possibility of scarring of the liver and lung problems that can occur when taking methotrexate. Persistent nausea, loss of appetite, pale stools, dark urine, cough, and shortness of breath should be reported immediately. Patient advised to discontinue Arava treatment and consult with a physician prior to attempting conception. The patient will have to undergo a treatment to eliminate Arava from the body prior to conception.
Xelmaryz Pregnancy And Lactation Text: This medication is Pregnancy Category D and is not considered safe during pregnancy.  The risk during breast feeding is also uncertain.
Odomzo Counseling- I discussed with the patient the risks of Odomzo including but not limited to nausea, vomiting, diarrhea, constipation, weight loss, changes in the sense of taste, decreased appetite, muscle spasms, and hair loss.  The patient verbalized understanding of the proper use and possible adverse effects of Odomzo.  All of the patient's questions and concerns were addressed.
Benzoyl Peroxide Counseling: Patient counseled that medicine may cause skin irritation and bleach clothing.  In the event of skin irritation, the patient was advised to reduce the amount of the drug applied or use it less frequently.   The patient verbalized understanding of the proper use and possible adverse effects of benzoyl peroxide.  All of the patient's questions and concerns were addressed.
Azithromycin Pregnancy And Lactation Text: This medication is considered safe during pregnancy and is also secreted in breast milk.
Topical Metronidazole Pregnancy And Lactation Text: This medication is Pregnancy Category B and considered safe during pregnancy.  It is also considered safe to use while breastfeeding.
Dapsone Pregnancy And Lactation Text: This medication is Pregnancy Category C and is not considered safe during pregnancy or breast feeding.
Enbrel Counseling:  I discussed with the patient the risks of etanercept including but not limited to myelosuppression, immunosuppression, autoimmune hepatitis, demyelinating diseases, lymphoma, and infections.  The patient understands that monitoring is required including a PPD at baseline and must alert us or the primary physician if symptoms of infection or other concerning signs are noted.
Cibinqo Counseling: I discussed with the patient the risks of Cibinqo therapy including but not limited to common cold, nausea, headache, cold sores, increased blood CPK levels, dizziness, UTIs, fatigue, acne, and vomitting. Live vaccines should be avoided.  This medication has been linked to serious infections; higher rate of mortality; malignancy and lymphoproliferative disorders; major adverse cardiovascular events; thrombosis; thrombocytopenia and lymphopenia; lipid elevations; and retinal detachment.
Erythromycin Counseling:  I discussed with the patient the risks of erythromycin including but not limited to GI upset, allergic reaction, drug rash, diarrhea, increase in liver enzymes, and yeast infections.
Wegovy Counseling: I reviewed the possible side effects including: thyroid tumors, kidney disease, gallbladder disease, abdominal pain, constipation, diarrhea, nausea, vomiting and pancreatitis. Do not take this medication if you have a history or family history of multiple endocrine neoplasia syndrome type 2. Side effects reviewed, pt to contact office should one occur.
Finasteride Pregnancy And Lactation Text: This medication is absolutely contraindicated during pregnancy. It is unknown if it is excreted in breast milk.
Niacinamide Pregnancy And Lactation Text: These medications are considered safe during pregnancy.
Minoxidil Counseling: Minoxidil is a topical medication which can increase blood flow where it is applied. It is uncertain how this medication increases hair growth. Side effects are uncommon and include stinging and allergic reactions.
Tranexamic Acid Pregnancy And Lactation Text: It is unknown if this medication is safe during pregnancy or breast feeding.
Cyclophosphamide Counseling:  I discussed with the patient the risks of cyclophosphamide including but not limited to hair loss, hormonal abnormalities, decreased fertility, abdominal pain, diarrhea, nausea and vomiting, bone marrow suppression and infection. The patient understands that monitoring is required while taking this medication.
High Dose Vitamin A Counseling: Side effects reviewed, pt to contact office should one occur.
Hydroxyzine Pregnancy And Lactation Text: This medication is not safe during pregnancy and should not be taken. It is also excreted in breast milk and breast feeding isn't recommended.
Vtama Pregnancy And Lactation Text: It is unknown if this medication can cause problems during pregnancy and breastfeeding.
Cibinqo Pregnancy And Lactation Text: It is unknown if this medication will adversely affect pregnancy or breast feeding.  You should not take this medication if you are currently pregnant or planning a pregnancy or while breastfeeding.
Erythromycin Pregnancy And Lactation Text: This medication is Pregnancy Category B and is considered safe during pregnancy. It is also excreted in breast milk.
Valtrex Counseling: I discussed with the patient the risks of valacyclovir including but not limited to kidney damage, nausea, vomiting and severe allergy.  The patient understands that if the infection seems to be worsening or is not improving, they are to call.
High Dose Vitamin A Pregnancy And Lactation Text: High dose vitamin A therapy is contraindicated during pregnancy and breast feeding.
Nsaids Counseling: NSAID Counseling: I discussed with the patient that NSAIDs should be taken with food. Prolonged use of NSAIDs can result in the development of stomach ulcers.  Patient advised to stop taking NSAIDs if abdominal pain occurs.  The patient verbalized understanding of the proper use and possible adverse effects of NSAIDs.  All of the patient's questions and concerns were addressed.
Sotyktu Counseling:  I discussed the most common side effects of Sotyktu including: common cold, sore throat, sinus infections, cold sores, canker sores, folliculitis, and acne.  I also discussed more serious side effects of Sotyktu including but not limited to: serious allergic reactions; increased risk for infections such as TB; cancers such as lymphomas; rhabdomyolysis and elevated CPK; and elevated triglycerides and liver enzymes. 
Birth Control Pills Counseling: Birth Control Pill Counseling: I discussed with the patient the potential side effects of OCPs including but not limited to increased risk of stroke, heart attack, thrombophlebitis, deep venous thrombosis, hepatic adenomas, breast changes, GI upset, headaches, and depression.  The patient verbalized understanding of the proper use and possible adverse effects of OCPs. All of the patient's questions and concerns were addressed.
Cyclophosphamide Pregnancy And Lactation Text: This medication is Pregnancy Category D and it isn't considered safe during pregnancy. This medication is excreted in breast milk.
Skyrizi Counseling: I discussed with the patient the risks of risankizumab-rzaa including but not limited to immunosuppression, and serious infections.  The patient understands that monitoring is required including a PPD at baseline and must alert us or the primary physician if symptoms of infection or other concerning signs are noted.
Zoryve Counseling:  I discussed with the patient that Zoryve is not for use in the eyes, mouth or vagina. The most commonly reported side effects include diarrhea, headache, insomnia, application site pain, upper respiratory tract infections, and urinary tract infections.  All of the patient's questions and concerns were addressed.
Valtrex Pregnancy And Lactation Text: this medication is Pregnancy Category B and is considered safe during pregnancy. This medication is not directly found in breast milk but it's metabolite acyclovir is present.
Mirvaso Counseling: Mirvaso is a topical medication which can decrease superficial blood flow where applied. Side effects are uncommon and include stinging, redness and allergic reactions.
Humira Counseling:  I discussed with the patient the risks of adalimumab including but not limited to myelosuppression, immunosuppression, autoimmune hepatitis, demyelinating diseases, lymphoma, and serious infections.  The patient understands that monitoring is required including a PPD at baseline and must alert us or the primary physician if symptoms of infection or other concerning signs are noted.
Tazorac Counseling:  Patient advised that medication is irritating and drying.  Patient may need to apply sparingly and wash off after an hour before eventually leaving it on overnight.  The patient verbalized understanding of the proper use and possible adverse effects of tazorac.  All of the patient's questions and concerns were addressed.
Metronidazole Counseling:  I discussed with the patient the risks of metronidazole including but not limited to seizures, nausea/vomiting, a metallic taste in the mouth, nausea/vomiting and severe allergy.
Zepbound Counseling: I reviewed the possible side effects including: thyroid tumors, kidney disease, gallbladder disease, abdominal pain, constipation, diarrhea, nausea, vomiting and pancreatitis. Do not take this medication if you have a history or family history of multiple endocrine neoplasia syndrome type 2. Side effects reviewed, pt to contact office should one occur.
Litfulo Counseling: I discussed with the patient the risks of Litfulo therapy including but not limited to upper respiratory tract infections, shingles, cold sores, and nausea. Live vaccines should be avoided.  This medication has been linked to serious infections; higher rate of mortality; malignancy and lymphoproliferative disorders; major adverse cardiovascular events; thrombosis; gastrointestinal perforations; neutropenia; lymphopenia; anemia; liver enzyme elevations; and lipid elevations.
Dutasteride Male Counseling: Dustasteride Counseling:  I discussed with the patient the risks of use of dutasteride including but not limited to decreased libido, decreased ejaculate volume, and gynecomastia. Women who can become pregnant should not handle medication.  All of the patient's questions and concerns were addressed.
Birth Control Pills Pregnancy And Lactation Text: This medication should be avoided if pregnant and for the first 30 days post-partum.
Sotyktu Pregnancy And Lactation Text: There is insufficient data to evaluate whether or not Sotyktu is safe to use during pregnancy.   It is not known if Sotyktu passes into breast milk and whether or not it is safe to use when breastfeeding.  
Nsaids Pregnancy And Lactation Text: These medications are considered safe up to 30 weeks gestation. It is excreted in breast milk.
Tazorac Pregnancy And Lactation Text: This medication is not safe during pregnancy. It is unknown if this medication is excreted in breast milk.
Cyclosporine Counseling:  I discussed with the patient the risks of cyclosporine including but not limited to hypertension, gingival hyperplasia,myelosuppression, immunosuppression, liver damage, kidney damage, neurotoxicity, lymphoma, and serious infections. The patient understands that monitoring is required including baseline blood pressure, CBC, CMP, lipid panel and uric acid, and then 1-2 times monthly CMP and blood pressure.
Dutasteride Female Counseling: Dutasteride Counseling:  I discussed with the patient the risks of use of dutasteride including but not limited to decreased libido and sexual dysfunction. Explained the teratogenic nature of the medication and stressed the importance of not getting pregnant during treatment. All of the patient's questions and concerns were addressed.
Litfulo Pregnancy And Lactation Text: Based on animal studies, Lifulo may cause embryo-fetal harm when administered to pregnant women.  The medication should not be used in pregnancy.  Breastfeeding is not recommended during treatment.
Metronidazole Pregnancy And Lactation Text: This medication is Pregnancy Category B and considered safe during pregnancy.  It is also excreted in breast milk.
Spevigo Counseling: I discussed with the patient the risks of Spevigo including but not limited to fatigue, nasuea, vomiting, headache, pruritus, urinary tract infection, an infusion related reactions.  The patient understands that monitoring is required including screening for tuberculosis at baseline and yearly screening thereafter while continuing Spevigo therapy. They should contact us if symptoms of infection or other concerning signs are noted.
Zyclara Counseling:  I discussed with the patient the risks of imiquimod including but not limited to erythema, scaling, itching, weeping, crusting, and pain.  Patient understands that the inflammatory response to imiquimod is variable from person to person and was educated regarded proper titration schedule.  If flu-like symptoms develop, patient knows to discontinue the medication and contact us.
Spironolactone Counseling: Patient advised regarding risks of diarrhea, abdominal pain, hyperkalemia, birth defects (for female patients), liver toxicity and renal toxicity. The patient may need blood work to monitor liver and kidney function and potassium levels while on therapy. The patient verbalized understanding of the proper use and possible adverse effects of spironolactone.  All of the patient's questions and concerns were addressed.
Olanzapine Counseling- I discussed with the patient the common side effects of olanzapine including but are not limited to: lack of energy, dry mouth, increased appetite, sleepiness, tremor, constipation, dizziness, changes in behavior, or restlessness.  Explained that teenagers are more likely to experience headaches, abdominal pain, pain in the arms or legs, tiredness, and sleepiness.  Serious side effects include but are not limited: increased risk of death in elderly patients who are confused, have memory loss, or dementia-related psychosis; hyperglycemia; increased cholesterol and triglycerides; and weight gain.
Olumiant Counseling: I discussed with the patient the risks of Olumiant therapy including but not limited to upper respiratory tract infections, shingles, cold sores, and nausea. Live vaccines should be avoided.  This medication has been linked to serious infections; higher rate of mortality; malignancy and lymphoproliferative disorders; major adverse cardiovascular events; thrombosis; gastrointestinal perforations; neutropenia; lymphopenia; anemia; liver enzyme elevations; and lipid elevations.
Hyrimoz Counseling:  I discussed with the patient the risks of adalimumab including but not limited to myelosuppression, immunosuppression, autoimmune hepatitis, demyelinating diseases, lymphoma, and serious infections.  The patient understands that monitoring is required including a PPD at baseline and must alert us or the primary physician if symptoms of infection or other concerning signs are noted.
Minocycline Counseling: Patient advised regarding possible photosensitivity and discoloration of the teeth, skin, lips, tongue and gums.  Patient instructed to avoid sunlight, if possible.  When exposed to sunlight, patients should wear protective clothing, sunglasses, and sunscreen.  The patient was instructed to call the office immediately if the following severe adverse effects occur:  hearing changes, easy bruising/bleeding, severe headache, or vision changes.  The patient verbalized understanding of the proper use and possible adverse effects of minocycline.  All of the patient's questions and concerns were addressed.
Topical Clindamycin Counseling: Patient counseled that this medication may cause skin irritation or allergic reactions.  In the event of skin irritation, the patient was advised to reduce the amount of the drug applied or use it less frequently.   The patient verbalized understanding of the proper use and possible adverse effects of clindamycin.  All of the patient's questions and concerns were addressed.
Olanzapine Pregnancy And Lactation Text: This medication is pregnancy category C.   There are no adequate and well controlled trials with olanzapine in pregnant females.  Olanzapine should be used during pregnancy only if the potential benefit justifies the potential risk to the fetus.   In a study in lactating healthy women, olanzapine was excreted in breast milk.  It is recommended that women taking olanzapine should not breast feed.
Spironolactone Pregnancy And Lactation Text: This medication can cause feminization of the male fetus and should be avoided during pregnancy. The active metabolite is also found in breast milk.
Dutasteride Pregnancy And Lactation Text: This medication is absolutely contraindicated in women, especially during pregnancy and breast feeding. Feminization of male fetuses is possible if taking while pregnant.
Opzelura Counseling:  I discussed with the patient the risks of Opzelura including but not limited to nasopharngitis, bronchitis, ear infection, eosinophila, hives, diarrhea, folliculitis, tonsillitis, and rhinorrhea.  Taken orally, this medication has been linked to serious infections; higher rate of mortality; malignancy and lymphoproliferative disorders; major adverse cardiovascular events; thrombosis; thrombocytopenia, anemia, and neutropenia; and lipid elevations.

## 2025-04-02 NOTE — TELEPHONE ENCOUNTER
Dupixent myway states they are working on reviewing benefits. Will check back in a few days for another update

## 2025-04-10 NOTE — TELEPHONE ENCOUNTER
FREE DRUG APPLICATION APPROVED    Medication: DUPIXENT 300 MG/2ML SC SOAJ  Program Name:  Dupixent [place]  Effective Date: 4/9/2025  Expiration Date: 12/31/2025  Pharmacy Filling the Rx:    Patient Notified: yes  Additional Information:

## 2025-04-14 ENCOUNTER — TELEPHONE (OUTPATIENT)
Dept: ALLERGY | Facility: OTHER | Age: 85
End: 2025-04-14
Payer: COMMERCIAL

## 2025-04-14 NOTE — TELEPHONE ENCOUNTER
Assisted living facility calling for verbal orders to administer patient's Dupixent.  Provided orders to give Dupixent 300mg every 14 days subcutaneously.  They will also fax over order to have provider sign and fax back.    Enriqueta Wen MSN, RN   [facility], 4/14/2025 12:45 PM

## 2025-04-15 NOTE — TELEPHONE ENCOUNTER
Orders received for Dupixent, Symbicort and albuterol, and faxed back to [facility] at [phone].    Enriqueta Wen MSN, RN   [facility], 4/15/2025 10:07 AM

## 2025-04-30 ENCOUNTER — MYC MEDICAL ADVICE (OUTPATIENT)
Dept: FAMILY MEDICINE | Facility: CLINIC | Age: 85
End: 2025-04-30
Payer: COMMERCIAL

## 2025-04-30 ENCOUNTER — TELEPHONE (OUTPATIENT)
Dept: FAMILY MEDICINE | Facility: CLINIC | Age: 85
End: 2025-04-30
Payer: COMMERCIAL

## 2025-04-30 DIAGNOSIS — R42 VERTIGO: ICD-10-CM

## 2025-04-30 DIAGNOSIS — R42 DIZZINESS: Primary | ICD-10-CM

## 2025-04-30 NOTE — TELEPHONE ENCOUNTER
Refaxed PT Referral to [facility] to [phone], Rightfax confirmed.  Milagros BUCK    [facility]

## 2025-04-30 NOTE — TELEPHONE ENCOUNTER
Patient daughter Kelley calling on patient behalf. CTC on file. Looking for a referral for Physical Therapy at [facility] for vertigo. Patient has appointment tomorrow morning.    Pended referral, provider please review referral. Send referral to  so they can fax the order.   Phone to [facility]:    [phone]    Melonie Mcneill RN

## 2025-04-30 NOTE — TELEPHONE ENCOUNTER
Huddled with , will fax over referral to [facility]. Called patient daughter, Kelley, back to relay the message.     Melonie Mcneill RN

## 2025-05-11 ENCOUNTER — HEALTH MAINTENANCE LETTER (OUTPATIENT)
Age: 85
End: 2025-05-11

## 2025-05-19 ENCOUNTER — NURSE TRIAGE (OUTPATIENT)
Dept: FAMILY MEDICINE | Facility: CLINIC | Age: 85
End: 2025-05-19
Payer: COMMERCIAL

## 2025-05-19 NOTE — TELEPHONE ENCOUNTER
Nurse Triage SBAR    Is this a 2nd Level Triage? NO    Situation: Daughter Kelley (on CTC) called back. C/o recurrent left lower eyelid stye. Requesting appointment tomorrow or with next available provider at the [facility].     Background: Per daughter, this is the 4th time the pt had a stye in the last month. They have been to Urgent care and pt used eye drops in the past. Treated by Dr. Barboza on 3/3/25 with Augmentin but symptom came back.     Assessment: daughter was unable to determine the size of the stye but it's red and swollen and painful to touch. Pt c/o more pain towards the end of the day. Denies fever, blurred vision or any other symptoms.     Protocol Recommended Disposition:   See in Office Within 3 Days    Recommendation: Writer offered appointment at [facility] tomorrow but the daughter declined. Requested pt to be seen at the [facility] with next available provider. Scheduled with Dr. Gomez.    May 22, 2025 4:00 PM  (Arrive by 3:40 PM)  Provider Visit with Mahamed Gomez MD  [facility] ([facility] [place] [address] ) [phone]     Does the patient meet one of the following criteria for ADS visit consideration? 16+ years old, with an [facility] PCP     TIP  Providers, please consider if this condition is appropriate for management at one of our Acute and Diagnostic Services sites.     If patient is a good candidate, please use dotphrase <dot>triageresponse and select Refer to ADS to document.    Reason for Disposition   Patient wants to be seen   Longstanding or recurring problems with styes    Additional Information   Negative: Patient sounds very sick or weak to the triager   Negative: Eyelid is red and fever   Negative: Eyelid is swollen and fever   Negative: Redness spreads around the eye (both upper and lower eyelid are red)   Negative: Blurred vision AND new-onset or getting worse   Negative: Eyelid is very swollen and no fever    Answer Assessment -  "Initial Assessment Questions  1. LOCATION: \"Which eye has the sty?\" \"Upper or lower eyelid?\"      Left eye lower eyelid  2. SIZE: \"How big is it?\" (Note: standard pencil eraser is 6 mm)      \"Not that big but it's red and swollen\"  3. EYELID: \"Is the eyelid swollen?\" If Yes, ask: \"How much?\"      yes  4. REDNESS: \"Has the redness spread onto the eyelid?\"      Yes   5. ONSET: \"When did you notice the sty?\"      Over the weekend   6. VISION: \"Do you have blurred vision?\"       No   7. PAIN: \"Is it painful?\" If Yes, ask: \"How bad is the pain?\"  (Scale 1-10; or mild, moderate, severe)      3/10   8. CONTACTS: \"Do you wear contacts?\"      No.  9. OTHER SYMPTOMS: \"Do you have any other symptoms?\" (e.g., fever)      No    Protocols used: Sty-A-OH    "

## 2025-05-19 NOTE — TELEPHONE ENCOUNTER
This writer attempted to contact patient on 05/19/25.    Reason for call triage and left message to call clinic back at [phone].    If patient calls back:   Please further triage eye symptoms.     FYI: If appropriate for visit tomorrow and patient unable to get in with PCP, please consider ADS acute slot either at 9 am or 9:30 am.       YUE Toro  [facility] Triage  [place]

## 2025-05-19 NOTE — TELEPHONE ENCOUNTER
Reason for Call:  Appointment Request    Patient requesting this type of appt:  check possible sty on left eye    Requested provider: Jabari    Reason patient unable to be scheduled: Needs to be scheduled by clinic    When does patient want to be seen/preferred time: 1-2 days    Comments: Unable to schedule within a reasonable amount of time. If Dr. Barboza is unavailable, would be willing to be seen by any provider.    Could we send this information to you in [employer] or would you prefer to receive a phone call?:   Patient would prefer a phone call   Okay to leave a detailed message?: Yes at Other phone number:  Kelley [phone]    Call taken on 5/19/2025 at 1:03 PM by Flavia Kiser

## 2025-05-22 ENCOUNTER — OFFICE VISIT (OUTPATIENT)
Dept: FAMILY MEDICINE | Facility: CLINIC | Age: 85
End: 2025-05-22
Payer: COMMERCIAL

## 2025-05-22 VITALS
BODY MASS INDEX: 33.33 KG/M2 | OXYGEN SATURATION: 96 % | RESPIRATION RATE: 15 BRPM | WEIGHT: 225 LBS | SYSTOLIC BLOOD PRESSURE: 127 MMHG | TEMPERATURE: 97.7 F | DIASTOLIC BLOOD PRESSURE: 70 MMHG | HEART RATE: 71 BPM | HEIGHT: 69 IN

## 2025-05-22 DIAGNOSIS — I10 HYPERTENSION GOAL BP (BLOOD PRESSURE) < 140/90: ICD-10-CM

## 2025-05-22 DIAGNOSIS — H00.015 HORDEOLUM EXTERNUM OF LEFT LOWER EYELID: Primary | ICD-10-CM

## 2025-05-22 DIAGNOSIS — L71.9 ROSACEA: ICD-10-CM

## 2025-05-22 RX ORDER — DOXYCYCLINE 100 MG/1
100 CAPSULE ORAL DAILY
Qty: 90 CAPSULE | Refills: 0 | Status: SHIPPED | OUTPATIENT
Start: 2025-05-22

## 2025-05-22 ASSESSMENT — ENCOUNTER SYMPTOMS: EYE PAIN: 1

## 2025-05-22 ASSESSMENT — PAIN SCALES - GENERAL: PAINLEVEL_OUTOF10: MILD PAIN (2)

## 2025-05-22 NOTE — PROGRESS NOTES
Assessment & Plan     1.  Hordeolum externum of the left lower eyelid.  Recommend moist back over the eye for 10 minutes 4 times daily.  Patient is having recurrent hordeolum in both eyes.  Advised to see a ophthalmologist/optometrist for advice.  I wonder if it is related to dry eyes.  2.  Rosacea with painful pimples in the nose.  Dermatology has prescribed Cleocin and metronidazole gel which is he does not like to use the work as well.  I prescribed doxycycline 100 mg daily and see if it helps.  3.  Hypertension with blood pressure under control.    Debra Li is a 85 year old, presenting for the following health issues:  Eye Problem (Sty reoccurring )      5/22/2025     3:51 PM   Additional Questions   Roomed by Julianna         5/22/2025     3:50 PM   Patient Reported Additional Medications   Patient reports taking the following new medications no     Eye Problem     History of Present Illness       Reason for visit:  Eye swollen and face bumps    He eats 2-3 servings of fruits and vegetables daily.He consumes 1 sweetened beverage(s) daily.He exercises with enough effort to increase his heart rate 20 to 29 minutes per day.  He exercises with enough effort to increase his heart rate 3 or less days per week.   He is taking medications regularly.        Concern - reoccurring sty in left eye, bumps on skin   Onset: 2 weeks   Description: bump, red  Intensity: mild  Progression of Symptoms:  same  Accompanying Signs & Symptoms: bump, red  Previous history of similar problem: Yes  Precipitating factors:        Worsened by: comes and goes   Alleviating factors:        Improved by: comes and goes   Therapies tried and outcome: Yes       85-year-old gentleman with history of hypertension paroxysmal atrial fibrillation currently in assisted living presents to the clinic today with his daughter.  He has a stye in the left eye.  He indicates that he gets recurrent stye in both eyes.  No vision issues.  Has not seen  "an eye doctor in over a year.  He also has issue with rosacea of longstanding and lately over the nose he has been getting painful pimples.  He has seen dermatology in the past and has been prescribed topical treatment and it sounds like metronidazole and Cleocin which has not really helped.      Review of Systems  Constitutional, neuro, ENT, endocrine, pulmonary, cardiac, gastrointestinal, genitourinary, musculoskeletal, integument and psychiatric systems are negative, except as otherwise noted.      Objective    /70 (BP Location: Right arm, Patient Position: Sitting, Cuff Size: Adult Regular)   Pulse 71   Temp 97.7  F (36.5  C) (Temporal)   Resp 15   Ht 1.753 m (5' 9\")   Wt 102.1 kg (225 lb)   SpO2 96%   BMI 33.23 kg/m    Body mass index is 33.23 kg/m .  Physical Exam   GENERAL: alert and no distress  EYES: Eyes grossly normal to inspection except for evidence of a small red swollen lesion left lower eyelid consistent with stye.  Right eye looks fine.  SKIN: Patient has a butterfly type erythematous rash involving the nose and the cheeks of the face.  Couple of red pimple noted over the nose.            Signed Electronically by: Mahamed Gomez MD    "

## 2025-06-03 DIAGNOSIS — E78.5 HYPERLIPIDEMIA LDL GOAL <130: ICD-10-CM

## 2025-06-03 RX ORDER — ATORVASTATIN CALCIUM 20 MG/1
TABLET, FILM COATED ORAL
Qty: 45 TABLET | Refills: 2 | Status: SHIPPED | OUTPATIENT
Start: 2025-06-03

## 2025-08-04 ENCOUNTER — OFFICE VISIT (OUTPATIENT)
Dept: CARDIOLOGY | Facility: CLINIC | Age: 85
End: 2025-08-04
Payer: COMMERCIAL

## 2025-08-04 VITALS
HEIGHT: 69 IN | BODY MASS INDEX: 33.18 KG/M2 | OXYGEN SATURATION: 98 % | DIASTOLIC BLOOD PRESSURE: 69 MMHG | WEIGHT: 224 LBS | SYSTOLIC BLOOD PRESSURE: 149 MMHG | HEART RATE: 71 BPM

## 2025-08-04 DIAGNOSIS — E78.5 HYPERLIPIDEMIA LDL GOAL <130: Primary | ICD-10-CM

## 2025-08-04 DIAGNOSIS — E78.5 HYPERLIPIDEMIA LDL GOAL <130: ICD-10-CM

## 2025-08-04 DIAGNOSIS — R00.2 PALPITATIONS: ICD-10-CM

## 2025-08-04 DIAGNOSIS — R07.9 CHEST PAIN: ICD-10-CM

## 2025-08-04 DIAGNOSIS — I10 HYPERTENSION GOAL BP (BLOOD PRESSURE) < 140/90: Primary | ICD-10-CM

## 2025-08-04 DIAGNOSIS — I10 HYPERTENSION GOAL BP (BLOOD PRESSURE) < 140/90: ICD-10-CM

## 2025-08-04 LAB
ATRIAL RATE - MUSE: NORMAL BPM
DIASTOLIC BLOOD PRESSURE - MUSE: NORMAL MMHG
INTERPRETATION ECG - MUSE: NORMAL
P AXIS - MUSE: NORMAL DEGREES
PR INTERVAL - MUSE: NORMAL MS
QRS DURATION - MUSE: 96 MS
QT - MUSE: 372 MS
QTC - MUSE: 401 MS
R AXIS - MUSE: -9 DEGREES
SYSTOLIC BLOOD PRESSURE - MUSE: NORMAL MMHG
T AXIS - MUSE: 65 DEGREES
VENTRICULAR RATE- MUSE: 70 BPM

## 2025-08-04 PROCEDURE — 3077F SYST BP >= 140 MM HG: CPT | Performed by: STUDENT IN AN ORGANIZED HEALTH CARE EDUCATION/TRAINING PROGRAM

## 2025-08-04 PROCEDURE — 3078F DIAST BP <80 MM HG: CPT | Performed by: STUDENT IN AN ORGANIZED HEALTH CARE EDUCATION/TRAINING PROGRAM

## 2025-08-04 PROCEDURE — 93000 ELECTROCARDIOGRAM COMPLETE: CPT | Performed by: INTERNAL MEDICINE

## 2025-08-04 PROCEDURE — 99204 OFFICE O/P NEW MOD 45 MIN: CPT | Performed by: STUDENT IN AN ORGANIZED HEALTH CARE EDUCATION/TRAINING PROGRAM

## 2025-08-04 PROCEDURE — 3049F LDL-C 100-129 MG/DL: CPT | Performed by: STUDENT IN AN ORGANIZED HEALTH CARE EDUCATION/TRAINING PROGRAM

## 2025-08-04 PROCEDURE — G2211 COMPLEX E/M VISIT ADD ON: HCPCS | Performed by: STUDENT IN AN ORGANIZED HEALTH CARE EDUCATION/TRAINING PROGRAM

## 2025-08-04 RX ORDER — DILTIAZEM HYDROCHLORIDE 120 MG/1
120 CAPSULE, COATED, EXTENDED RELEASE ORAL DAILY
Qty: 90 CAPSULE | Refills: 3 | Status: SHIPPED | OUTPATIENT
Start: 2025-08-04

## 2025-08-04 RX ORDER — ATORVASTATIN CALCIUM 20 MG/1
10 TABLET, FILM COATED ORAL DAILY
Qty: 45 TABLET | Refills: 3 | Status: SHIPPED | OUTPATIENT
Start: 2025-08-04

## 2025-08-05 ENCOUNTER — TELEPHONE (OUTPATIENT)
Dept: FAMILY MEDICINE | Facility: CLINIC | Age: 85
End: 2025-08-05
Payer: COMMERCIAL

## 2025-08-05 DIAGNOSIS — R42 VERTIGO: Primary | ICD-10-CM

## 2025-08-07 ENCOUNTER — LAB (OUTPATIENT)
Dept: LAB | Facility: CLINIC | Age: 85
End: 2025-08-07
Payer: COMMERCIAL

## 2025-08-07 ENCOUNTER — TRANSFERRED RECORDS (OUTPATIENT)
Dept: HEALTH INFORMATION MANAGEMENT | Facility: CLINIC | Age: 85
End: 2025-08-07

## 2025-08-07 DIAGNOSIS — E78.5 HYPERLIPIDEMIA LDL GOAL <130: ICD-10-CM

## 2025-08-07 DIAGNOSIS — I10 HYPERTENSION GOAL BP (BLOOD PRESSURE) < 140/90: Primary | ICD-10-CM

## 2025-08-07 LAB
ANION GAP SERPL CALCULATED.3IONS-SCNC: 11 MMOL/L (ref 7–15)
BUN SERPL-MCNC: 10.7 MG/DL (ref 8–23)
CALCIUM SERPL-MCNC: 9.5 MG/DL (ref 8.8–10.4)
CHLORIDE SERPL-SCNC: 102 MMOL/L (ref 98–107)
CHOLEST SERPL-MCNC: 190 MG/DL
CREAT SERPL-MCNC: 0.76 MG/DL (ref 0.67–1.17)
EGFRCR SERPLBLD CKD-EPI 2021: 89 ML/MIN/1.73M2
FASTING STATUS PATIENT QL REPORTED: YES
FASTING STATUS PATIENT QL REPORTED: YES
GLUCOSE SERPL-MCNC: 113 MG/DL (ref 70–99)
HCO3 SERPL-SCNC: 26 MMOL/L (ref 22–29)
HDLC SERPL-MCNC: 69 MG/DL
LDLC SERPL CALC-MCNC: 108 MG/DL
NONHDLC SERPL-MCNC: 121 MG/DL
POTASSIUM SERPL-SCNC: 4.5 MMOL/L (ref 3.4–5.3)
SODIUM SERPL-SCNC: 139 MMOL/L (ref 135–145)
TRIGL SERPL-MCNC: 64 MG/DL

## (undated) DEVICE — SOL WATER IRRIG 1000ML BOTTLE 07139-09